# Patient Record
Sex: MALE | Race: WHITE | NOT HISPANIC OR LATINO | Employment: OTHER | URBAN - METROPOLITAN AREA
[De-identification: names, ages, dates, MRNs, and addresses within clinical notes are randomized per-mention and may not be internally consistent; named-entity substitution may affect disease eponyms.]

---

## 2017-01-27 ENCOUNTER — HOSPITAL ENCOUNTER (OUTPATIENT)
Facility: HOSPITAL | Age: 67
Setting detail: OBSERVATION
Discharge: HOME | End: 2017-01-28
Attending: EMERGENCY MEDICINE | Admitting: INTERNAL MEDICINE

## 2017-01-27 ENCOUNTER — APPOINTMENT (OUTPATIENT)
Dept: GENERAL RADIOLOGY | Facility: HOSPITAL | Age: 67
End: 2017-01-27

## 2017-01-27 ENCOUNTER — APPOINTMENT (OUTPATIENT)
Dept: CARDIOLOGY | Facility: HOSPITAL | Age: 67
End: 2017-01-27
Attending: INTERNAL MEDICINE

## 2017-01-27 DIAGNOSIS — R07.9 CHEST PAIN, UNSPECIFIED TYPE: Primary | ICD-10-CM

## 2017-01-27 LAB
ALBUMIN SERPL-MCNC: 3.5 G/DL (ref 3.5–5.2)
ALBUMIN/GLOB SERPL: 1.4 G/DL
ALP SERPL-CCNC: 97 U/L (ref 40–129)
ALT SERPL W P-5'-P-CCNC: 13 U/L (ref 5–41)
ANION GAP SERPL CALCULATED.3IONS-SCNC: 13.2 MMOL/L
APTT PPP: 32 SECONDS (ref 24.3–38.1)
AST SERPL-CCNC: 26 U/L (ref 5–40)
BASOPHILS # BLD AUTO: 0.04 10*3/MM3 (ref 0–0.2)
BASOPHILS NFR BLD AUTO: 0.8 % (ref 0–2)
BILIRUB SERPL-MCNC: 0.7 MG/DL (ref 0.2–1.2)
BUN BLD-MCNC: 15 MG/DL (ref 8–23)
BUN/CREAT SERPL: 14.7 (ref 7–25)
CALCIUM SPEC-SCNC: 8.4 MG/DL (ref 8.8–10.5)
CHLORIDE SERPL-SCNC: 104 MMOL/L (ref 98–107)
CO2 SERPL-SCNC: 23.8 MMOL/L (ref 22–29)
CREAT BLD-MCNC: 1.02 MG/DL (ref 0.76–1.27)
DEPRECATED RDW RBC AUTO: 55.9 FL (ref 37–54)
EOSINOPHIL # BLD AUTO: 0.08 10*3/MM3 (ref 0.1–0.3)
EOSINOPHIL NFR BLD AUTO: 1.5 % (ref 0–4)
ERYTHROCYTE [DISTWIDTH] IN BLOOD BY AUTOMATED COUNT: 14.3 % (ref 11.5–14.5)
GFR SERPL CREATININE-BSD FRML MDRD: 73 ML/MIN/1.73
GLOBULIN UR ELPH-MCNC: 2.5 GM/DL
GLUCOSE BLD-MCNC: 99 MG/DL (ref 65–99)
HCT VFR BLD AUTO: 41.6 % (ref 42–52)
HGB BLD-MCNC: 15 G/DL (ref 14–18)
IMM GRANULOCYTES # BLD: 0.03 10*3/MM3 (ref 0–0.03)
IMM GRANULOCYTES NFR BLD: 0.6 % (ref 0–0.5)
INR PPP: 0.94 (ref 0.9–1.1)
LYMPHOCYTES # BLD AUTO: 1.71 10*3/MM3 (ref 0.6–4.8)
LYMPHOCYTES NFR BLD AUTO: 33 % (ref 20–45)
MCH RBC QN AUTO: 38 PG (ref 27–31)
MCHC RBC AUTO-ENTMCNC: 36.1 G/DL (ref 31–37)
MCV RBC AUTO: 105.3 FL (ref 80–94)
MONOCYTES # BLD AUTO: 0.46 10*3/MM3 (ref 0–1)
MONOCYTES NFR BLD AUTO: 8.9 % (ref 3–8)
NEUTROPHILS # BLD AUTO: 2.86 10*3/MM3 (ref 1.5–8.3)
NEUTROPHILS NFR BLD AUTO: 55.2 % (ref 45–70)
NRBC BLD MANUAL-RTO: 0 /100 WBC (ref 0–0)
NT-PROBNP SERPL-MCNC: 158 PG/ML (ref 5–125)
PLATELET # BLD AUTO: 150 10*3/MM3 (ref 140–500)
PMV BLD AUTO: 10.4 FL (ref 7.4–10.4)
POTASSIUM BLD-SCNC: 4 MMOL/L (ref 3.5–5.2)
PROT SERPL-MCNC: 6 G/DL (ref 6–8.5)
PROTHROMBIN TIME: 12.6 SECONDS (ref 12.1–15)
RBC # BLD AUTO: 3.95 10*6/MM3 (ref 4.7–6.1)
SODIUM BLD-SCNC: 141 MMOL/L (ref 136–145)
TROPONIN T SERPL-MCNC: <0.01 NG/ML (ref 0–0.03)
TROPONIN T SERPL-MCNC: <0.01 NG/ML (ref 0–0.03)
WBC NRBC COR # BLD: 5.18 10*3/MM3 (ref 4.8–10.8)

## 2017-01-27 PROCEDURE — 85730 THROMBOPLASTIN TIME PARTIAL: CPT | Performed by: EMERGENCY MEDICINE

## 2017-01-27 PROCEDURE — G0378 HOSPITAL OBSERVATION PER HR: HCPCS

## 2017-01-27 PROCEDURE — 25010000002 ENOXAPARIN PER 10 MG: Performed by: EMERGENCY MEDICINE

## 2017-01-27 PROCEDURE — 25010000002 ENOXAPARIN PER 10 MG: Performed by: INTERNAL MEDICINE

## 2017-01-27 PROCEDURE — 99219 PR INITIAL OBSERVATION CARE/DAY 50 MINUTES: CPT | Performed by: INTERNAL MEDICINE

## 2017-01-27 PROCEDURE — 94640 AIRWAY INHALATION TREATMENT: CPT

## 2017-01-27 PROCEDURE — 85025 COMPLETE CBC W/AUTO DIFF WBC: CPT | Performed by: EMERGENCY MEDICINE

## 2017-01-27 PROCEDURE — 85610 PROTHROMBIN TIME: CPT | Performed by: EMERGENCY MEDICINE

## 2017-01-27 PROCEDURE — 83880 ASSAY OF NATRIURETIC PEPTIDE: CPT | Performed by: EMERGENCY MEDICINE

## 2017-01-27 PROCEDURE — 99284 EMERGENCY DEPT VISIT MOD MDM: CPT | Performed by: EMERGENCY MEDICINE

## 2017-01-27 PROCEDURE — 71020 HC CHEST PA AND LATERAL: CPT

## 2017-01-27 PROCEDURE — 80053 COMPREHEN METABOLIC PANEL: CPT | Performed by: EMERGENCY MEDICINE

## 2017-01-27 PROCEDURE — 96372 THER/PROPH/DIAG INJ SC/IM: CPT

## 2017-01-27 PROCEDURE — 93306 TTE W/DOPPLER COMPLETE: CPT

## 2017-01-27 PROCEDURE — 99284 EMERGENCY DEPT VISIT MOD MDM: CPT

## 2017-01-27 PROCEDURE — 84484 ASSAY OF TROPONIN QUANT: CPT | Performed by: INTERNAL MEDICINE

## 2017-01-27 PROCEDURE — 93306 TTE W/DOPPLER COMPLETE: CPT | Performed by: INTERNAL MEDICINE

## 2017-01-27 PROCEDURE — 93010 ELECTROCARDIOGRAM REPORT: CPT | Performed by: INTERNAL MEDICINE

## 2017-01-27 PROCEDURE — 93005 ELECTROCARDIOGRAM TRACING: CPT | Performed by: EMERGENCY MEDICINE

## 2017-01-27 PROCEDURE — 84484 ASSAY OF TROPONIN QUANT: CPT | Performed by: EMERGENCY MEDICINE

## 2017-01-27 RX ORDER — SODIUM CHLORIDE 0.9 % (FLUSH) 0.9 %
10 SYRINGE (ML) INJECTION AS NEEDED
Status: DISCONTINUED | OUTPATIENT
Start: 2017-01-27 | End: 2017-01-28 | Stop reason: HOSPADM

## 2017-01-27 RX ORDER — ACETAMINOPHEN 325 MG/1
650 TABLET ORAL EVERY 6 HOURS PRN
Status: DISCONTINUED | OUTPATIENT
Start: 2017-01-27 | End: 2017-01-28 | Stop reason: HOSPADM

## 2017-01-27 RX ORDER — DOCUSATE SODIUM 100 MG/1
100 CAPSULE, LIQUID FILLED ORAL 2 TIMES DAILY
Status: DISCONTINUED | OUTPATIENT
Start: 2017-01-27 | End: 2017-01-28 | Stop reason: HOSPADM

## 2017-01-27 RX ORDER — GABAPENTIN 400 MG/1
800 CAPSULE ORAL EVERY 8 HOURS SCHEDULED
Status: DISCONTINUED | OUTPATIENT
Start: 2017-01-27 | End: 2017-01-28 | Stop reason: HOSPADM

## 2017-01-27 RX ORDER — CYCLOBENZAPRINE HCL 10 MG
10 TABLET ORAL 3 TIMES DAILY PRN
Status: DISCONTINUED | OUTPATIENT
Start: 2017-01-27 | End: 2017-01-28 | Stop reason: HOSPADM

## 2017-01-27 RX ORDER — NORTRIPTYLINE HYDROCHLORIDE 25 MG/1
25 CAPSULE ORAL NIGHTLY
Status: DISCONTINUED | OUTPATIENT
Start: 2017-01-27 | End: 2017-01-28 | Stop reason: HOSPADM

## 2017-01-27 RX ORDER — NITROGLYCERIN 0.4 MG/1
0.4 TABLET SUBLINGUAL
Status: DISCONTINUED | OUTPATIENT
Start: 2017-01-27 | End: 2017-01-28 | Stop reason: HOSPADM

## 2017-01-27 RX ORDER — NICOTINE 21 MG/24HR
1 PATCH, TRANSDERMAL 24 HOURS TRANSDERMAL EVERY 24 HOURS
Status: DISCONTINUED | OUTPATIENT
Start: 2017-01-27 | End: 2017-01-28 | Stop reason: HOSPADM

## 2017-01-27 RX ORDER — DESONIDE 0.5 MG/G
CREAM TOPICAL EVERY 12 HOURS SCHEDULED
Status: DISCONTINUED | OUTPATIENT
Start: 2017-01-27 | End: 2017-01-28 | Stop reason: HOSPADM

## 2017-01-27 RX ORDER — ASPIRIN 81 MG/1
324 TABLET, CHEWABLE ORAL ONCE
Status: COMPLETED | OUTPATIENT
Start: 2017-01-27 | End: 2017-01-27

## 2017-01-27 RX ORDER — IPRATROPIUM BROMIDE AND ALBUTEROL SULFATE 2.5; .5 MG/3ML; MG/3ML
3 SOLUTION RESPIRATORY (INHALATION)
Status: DISCONTINUED | OUTPATIENT
Start: 2017-01-27 | End: 2017-01-28 | Stop reason: HOSPADM

## 2017-01-27 RX ORDER — DOXAZOSIN 2 MG/1
4 TABLET ORAL DAILY
Status: DISCONTINUED | OUTPATIENT
Start: 2017-01-27 | End: 2017-01-28 | Stop reason: HOSPADM

## 2017-01-27 RX ORDER — ALBUTEROL SULFATE 2.5 MG/3ML
2.5 SOLUTION RESPIRATORY (INHALATION)
Status: DISCONTINUED | OUTPATIENT
Start: 2017-01-27 | End: 2017-01-27

## 2017-01-27 RX ORDER — FAMOTIDINE 20 MG/1
20 TABLET, FILM COATED ORAL DAILY
Status: DISCONTINUED | OUTPATIENT
Start: 2017-01-27 | End: 2017-01-28 | Stop reason: HOSPADM

## 2017-01-27 RX ORDER — CELECOXIB 200 MG/1
200 CAPSULE ORAL DAILY
Status: DISCONTINUED | OUTPATIENT
Start: 2017-01-27 | End: 2017-01-28 | Stop reason: HOSPADM

## 2017-01-27 RX ORDER — BUDESONIDE 0.5 MG/2ML
0.5 INHALANT ORAL
Status: DISCONTINUED | OUTPATIENT
Start: 2017-01-27 | End: 2017-01-28 | Stop reason: HOSPADM

## 2017-01-27 RX ADMIN — DILTIAZEM HYDROCHLORIDE 30 MG: 30 TABLET, FILM COATED ORAL at 19:08

## 2017-01-27 RX ADMIN — NORTRIPTYLINE HYDROCHLORIDE 25 MG: 25 CAPSULE ORAL at 21:11

## 2017-01-27 RX ADMIN — DESONIDE: 0.5 CREAM TOPICAL at 21:08

## 2017-01-27 RX ADMIN — ASPIRIN 81 MG 324 MG: 81 TABLET ORAL at 12:55

## 2017-01-27 RX ADMIN — FAMOTIDINE 20 MG: 20 TABLET, FILM COATED ORAL at 19:08

## 2017-01-27 RX ADMIN — IPRATROPIUM BROMIDE AND ALBUTEROL SULFATE 3 ML: .5; 3 SOLUTION RESPIRATORY (INHALATION) at 20:16

## 2017-01-27 RX ADMIN — DILTIAZEM HYDROCHLORIDE 30 MG: 30 TABLET, FILM COATED ORAL at 23:50

## 2017-01-27 RX ADMIN — NITROGLYCERIN 0.4 MG: 0.4 TABLET SUBLINGUAL at 12:55

## 2017-01-27 RX ADMIN — IPRATROPIUM BROMIDE AND ALBUTEROL SULFATE 3 ML: .5; 3 SOLUTION RESPIRATORY (INHALATION) at 16:33

## 2017-01-27 RX ADMIN — BUDESONIDE 0.5 MG: 0.5 SUSPENSION RESPIRATORY (INHALATION) at 20:16

## 2017-01-27 RX ADMIN — ENOXAPARIN SODIUM 60 MG: 60 INJECTION SUBCUTANEOUS at 15:05

## 2017-01-27 RX ADMIN — GABAPENTIN 800 MG: 400 CAPSULE ORAL at 21:09

## 2017-01-27 RX ADMIN — NICOTINE 1 PATCH: 21 PATCH TRANSDERMAL at 19:07

## 2017-01-27 RX ADMIN — ENOXAPARIN SODIUM 40 MG: 40 INJECTION SUBCUTANEOUS at 21:08

## 2017-01-27 RX ADMIN — NITROGLYCERIN 1 INCH: 20 OINTMENT TOPICAL at 14:08

## 2017-01-27 RX ADMIN — GABAPENTIN 800 MG: 400 CAPSULE ORAL at 19:09

## 2017-01-28 VITALS
WEIGHT: 142.8 LBS | HEIGHT: 69 IN | SYSTOLIC BLOOD PRESSURE: 122 MMHG | TEMPERATURE: 97 F | OXYGEN SATURATION: 97 % | HEART RATE: 86 BPM | BODY MASS INDEX: 21.15 KG/M2 | DIASTOLIC BLOOD PRESSURE: 65 MMHG | RESPIRATION RATE: 18 BRPM

## 2017-01-28 DIAGNOSIS — R07.9 CHEST PAIN, UNSPECIFIED TYPE: Primary | ICD-10-CM

## 2017-01-28 LAB
ALBUMIN SERPL-MCNC: 3.4 G/DL (ref 3.5–5.2)
ALBUMIN/GLOB SERPL: 1.7 G/DL
ALP SERPL-CCNC: 83 U/L (ref 40–129)
ALT SERPL W P-5'-P-CCNC: 10 U/L (ref 5–41)
ANION GAP SERPL CALCULATED.3IONS-SCNC: 12.7 MMOL/L
AST SERPL-CCNC: 21 U/L (ref 5–40)
BASOPHILS # BLD AUTO: 0.04 10*3/MM3 (ref 0–0.2)
BASOPHILS NFR BLD AUTO: 0.7 % (ref 0–2)
BH CV ECHO MEAS - ACS: 1.7 CM
BH CV ECHO MEAS - AO MAX PG (FULL): 13 MMHG
BH CV ECHO MEAS - AO MAX PG: 13.1 MMHG
BH CV ECHO MEAS - AO MEAN PG (FULL): 3.1 MMHG
BH CV ECHO MEAS - AO MEAN PG: 6.7 MMHG
BH CV ECHO MEAS - AO ROOT AREA (BSA CORRECTED): 1.9
BH CV ECHO MEAS - AO ROOT AREA: 9.6 CM^2
BH CV ECHO MEAS - AO ROOT DIAM: 3.5 CM
BH CV ECHO MEAS - AO V2 MAX: 180.7 CM/SEC
BH CV ECHO MEAS - AO V2 MEAN: 118.9 CM/SEC
BH CV ECHO MEAS - AO V2 VTI: 32.7 CM
BH CV ECHO MEAS - AVA(I,A): 2.4 CM^2
BH CV ECHO MEAS - AVA(I,D): 2.4 CM^2
BH CV ECHO MEAS - AVA(V,A): 2.1 CM^2
BH CV ECHO MEAS - AVA(V,D): 2.1 CM^2
BH CV ECHO MEAS - BSA(HAYCOCK): 1.8 M^2
BH CV ECHO MEAS - BSA: 1.8 M^2
BH CV ECHO MEAS - BZI_BMI: 21.1 KILOGRAMS/M^2
BH CV ECHO MEAS - BZI_METRIC_HEIGHT: 175.3 CM
BH CV ECHO MEAS - BZI_METRIC_WEIGHT: 64.9 KG
BH CV ECHO MEAS - CONTRAST EF (2CH): 65.8 ML/M^2
BH CV ECHO MEAS - CONTRAST EF 4CH: 59.7 ML/M^2
BH CV ECHO MEAS - EDV(CUBED): 101 ML
BH CV ECHO MEAS - EDV(MOD-SP2): 73 ML
BH CV ECHO MEAS - EDV(MOD-SP4): 67 ML
BH CV ECHO MEAS - EDV(TEICH): 100.2 ML
BH CV ECHO MEAS - EF(CUBED): 81.8 %
BH CV ECHO MEAS - EF(MOD-SP2): 65.8 %
BH CV ECHO MEAS - EF(MOD-SP4): 59.7 %
BH CV ECHO MEAS - EF(TEICH): 74.5 %
BH CV ECHO MEAS - ESV(CUBED): 18.4 ML
BH CV ECHO MEAS - ESV(MOD-SP2): 25 ML
BH CV ECHO MEAS - ESV(MOD-SP4): 27 ML
BH CV ECHO MEAS - ESV(TEICH): 25.6 ML
BH CV ECHO MEAS - FS: 43.3 %
BH CV ECHO MEAS - IVS/LVPW: 1.1
BH CV ECHO MEAS - IVSD: 0.86 CM
BH CV ECHO MEAS - LA DIMENSION: 3 CM
BH CV ECHO MEAS - LA/AO: 0.87
BH CV ECHO MEAS - LAT PEAK E' VEL: 10 CM/SEC
BH CV ECHO MEAS - LV DIASTOLIC VOL/BSA (35-75): 37.4 ML/M^2
BH CV ECHO MEAS - LV MASS(C)D: 127.2 GRAMS
BH CV ECHO MEAS - LV MASS(C)DI: 71 GRAMS/M^2
BH CV ECHO MEAS - LV MAX PG: 6.8 MMHG
BH CV ECHO MEAS - LV MEAN PG: 3.5 MMHG
BH CV ECHO MEAS - LV SYSTOLIC VOL/BSA (12-30): 15.1 ML/M^2
BH CV ECHO MEAS - LV V1 MAX: 130.3 CM/SEC
BH CV ECHO MEAS - LV V1 MEAN: 88 CM/SEC
BH CV ECHO MEAS - LV V1 VTI: 27.3 CM
BH CV ECHO MEAS - LVIDD: 4.7 CM
BH CV ECHO MEAS - LVIDS: 2.6 CM
BH CV ECHO MEAS - LVLD AP2: 8 CM
BH CV ECHO MEAS - LVLD AP4: 7.7 CM
BH CV ECHO MEAS - LVLS AP2: 6.8 CM
BH CV ECHO MEAS - LVLS AP4: 6.7 CM
BH CV ECHO MEAS - LVOT AREA (M): 2.8 CM^2
BH CV ECHO MEAS - LVOT AREA: 2.8 CM^2
BH CV ECHO MEAS - LVOT DIAM: 1.9 CM
BH CV ECHO MEAS - LVPWD: 0.81 CM
BH CV ECHO MEAS - MED PEAK E' VEL: 9 CM/SEC
BH CV ECHO MEAS - MR MAX PG: 84.4 MMHG
BH CV ECHO MEAS - MR MAX VEL: 459.4 CM/SEC
BH CV ECHO MEAS - MV A DUR: 0.16 SEC
BH CV ECHO MEAS - MV A MAX VEL: 87.4 CM/SEC
BH CV ECHO MEAS - MV DEC SLOPE: 375.9 CM/SEC^2
BH CV ECHO MEAS - MV DEC TIME: 0.23 SEC
BH CV ECHO MEAS - MV E MAX VEL: 89.8 CM/SEC
BH CV ECHO MEAS - MV E/A: 1
BH CV ECHO MEAS - MV MAX PG: 3.4 MMHG
BH CV ECHO MEAS - MV MEAN PG: 1.5 MMHG
BH CV ECHO MEAS - MV P1/2T MAX VEL: 90.3 CM/SEC
BH CV ECHO MEAS - MV P1/2T: 70.4 MSEC
BH CV ECHO MEAS - MV V2 MAX: 92.3 CM/SEC
BH CV ECHO MEAS - MV V2 MEAN: 57.6 CM/SEC
BH CV ECHO MEAS - MV V2 VTI: 24.5 CM
BH CV ECHO MEAS - MVA P1/2T LCG: 2.4 CM^2
BH CV ECHO MEAS - MVA(P1/2T): 3.1 CM^2
BH CV ECHO MEAS - MVA(VTI): 3.2 CM^2
BH CV ECHO MEAS - PA ACC SLOPE: 1079 CM/SEC^2
BH CV ECHO MEAS - PA ACC TIME: 0.12 SEC
BH CV ECHO MEAS - PA MAX PG (FULL): 4.2 MMHG
BH CV ECHO MEAS - PA MAX PG: 7.4 MMHG
BH CV ECHO MEAS - PA MEAN PG (FULL): 1.6 MMHG
BH CV ECHO MEAS - PA MEAN PG: 3 MMHG
BH CV ECHO MEAS - PA PR(ACCEL): 23.5 MMHG
BH CV ECHO MEAS - PA V2 MAX: 135.7 CM/SEC
BH CV ECHO MEAS - PA V2 MEAN: 78.2 CM/SEC
BH CV ECHO MEAS - PA V2 VTI: 19 CM
BH CV ECHO MEAS - PULM A REVS DUR: 0.14 SEC
BH CV ECHO MEAS - PULM A REVS VEL: 42.4 CM/SEC
BH CV ECHO MEAS - PULM DIAS VEL: 45.9 CM/SEC
BH CV ECHO MEAS - PULM S/D: 1.3
BH CV ECHO MEAS - PULM SYS VEL: 60.7 CM/SEC
BH CV ECHO MEAS - PVA(I,A): 2.2 CM^2
BH CV ECHO MEAS - PVA(I,D): 2.2 CM^2
BH CV ECHO MEAS - PVA(V,A): 2 CM^2
BH CV ECHO MEAS - PVA(V,D): 2 CM^2
BH CV ECHO MEAS - QP/QS: 0.53
BH CV ECHO MEAS - RAP SYSTOLE: 3 MMHG
BH CV ECHO MEAS - RV MAX PG: 3.1 MMHG
BH CV ECHO MEAS - RV MEAN PG: 1.4 MMHG
BH CV ECHO MEAS - RV V1 MAX: 88.4 CM/SEC
BH CV ECHO MEAS - RV V1 MEAN: 53.5 CM/SEC
BH CV ECHO MEAS - RV V1 VTI: 13.6 CM
BH CV ECHO MEAS - RVOT AREA: 3 CM^2
BH CV ECHO MEAS - RVOT DIAM: 2 CM
BH CV ECHO MEAS - RVSP: 31 MMHG
BH CV ECHO MEAS - SI(AO): 174.4 ML/M^2
BH CV ECHO MEAS - SI(CUBED): 46.1 ML/M^2
BH CV ECHO MEAS - SI(LVOT): 43.3 ML/M^2
BH CV ECHO MEAS - SI(MOD-SP2): 26.8 ML/M^2
BH CV ECHO MEAS - SI(MOD-SP4): 22.3 ML/M^2
BH CV ECHO MEAS - SI(TEICH): 41.7 ML/M^2
BH CV ECHO MEAS - SV(AO): 312.4 ML
BH CV ECHO MEAS - SV(CUBED): 82.6 ML
BH CV ECHO MEAS - SV(LVOT): 77.6 ML
BH CV ECHO MEAS - SV(MOD-SP2): 48 ML
BH CV ECHO MEAS - SV(MOD-SP4): 40 ML
BH CV ECHO MEAS - SV(RVOT): 40.8 ML
BH CV ECHO MEAS - SV(TEICH): 74.6 ML
BH CV ECHO MEAS - TAPSE (>1.6): 2.8 CM2
BH CV ECHO MEAS - TR MAX VEL: 261.8 CM/SEC
BH CV XLRA - RV BASE: 3.8 CM
BH CV XLRA - RV LENGTH: 7.2 CM
BH CV XLRA - RV MID: 2.6 CM
BH CV XLRA - TDI S': 18 CM/SEC
BILIRUB SERPL-MCNC: 0.6 MG/DL (ref 0.2–1.2)
BUN BLD-MCNC: 10 MG/DL (ref 8–23)
BUN/CREAT SERPL: 11.4 (ref 7–25)
CALCIUM SPEC-SCNC: 8.3 MG/DL (ref 8.8–10.5)
CHLORIDE SERPL-SCNC: 104 MMOL/L (ref 98–107)
CO2 SERPL-SCNC: 24.3 MMOL/L (ref 22–29)
CREAT BLD-MCNC: 0.88 MG/DL (ref 0.76–1.27)
DEPRECATED RDW RBC AUTO: 55 FL (ref 37–54)
E/E' RATIO: 10
EOSINOPHIL # BLD AUTO: 0.07 10*3/MM3 (ref 0.1–0.3)
EOSINOPHIL NFR BLD AUTO: 1.3 % (ref 0–4)
ERYTHROCYTE [DISTWIDTH] IN BLOOD BY AUTOMATED COUNT: 14.2 % (ref 11.5–14.5)
GFR SERPL CREATININE-BSD FRML MDRD: 87 ML/MIN/1.73
GLOBULIN UR ELPH-MCNC: 2 GM/DL
GLUCOSE BLD-MCNC: 96 MG/DL (ref 65–99)
HCT VFR BLD AUTO: 37.9 % (ref 42–52)
HGB BLD-MCNC: 13.4 G/DL (ref 14–18)
IMM GRANULOCYTES # BLD: 0.02 10*3/MM3 (ref 0–0.03)
IMM GRANULOCYTES NFR BLD: 0.4 % (ref 0–0.5)
LEFT ATRIUM VOLUME INDEX: 23 ML/M2
LEFT ATRIUM VOLUME: 41 CM3
LYMPHOCYTES # BLD AUTO: 1.79 10*3/MM3 (ref 0.6–4.8)
LYMPHOCYTES NFR BLD AUTO: 32 % (ref 20–45)
MCH RBC QN AUTO: 36.9 PG (ref 27–31)
MCHC RBC AUTO-ENTMCNC: 35.4 G/DL (ref 31–37)
MCV RBC AUTO: 104.4 FL (ref 80–94)
MONOCYTES # BLD AUTO: 0.45 10*3/MM3 (ref 0–1)
MONOCYTES NFR BLD AUTO: 8 % (ref 3–8)
NEUTROPHILS # BLD AUTO: 3.23 10*3/MM3 (ref 1.5–8.3)
NEUTROPHILS NFR BLD AUTO: 57.6 % (ref 45–70)
NRBC BLD MANUAL-RTO: 0 /100 WBC (ref 0–0)
PLATELET # BLD AUTO: 137 10*3/MM3 (ref 140–500)
PMV BLD AUTO: 10.3 FL (ref 7.4–10.4)
POTASSIUM BLD-SCNC: 3.7 MMOL/L (ref 3.5–5.2)
PROT SERPL-MCNC: 5.4 G/DL (ref 6–8.5)
RBC # BLD AUTO: 3.63 10*6/MM3 (ref 4.7–6.1)
SODIUM BLD-SCNC: 141 MMOL/L (ref 136–145)
TROPONIN T SERPL-MCNC: <0.01 NG/ML (ref 0–0.03)
TROPONIN T SERPL-MCNC: <0.01 NG/ML (ref 0–0.03)
WBC NRBC COR # BLD: 5.6 10*3/MM3 (ref 4.8–10.8)

## 2017-01-28 PROCEDURE — 99217 PR OBSERVATION CARE DISCHARGE MANAGEMENT: CPT | Performed by: INTERNAL MEDICINE

## 2017-01-28 PROCEDURE — 84484 ASSAY OF TROPONIN QUANT: CPT | Performed by: INTERNAL MEDICINE

## 2017-01-28 PROCEDURE — 93005 ELECTROCARDIOGRAM TRACING: CPT | Performed by: INTERNAL MEDICINE

## 2017-01-28 PROCEDURE — G0378 HOSPITAL OBSERVATION PER HR: HCPCS

## 2017-01-28 PROCEDURE — 93010 ELECTROCARDIOGRAM REPORT: CPT | Performed by: INTERNAL MEDICINE

## 2017-01-28 PROCEDURE — 99214 OFFICE O/P EST MOD 30 MIN: CPT | Performed by: INTERNAL MEDICINE

## 2017-01-28 PROCEDURE — 94640 AIRWAY INHALATION TREATMENT: CPT

## 2017-01-28 PROCEDURE — 80053 COMPREHEN METABOLIC PANEL: CPT | Performed by: INTERNAL MEDICINE

## 2017-01-28 PROCEDURE — 85025 COMPLETE CBC W/AUTO DIFF WBC: CPT | Performed by: INTERNAL MEDICINE

## 2017-01-28 RX ORDER — FAMOTIDINE 20 MG/1
20 TABLET, FILM COATED ORAL DAILY
Qty: 30 TABLET | Refills: 1 | Status: SHIPPED | OUTPATIENT
Start: 2017-01-28 | End: 2018-04-09

## 2017-01-28 RX ADMIN — BUDESONIDE 0.5 MG: 0.5 SUSPENSION RESPIRATORY (INHALATION) at 07:38

## 2017-01-28 RX ADMIN — DILTIAZEM HYDROCHLORIDE 30 MG: 30 TABLET, FILM COATED ORAL at 05:42

## 2017-01-28 RX ADMIN — DOCUSATE SODIUM 100 MG: 100 CAPSULE, LIQUID FILLED ORAL at 09:00

## 2017-01-28 RX ADMIN — FAMOTIDINE 20 MG: 20 TABLET, FILM COATED ORAL at 09:00

## 2017-01-28 RX ADMIN — IPRATROPIUM BROMIDE AND ALBUTEROL SULFATE 3 ML: .5; 3 SOLUTION RESPIRATORY (INHALATION) at 07:38

## 2017-01-28 RX ADMIN — GABAPENTIN 800 MG: 400 CAPSULE ORAL at 05:42

## 2017-01-28 RX ADMIN — CELECOXIB 200 MG: 200 CAPSULE ORAL at 09:00

## 2017-01-28 RX ADMIN — DOXAZOSIN 4 MG: 2 TABLET ORAL at 09:00

## 2017-02-01 ENCOUNTER — HOSPITAL ENCOUNTER (OUTPATIENT)
Dept: CARDIOLOGY | Facility: HOSPITAL | Age: 67
Discharge: HOME | End: 2017-02-01
Attending: INTERNAL MEDICINE | Admitting: INTERNAL MEDICINE

## 2017-02-01 ENCOUNTER — TELEPHONE (OUTPATIENT)
Dept: CARDIOLOGY | Facility: CLINIC | Age: 67
End: 2017-02-01

## 2017-02-01 DIAGNOSIS — R07.9 CHEST PAIN, UNSPECIFIED TYPE: ICD-10-CM

## 2017-02-01 LAB
BH CV NUCLEAR PRIOR STUDY: 1
BH CV STRESS BP STAGE 1: NORMAL
BH CV STRESS COMMENTS STAGE 1: NORMAL
BH CV STRESS DOSE REGADENOSON STAGE 1: 0.4
BH CV STRESS DURATION MIN STAGE 1: 0
BH CV STRESS DURATION SEC STAGE 1: 15
BH CV STRESS HR STAGE 1: 116
BH CV STRESS PROTOCOL 1: NORMAL
BH CV STRESS RECOVERY BP: NORMAL MMHG
BH CV STRESS RECOVERY HR: 100 BPM
BH CV STRESS STAGE 1: 1
LV EF NUC BP: 60 %
MAXIMAL PREDICTED HEART RATE: 154 BPM
PERCENT MAX PREDICTED HR: 75.32 %
STRESS BASELINE BP: NORMAL MMHG
STRESS BASELINE HR: 92 BPM
STRESS PERCENT HR: 89 %
STRESS POST EXERCISE DUR SEC: 15 SEC
STRESS POST PEAK BP: NORMAL MMHG
STRESS POST PEAK HR: 116 BPM
STRESS TARGET HR: 131 BPM

## 2017-02-01 PROCEDURE — 93016 CV STRESS TEST SUPVJ ONLY: CPT | Performed by: INTERNAL MEDICINE

## 2017-02-01 PROCEDURE — 93018 CV STRESS TEST I&R ONLY: CPT | Performed by: INTERNAL MEDICINE

## 2017-02-01 PROCEDURE — A9502 TC99M TETROFOSMIN: HCPCS | Performed by: INTERNAL MEDICINE

## 2017-02-01 PROCEDURE — 78452 HT MUSCLE IMAGE SPECT MULT: CPT

## 2017-02-01 PROCEDURE — 0 TECHNETIUM TETROFOSMIN KIT: Performed by: INTERNAL MEDICINE

## 2017-02-01 PROCEDURE — 25010000002 REGADENOSON 0.4 MG/5ML SOLUTION: Performed by: INTERNAL MEDICINE

## 2017-02-01 PROCEDURE — 93017 CV STRESS TEST TRACING ONLY: CPT

## 2017-02-01 PROCEDURE — 78452 HT MUSCLE IMAGE SPECT MULT: CPT | Performed by: INTERNAL MEDICINE

## 2017-02-01 RX ADMIN — REGADENOSON 0.4 MG: 0.08 INJECTION, SOLUTION INTRAVENOUS at 13:01

## 2017-02-01 RX ADMIN — TETROFOSMIN 1 DOSE: 1.38 INJECTION, POWDER, LYOPHILIZED, FOR SOLUTION INTRAVENOUS at 12:10

## 2017-02-01 RX ADMIN — TETROFOSMIN 1 DOSE: 1.38 INJECTION, POWDER, LYOPHILIZED, FOR SOLUTION INTRAVENOUS at 13:01

## 2017-02-01 NOTE — TELEPHONE ENCOUNTER
King Parson  Male, 66 y.o., 1950  PCP:   BETTE Guillen  Language:   English  Need Interp:   No  Last Weight:   142 lb 12.8 oz (64.8 kg)  Phone:   M: [phone] H: [phone]  Allergies  Codeine  Health Maintenance:   Due  FYI:   None  Primary Ins.:   MEDICARE  MRN:   [id number]  MyChart:   Code Exp  Pharmacy:   [address] - [phone]  - [phone] [[id number]]  Preferred Lab:   None  Next Appt Date by Dept:   None    Message  Received: Today       Ashwin Buck MD sent to Cosmo Conway MD       Cc: Stephania Will MA                     Patient of yours I saw in  over the weekend for atypical chest pain.       Will you let him know of the results?     ty   jdk            Previous Messages       ----- Message -----      From: Cosmo Conway MD      Sent: 2/1/2017   2:05 PM        To: Ashwin Buck MD                           Stress Test With Myocardial Perfusion   Status:  Final result   Visible to patient:  No (Not Released) Dx:  Chest pain, unspecified type Order: [id number]         Details        Reading Physician Reading Date Result Priority       MD Cosmo Floyd MD Michael Imburgia, MD 2/1/2017 2/1/2017 2/1/2017 Routine           Result Text             · Myocardial perfusion imaging indicates a normal myocardial perfusion study with no evidence of ischemia.  · Left ventricular ejection fraction is normal (Calculated EF = 60%).  · Compared to the prior study from 5/19/2015 the current study reveals no changes.  · Impressions are consistent with a low risk study.                    All Measurements          Ref Range & Units 2:01 PM         Nuclear Prior Study  1       BH CV STRESS PROTOCOL 1  Pharmacologic       Stage 1  1       HR Stage 1  116       BP Stage 1  98/50       Duration Min Stage 1  0       Duration Sec Stage 1  15       Stress Dose Regadenoson Stage 1  0.4        Stress Comments Stage 1  15 sec bolus injection       Baseline HR bpm 92       Baseline BP mmHg 110/62       Peak HR bpm 116       Percent Max Pred HR % 75.32       Percent Target HR % 89       Peak BP mmHg 98/50       Recovery HR bpm 100       Recovery BP mmHg 122/64       Target HR (85%) bpm 131       Max. Pred. HR (100%) bpm 154       Exercise duration (sec) sec 15       Nuc Stress EF % 60                       [facility]  [address]  [facility] [id number] [phone]            Stress Data        Stage HR (bpm) BP (mmHg) Minutes Seconds Dose (mg) Comments       1        116        98/50        0        15        0.4        15 sec bolus injection                Stress Measurements        Baseline Vitals   Baseline HR 92 bpm        Baseline /62 mmHg         Peak Stress Vitals   Peak  bpm        Peak BP 98/50 mmHg         Recovery Vitals   Recovery  bpm        Recovery /64 mmHg         Exercise Data   Target HR (85%) 131 bpm        Max. Pred. HR (100%) 154 bpm        Percent Max Pred HR 75.32 %        Exercise duration (sec) 15 sec                 Stress Procedure        Rest ECG  Baseline ECG of normal sinus rhythm noted. Right bundle branch block noted. Slight ST elevation leads III and AVF.   AL interval = 160 ms. QRS complex = 120 ms. There was no ST segment deviation noted.       Stress ECG  Stress ECG of normal sinus rhythm noted. Right bundle branch block noted. Slight ST elevation leads III and AVF.   There was no ST segment deviation noted during stress.   Arrhythmias during stress: none.   Arrhythmias during recovery: none.   There were no significant arrhythmias noted during the test.   ECG was interpretable.   Indeterminate stress ECG interpretation.       Stress Description  A pharmacological stress test was performed using regadenoson with low-level exercise.   The patient reached the end of the protocol.   The patient  reported shortness of breath during the stress test. Symptoms were not clinically significant.       Stress Findings  Equivocal ECG evidence of myocardial ischemia.Indeterminate clinical evidence of myocardial ischemia.           Nuclear Perfusion Findings        Study Impression  Myocardial perfusion imaging indicates a normal myocardial perfusion study with no evidence of ischemia. Impressions are consistent with a low risk study. Compared to the prior study from 5/19/2015 the current study reveals no changes.       Rest Perfusion Defect 1  No rest myocardial perfusion defect noted.       Stress Perfusion Defect 1  No stress myocardial perfusion defect noted.       Ventricle Size / Description  Left ventricular ejection fraction is normal (Calculated EF = 60%). Normal LV cavity size. Normal LV wall motion noted. Normal RV cavity size.                   Last Resulted: 02/01/17  2:02 PM                            Routing History        Priority Sent On From To Message Type        2/1/2017  2:08 PM MD Cosmo Myrick MD Results        Stephania Will MA         2/1/2017  2:05 PM MD Ashwin Floyd MD Results

## 2017-02-02 RX ORDER — GABAPENTIN 800 MG/1
TABLET ORAL
Qty: 120 TABLET | Refills: 2 | Status: SHIPPED | OUTPATIENT
Start: 2017-02-02 | End: 2017-05-11 | Stop reason: SDUPTHER

## 2017-03-28 ENCOUNTER — OFFICE VISIT (OUTPATIENT)
Dept: PAIN MEDICINE | Facility: CLINIC | Age: 67
End: 2017-03-28

## 2017-03-28 VITALS
DIASTOLIC BLOOD PRESSURE: 74 MMHG | HEIGHT: 69 IN | HEART RATE: 68 BPM | TEMPERATURE: 97.8 F | SYSTOLIC BLOOD PRESSURE: 144 MMHG | RESPIRATION RATE: 16 BRPM | OXYGEN SATURATION: 97 % | WEIGHT: 154.6 LBS | BODY MASS INDEX: 22.9 KG/M2

## 2017-03-28 DIAGNOSIS — M48.061 SPINAL STENOSIS OF LUMBAR REGION: ICD-10-CM

## 2017-03-28 DIAGNOSIS — G89.29 OTHER CHRONIC PAIN: Primary | ICD-10-CM

## 2017-03-28 DIAGNOSIS — M54.16 LUMBAR RADICULOPATHY: ICD-10-CM

## 2017-03-28 DIAGNOSIS — M47.816 LUMBAR FACET ARTHROPATHY: ICD-10-CM

## 2017-03-28 PROCEDURE — 99213 OFFICE O/P EST LOW 20 MIN: CPT | Performed by: NURSE PRACTITIONER

## 2017-03-28 RX ORDER — CELECOXIB 200 MG/1
CAPSULE ORAL
Qty: 30 CAPSULE | Refills: 1 | Status: SHIPPED | OUTPATIENT
Start: 2017-03-28 | End: 2017-05-16 | Stop reason: SDUPTHER

## 2017-03-28 NOTE — PROGRESS NOTES
CHIEF COMPLAINT    Pt had Lumbar RF on 7-12-16, and states he was able to work for two months after that he felt so good. He reports 65% relief after it for months. Gradually, the relief has been wearing off. His bilateral leg pain has been increasing as well.    Subjective   Kinglilian Parson is a 66 y.o. male  who presents to the office for follow-up of procedure.  He completed a lumbar RFL   on  7- performed by Dr. Prince for management of low back pain. Patient reports 65-70% relief from the procedure for 6-7 months with moderate benefit ongoing at this time. Primary complaint today is bilateral leg pain, low back pain still decreased.      C/o low back pain with radiating pain down bilateral posterior/lateral legs. The pain is constant, but worse with activity.  Pain is worse at night. Today it is a 6/10 in severity.  Improved with rest, injections. Has previously benefited from bilateral L5 LTFESI.      Back Pain   This is a chronic problem. The current episode started more than 1 year ago. The problem occurs constantly. The problem is unchanged. The pain is present in the lumbar spine and sacro-iliac. The quality of the pain is described as aching and burning. The pain does not radiate (radicular symptoms have resolved since LTFESI, still reporting axial LBP). The pain is at a severity of 6/10. The pain is mild. The pain is worse during the night. The symptoms are aggravated by standing, bending and twisting (walking). Stiffness is present at night. Pertinent negatives include no bladder incontinence, bowel incontinence, chest pain, dysuria, fever, headaches, numbness, tingling or weakness. Risk factors include sedentary lifestyle. He has tried NSAIDs (Celebrex, Gabapentin, SI joint injections, Bilateral L5 TFESI's, lumbar MBB) for the symptoms. The treatment provided moderate relief.        PEG Assessment   What number best describes your pain on average in the past week?6  What number best describes  "how, during the past week, pain has interfered with your enjoyment of life?6  What number best describes how, during the past week, pain has interfered with your general activity?  7      The following portions of the patient's history were reviewed and updated as appropriate: allergies, current medications, past family history, past medical history, past social history, past surgical history and problem list.    Review of Systems   Constitutional: Negative for activity change, appetite change, chills and fever.   Respiratory: Positive for cough (pt states he has asthma and COPD) and shortness of breath. Negative for apnea.    Cardiovascular: Negative for chest pain and palpitations.   Gastrointestinal: Negative for bowel incontinence, constipation, diarrhea, nausea and vomiting.   Genitourinary: Negative for bladder incontinence, difficulty urinating and dysuria.   Musculoskeletal: Positive for back pain (Bilat leg to ankles).   Neurological: Negative for dizziness, tingling, weakness, light-headedness, numbness and headaches.   Psychiatric/Behavioral: Negative for agitation, confusion, hallucinations, sleep disturbance and suicidal ideas. The patient is not nervous/anxious.        Vitals:    03/28/17 1515   BP: 144/74   Pulse: 68   Resp: 16   Temp: 97.8 °F (36.6 °C)   SpO2: 97%   Weight: 154 lb 9.6 oz (70.1 kg)   Height: 69\" (175.3 cm)   PainSc:   6   PainLoc: Back     Objective   Physical Exam   Constitutional: He is oriented to person, place, and time. He appears well-developed and well-nourished. He is cooperative.   HENT:   Head: Normocephalic and atraumatic.   Nose: Nose normal.   Eyes: Conjunctivae and lids are normal.   Neck: Trachea normal.   Cardiovascular: Normal rate, regular rhythm and normal heart sounds.    Pulmonary/Chest: Effort normal and breath sounds normal. No respiratory distress.   Abdominal: Soft. Normal appearance.   Musculoskeletal:        Lumbar back: He exhibits tenderness. "   Neurological: He is alert and oriented to person, place, and time. He has normal reflexes. Gait normal.   Reflex Scores:       Patellar reflexes are 2+ on the right side and 2+ on the left side.  Skin: Skin is warm, dry and intact.   Psychiatric: He has a normal mood and affect. His speech is normal and behavior is normal. Judgment and thought content normal. Cognition and memory are normal.   Nursing note and vitals reviewed.    Assessment/Plan   King was seen today for back pain.    Diagnoses and all orders for this visit:    Other chronic pain    Spinal stenosis of lumbar region  -     Case Request    Lumbar radiculopathy  -     Case Request    Lumbar facet arthropathy      --- Bilateral L5 LTFESI X 2, 2-4 weeks apart. No blood thinners.    --- Follow-up after procedure          ADA REPORT  ADA report has been reviewed and scanned into the patient's chart.    Date of last ADA : 3-

## 2017-04-18 ENCOUNTER — OUTSIDE FACILITY SERVICE (OUTPATIENT)
Dept: PAIN MEDICINE | Facility: CLINIC | Age: 67
End: 2017-04-18

## 2017-04-18 PROCEDURE — 64483 NJX AA&/STRD TFRM EPI L/S 1: CPT | Performed by: ANESTHESIOLOGY

## 2017-05-11 RX ORDER — GABAPENTIN 800 MG/1
TABLET ORAL
Qty: 120 TABLET | Refills: 1 | Status: SHIPPED | OUTPATIENT
Start: 2017-05-11 | End: 2017-07-08 | Stop reason: SDUPTHER

## 2017-05-16 ENCOUNTER — OFFICE VISIT (OUTPATIENT)
Dept: PAIN MEDICINE | Facility: CLINIC | Age: 67
End: 2017-05-16

## 2017-05-16 VITALS
WEIGHT: 151 LBS | SYSTOLIC BLOOD PRESSURE: 153 MMHG | RESPIRATION RATE: 16 BRPM | DIASTOLIC BLOOD PRESSURE: 77 MMHG | BODY MASS INDEX: 22.36 KG/M2 | HEART RATE: 77 BPM | TEMPERATURE: 99.2 F | OXYGEN SATURATION: 95 % | HEIGHT: 69 IN

## 2017-05-16 DIAGNOSIS — G89.29 OTHER CHRONIC PAIN: Primary | ICD-10-CM

## 2017-05-16 DIAGNOSIS — M51.36 DEGENERATION OF INTERVERTEBRAL DISC OF LUMBAR REGION: ICD-10-CM

## 2017-05-16 DIAGNOSIS — M54.16 LUMBAR RADICULOPATHY: ICD-10-CM

## 2017-05-16 DIAGNOSIS — M47.816 LUMBAR FACET ARTHROPATHY: ICD-10-CM

## 2017-05-16 PROCEDURE — 99212 OFFICE O/P EST SF 10 MIN: CPT | Performed by: NURSE PRACTITIONER

## 2017-05-16 RX ORDER — PREDNISONE 10 MG/1
TABLET ORAL
COMMUNITY
Start: 2017-05-12 | End: 2017-06-20

## 2017-05-16 RX ORDER — OMEPRAZOLE 40 MG/1
40 CAPSULE, DELAYED RELEASE ORAL EVERY MORNING
COMMUNITY
Start: 2017-05-12

## 2017-05-23 ENCOUNTER — OUTSIDE FACILITY SERVICE (OUTPATIENT)
Dept: PAIN MEDICINE | Facility: CLINIC | Age: 67
End: 2017-05-23

## 2017-05-23 PROCEDURE — 64483 NJX AA&/STRD TFRM EPI L/S 1: CPT | Performed by: ANESTHESIOLOGY

## 2017-05-23 RX ORDER — CELECOXIB 200 MG/1
CAPSULE ORAL
Qty: 30 CAPSULE | Refills: 1 | Status: SHIPPED | OUTPATIENT
Start: 2017-05-23 | End: 2017-07-21 | Stop reason: SDUPTHER

## 2017-05-31 ENCOUNTER — TRANSCRIBE ORDERS (OUTPATIENT)
Dept: ADMINISTRATIVE | Facility: HOSPITAL | Age: 67
End: 2017-05-31

## 2017-05-31 DIAGNOSIS — R22.1 NECK MASS: Primary | ICD-10-CM

## 2017-06-06 ENCOUNTER — HOSPITAL ENCOUNTER (OUTPATIENT)
Dept: CT IMAGING | Facility: HOSPITAL | Age: 67
Discharge: HOME | End: 2017-06-06
Attending: OTOLARYNGOLOGY | Admitting: OTOLARYNGOLOGY

## 2017-06-06 DIAGNOSIS — R22.1 NECK MASS: ICD-10-CM

## 2017-06-06 LAB — CREAT BLDA-MCNC: 0.9 MG/DL (ref 0.6–1.3)

## 2017-06-06 PROCEDURE — 0 IOPAMIDOL 61 % SOLUTION: Performed by: OTOLARYNGOLOGY

## 2017-06-06 PROCEDURE — 82565 ASSAY OF CREATININE: CPT

## 2017-06-06 PROCEDURE — 70491 CT SOFT TISSUE NECK W/DYE: CPT

## 2017-06-06 RX ADMIN — IOPAMIDOL 85 ML: 612 INJECTION, SOLUTION INTRAVENOUS at 10:18

## 2017-06-15 ENCOUNTER — TRANSCRIBE ORDERS (OUTPATIENT)
Dept: ADMINISTRATIVE | Facility: HOSPITAL | Age: 67
End: 2017-06-15

## 2017-06-15 DIAGNOSIS — R59.0 ENLARGED LYMPH NODE IN NECK: Primary | ICD-10-CM

## 2017-06-20 ENCOUNTER — OFFICE VISIT (OUTPATIENT)
Dept: PAIN MEDICINE | Facility: CLINIC | Age: 67
End: 2017-06-20

## 2017-06-20 VITALS
WEIGHT: 148.4 LBS | SYSTOLIC BLOOD PRESSURE: 123 MMHG | RESPIRATION RATE: 18 BRPM | TEMPERATURE: 98.1 F | OXYGEN SATURATION: 96 % | BODY MASS INDEX: 21.98 KG/M2 | HEART RATE: 78 BPM | HEIGHT: 69 IN | DIASTOLIC BLOOD PRESSURE: 72 MMHG

## 2017-06-20 DIAGNOSIS — M47.816 LUMBAR FACET ARTHROPATHY: ICD-10-CM

## 2017-06-20 DIAGNOSIS — M54.16 LUMBAR RADICULOPATHY: ICD-10-CM

## 2017-06-20 DIAGNOSIS — G89.29 OTHER CHRONIC PAIN: Primary | ICD-10-CM

## 2017-06-20 DIAGNOSIS — M51.36 DEGENERATION OF INTERVERTEBRAL DISC OF LUMBAR REGION: ICD-10-CM

## 2017-06-20 PROCEDURE — 99213 OFFICE O/P EST LOW 20 MIN: CPT | Performed by: NURSE PRACTITIONER

## 2017-06-20 NOTE — PROGRESS NOTES
CHIEF COMPLAINT  Follow-up for back pain.    Subjective   King Parson is a 66 y.o. male  who presents to the office for follow-up of procedure.  He completed a bilateral lumbar TFESI @ L5  on  5/23/17 performed by Dr. Prince for management of back pain. Patient reports 40% ongoing relief from the procedure.     lumbar RFL on 7-. 65-70% relief from the procedure for  About 8 months but can tell that it has waned over the previous couple of months.  He complains of lumbosacral area pain, no radiating pain currently.  It is aggravated by bending, twisting. Improved with rest.       Back Pain   This is a chronic problem. The current episode started more than 1 year ago. The problem occurs constantly. The problem is unchanged. The pain is present in the lumbar spine and sacro-iliac. The quality of the pain is described as aching and burning. The pain does not radiate. The pain is at a severity of 6/10. The pain is mild. The pain is worse during the night. The symptoms are aggravated by standing, bending and twisting (walking). Stiffness is present at night. Pertinent negatives include no bladder incontinence, bowel incontinence, chest pain, dysuria, fever, headaches, numbness, tingling or weakness. Risk factors include sedentary lifestyle. He has tried NSAIDs (Celebrex, Gabapentin, SI joint injections, Bilateral L5 TFESI's, lumbar MBB/RFL) for the symptoms. The treatment provided moderate relief.      PEG Assessment   What number best describes your pain on average in the past week?6  What number best describes how, during the past week, pain has interfered with your enjoyment of life?7  What number best describes how, during the past week, pain has interfered with your general activity?  7    The following portions of the patient's history were reviewed and updated as appropriate: allergies, current medications, past family history, past medical history, past social history, past surgical history and  "problem list.    Review of Systems   Constitutional: Negative for fatigue and fever.   HENT: Positive for congestion.    Eyes: Negative for visual disturbance.   Respiratory: Positive for shortness of breath. Negative for cough and wheezing.    Cardiovascular: Negative.  Negative for chest pain.   Gastrointestinal: Negative for bowel incontinence, constipation and diarrhea.   Genitourinary: Negative for bladder incontinence, difficulty urinating and dysuria.   Musculoskeletal: Positive for back pain.   Neurological: Negative for tingling, weakness, numbness and headaches.   Psychiatric/Behavioral: Negative for sleep disturbance and suicidal ideas. The patient is nervous/anxious.      Vitals:    06/20/17 1053   BP: 123/72   Pulse: 78   Resp: 18   Temp: 98.1 °F (36.7 °C)   SpO2: 96%   Weight: 148 lb 6.4 oz (67.3 kg)   Height: 69\" (175.3 cm)   PainSc:   6   PainLoc: Back     Objective   Physical Exam   Constitutional: He is oriented to person, place, and time. He appears well-developed and well-nourished. He is cooperative.   HENT:   Head: Normocephalic and atraumatic.   Nose: Nose normal.   Eyes: Conjunctivae and lids are normal.   Neck: Trachea normal.   Cardiovascular: Normal rate.    Pulmonary/Chest: Effort normal. No respiratory distress.   Abdominal: Soft. Normal appearance.   Musculoskeletal:        Lumbar back: He exhibits decreased range of motion (pain with extension ) and tenderness.   +lumbar facet tenderness    Neurological: He is alert and oriented to person, place, and time. He has normal reflexes. Gait normal.   Reflex Scores:       Patellar reflexes are 2+ on the right side and 2+ on the left side.  Skin: Skin is warm, dry and intact.   Psychiatric: He has a normal mood and affect. His speech is normal and behavior is normal. Judgment and thought content normal. Cognition and memory are normal.   Nursing note and vitals reviewed.    Assessment/Plan   King was seen today for back pain.    Diagnoses " "and all orders for this visit:    Other chronic pain    Degeneration of intervertebral disc of lumbar region    Lumbar radiculopathy    Lumbar facet arthropathy  -     Case Request      --- Bilateral L2-L5 MBB X 1, if diagnostically positive would plan to repeat RFL    -------  Education about Medial Branch Blockade and RF Therapy:    This medial branch blockade (MBB) suggested is intended for diagnostic purposes, with the intent of offering the patient Radiofrequency thermal rhizotomy (RF) if the MBB is diagnostically effective.  The diagnostic blockade is necessary to determine the likelihood that RF therapy could be efficacious in providing long term relief to the patient.    Medial branches are sensory nerve branches that connect to a facet joint and transmit sensations & pain signals from that joint.  Facet is a term for the type of joints found in the spine.  Medial branches are the nerves that go to a facet, and therefore are also sometimes called \"facet joint nerves\" (FJNs).      In a medial branch blockade procedure, xray fluoroscopy is used to verify the locations of the outside of the joint lines which are being targeted.  Under xray guidance, needles are placed to these areas.  Contrast dye is injected to confirm proper placement, with dye flowing over the joint area, and to ensure that the dye does not flow into unintended areas such as a vein.  When this is confirmed, local anesthetic is injected to block the medial branch at that joint level.      If MBBs are diagnostically successful in blocking pain, then the patient is most likely a great candidate for Radiofrequency of those facet joint nerves.  In the RF procedure, needles are placed to the joint lines in the same fashion, and after testing, the needle tips are heated to thermally treat the nerves, blocking the nerves by in essence damaging the nerves with the heat treatment.       Medically, a successful RF procedure should provide a patient " with 50% pain relief or more for at least 6 months.  Clinical experience suggests that successful patients receive relief more in the range of 12 months on average.  We also discussed that a fortunate minority of patients receive therapeutic success from the MBB, and may not require RF ablation.  If a patient receives more than 8 weeks of relief from MBB, then occasional repeat MBB for therapeutic purposes is a very reasonable alternative therapy.  This course of therapy is consistent with our LCDs according to our CMS  in the area, and therefore other insurance providers should follow accordingly.  We will monitor our patients to screen for these therapeutic responders and will offer RF therapy only when necessary.        We discussed that MBB & RF are not without risks.  Guidelines regarding anticoagulant use & neuraxial procedures will be respected.  Patients that are ill or otherwise may be at risk for sepsis will not have their spines accessed by neuraxial injections of any type.  This patient will not be offered these therapies if there is an increased risk.   We discussed that there is a risk of postprocedural pain and also a risk of worsening of clinical picture with these procedures as with any neuraxial procedure.    -------      --- Follow-up after procedure          ADA REPORT    ADA report has been reviewed and scanned into the patient's chart.    Date of last ADA : 6-

## 2017-06-23 ENCOUNTER — HOSPITAL ENCOUNTER (OUTPATIENT)
Dept: ULTRASOUND IMAGING | Facility: HOSPITAL | Age: 67
Discharge: HOME | End: 2017-06-23
Attending: OTOLARYNGOLOGY | Admitting: OTOLARYNGOLOGY

## 2017-06-23 VITALS
OXYGEN SATURATION: 95 % | WEIGHT: 148 LBS | SYSTOLIC BLOOD PRESSURE: 152 MMHG | TEMPERATURE: 99.3 F | DIASTOLIC BLOOD PRESSURE: 73 MMHG | BODY MASS INDEX: 21.92 KG/M2 | RESPIRATION RATE: 18 BRPM | HEART RATE: 68 BPM | HEIGHT: 69 IN

## 2017-06-23 DIAGNOSIS — R59.0 ENLARGED LYMPH NODE IN NECK: ICD-10-CM

## 2017-06-23 PROCEDURE — 76942 ECHO GUIDE FOR BIOPSY: CPT

## 2017-06-23 PROCEDURE — 88305 TISSUE EXAM BY PATHOLOGIST: CPT | Performed by: OTOLARYNGOLOGY

## 2017-06-23 RX ORDER — LIDOCAINE HYDROCHLORIDE 10 MG/ML
20 INJECTION, SOLUTION INFILTRATION; PERINEURAL ONCE
Status: COMPLETED | OUTPATIENT
Start: 2017-06-23 | End: 2017-06-23

## 2017-06-23 RX ADMIN — LIDOCAINE HYDROCHLORIDE 8 ML: 10 INJECTION, SOLUTION INFILTRATION; PERINEURAL at 13:20

## 2017-06-29 ENCOUNTER — DOCUMENTATION (OUTPATIENT)
Dept: PAIN MEDICINE | Facility: CLINIC | Age: 67
End: 2017-06-29

## 2017-06-29 ENCOUNTER — OUTSIDE FACILITY SERVICE (OUTPATIENT)
Dept: PAIN MEDICINE | Facility: CLINIC | Age: 67
End: 2017-06-29

## 2017-06-29 PROCEDURE — 64493 INJ PARAVERT F JNT L/S 1 LEV: CPT | Performed by: ANESTHESIOLOGY

## 2017-06-29 PROCEDURE — 64494 INJ PARAVERT F JNT L/S 2 LEV: CPT | Performed by: ANESTHESIOLOGY

## 2017-06-29 PROCEDURE — 64495 INJ PARAVERT F JNT L/S 3 LEV: CPT | Performed by: ANESTHESIOLOGY

## 2017-06-30 ENCOUNTER — TRANSCRIBE ORDERS (OUTPATIENT)
Dept: ADMINISTRATIVE | Facility: HOSPITAL | Age: 67
End: 2017-06-30

## 2017-06-30 DIAGNOSIS — C13.9 HYPOPHARYNGEAL CANCER (HCC): Primary | ICD-10-CM

## 2017-06-30 NOTE — PROGRESS NOTES
Bilateral L2-5 Lumbar Medial Branch Blockade    PREOPERATIVE DIAGNOSIS:  Lumbar spondylosis without myelopathy    POSTOPERATIVE DIAGNOSIS:  Lumbar spondylosis without myelopathy    PROCEDURE:   Diagnostic Bilateral Lumbar Medial Branch Nerve Blockades, with fluoroscopy:  L2, L3, L4, and L5 nerves (at the L3, L4, L5 transverse processes and the sacral alar groove) to block facet joints L3-4, L4-5, and L5-S1  1. 64346-65 -- Bilateral Lumbar Facet blocks, 1st Level  2. 40044-30 -- Bilateral Lumbar Facet blocks, 2nd  Level  3. 62015-53 -- Bilateral Lumbar Facet blocks, 3rd Level    PRE-PROCEDURE DISCUSSION WITH PATIENT:    Risks and complications were discussed with the patient prior to starting the procedure and informed consent was obtained.      SURGEON:  Edgar Prince MD    REASON FOR PROCEDURE:    Previous therapeutic significance after RF last year.  Recent return of pain.  Relief from previous RF was >50% for >10 months.      SEDATION:  Versed 4mg, Fentanyl 100 mcg  ANESTHETIC:  Marcaine 0.5%  STEROID:  no  TOTAL VOLUME OF SOLUTION:  8mL    DESCRIPTON OF PROCEDURE:  After obtaining informed consent, IV access was obtained in the preoperative area.   The patient was taken to the operating room.  The patient was placed in the prone position with a pillow under the abdomen. All pressure points were well padded.  EKG, blood pressure, and pulse oximeter were monitored.  The patient was monitored and sedated by the RN under my direction. The lumbosacral area was prepped with Chloraprep and draped in a sterile fashion. Under fluoroscopic guidance the transverse processes of the L3, L4, and L5 vertebrae at the junctions of the superior articular processes were identified on the right. Also identified was the groove between the ala and the superior articular process of the sacrum on the ipsilateral side.  Skin and subcutaneous tissue were anesthetized with 1% lidocaine above each of these points. A 22-gauge 3.5  -inch  spinal needle was introduced under fluoroscopic guidance at the above junctions. Aspiration was negative for blood and CSF.  After confirming the position of the needle with fluoroscope in all views, 0.25 mL of Omnipaque was injected, and after seeing the proper spread a total of 1 mL of the anesthetic solution noted above was injected at each of these points.  Needles were removed intact from each of the areas.  A similar procedure was repeated to block the L2, L3, L4, and L5 nerves on the contralateral side.   Onset of analgesia was noted.  Vital signs remained stable throughout.      ESTIMATED BLOOD LOSS:  <5 mL  SPECIMENS:  none    COMPLICATIONS:   None.      TOLERANCE & DISCHARGE CONDITION:    The patient tolerated the procedure well.  The patient was transported to the recovery area without difficulties.  The patient was discharged to home under the care of family in stable and satisfactory condition.    PLAN OF CARE:  1. The patient was given our standard instruction sheet.  2. We discussed that Lumbar Medial Branch Blockade is a diagnostic procedure in consideration for radiofrequency ablation if two diagnostic procedures prove to be positive for significant benefit.  If sustained relief of 6 to eight weeks is obtained, then an alternative plan could be therapeutic lumbar branch blockades.  3. The patient is asked to keep a pain log each hour for 8 hours after the procedure today.  4. The patient will  Await for us to receive authorization for Radiofrequency lesioning.  I saw him in the recovery room and he was 100% pain free from the local anesthetic.  5. The patient will resume all medications as per the medication reconciliation sheet.

## 2017-07-06 ENCOUNTER — HOSPITAL ENCOUNTER (OUTPATIENT)
Dept: PET IMAGING | Facility: HOSPITAL | Age: 67
Discharge: HOME | End: 2017-07-06
Attending: OTOLARYNGOLOGY | Admitting: OTOLARYNGOLOGY

## 2017-07-06 ENCOUNTER — HOSPITAL ENCOUNTER (OUTPATIENT)
Dept: PET IMAGING | Facility: HOSPITAL | Age: 67
Discharge: HOME | End: 2017-07-06
Attending: OTOLARYNGOLOGY

## 2017-07-06 DIAGNOSIS — C13.9 HYPOPHARYNGEAL CANCER (HCC): ICD-10-CM

## 2017-07-06 PROCEDURE — 0 FLUDEOXYGLUCOSE F18 SOLUTION: Performed by: OTOLARYNGOLOGY

## 2017-07-06 PROCEDURE — 78815 PET IMAGE W/CT SKULL-THIGH: CPT

## 2017-07-06 PROCEDURE — A9552 F18 FDG: HCPCS | Performed by: OTOLARYNGOLOGY

## 2017-07-06 RX ADMIN — FLUDEOXYGLUCOSE F18 1 DOSE: 300 INJECTION INTRAVENOUS at 09:25

## 2017-07-10 RX ORDER — GABAPENTIN 800 MG/1
TABLET ORAL
Qty: 120 TABLET | Refills: 2 | OUTPATIENT
Start: 2017-07-10 | End: 2017-07-27 | Stop reason: SDUPTHER

## 2017-07-19 ENCOUNTER — TELEPHONE (OUTPATIENT)
Dept: PAIN MEDICINE | Facility: CLINIC | Age: 67
End: 2017-07-19

## 2017-07-19 NOTE — TELEPHONE ENCOUNTER
Mr. Parson called today and states that he has newly been diagnosed with throat cancer. He states that he has an appt at [facility] on 7/25/17. He wanted to know if he would still be able to get his Lumbar RF with Dr. Prince on 8/1/17. He was told the oncologist may start treatment immediately. I informed him per our conversation that we will continue with the plan for the RF unless the oncologist sees a reason why he shouldn't have the procedure.

## 2017-07-24 PROBLEM — C13.9 SQUAMOUS CELL CARCINOMA OF HYPOPHARYNX (HCC): Status: ACTIVE | Noted: 2017-07-24

## 2017-07-25 ENCOUNTER — LAB (OUTPATIENT)
Dept: LAB | Facility: HOSPITAL | Age: 67
End: 2017-07-25

## 2017-07-25 ENCOUNTER — CONSULT (OUTPATIENT)
Dept: ONCOLOGY | Facility: CLINIC | Age: 67
End: 2017-07-25

## 2017-07-25 VITALS
HEART RATE: 76 BPM | HEIGHT: 68 IN | SYSTOLIC BLOOD PRESSURE: 124 MMHG | WEIGHT: 143.6 LBS | DIASTOLIC BLOOD PRESSURE: 68 MMHG | RESPIRATION RATE: 18 BRPM | BODY MASS INDEX: 21.76 KG/M2 | TEMPERATURE: 98.6 F | OXYGEN SATURATION: 97 %

## 2017-07-25 DIAGNOSIS — C13.9 SQUAMOUS CELL CARCINOMA OF HYPOPHARYNX (HCC): Primary | ICD-10-CM

## 2017-07-25 DIAGNOSIS — C14.0 THROAT CANCER (HCC): Primary | ICD-10-CM

## 2017-07-25 LAB
BASOPHILS # BLD AUTO: 0.04 10*3/MM3 (ref 0–0.1)
BASOPHILS NFR BLD AUTO: 0.7 % (ref 0–1.1)
DEPRECATED RDW RBC AUTO: 49.9 FL (ref 37–49)
EOSINOPHIL # BLD AUTO: 0.06 10*3/MM3 (ref 0–0.36)
EOSINOPHIL NFR BLD AUTO: 1 % (ref 1–5)
ERYTHROCYTE [DISTWIDTH] IN BLOOD BY AUTOMATED COUNT: 12.3 % (ref 11.7–14.5)
HCT VFR BLD AUTO: 40.9 % (ref 40–49)
HGB BLD-MCNC: 15.2 G/DL (ref 13.5–16.5)
IMM GRANULOCYTES # BLD: 0.05 10*3/MM3 (ref 0–0.03)
IMM GRANULOCYTES NFR BLD: 0.8 % (ref 0–0.5)
LYMPHOCYTES # BLD AUTO: 1.75 10*3/MM3 (ref 1–3.5)
LYMPHOCYTES NFR BLD AUTO: 28.7 % (ref 20–49)
MCH RBC QN AUTO: 40.4 PG (ref 27–33)
MCHC RBC AUTO-ENTMCNC: 37.2 G/DL (ref 32–35)
MCV RBC AUTO: 108.8 FL (ref 83–97)
MONOCYTES # BLD AUTO: 0.81 10*3/MM3 (ref 0.25–0.8)
MONOCYTES NFR BLD AUTO: 13.3 % (ref 4–12)
NEUTROPHILS # BLD AUTO: 3.38 10*3/MM3 (ref 1.5–7)
NEUTROPHILS NFR BLD AUTO: 55.5 % (ref 39–75)
NRBC BLD MANUAL-RTO: 0 /100 WBC (ref 0–0)
PLATELET # BLD AUTO: 156 10*3/MM3 (ref 150–375)
PMV BLD AUTO: 9.9 FL (ref 8.9–12.1)
RBC # BLD AUTO: 3.76 10*6/MM3 (ref 4.3–5.5)
WBC NRBC COR # BLD: 6.09 10*3/MM3 (ref 4–10)

## 2017-07-25 PROCEDURE — 99205 OFFICE O/P NEW HI 60 MIN: CPT | Performed by: INTERNAL MEDICINE

## 2017-07-25 PROCEDURE — 36416 COLLJ CAPILLARY BLOOD SPEC: CPT | Performed by: INTERNAL MEDICINE

## 2017-07-25 PROCEDURE — 85025 COMPLETE CBC W/AUTO DIFF WBC: CPT | Performed by: INTERNAL MEDICINE

## 2017-07-25 RX ORDER — SODIUM CHLORIDE 9 MG/ML
250 INJECTION, SOLUTION INTRAVENOUS ONCE
Status: CANCELLED | OUTPATIENT
Start: 2017-08-11

## 2017-07-25 RX ORDER — SODIUM CHLORIDE 9 MG/ML
250 INJECTION, SOLUTION INTRAVENOUS ONCE
Status: CANCELLED | OUTPATIENT
Start: 2017-08-10

## 2017-07-25 RX ORDER — PALONOSETRON 0.05 MG/ML
0.25 INJECTION, SOLUTION INTRAVENOUS ONCE
Status: CANCELLED | OUTPATIENT
Start: 2017-08-11

## 2017-07-25 RX ORDER — FAMOTIDINE 10 MG/ML
20 INJECTION, SOLUTION INTRAVENOUS ONCE
Status: CANCELLED | OUTPATIENT
Start: 2017-08-10

## 2017-07-25 RX ORDER — CELECOXIB 200 MG/1
CAPSULE ORAL
Qty: 30 CAPSULE | Refills: 5 | Status: SHIPPED | OUTPATIENT
Start: 2017-07-25 | End: 2017-07-27 | Stop reason: SDUPTHER

## 2017-07-25 NOTE — PROGRESS NOTES
Subjective     REASON FOR CONSULTATION:  1. 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  2.  Plans for combined radiation and chemotherapy as definitive treatment.  3.  Long smoking history with COPD.  Provide an opinion on any further workup or treatment                             REQUESTING PHYSICIAN:  Clinton Styles MD    RECORDS OBTAINED:  Records of the patients history including those obtained from the referring provider were reviewed and summarized in detail.    HISTORY OF PRESENT ILLNESS:  The patient is a 66 y.o. year old male who is here for an opinion about the above issue.  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  This diagnosis was made by FNA of the neck node on 6/23/2017.  He is undergone staging CT scans on 6/6/2017 and a PET scan on 7/6/2017.  His disease appears to be clinical stage TIII N2 a stage IV a squamous carcinoma of the hypopharynx.    He was originally referred to the [facility] and was seen by Dr. Moreno Camp of radiation oncology on 7/14/2017.  The patient lives in [place] and has some transportation difficulties.  He has a sister who works in the lab at [facility], [place] and for this reason they have decided to receive their treatment in the [facility] system.    He was seen today in the company of 2 of his sisters.  We recommended combined chemotherapy and radiation.  He will be seeing Dr. Grayson of the [facility] radiation Center tomorrow to discuss the radiation portion of his treatment.  We plan to deliver weekly chemotherapy during radiation with combination of cisplatin and Taxol.  Taxol will be delivered day 1 and cisplatin be delivered along with hydration on day 2 each week.    We had a long discussion with the patient and his family today regarding the toxicities of  treatment and recommended referral for placement of a MediPort and a PEG tube.  We also will be scheduling a nutrition consult.    History of Present Illness     Past Medical History:   Diagnosis Date   • Cancer 2017    Hypopharyngeal, right   • COPD (chronic obstructive pulmonary disease)    • GERD (gastroesophageal reflux disease)    • Hyperlipidemia    • Hypertension    • Low back pain     chronic, also bilat leg pain   • Neck mass     right side, here to have bx   • Osteoarthritis         Past Surgical History:   Procedure Laterality Date   • ELBOW ARTHROTOMY Bilateral     bursa removed   • ELBOW PROCEDURE     • EPIDURAL BLOCK     • EYE SURGERY Left 1993   • HAND SURGERY Left         Current Outpatient Prescriptions on File Prior to Visit   Medication Sig Dispense Refill   • albuterol (PROVENTIL HFA;VENTOLIN HFA) 108 (90 BASE) MCG/ACT inhaler Inhale 2 puffs 2 (two) times a day.     • celecoxib (CeleBREX) 200 MG capsule TAKE ONE CAPSULE BY MOUTH DAILY 30 capsule 5   • desonide (DESOWEN) 0.05 % cream      • diltiazem SR (CARDIZEM SR) 120 MG 12 hr capsule Take 1 capsule by mouth 2 (two) times a day.     • doxazosin (CARDURA) 4 MG tablet Take 1 tablet by mouth daily.     • famotidine (PEPCID) 20 MG tablet Take 1 tablet by mouth Daily. 30 tablet 1   • Fluticasone Propionate, Inhal, (FLOVENT IN) Inhale 2 (two) times a day.     • gabapentin (NEURONTIN) 800 MG tablet TAKE ONE TABLET BY MOUTH FOUR TIMES A  tablet 2   • Mometasone Furoate 200 MCG/ACT aerosol Inhale 2 puffs 2 (two) times a day.     • omeprazole (priLOSEC) 40 MG capsule      • [DISCONTINUED] cyclobenzaprine (FLEXERIL) 10 MG tablet Take 1 tablet by mouth 3 (three) times a day as needed.     • [DISCONTINUED] lidocaine viscous (XYLOCAINE) 2 % solution      • [DISCONTINUED] nortriptyline (PAMELOR) 25 MG capsule Take 1 capsule by mouth nightly.       No current facility-administered medications on file prior to visit.         ALLERGIES:    Allergies    Allergen Reactions   • Codeine      He dont like to take it because it makes him feel strange.         Social History     Social History   • Marital status: Single     Spouse name: N/A   • Number of children: 0   • Years of education: N/A     Occupational History   •  for medical supply Retired     Social History Main Topics   • Smoking status: Current Every Day Smoker     Packs/day: 0.50     Types: Cigarettes   • Smokeless tobacco: Not on file      Comment: for 12 yrs. Pt states he has patches but hasn't started trying to quit but wants to   • Alcohol use Yes      Comment: 3-4 drinks a week   • Drug use: No   • Sexual activity: Not on file     Other Topics Concern   • Not on file     Social History Narrative        Family History   Problem Relation Age of Onset   • Cancer Father      malignant brenda   • Prostate cancer Father    • Heart disease Sister      cath stent placement    • Heart attack Sister    • Heart attack Brother    • Diabetes Brother    • Hypertension Other         Review of Systems   Constitutional: Negative for activity change, appetite change, fatigue, fever and unexpected weight change.   HENT: Negative for hearing loss, nosebleeds, trouble swallowing and voice change.         Edentulous   Eyes: Negative for visual disturbance.   Respiratory: Positive for shortness of breath. Negative for cough and wheezing.    Cardiovascular: Negative for chest pain and palpitations.   Gastrointestinal: Negative for abdominal pain, diarrhea, nausea and vomiting.   Genitourinary: Negative for difficulty urinating, frequency, hematuria and urgency.   Musculoskeletal: Positive for back pain. Negative for neck pain.   Skin: Negative for rash.   Neurological: Positive for dizziness. Negative for seizures, syncope and headaches.   Hematological: Negative for adenopathy. Does not bruise/bleed easily.   Psychiatric/Behavioral: Negative for behavioral problems. The patient is not nervous/anxious.      "    Objective     Vitals:    07/25/17 0831   BP: 124/68   Pulse: 76   Resp: 18   Temp: 98.6 °F (37 °C)   TempSrc: Oral   SpO2: 97%   Weight: 143 lb 9.6 oz (65.1 kg)   Height: 68.11\" (173 cm)   PainSc: 6  Comment: Back and Bilat leg pain.   PainLoc: Back     Current Status 7/25/2017   ECOG score 0       Physical Exam   Constitutional: He is oriented to person, place, and time. He appears well-developed and well-nourished. No distress.   HENT:   Head: Normocephalic.   edentulous   Eyes: Conjunctivae and EOM are normal. Pupils are equal, round, and reactive to light. No scleral icterus.   Neck: Normal range of motion. Neck supple. No JVD present. No thyromegaly present.   Cardiovascular: Normal rate and regular rhythm.  Exam reveals no gallop and no friction rub.    No murmur heard.  Pulmonary/Chest: Effort normal. He has wheezes. He has no rales.   Abdominal: Soft. He exhibits no distension and no mass. There is no tenderness.   Musculoskeletal: Normal range of motion. He exhibits no edema or deformity.   Lymphadenopathy:     He has cervical adenopathy.        Right cervical: Superficial cervical adenopathy present.   3-4 cm firm node right neck.   Neurological: He is alert and oriented to person, place, and time. He has normal reflexes. No cranial nerve deficit.   Skin: Skin is warm and dry. No rash noted. No erythema.   Psychiatric: He has a normal mood and affect. His behavior is normal. Judgment normal.         RECENT LABS:  Hematology WBC   Date Value Ref Range Status   07/25/2017 6.09 4.00 - 10.00 10*3/mm3 Final     RBC   Date Value Ref Range Status   07/25/2017 3.76 (L) 4.30 - 5.50 10*6/mm3 Final     Hemoglobin   Date Value Ref Range Status   07/25/2017 15.2 13.5 - 16.5 g/dL Final     Hematocrit   Date Value Ref Range Status   07/25/2017 40.9 40.0 - 49.0 % Final     Platelets   Date Value Ref Range Status   07/25/2017 156 150 - 375 10*3/mm3 Final        Lab Results   Component Value Date    GLUCOSE 96 " "01/28/2017    BUN 10 01/28/2017    CREATININE 0.90 06/06/2017    EGFRIFNONA 87 01/28/2017    BCR 11.4 01/28/2017    K 3.7 01/28/2017    CO2 24.3 01/28/2017    CALCIUM 8.3 (L) 01/28/2017    ALBUMIN 3.40 (L) 01/28/2017    LABIL2 1.7 01/28/2017    AST 21 01/28/2017    ALT 10 01/28/2017     PATHOLOGY 6/23/2017  Final Diagnosis   \"RIGHT NECK MASS\", NEEDLE BIOPSY:                         METASTATIC SQUAMOUS CELL CARCINOMA TO LYMPH NODE.     CT NECK WITH CONTRAST  6/6/2017      HISTORY: Right-sided neck mass.      A CT examination of the neck was performed after the intravenous  administration of contrast. No prior study is available for comparison.      FINDINGS: The visualized portions of the skull base appear unremarkable.  The parotid and submandibular glands appear unremarkable.      There is a pathologically enlarged node involving the jugulodigastric  region on the right. It is centrally decreased in attenuation. This  measures 2.4 cm in transverse dimension and approximately 1.9 cm in AP  dimension. Small nodes are identified, subcentimeter in size involving  the posterior triangle of the neck on the right. The largest measures 5  mm in size. Small 4 to 5 mm nodes are identified in the right  paratracheal region.      IMPRESSION : Pathologically enlarged node involving the jugulodigastric  region on the right. The etiology is uncertain.  I cannot exclude a neoplastic process. Further evaluation could be  performed with a PET examination and/or biopsy.    PET 7/6/2017  IMPRESSION:  Intensely hypermetabolic primary neoplasm within the right  wall of the hypopharynx as described in more detail above. There is a  solitary enlarged hypermetabolic lymph node in the right side of the  neck consistent with localized metastatic disease. There is no metabolic  evidence of distant metastatic disease within the chest, abdomen or  pelvis.    Assessment/Plan     1.  Stage IV a (T3, N2 a, M0) squamous cell carcinoma of the " hypopharynx with metastatic lymphadenopathy to the right neck.  2.  Comorbidities including COPD.    Recommendations    1.  We had a long discussion with the patient and his family today regarding the diagnosis, stage, prognosis, and treatment options for this squamous cell carcinoma the head and neck.  We agree that combined chemotherapy and radiation would be the best approach for this gentleman.  2.  He is scheduled to meet with Dr. Grayson at the [facility] radiation Center tomorrow on 7/26/2017.  3.  We discussed concurrent chemotherapy treatment with cisplatin and Taxol delivered weekly during his radiation with Taxol delivered on day 1 and cisplatin on day 2 of each week of treatment.  4.  We have scheduled a formal chemotherapy education appointment with former nurse practitioners but we did discuss the major toxicities of this treatment and have recommended placement of a port and PEG tube.  5.  Patient will be referred to Dr. Raul Valenzuela for placement of a MediPort and temporary PEG tube feeding tube prior toinitiation of therapy.  6.  The patient lives in [place] and has a sister who works in the [facility] lab.  She works on Thursday and Friday each week and they have requested that he receive his chemotherapy on those days.  We will plan to initiate his first chemotherapy treatment in our office on Thursday, 8/10/2017.  7.  We have also scheduled an appointment with the oncology nutritionist and when the patient returns for his chemotherapy education he also will meet with our [facility] .  8.  The patient and family understand that this treatment is potentially curative.  Patient will need to be restaged at completion of his combined chemotherapy and radiation including a follow-up PET scan and referral back to Dr. Styles for endoscopic evaluation.    Thanks for allowing us to see this nice gentleman in consultation.

## 2017-07-26 ENCOUNTER — APPOINTMENT (OUTPATIENT)
Dept: RADIATION ONCOLOGY | Facility: HOSPITAL | Age: 67
End: 2017-07-26

## 2017-07-26 ENCOUNTER — DOCUMENTATION (OUTPATIENT)
Dept: ONCOLOGY | Facility: CLINIC | Age: 67
End: 2017-07-26

## 2017-07-26 ENCOUNTER — PREP FOR SURGERY (OUTPATIENT)
Dept: OTHER | Facility: HOSPITAL | Age: 67
End: 2017-07-26

## 2017-07-26 ENCOUNTER — CONSULT (OUTPATIENT)
Dept: RADIATION ONCOLOGY | Facility: HOSPITAL | Age: 67
End: 2017-07-26

## 2017-07-26 ENCOUNTER — APPOINTMENT (OUTPATIENT)
Dept: LAB | Facility: HOSPITAL | Age: 67
End: 2017-07-26

## 2017-07-26 ENCOUNTER — OFFICE VISIT (OUTPATIENT)
Dept: ONCOLOGY | Facility: CLINIC | Age: 67
End: 2017-07-26

## 2017-07-26 ENCOUNTER — APPOINTMENT (OUTPATIENT)
Dept: ONCOLOGY | Facility: CLINIC | Age: 67
End: 2017-07-26

## 2017-07-26 VITALS — WEIGHT: 144.9 LBS | BODY MASS INDEX: 21.96 KG/M2

## 2017-07-26 VITALS
OXYGEN SATURATION: 96 % | SYSTOLIC BLOOD PRESSURE: 120 MMHG | DIASTOLIC BLOOD PRESSURE: 72 MMHG | TEMPERATURE: 98.2 F | RESPIRATION RATE: 16 BRPM | WEIGHT: 144 LBS | HEART RATE: 73 BPM | BODY MASS INDEX: 21.82 KG/M2

## 2017-07-26 DIAGNOSIS — C13.9 SQUAMOUS CELL CARCINOMA OF HYPOPHARYNX (HCC): Primary | ICD-10-CM

## 2017-07-26 DIAGNOSIS — C13.9 SQUAMOUS CELL CANCER OF HYPOPHARYNX (HCC): Primary | ICD-10-CM

## 2017-07-26 LAB
CYTO UR: NORMAL
LAB AP CASE REPORT: NORMAL
LAB AP CLINICAL INFORMATION: NORMAL
Lab: NORMAL
PATH REPORT.ADDENDUM SPEC: NORMAL
PATH REPORT.FINAL DX SPEC: NORMAL
PATH REPORT.GROSS SPEC: NORMAL

## 2017-07-26 PROCEDURE — 77334 RADIATION TREATMENT AID(S): CPT | Performed by: RADIOLOGY

## 2017-07-26 PROCEDURE — G0463 HOSPITAL OUTPT CLINIC VISIT: HCPCS | Performed by: NURSE PRACTITIONER

## 2017-07-26 PROCEDURE — G0463 HOSPITAL OUTPT CLINIC VISIT: HCPCS | Performed by: RADIOLOGY

## 2017-07-26 PROCEDURE — 77263 THER RADIOLOGY TX PLNG CPLX: CPT | Performed by: RADIOLOGY

## 2017-07-26 PROCEDURE — 99215 OFFICE O/P EST HI 40 MIN: CPT | Performed by: NURSE PRACTITIONER

## 2017-07-26 PROCEDURE — 77370 RADIATION PHYSICS CONSULT: CPT | Performed by: RADIOLOGY

## 2017-07-26 PROCEDURE — 99204 OFFICE O/P NEW MOD 45 MIN: CPT | Performed by: RADIOLOGY

## 2017-07-26 RX ORDER — ONDANSETRON HYDROCHLORIDE 8 MG/1
8 TABLET, FILM COATED ORAL 3 TIMES DAILY PRN
Qty: 30 TABLET | Refills: 5 | Status: SHIPPED | OUTPATIENT
Start: 2017-07-26 | End: 2017-09-21 | Stop reason: SDUPTHER

## 2017-07-26 RX ORDER — CEFAZOLIN SODIUM 2 G/100ML
2 INJECTION, SOLUTION INTRAVENOUS ONCE
Status: CANCELLED | OUTPATIENT
Start: 2017-07-31 | End: 2017-07-26

## 2017-07-26 NOTE — PROGRESS NOTES
"King Parson,66, was seen for an initial social work appointment today.  He and his two sisters, Ivet and Blaire, came in for Chemotherapy Education with BETTE Savage.  One of his sisters is a Transporter here at [facility].  They had just seen the chemotherapy infusion suite. I asked them about that and they all said it was fine. \"He has a bed of his own there.\" One of his sisters said they all understand  that both chemotherapy and radiation treatment \"have to happen.\"  The patient did not have false teeth, he has no teeth and his speech is indistinct. His sister said they had had no idea that treatment was as involved.  They've had appointments every day.  Next week he goes to the surgeon for both a port and a feeding tube.     I reviewed social work services and gave the family support group information.  Mr. Parson said the [employer] support groups are \"for women\".  We agreed that the Yoga or the knitting didn't sound especially designed for him.  He has talked about fishing, which he hasn't done much of.  A sister has a pond in back of her house if he gets interested.  (They all didn't think he'd want to do the Fishing Weekends.) Between the two sister's schedules and those of some other family members they didn't expect any problem with transportation to treatment from the patient's home in [place]. They have contact information for Lucrecia Frank LCSW and me and should feel free to call.  "

## 2017-07-26 NOTE — PROGRESS NOTES
Subjective     PATIENT NAME:  King Parson  YOB: 1950  PATIENTS AGE:  66 y.o.  PATIENTS SEX:  male  DATE OF SERVICE:  07/26/2017  PROVIDER:  BETTE Alas      ____________________PATIENT EDUCATION____________________    PATIENT EDUCATION:  Today I met with the patient to discuss the chemotherapy regimen recommended for treatment of his disease.  The patient was given explanation of treatment premed side effects including office policy that prohibits patients to drive if sedating medications are administered, MD explanation given regarding benefits, side effects, toxicities and goals of treatment.  The patient received a Chemotherapy/Biotherapy Plan Summary including diagnosis and specific treatment plan.    SIDE EFFECTS:  Common side effects were discussed with the patient and sisters.  Discussion included hair loss/discoloration, anemia/fatigue, infection/chills/fever, appetite, bleeding risk/precautions, constipation, diarrhea, mouth sores, taste alteration, loss of appetite,nausea/vomiting, peripheral neuropathy, skin/nail changes, rash, muscle aches/weakness, photosensitivity, weight gain/loss, hearing loss, dizziness, liver damage, lung damage, kidney damage, DVT/PE risk, fluid retention, pleural/pericardial effusion, somnolence, electrolyte/LFT imbalance, vein exercises and/or the possible need for vascular access/port placement.  The patient was advice that although uncommon, leakage of an infused medication from the vein or venous access device (port) may lead to skin breakdown and/or other tissue damage.  The patient was advised that he/she may have pain, bleeding, and/or bruising from the insertion of a needle in their vein or venous access device (port).  The patient was further advised that, in spite of proper technique, infection with redness and irritation may rarely occur at the site where the needle was inserted.  The patient was advised that if complications  occur, additional medical treatment is available.    Discussion also included side effects specific to drugs in the treatment plan, specifically Cisplatin, Taxol.    Reproductive risks were discussed, including appropriate use of birth control and protection during sexual relations.    A total of 60 minutes were spent with the patient, with 100% of time spent in education and counseling.    Beatrice Styles, APRSUKI  07/26/2017

## 2017-07-26 NOTE — PROGRESS NOTES
DIAGNOSIS and REASON FOR CONSULTATION: Squamous cell carcinoma of hypopharynx    Referring Provider:  Clinton Styles MD  Patient Care Team:  BETTE Guillen as PCP - General  BETTE Ruvalcaba as PCP - Claims Attributed  Clinton Styles MD as Referring Physician (Otolaryngology)  Aakash Garcia MD as Consulting Physician (Hematology and Oncology)  Cosmo Conway MD as Consulting Physician (Cardiology)  Edgar Prince MD as Consulting Physician (Pain Medicine)    CHIEF COMPLAINT:  Squamous cell carcinoma of hypopharynx  HISTORY OF PRESENT ILLNESS:  The patient is a 66 y.o. year old male who presented with difficulty swallowing and a palpable right neck mass. He had plain films of the neck on May 11, 2017 which showed no abnormality and he was treated intially with antibiotics. He was found on ENT exam to have a 2.5 cm right neck node and irritation within the hypopharynx.    He had a CT of the neck on June 6, 2017 which showed the large AIDA node on the right measuring 2.4 x 1.9 cm with small nodes in the right posterior triangle, all less than 5 mm and small 5 mm nodes in the right paratracheal region.     He underwent CT guided biopsy of the right neck node on June 23, 2017 which showed metastatic squamous cell carcinoma to the node.  PET scan on July 7, 2017 shows soft tissue fullness within the right lateral wall of the hypopharynx at the level of the vallecula with an SUV of 21.2. The mass extends inferiorly to the level of the thyroid cartilage, without invasion of the vocal cords and extends superiorly to the level of the base of tongue without invasvion of the tongue or vallecula.  There was a soliary enlarged hypermetabolic right jugular node measuring 2.0 x 2.6 cm but no other hypermetabolic nodes and no evidence of metastatic disease.    He was [facility] and was seen by the medical oncologist and radiation oncologist but due to transportation  issues and a family member here at [facility], he presneted to the [facility] physicians for another opinion. They discussed weekly cisplatin and Taxol concurrent with radiation. He was agreeable to port and PEG tube placement and will see Dr. Valenzuela to discuss those today.    Clinically, he is doing amazingly well. He is still tolerating a full diet and has no restrictions currently.  He has no teeth and eats a fairly soft diet typically. His weight is stable and he is maintaining his normal performance status. He does have COPD and states his shortness of breath is at it's baseline. He also has long standing back pain issues for which he sees the pain physician and that also is stable. He is accompanied by two sisters who will help with his transportation and needs.    Past Medical History: he  has a past medical history of Cancer (2017); COPD (chronic obstructive pulmonary disease); GERD (gastroesophageal reflux disease); Hyperlipidemia; Hypertension; Low back pain; Neck mass; and Osteoarthritis.    Past Surgical History:  he has a past surgical history that includes Eye surgery (Left, 1993); Hand surgery (Left); Elbow surgery; Epidural block injection; and Elbow arthrotomy (Bilateral).    Meds:    Current Outpatient Prescriptions:   •  albuterol (PROVENTIL HFA;VENTOLIN HFA) 108 (90 BASE) MCG/ACT inhaler, Inhale 2 puffs 2 (two) times a day., Disp: , Rfl:   •  celecoxib (CeleBREX) 200 MG capsule, TAKE ONE CAPSULE BY MOUTH DAILY, Disp: 30 capsule, Rfl: 5  •  desonide (DESOWEN) 0.05 % cream, , Disp: , Rfl:   •  diltiazem SR (CARDIZEM SR) 120 MG 12 hr capsule, Take 1 capsule by mouth 2 (two) times a day., Disp: , Rfl:   •  doxazosin (CARDURA) 4 MG tablet, Take 1 tablet by mouth daily., Disp: , Rfl:   •  famotidine (PEPCID) 20 MG tablet, Take 1 tablet by mouth Daily., Disp: 30 tablet, Rfl: 1  •  Fluticasone Propionate, Inhal, (FLOVENT IN), Inhale 2 (two) times a day., Disp: , Rfl:   •  gabapentin (NEURONTIN) 800 MG tablet,  TAKE ONE TABLET BY MOUTH FOUR TIMES A DAY, Disp: 120 tablet, Rfl: 2  •  Mometasone Furoate 200 MCG/ACT aerosol, Inhale 2 puffs 2 (two) times a day., Disp: , Rfl:   •  omeprazole (priLOSEC) 40 MG capsule, , Disp: , Rfl:     Allergies:    Allergies   Allergen Reactions   • Codeine      He dont like to take it because it makes him feel strange.        Family History:  his family history includes Cancer in his father; Diabetes in his brother; Heart attack in his brother and sister; Heart disease in his sister; Hypertension in his other; Prostate cancer in his father.    Social History:  he  reports that he has been smoking Cigarettes.  He has been smoking about 0.50 packs per day. He does not have any smokeless tobacco history on file. He reports that he drinks alcohol. He reports that he does not use illicit drugs.    Pertinent Findings on   Review of Systems   Constitutional: Negative for appetite change, chills, diaphoresis, fatigue, fever and unexpected weight change.   HENT:   Positive for lump/mass. Negative for hearing loss, mouth sores, nosebleeds, sore throat, tinnitus, trouble swallowing and voice change.    Eyes: Positive for eye problems.   Respiratory: Positive for shortness of breath and wheezing. Negative for chest tightness, cough, dizziness on exertion and hemoptysis.    Cardiovascular: Negative for chest pain, leg swelling and palpitations.   Gastrointestinal: Negative for abdominal distention, abdominal pain, blood in stool, constipation, diarrhea, nausea, rectal pain and vomiting.   Endocrine: Negative for hot flashes.   Genitourinary: Positive for frequency. Negative for bladder incontinence, difficulty urinating, dysuria, hematuria, nocturia and pelvic pain.    Musculoskeletal: Positive for arthralgias, back pain, myalgias, neck pain and neck stiffness. Negative for flank pain and gait problem.   Skin: Negative for itching and rash.   Neurological: Positive for dizziness. Negative for extremity  weakness, gait problem, headaches, light-headedness, numbness, seizures and speech difficulty.   Hematological: Negative for adenopathy. Does not bruise/bleed easily.   Psychiatric/Behavioral: Negative for confusion, decreased concentration, depression, sleep disturbance and suicidal ideas. The patient is not nervous/anxious.    :  Vitals:    07/26/17 0912   BP: 120/72   Pulse: 73   Resp: 16   Temp: 98.2 °F (36.8 °C)   TempSrc: Tympanic   SpO2: 96%   Weight: 144 lb (65.3 kg)   PainSc:   5   PainLoc: Back       Performance Status: (2) Ambulatory and capable of self care, unable to carry out work activity, up and about > 50% or waking hours    Pertinent Findings on:  Physical Exam   Constitutional: He is oriented to person, place, and time. He appears well-developed and well-nourished.   HENT:   Head: Normocephalic and atraumatic.   OC/OP WNL. No teeth nor dentures noted.    Eyes: EOM are normal.   Neck: Normal range of motion.   2 x 2 cm right mid neck node; no other palpable lymphadenopathy   Pulmonary/Chest: Effort normal.   Abdominal: Soft.   Musculoskeletal: Normal range of motion.   Neurological: He is alert and oriented to person, place, and time.   Skin: Skin is warm and dry.   Psychiatric: He has a normal mood and affect. His behavior is normal. Judgment and thought content normal.   Vitals reviewed.      Assessment:   1. Squamous cell carcinoma of hypopharynx         Plan:   We reviewed today the diagnostic imaging, pathology reports and stage of disease at this point.  We reviewed the standard treatment recommendations, including the consideration of surgery and chemotherapy.  They have had this discussion multiple times now and are very comfortable with the treatment recommendations.    I discussed a course of definitive treatment encompassing the site of the primary, adjacent pharyngeal sites and the bilateral necks and supraclavicular spaces to a total dose of 5000 cGy in 25 treatments.  Following this,  we will plan to continue treatment to the primary and the ipsilateral neck on to 6000 cGy in 5 additional treatments and then finally will plan on boosting the site of PET positivity in the neck and primary site on to approximately 6600 to 7000 cGy in an additional 3 to 5 treatments.  This will all be delivered using an IMRT treatment plan along with image guidance to optimize the dose delivered and protect the normal adjacent structures as much as possible.  Given the above, this course of treatment should be completed in approximately 7 weeks.    We discussed the logistics of the treatment and the importance of our treatment planning process today.  We also discussed the significant acute side effects of irritation of the treated skin, hair loss in the treatment area, mucositis, sore mouth, tongue and throat, dysphagia, increased saliva and mucus, cough, decrease in appetite and fatigue.  We also discussed the long term possibility of dry mouth, difficulties with dentition and the mandible and possible esophageal stricture down the road.      We did discuss the importance of continuing to eat in spite of the tube placement and he understands the integration of the tube feeds for caloric supplementation. We will have our nutritionist and other support services meet with him as well. I have made a referral to our multidisciplinary head and neck clinic for evaluation by our nutritionist, physical and occupational therapists and lymphedema specialists if needed.     We did go ahead today and make our immobilization mask and take our CT for treatment planning.  We will be fusing that to the PET scan.  I anticipate getting treatments underway concurrent with the chemotherapy on August 10th.    I spent over 45 minutes face-to-face with the patient today and of that time, 35 minutes were spent counseling and coordinating care.

## 2017-07-27 ENCOUNTER — APPOINTMENT (OUTPATIENT)
Dept: PREADMISSION TESTING | Facility: HOSPITAL | Age: 67
End: 2017-07-27

## 2017-07-27 ENCOUNTER — DOCUMENTATION (OUTPATIENT)
Dept: ONCOLOGY | Facility: CLINIC | Age: 67
End: 2017-07-27

## 2017-07-27 ENCOUNTER — DOCUMENTATION (OUTPATIENT)
Dept: NUTRITION | Facility: HOSPITAL | Age: 67
End: 2017-07-27

## 2017-07-27 ENCOUNTER — TELEPHONE (OUTPATIENT)
Dept: PAIN MEDICINE | Facility: CLINIC | Age: 67
End: 2017-07-27

## 2017-07-27 VITALS
BODY MASS INDEX: 21.37 KG/M2 | TEMPERATURE: 97.7 F | OXYGEN SATURATION: 97 % | DIASTOLIC BLOOD PRESSURE: 68 MMHG | RESPIRATION RATE: 16 BRPM | HEIGHT: 69 IN | SYSTOLIC BLOOD PRESSURE: 130 MMHG | HEART RATE: 69 BPM | WEIGHT: 144.3 LBS

## 2017-07-27 LAB
ANION GAP SERPL CALCULATED.3IONS-SCNC: 16.4 MMOL/L
BUN BLD-MCNC: 10 MG/DL (ref 8–23)
BUN/CREAT SERPL: 12.3 (ref 7–25)
CALCIUM SPEC-SCNC: 9 MG/DL (ref 8.6–10.5)
CHLORIDE SERPL-SCNC: 102 MMOL/L (ref 98–107)
CO2 SERPL-SCNC: 23.6 MMOL/L (ref 22–29)
CREAT BLD-MCNC: 0.81 MG/DL (ref 0.76–1.27)
GFR SERPL CREATININE-BSD FRML MDRD: 95 ML/MIN/1.73
GLUCOSE BLD-MCNC: 71 MG/DL (ref 65–99)
POTASSIUM BLD-SCNC: 3.6 MMOL/L (ref 3.5–5.2)
SODIUM BLD-SCNC: 142 MMOL/L (ref 136–145)

## 2017-07-27 PROCEDURE — 93005 ELECTROCARDIOGRAM TRACING: CPT

## 2017-07-27 PROCEDURE — 36415 COLL VENOUS BLD VENIPUNCTURE: CPT

## 2017-07-27 PROCEDURE — 80048 BASIC METABOLIC PNL TOTAL CA: CPT | Performed by: SURGERY

## 2017-07-27 PROCEDURE — 93010 ELECTROCARDIOGRAM REPORT: CPT | Performed by: INTERNAL MEDICINE

## 2017-07-27 RX ORDER — GABAPENTIN 800 MG/1
800 TABLET ORAL 4 TIMES DAILY
COMMUNITY
End: 2017-10-04 | Stop reason: SDUPTHER

## 2017-07-27 RX ORDER — NICOTINE 21 MG/24HR
1 PATCH, TRANSDERMAL 24 HOURS TRANSDERMAL EVERY 24 HOURS
COMMUNITY
End: 2018-04-09

## 2017-07-27 RX ORDER — CELECOXIB 200 MG/1
200 CAPSULE ORAL DAILY
COMMUNITY
End: 2018-05-18 | Stop reason: HOSPADM

## 2017-07-27 NOTE — PROGRESS NOTES
"Oncology Nutrition Screening    Patient Name:  King Parson  YOB: 1950  MRN: [id number]  Date:  07/27/17  Physician:  Jose Grayson    Type of Cancer Treatment:   Radiation/Cyberknife: Definitive  Number of Treatments:  35  Chemotherapy: cisplatin/taxol       Patient Active Problem List   Diagnosis   • Chronic low back pain   • Lumbar radiculopathy   • Spinal stenosis of lumbar region   • Degeneration of intervertebral disc of lumbar region   • Osteoarthritis of lumbar spine   • Inflammation of sacroiliac joint   • Chest pain   • Lumbar facet arthropathy   • Squamous cell carcinoma of hypopharynx   • Throat cancer   • Squamous cell cancer of hypopharynx       Current Outpatient Prescriptions   Medication Sig Dispense Refill   • albuterol (PROVENTIL HFA;VENTOLIN HFA) 108 (90 BASE) MCG/ACT inhaler Inhale 2 puffs 2 (two) times a day.     • celecoxib (CeleBREX) 200 MG capsule TAKE ONE CAPSULE BY MOUTH DAILY 30 capsule 5   • desonide (DESOWEN) 0.05 % cream      • diltiazem SR (CARDIZEM SR) 120 MG 12 hr capsule Take 1 capsule by mouth 2 (two) times a day.     • doxazosin (CARDURA) 4 MG tablet Take 1 tablet by mouth daily.     • famotidine (PEPCID) 20 MG tablet Take 1 tablet by mouth Daily. 30 tablet 1   • Fluticasone Propionate, Inhal, (FLOVENT IN) Inhale 2 (two) times a day.     • gabapentin (NEURONTIN) 800 MG tablet TAKE ONE TABLET BY MOUTH FOUR TIMES A  tablet 2   • Mometasone Furoate 200 MCG/ACT aerosol Inhale 2 puffs 2 (two) times a day.     • omeprazole (priLOSEC) 40 MG capsule      • ondansetron (ZOFRAN) 8 MG tablet Take 1 tablet by mouth 3 (Three) Times a Day As Needed for Nausea or Vomiting. 30 tablet 5     No current facility-administered medications for this visit.        Glycemic Risk:   n/a    Weight:   Height: 68\"  Weight: 144 lbs.  Usual Body Weight: 148 lbs.   BMI: 22    Weight has been stable. He lost 20# intentionally 2 years ago after giving up sweets.     Oral Food " Intake:  Regular Diet - No Restrictions    Hydration Status:   How many 8 ounce glass of water of fluid do you drink per day?  8    Enteral Feeding:   Location of Feeding Tube:  Stomach  Date of Tube Placement: 7/31/17  n/a    Nutrition Symptoms:   No Problems with Eating    Activity:   Normal with no limitations     reports that he has been smoking Cigarettes.  He has been smoking about 0.50 packs per day. He does not have any smokeless tobacco history on file. He reports that he drinks alcohol. He reports that he does not use illicit drugs.    Evaluation of Nutritional Risk:   Patient identified to be at nutritional risk and/or for nutritional consultation; will follow patient through course of treatment.   Diagnosis  Nutrition Impact Symptoms  Patient Education     Notes: [facility].  He is having a port and PEG placed on Monday 7/31.  He is currently eating well and is not having swallowing issues even though he complains of throat soreness.  He is working on gaining some weight in preparation for treatment.  He is edentulous and has been for about 2 years. He eats regular soft foods at home.    I reviewed the Eating Hints booklet and discussed the importance of protein and calorie intake during treatment and that it is important he continue to eat after the PEG is placed. Discussed hydration, mouth care and potential side effects of treatment and the effect on appetite.  Educated patient on the PEG using a booklet and provided patient with a syringe set for flushing the tube after it is placed.  We also talked about cleaning and care of the tube.  I provided high calorie high protein milkshake recipes and a handout on nutrition for head and neck cancer patients.  I also gave the patient a cancer cookbook for head and neck patients as he likes to cook.    With the patients permission I sent a referral to [facility] [place]. Will follow up after the PEG is placed and follow during  course of treatment.     Needs:  1970- 2275 cals  , 80-90g pro , 1970-2275cc free fluid per day.         Electronically signed by:  Marli May RD  3:16 PM

## 2017-07-27 NOTE — TELEPHONE ENCOUNTER
Pt is having outpatient surgery by Dr. Valenzuela to insert an IV port in chest for chemotherapy and a feeding tube insertion on 7-31-17. He has an RF scheduled for 8-01-17. He is asking if this is still ok to go ahead with it? He does not want to cancel RF. States chemo for throat cancer does not start until 8-10-17.

## 2017-07-27 NOTE — TELEPHONE ENCOUNTER
I do not see a reason why he could not complete the RF for port placement.  Just let the patient know that there could be some discomfort related to positioning during the RF procedure.

## 2017-07-27 NOTE — DISCHARGE INSTRUCTIONS
Take the following medications the morning of surgery with a small sip of water:  QVAR, DOXAZOSIN, DILTIAZEM, ALBUTEROL, FAMOTIDINE, OMEPRAZOLE    Arrive to hospital on your day of surgery at 10:00 AM.    General Instructions:  • Do not eat solid food after midnight the night before surgery.  • You may drink clear liquids day of surgery but must stop at least one hour before your hospital arrival time 9:00 AM.  • It is beneficial for you to have a clear drink that contains carbohydrates the day of surgery.  We suggest a 20 ounce bottle of Gatorade or Powerade for non-diabetic patients or a 20 ounce bottle of G2 or Powerade Zero for diabetic patients. (Pediatric patients, are not advised to drink a 20 ounce carbohydrate drink)    Clear liquids are liquids you can see through.  Nothing red in color.     Plain water                               Sports drinks  Sodas                                   Gelatin (Jell-O)  Fruit juices without pulp such as white grape juice and apple juice  Popsicles that contain no fruit or yogurt  Tea or coffee (no cream or milk added)  Gatorade / Powerade  G2 / Powerade Zero    • Infants may have breast milk up to four hours before surgery.  • Infants drinking formula may drink formula up to six hours before surgery.   • Patients who avoid smoking, chewing tobacco and alcohol for 4 weeks prior to surgery have a reduced risk of post-operative complications.  Quit smoking as many days before surgery as you can.  • Do not smoke, use chewing tobacco or drink alcohol the day of surgery.   • If applicable bring your C-PAP/ BI-PAP machine.  • Bring any papers given to you in the doctor’s office.  • Wear clean comfortable clothes and socks.  • Do not wear contact lenses or make-up.  Bring a case for your glasses.   • Bring crutches or walker if applicable.  • Leave all other valuables and jewelry at home.  • The Pre-Admission Testing nurse will instruct you to bring medications if unable to  obtain an accurate list in Pre-Admission Testing.        If you were given a blood bank ID arm band remember to bring it with you the day of surgery.    Preventing a Surgical Site Infection:  • For 2 to 3 days before surgery, avoid shaving with a razor because the razor can irritate skin and make it easier to develop an infection.  • The night prior to surgery sleep in a clean bed with clean clothing.  Do not allow pets to sleep with you.  • Shower on the morning of surgery using a fresh bar of anti-bacterial soap (such as Dial) and clean washcloth.  Dry with a clean towel and dress in clean clothing.  • Ask your surgeon if you will be receiving antibiotics prior to surgery.  • Make sure you, your family, and all healthcare providers clean their hands with soap and water or an alcohol based hand  before caring for you or your wound.    Day of surgery:  Upon arrival, a Pre-op nurse and Anesthesiologist will review your health history, obtain vital signs, and answer questions you may have.  The only belongings needed at this time will be your home medications and if applicable your C-PAP/BI-PAP machine.  If you are staying overnight your family can leave the rest of your belongings in the car and bring them to your room later.  A Pre-op nurse will start an IV and you may receive medication in preparation for surgery, including something to help you relax.  Your family will be able to see you in the Pre-op area.  While you are in surgery your family should notify the waiting room  if they leave the waiting room area and provide a contact phone number.    Please be aware that surgery does come with discomfort.  We want to make every effort to control your discomfort so please discuss any uncontrolled symptoms with your nurse.   Your doctor will most likely have prescribed pain medications.      If you are going home after surgery you will receive individualized written care instructions before being  discharged.  A responsible adult must drive you to and from the hospital on the day of your surgery and stay with you for 24 hours.    If you are staying overnight following surgery, you will be transported to your hospital room following the recovery period.  [facility] has all private rooms.    If you have any questions please call Pre-Admission Testing at [phone].  Deductibles and co-payments are collected on the day of service. Please be prepared to pay the required co-pay, deductible or deposit on the day of service as defined by your plan.

## 2017-07-28 ENCOUNTER — TELEPHONE (OUTPATIENT)
Dept: SURGERY | Facility: CLINIC | Age: 67
End: 2017-07-28

## 2017-07-28 NOTE — TELEPHONE ENCOUNTER
Pt sched for peg & port on 7/31 but, pt suppose to have RFA L-2 & L5 where he has to lay on his belly. Is that a problem?

## 2017-07-31 ENCOUNTER — ANESTHESIA (OUTPATIENT)
Dept: PERIOP | Facility: HOSPITAL | Age: 67
End: 2017-07-31

## 2017-07-31 ENCOUNTER — HOSPITAL ENCOUNTER (OUTPATIENT)
Facility: HOSPITAL | Age: 67
Setting detail: HOSPITAL OUTPATIENT SURGERY
Discharge: HOME | End: 2017-07-31
Attending: SURGERY | Admitting: SURGERY

## 2017-07-31 ENCOUNTER — ANESTHESIA EVENT (OUTPATIENT)
Dept: PERIOP | Facility: HOSPITAL | Age: 67
End: 2017-07-31

## 2017-07-31 ENCOUNTER — APPOINTMENT (OUTPATIENT)
Dept: GENERAL RADIOLOGY | Facility: HOSPITAL | Age: 67
End: 2017-07-31

## 2017-07-31 VITALS
HEART RATE: 68 BPM | OXYGEN SATURATION: 95 % | HEIGHT: 69 IN | TEMPERATURE: 97.6 F | SYSTOLIC BLOOD PRESSURE: 134 MMHG | RESPIRATION RATE: 16 BRPM | BODY MASS INDEX: 21.18 KG/M2 | WEIGHT: 143 LBS | DIASTOLIC BLOOD PRESSURE: 70 MMHG

## 2017-07-31 DIAGNOSIS — C13.9 SQUAMOUS CELL CANCER OF HYPOPHARYNX (HCC): ICD-10-CM

## 2017-07-31 PROCEDURE — 25010000003 CEFAZOLIN IN DEXTROSE 2-4 GM/100ML-% SOLUTION: Performed by: SURGERY

## 2017-07-31 PROCEDURE — C1788 PORT, INDWELLING, IMP: HCPCS | Performed by: SURGERY

## 2017-07-31 PROCEDURE — 25010000002 MIDAZOLAM PER 1 MG: Performed by: ANESTHESIOLOGY

## 2017-07-31 PROCEDURE — 36561 INSERT TUNNELED CV CATH: CPT | Performed by: SURGERY

## 2017-07-31 PROCEDURE — 25010000002 PROPOFOL 10 MG/ML EMULSION: Performed by: NURSE ANESTHETIST, CERTIFIED REGISTERED

## 2017-07-31 PROCEDURE — 77001 FLUOROGUIDE FOR VEIN DEVICE: CPT

## 2017-07-31 PROCEDURE — 43246 EGD PLACE GASTROSTOMY TUBE: CPT | Performed by: SURGERY

## 2017-07-31 PROCEDURE — 25010000002 HEPARIN (PORCINE) PER 1000 UNITS: Performed by: SURGERY

## 2017-07-31 PROCEDURE — 77001 FLUOROGUIDE FOR VEIN DEVICE: CPT | Performed by: SURGERY

## 2017-07-31 DEVICE — PERCUTANEOUS ENDOSCOPIC GASTROSTOMY KIT
Type: IMPLANTABLE DEVICE | Status: FUNCTIONAL
Brand: ENDOVIVE SAFETY PEG KIT

## 2017-07-31 DEVICE — POWERPORT M.R.I. IMPLANTABLE PORT WITH ATTACHABLE 8F CHRONOFLEX OPEN-ENDED SINGLE-LUMEN VENOUS CATHETER INTERMEDIATE KIT (WITH SUTURE PLUGS)
Type: IMPLANTABLE DEVICE | Status: FUNCTIONAL
Brand: POWERPORT M.R.I., CHRONOFLEX

## 2017-07-31 RX ORDER — PROMETHAZINE HYDROCHLORIDE 25 MG/ML
12.5 INJECTION, SOLUTION INTRAMUSCULAR; INTRAVENOUS ONCE AS NEEDED
Status: DISCONTINUED | OUTPATIENT
Start: 2017-07-31 | End: 2017-07-31 | Stop reason: HOSPADM

## 2017-07-31 RX ORDER — FENTANYL CITRATE 50 UG/ML
50 INJECTION, SOLUTION INTRAMUSCULAR; INTRAVENOUS
Status: DISCONTINUED | OUTPATIENT
Start: 2017-07-31 | End: 2017-07-31 | Stop reason: HOSPADM

## 2017-07-31 RX ORDER — DIPHENHYDRAMINE HYDROCHLORIDE 50 MG/ML
12.5 INJECTION INTRAMUSCULAR; INTRAVENOUS
Status: DISCONTINUED | OUTPATIENT
Start: 2017-07-31 | End: 2017-07-31 | Stop reason: HOSPADM

## 2017-07-31 RX ORDER — HYDROCODONE BITARTRATE AND ACETAMINOPHEN 7.5; 325 MG/1; MG/1
1 TABLET ORAL ONCE AS NEEDED
Status: COMPLETED | OUTPATIENT
Start: 2017-07-31 | End: 2017-07-31

## 2017-07-31 RX ORDER — EPHEDRINE SULFATE 50 MG/ML
5 INJECTION, SOLUTION INTRAVENOUS ONCE AS NEEDED
Status: DISCONTINUED | OUTPATIENT
Start: 2017-07-31 | End: 2017-07-31 | Stop reason: HOSPADM

## 2017-07-31 RX ORDER — FLUMAZENIL 0.1 MG/ML
0.2 INJECTION INTRAVENOUS AS NEEDED
Status: DISCONTINUED | OUTPATIENT
Start: 2017-07-31 | End: 2017-07-31 | Stop reason: HOSPADM

## 2017-07-31 RX ORDER — MIDAZOLAM HYDROCHLORIDE 1 MG/ML
2 INJECTION INTRAMUSCULAR; INTRAVENOUS
Status: DISCONTINUED | OUTPATIENT
Start: 2017-07-31 | End: 2017-07-31 | Stop reason: HOSPADM

## 2017-07-31 RX ORDER — PROPOFOL 10 MG/ML
VIAL (ML) INTRAVENOUS CONTINUOUS PRN
Status: DISCONTINUED | OUTPATIENT
Start: 2017-07-31 | End: 2017-07-31 | Stop reason: SURG

## 2017-07-31 RX ORDER — SODIUM CHLORIDE 0.9 % (FLUSH) 0.9 %
1-10 SYRINGE (ML) INJECTION AS NEEDED
Status: DISCONTINUED | OUTPATIENT
Start: 2017-07-31 | End: 2017-07-31 | Stop reason: HOSPADM

## 2017-07-31 RX ORDER — MIDAZOLAM HYDROCHLORIDE 1 MG/ML
1 INJECTION INTRAMUSCULAR; INTRAVENOUS
Status: DISCONTINUED | OUTPATIENT
Start: 2017-07-31 | End: 2017-07-31 | Stop reason: HOSPADM

## 2017-07-31 RX ORDER — NALOXONE HCL 0.4 MG/ML
0.2 VIAL (ML) INJECTION AS NEEDED
Status: DISCONTINUED | OUTPATIENT
Start: 2017-07-31 | End: 2017-07-31 | Stop reason: HOSPADM

## 2017-07-31 RX ORDER — PROMETHAZINE HYDROCHLORIDE 25 MG/1
12.5 TABLET ORAL ONCE AS NEEDED
Status: DISCONTINUED | OUTPATIENT
Start: 2017-07-31 | End: 2017-07-31 | Stop reason: HOSPADM

## 2017-07-31 RX ORDER — HYDROMORPHONE HYDROCHLORIDE 1 MG/ML
0.5 INJECTION, SOLUTION INTRAMUSCULAR; INTRAVENOUS; SUBCUTANEOUS
Status: DISCONTINUED | OUTPATIENT
Start: 2017-07-31 | End: 2017-07-31 | Stop reason: HOSPADM

## 2017-07-31 RX ORDER — PROPOFOL 10 MG/ML
VIAL (ML) INTRAVENOUS AS NEEDED
Status: DISCONTINUED | OUTPATIENT
Start: 2017-07-31 | End: 2017-07-31 | Stop reason: SURG

## 2017-07-31 RX ORDER — LABETALOL HYDROCHLORIDE 5 MG/ML
5 INJECTION, SOLUTION INTRAVENOUS
Status: DISCONTINUED | OUTPATIENT
Start: 2017-07-31 | End: 2017-07-31 | Stop reason: HOSPADM

## 2017-07-31 RX ORDER — MAGNESIUM HYDROXIDE 1200 MG/15ML
LIQUID ORAL AS NEEDED
Status: DISCONTINUED | OUTPATIENT
Start: 2017-07-31 | End: 2017-07-31 | Stop reason: HOSPADM

## 2017-07-31 RX ORDER — BUPIVACAINE HYDROCHLORIDE AND EPINEPHRINE 5; 5 MG/ML; UG/ML
INJECTION, SOLUTION PERINEURAL AS NEEDED
Status: DISCONTINUED | OUTPATIENT
Start: 2017-07-31 | End: 2017-07-31 | Stop reason: HOSPADM

## 2017-07-31 RX ORDER — SODIUM CHLORIDE, SODIUM LACTATE, POTASSIUM CHLORIDE, CALCIUM CHLORIDE 600; 310; 30; 20 MG/100ML; MG/100ML; MG/100ML; MG/100ML
9 INJECTION, SOLUTION INTRAVENOUS CONTINUOUS
Status: DISCONTINUED | OUTPATIENT
Start: 2017-07-31 | End: 2017-07-31 | Stop reason: HOSPADM

## 2017-07-31 RX ORDER — HYDRALAZINE HYDROCHLORIDE 20 MG/ML
5 INJECTION INTRAMUSCULAR; INTRAVENOUS
Status: DISCONTINUED | OUTPATIENT
Start: 2017-07-31 | End: 2017-07-31 | Stop reason: HOSPADM

## 2017-07-31 RX ORDER — OXYCODONE AND ACETAMINOPHEN 7.5; 325 MG/1; MG/1
1 TABLET ORAL ONCE AS NEEDED
Status: DISCONTINUED | OUTPATIENT
Start: 2017-07-31 | End: 2017-07-31 | Stop reason: HOSPADM

## 2017-07-31 RX ORDER — FAMOTIDINE 10 MG/ML
20 INJECTION, SOLUTION INTRAVENOUS ONCE
Status: COMPLETED | OUTPATIENT
Start: 2017-07-31 | End: 2017-07-31

## 2017-07-31 RX ORDER — PROMETHAZINE HYDROCHLORIDE 25 MG/1
25 TABLET ORAL ONCE AS NEEDED
Status: DISCONTINUED | OUTPATIENT
Start: 2017-07-31 | End: 2017-07-31 | Stop reason: HOSPADM

## 2017-07-31 RX ORDER — LIDOCAINE HYDROCHLORIDE 20 MG/ML
INJECTION, SOLUTION INFILTRATION; PERINEURAL AS NEEDED
Status: DISCONTINUED | OUTPATIENT
Start: 2017-07-31 | End: 2017-07-31 | Stop reason: SURG

## 2017-07-31 RX ORDER — ONDANSETRON 2 MG/ML
4 INJECTION INTRAMUSCULAR; INTRAVENOUS ONCE AS NEEDED
Status: DISCONTINUED | OUTPATIENT
Start: 2017-07-31 | End: 2017-07-31 | Stop reason: HOSPADM

## 2017-07-31 RX ORDER — CEFAZOLIN SODIUM 2 G/100ML
2 INJECTION, SOLUTION INTRAVENOUS ONCE
Status: COMPLETED | OUTPATIENT
Start: 2017-07-31 | End: 2017-07-31

## 2017-07-31 RX ORDER — HYDROCODONE BITARTRATE AND ACETAMINOPHEN 5; 325 MG/1; MG/1
TABLET ORAL
Qty: 30 TABLET | Refills: 0 | Status: SHIPPED | OUTPATIENT
Start: 2017-07-31 | End: 2017-08-17

## 2017-07-31 RX ORDER — PROMETHAZINE HYDROCHLORIDE 25 MG/1
25 SUPPOSITORY RECTAL ONCE AS NEEDED
Status: DISCONTINUED | OUTPATIENT
Start: 2017-07-31 | End: 2017-07-31 | Stop reason: HOSPADM

## 2017-07-31 RX ADMIN — LIDOCAINE HYDROCHLORIDE 60 MG: 20 INJECTION, SOLUTION INFILTRATION; PERINEURAL at 12:17

## 2017-07-31 RX ADMIN — FAMOTIDINE 20 MG: 10 INJECTION INTRAVENOUS at 11:49

## 2017-07-31 RX ADMIN — PROPOFOL 60 MG: 10 INJECTION, EMULSION INTRAVENOUS at 12:19

## 2017-07-31 RX ADMIN — PROPOFOL 200 MCG/KG/MIN: 10 INJECTION, EMULSION INTRAVENOUS at 12:20

## 2017-07-31 RX ADMIN — HYDROCODONE BITARTRATE AND ACETAMINOPHEN 1 TABLET: 7.5; 325 TABLET ORAL at 07:00

## 2017-07-31 RX ADMIN — CEFAZOLIN SODIUM 2 G: 2 INJECTION, SOLUTION INTRAVENOUS at 12:15

## 2017-07-31 RX ADMIN — MIDAZOLAM 2 MG: 1 INJECTION INTRAMUSCULAR; INTRAVENOUS at 11:49

## 2017-07-31 RX ADMIN — SODIUM CHLORIDE, POTASSIUM CHLORIDE, SODIUM LACTATE AND CALCIUM CHLORIDE 9 ML/HR: 600; 310; 30; 20 INJECTION, SOLUTION INTRAVENOUS at 11:49

## 2017-07-31 NOTE — ANESTHESIA POSTPROCEDURE EVALUATION
"Patient: King Parson    Procedure Summary     Date Anesthesia Start Anesthesia Stop Room / Location    07/31/17 1215 1301  [facility] / [facility]       Procedure Diagnosis Surgeon Provider    INSERTION VENOUS ACCESS DEVICE (N/A ); ESOPHAGOGASTRODUODENOSCOPY WITH PERCUTANEOUS ENDOSCOPIC GASTROSTOMY TUBE INSERTION (N/A Esophagus) Squamous cell cancer of hypopharynx  (Squamous cell cancer of hypopharynx [C13.9]) MD Karime Byers MD          Anesthesia Type: MAC  Last vitals  BP   136/84 (07/31/17 1410)    Temp   36.4 °C (97.6 °F) (07/31/17 1426)    Pulse   67 (07/31/17 1410)   Resp   16 (07/31/17 1410)    SpO2   95 % (07/31/17 1410)      Post Anesthesia Care and Evaluation    Patient location during evaluation: PACU  Patient participation: complete - patient participated  Level of consciousness: awake and alert  Pain management: adequate  Airway patency: patent  Anesthetic complications: No anesthetic complications    Cardiovascular status: acceptable  Respiratory status: acceptable  Hydration status: acceptable    Comments: /84  Pulse 67  Temp 36.4 °C (97.6 °F) (Oral)   Resp 16  Ht 69\" (175.3 cm)  Wt 143 lb (64.9 kg)  SpO2 95%  BMI 21.12 kg/m2          "

## 2017-07-31 NOTE — ANESTHESIA PREPROCEDURE EVALUATION
Anesthesia Evaluation     Patient summary reviewed   NPO Solid Status: > 8 hours  NPO Liquid Status: > 8 hours     Airway   Mallampati: II  TM distance: >3 FB  Dental      Pulmonary    (+) a smoker (Last smoked 2 days ago.), COPD, asthma,     ROS comment: Cancer of hypopharynx.  Cardiovascular     ECG reviewed  Rhythm: regular  Rate: normal    (+) hypertension, hyperlipidemia    ROS comment: Negative stress test in February.  EF 60%    Neuro/Psych  GI/Hepatic/Renal/Endo    (+)  GERD,     Musculoskeletal     Abdominal    Substance History      OB/GYN          Other   (+) arthritis   history of cancer active                                    Anesthesia Plan    ASA 4     MAC     intravenous induction   Anesthetic plan and risks discussed with patient.

## 2017-08-01 ENCOUNTER — OUTSIDE FACILITY SERVICE (OUTPATIENT)
Dept: PAIN MEDICINE | Facility: CLINIC | Age: 67
End: 2017-08-01

## 2017-08-01 ENCOUNTER — DOCUMENTATION (OUTPATIENT)
Dept: PAIN MEDICINE | Facility: CLINIC | Age: 67
End: 2017-08-01

## 2017-08-01 PROCEDURE — 99152 MOD SED SAME PHYS/QHP 5/>YRS: CPT | Performed by: ANESTHESIOLOGY

## 2017-08-01 PROCEDURE — 64636 DESTROY L/S FACET JNT ADDL: CPT | Performed by: ANESTHESIOLOGY

## 2017-08-01 PROCEDURE — 64635 DESTROY LUMB/SAC FACET JNT: CPT | Performed by: ANESTHESIOLOGY

## 2017-08-02 NOTE — PROGRESS NOTES
Bilateral L2-5 Lumbar Medial Branch RADIOFREQUENCY  [facility]        PREOPERATIVE DIAGNOSIS:  Lumbar spondylosis without myelopathy    POSTOPERATIVE DIAGNOSIS:  Lumbar spondylosis without myelopathy    PROCEDURE:   Diagnostic Bilateral Lumbar Medial Branch Nerve thermal radiofrequency lesioning, with fluoroscopy:  L2, L3, L4, and L5 nerves (at the L3, L4, L5 transverse processes and the sacral alar groove) to thermally treat the innervation to facet joints L3-4, L4-5, and L5-S1  1. 85103-34 -- Bilateral L/S facet neuro destr., 1st Level  2. 61600-84 -- Bilateral L/S facet neuro destr., 2nd  Level  3. 36221-63 -- Bilateral  L/S facet neuro destr., 3rd Level    PRE-PROCEDURE DISCUSSION WITH PATIENT:    Risks and complications were discussed with the patient prior to starting the procedure and informed consent was obtained.      SURGEON:  Edgar Prince MD    REASON FOR PROCEDURE:    Previous diagnostic positivity of the lumbar medial branch blockade is noted.  Previous therapeutic effect from radiofrequency lesioning of the same levels for a clinically significant period of time was noted.    SEDATION:  Fentanyl 100 mcg IV  TIME OF PROCEDURE:   The intraoperative procedure time after administration of the sedative was 33 minutes.       ANESTHETIC:  Lidocaine 2%  STEROID:  NONE      DESCRIPTON OF PROCEDURE:  After obtaining informed consent, IV access was obtained in the preoperative area.   The patient was taken to the operating room.  The patient was placed in the prone position with a pillow under the abdomen. All pressure points were well padded.  EKG, blood pressure, and pulse oximeter were monitored.  The patient was monitored and sedated by the RN under my direction. The lumbosacral area was prepped with Chloraprep and draped in a sterile fashion.     Under fluoroscopic guidance the transverse processes of the L3, L4, and L5 vertebrae at the junctions of the superior articular processes  were identified on the right.  Also identified was the groove between the ala and the superior articular process of the sacrum on the ipsilateral side.  Skin and subcutaneous tissue were anesthetized with 1ml of 1% lidocaine above each of these points. Then, radiofrequency probe needles were advanced in this fluoro view to the above junctions.  Aspiration was negative for blood and CSF.  After confirming the position of the needle with fluoroscope in all views, testing was initiated.  First, sensory testing was started on each needle a 1V and 50Hz and slowly decreased until painful pressure stimulation diminished at 0.5V.  Next, motor testing was confirmed to be negative at 3V and 2Hz for any radicular stimulation.  Then 1mL of the local anesthetic was instilled in each needle.  Two minutes elapsed, and during this time a lateral fluoroscopic view was confirmed again to ensure the needles had not advanced nor retracted.  Then, Radiofrequency Lesioning was initiated for 3 minutes at 85 degrees Celsius.  Needles were removed intact from each of the areas.     A similar procedure was repeated to address the L2, L3, L4, and L5 nerves on the contralateral side.   Onset of analgesia was noted.  Vital signs remained stable throughout.      ESTIMATED BLOOD LOSS:  <5 mL  SPECIMENS:  none    COMPLICATIONS:   No complications were noted.    TOLERANCE & DISCHARGE CONDITION:    The patient tolerated the procedure well.  The patient was transported to the recovery area without difficulties.  The patient was discharged to home under the care of family in stable and satisfactory condition.    PLAN OF CARE:  1. The patient was given our standard instruction sheet.  2. The patient will  Return to clinic 4 wks.  3. The patient will resume all medications as per the medication reconciliation sheet.

## 2017-08-04 ENCOUNTER — TELEPHONE (OUTPATIENT)
Dept: SURGERY | Facility: CLINIC | Age: 67
End: 2017-08-04

## 2017-08-04 ENCOUNTER — HOSPITAL ENCOUNTER (INPATIENT)
Facility: HOSPITAL | Age: 67
LOS: 1 days | Discharge: HOME | End: 2017-08-06
Attending: FAMILY MEDICINE | Admitting: SURGERY

## 2017-08-04 DIAGNOSIS — IMO0001 CELLULITIS, WOUND, POST-OPERATIVE, INITIAL ENCOUNTER: Primary | ICD-10-CM

## 2017-08-04 PROBLEM — L03.311 CELLULITIS, ABDOMINAL WALL: Status: ACTIVE | Noted: 2017-08-04

## 2017-08-04 LAB
ALBUMIN SERPL-MCNC: 3.5 G/DL (ref 3.5–5.2)
ALBUMIN/GLOB SERPL: 1.3 G/DL
ALP SERPL-CCNC: 49 U/L (ref 39–117)
ALT SERPL W P-5'-P-CCNC: 24 U/L (ref 1–41)
ANION GAP SERPL CALCULATED.3IONS-SCNC: 13.7 MMOL/L
AST SERPL-CCNC: 23 U/L (ref 1–40)
BASOPHILS # BLD AUTO: 0.01 10*3/MM3 (ref 0–0.2)
BASOPHILS NFR BLD AUTO: 0.2 % (ref 0–1.5)
BILIRUB SERPL-MCNC: 0.2 MG/DL (ref 0.1–1.2)
BUN BLD-MCNC: 11 MG/DL (ref 8–23)
BUN/CREAT SERPL: 15.9 (ref 7–25)
CALCIUM SPEC-SCNC: 9 MG/DL (ref 8.6–10.5)
CHLORIDE SERPL-SCNC: 103 MMOL/L (ref 98–107)
CO2 SERPL-SCNC: 26.3 MMOL/L (ref 22–29)
CREAT BLD-MCNC: 0.69 MG/DL (ref 0.76–1.27)
DEPRECATED RDW RBC AUTO: 51.7 FL (ref 37–54)
EOSINOPHIL # BLD AUTO: 0.11 10*3/MM3 (ref 0–0.7)
EOSINOPHIL NFR BLD AUTO: 2.4 % (ref 0.3–6.2)
ERYTHROCYTE [DISTWIDTH] IN BLOOD BY AUTOMATED COUNT: 12.6 % (ref 11.5–14.5)
GFR SERPL CREATININE-BSD FRML MDRD: 115 ML/MIN/1.73
GLOBULIN UR ELPH-MCNC: 2.6 GM/DL
GLUCOSE BLD-MCNC: 96 MG/DL (ref 65–99)
HCT VFR BLD AUTO: 34.5 % (ref 40.4–52.2)
HGB BLD-MCNC: 12 G/DL (ref 13.7–17.6)
IMM GRANULOCYTES # BLD: 0.03 10*3/MM3 (ref 0–0.03)
IMM GRANULOCYTES NFR BLD: 0.6 % (ref 0–0.5)
LYMPHOCYTES # BLD AUTO: 1.47 10*3/MM3 (ref 0.9–4.8)
LYMPHOCYTES NFR BLD AUTO: 31.8 % (ref 19.6–45.3)
MCH RBC QN AUTO: 39.6 PG (ref 27–32.7)
MCHC RBC AUTO-ENTMCNC: 34.8 G/DL (ref 32.6–36.4)
MCV RBC AUTO: 113.9 FL (ref 79.8–96.2)
MONOCYTES # BLD AUTO: 0.62 10*3/MM3 (ref 0.2–1.2)
MONOCYTES NFR BLD AUTO: 13.4 % (ref 5–12)
NEUTROPHILS # BLD AUTO: 2.38 10*3/MM3 (ref 1.9–8.1)
NEUTROPHILS NFR BLD AUTO: 51.6 % (ref 42.7–76)
PLATELET # BLD AUTO: 153 10*3/MM3 (ref 140–500)
PMV BLD AUTO: 10 FL (ref 6–12)
POTASSIUM BLD-SCNC: 3.7 MMOL/L (ref 3.5–5.2)
PROT SERPL-MCNC: 6.1 G/DL (ref 6–8.5)
RBC # BLD AUTO: 3.03 10*6/MM3 (ref 4.6–6)
SODIUM BLD-SCNC: 143 MMOL/L (ref 136–145)
WBC NRBC COR # BLD: 4.62 10*3/MM3 (ref 4.5–10.7)

## 2017-08-04 PROCEDURE — 80053 COMPREHEN METABOLIC PANEL: CPT | Performed by: FAMILY MEDICINE

## 2017-08-04 PROCEDURE — 25010000002 ONDANSETRON PER 1 MG: Performed by: FAMILY MEDICINE

## 2017-08-04 PROCEDURE — 25010000002 HYDROMORPHONE PER 4 MG: Performed by: FAMILY MEDICINE

## 2017-08-04 PROCEDURE — G0378 HOSPITAL OBSERVATION PER HR: HCPCS

## 2017-08-04 PROCEDURE — 25010000002 VANCOMYCIN: Performed by: FAMILY MEDICINE

## 2017-08-04 PROCEDURE — 99284 EMERGENCY DEPT VISIT MOD MDM: CPT

## 2017-08-04 PROCEDURE — 85025 COMPLETE CBC W/AUTO DIFF WBC: CPT | Performed by: FAMILY MEDICINE

## 2017-08-04 RX ORDER — HYDROCODONE BITARTRATE AND ACETAMINOPHEN 7.5; 325 MG/1; MG/1
1 TABLET ORAL ONCE
Status: DISCONTINUED | OUTPATIENT
Start: 2017-08-04 | End: 2017-08-04

## 2017-08-04 RX ORDER — NICOTINE 21 MG/24HR
1 PATCH, TRANSDERMAL 24 HOURS TRANSDERMAL EVERY 24 HOURS
Status: DISCONTINUED | OUTPATIENT
Start: 2017-08-04 | End: 2017-08-06 | Stop reason: HOSPADM

## 2017-08-04 RX ORDER — ONDANSETRON HYDROCHLORIDE 8 MG/1
8 TABLET, FILM COATED ORAL 3 TIMES DAILY PRN
Status: DISCONTINUED | OUTPATIENT
Start: 2017-08-04 | End: 2017-08-06 | Stop reason: HOSPADM

## 2017-08-04 RX ORDER — ONDANSETRON 2 MG/ML
4 INJECTION INTRAMUSCULAR; INTRAVENOUS ONCE
Status: COMPLETED | OUTPATIENT
Start: 2017-08-04 | End: 2017-08-04

## 2017-08-04 RX ORDER — CELECOXIB 200 MG/1
200 CAPSULE ORAL DAILY
Status: DISCONTINUED | OUTPATIENT
Start: 2017-08-04 | End: 2017-08-06 | Stop reason: HOSPADM

## 2017-08-04 RX ORDER — FAMOTIDINE 20 MG/1
20 TABLET, FILM COATED ORAL DAILY
Status: DISCONTINUED | OUTPATIENT
Start: 2017-08-04 | End: 2017-08-06 | Stop reason: HOSPADM

## 2017-08-04 RX ORDER — TERAZOSIN 1 MG/1
1 CAPSULE ORAL NIGHTLY
Status: DISCONTINUED | OUTPATIENT
Start: 2017-08-04 | End: 2017-08-06 | Stop reason: HOSPADM

## 2017-08-04 RX ORDER — DILTIAZEM HYDROCHLORIDE 120 MG/1
120 CAPSULE, COATED, EXTENDED RELEASE ORAL
Status: DISCONTINUED | OUTPATIENT
Start: 2017-08-04 | End: 2017-08-06 | Stop reason: HOSPADM

## 2017-08-04 RX ORDER — BUDESONIDE 0.5 MG/2ML
0.5 INHALANT ORAL
Status: DISCONTINUED | OUTPATIENT
Start: 2017-08-04 | End: 2017-08-06 | Stop reason: HOSPADM

## 2017-08-04 RX ORDER — VANCOMYCIN HYDROCHLORIDE 1 G/200ML
1000 INJECTION, SOLUTION INTRAVENOUS EVERY 12 HOURS
Status: DISCONTINUED | OUTPATIENT
Start: 2017-08-05 | End: 2017-08-06 | Stop reason: HOSPADM

## 2017-08-04 RX ORDER — ALBUTEROL SULFATE 2.5 MG/3ML
2.5 SOLUTION RESPIRATORY (INHALATION) 2 TIMES DAILY
Status: DISCONTINUED | OUTPATIENT
Start: 2017-08-04 | End: 2017-08-06 | Stop reason: HOSPADM

## 2017-08-04 RX ORDER — GABAPENTIN 400 MG/1
800 CAPSULE ORAL NIGHTLY
Status: DISCONTINUED | OUTPATIENT
Start: 2017-08-04 | End: 2017-08-06 | Stop reason: HOSPADM

## 2017-08-04 RX ADMIN — NICOTINE 1 PATCH: 21 PATCH, EXTENDED RELEASE TRANSDERMAL at 22:51

## 2017-08-04 RX ADMIN — ONDANSETRON 4 MG: 2 INJECTION INTRAMUSCULAR; INTRAVENOUS at 17:10

## 2017-08-04 RX ADMIN — VANCOMYCIN HYDROCHLORIDE 1250 MG: 1 INJECTION, POWDER, LYOPHILIZED, FOR SOLUTION INTRAVENOUS at 19:39

## 2017-08-04 RX ADMIN — CELECOXIB 200 MG: 200 CAPSULE ORAL at 22:51

## 2017-08-04 RX ADMIN — TERAZOSIN HYDROCHLORIDE ANHYDROUS 1 MG: 1 CAPSULE ORAL at 22:51

## 2017-08-04 RX ADMIN — GABAPENTIN 800 MG: 400 CAPSULE ORAL at 22:41

## 2017-08-04 RX ADMIN — FAMOTIDINE 20 MG: 20 TABLET, FILM COATED ORAL at 22:51

## 2017-08-04 RX ADMIN — HYDROMORPHONE HYDROCHLORIDE 1 MG: 1 INJECTION, SOLUTION INTRAMUSCULAR; INTRAVENOUS; SUBCUTANEOUS at 17:11

## 2017-08-04 RX ADMIN — DILTIAZEM HYDROCHLORIDE 120 MG: 120 CAPSULE, COATED, EXTENDED RELEASE ORAL at 22:51

## 2017-08-04 NOTE — ED NOTES
Pt. has had G-tube placed on Monday and has a large area of erythema and warmth surrounding the insertion site.     Rafy Diamond RN  08/04/17 0492

## 2017-08-04 NOTE — ED PROVIDER NOTES
EMERGENCY DEPARTMENT ENCOUNTER    CHIEF COMPLAINT  Chief Complaint: post-op  History given by: patient, family  History limited by: nothing  Room Number: EVANGELISTA/EVANGELISTA  PMD: BETTE Guillen      HPI:  Pt is a 66 y.o. male who presents complaining of increasing erythema and pain around his G-Tube site for the last four days. Pt states that his niece marked the area of erythema around 1430 last night and that it has increased in diameter since that time. Pt denies similar symptoms previously or additional complaint. Pt states that he had to take a pain pill last night secondary to the pain.    Duration:  4 days  Onset: gradual  Timing: constant  Location: abdomen  Radiation: none  Quality: erythema  Intensity/Severity: moderate  Progression: worsening  Associated Symptoms: abd wall pin  Aggravating Factors: none  Alleviating Factors: none  Previous Episodes: Pt denies similar symptoms previously.  Treatment before arrival: Pt states that he had to take a pain pill last night secondary to the pain.    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   • Throat cancer 07/25/2017   • Squamous cell cancer of hypopharynx 07/26/2017     Resolved Ambulatory Problems     Diagnosis Date Noted   • No Resolved Ambulatory Problems     Past Medical History:   Diagnosis Date   • Asthma    • Bruises easily    • COPD (chronic obstructive pulmonary disease)    • GERD (gastroesophageal reflux disease)    • History of chest pain 02/01/2017   • Hyperlipidemia    • Hypertension    • Low back pain    • Neck  mass    • Osteoarthritis    • Squamous cell cancer of hypopharynx 2017   • Vision loss of left eye        PAST SURGICAL HISTORY  Past Surgical History:   Procedure Laterality Date   • ELBOW ARTHROTOMY Bilateral     bursa removed   • ENDOSCOPY W/ PEG TUBE PLACEMENT N/A 7/31/2017    Procedure: ESOPHAGOGASTRODUODENOSCOPY WITH PERCUTANEOUS ENDOSCOPIC GASTROSTOMY TUBE INSERTION;  Surgeon: Raul Valenzuela MD;  Location: [facility];  Service:    • EPIDURAL BLOCK     • EYE SURGERY Left 1993    HEMORRHAGE LEFT EYE   • HAND TENDON REPAIR      LEFT THUMB   • INCISION AND DRAINAGE ABSCESS Left     LEFT HAND, STREP INFECTION   • VENOUS ACCESS DEVICE (PORT) INSERTION N/A 7/31/2017    Procedure: INSERTION VENOUS ACCESS DEVICE;  Surgeon: Raul Valenuzela MD;  Location: [facility] OR;  Service:        FAMILY HISTORY  Family History   Problem Relation Age of Onset   • Cancer Father      malignant brenda   • Prostate cancer Father    • Heart disease Sister      cath stent placement    • Heart attack Sister    • Heart attack Brother    • Diabetes Brother    • Heart disease Brother    • Hypertension Other    • Malig Hyperthermia Neg Hx        SOCIAL HISTORY  Social History     Social History   • Marital status: Single     Spouse name: N/A   • Number of children: 0   • Years of education: High school     Occupational History   •  for medical supply Disabled     Social History Main Topics   • Smoking status: Current Some Day Smoker     Packs/day: 1.00     Years: 19.00     Types: Cigarettes   • Smokeless tobacco: Not on file      Comment: PT STATES HASN'T SMOKED IN 4 DAYS, WEARING NICOTINE PATCH   • Alcohol use No   • Drug use: No   • Sexual activity: Defer     Other Topics Concern   • Not on file     Social History Narrative   • No narrative on file       ALLERGIES  Codeine    REVIEW OF SYSTEMS  Review of Systems   Constitutional: Negative for activity change, appetite change and fever.   HENT: Negative for congestion  and sore throat.    Eyes: Negative.    Respiratory: Negative for cough and shortness of breath.    Cardiovascular: Negative for chest pain and leg swelling.   Gastrointestinal: Positive for abdominal pain (abd wall ). Negative for diarrhea and vomiting.   Endocrine: Negative.    Genitourinary: Negative for decreased urine volume and dysuria.   Musculoskeletal: Negative for neck pain.   Skin: Positive for color change (erythema to abdominal wall). Negative for rash and wound.   Allergic/Immunologic: Negative.    Neurological: Negative for weakness, numbness and headaches.   Hematological: Negative.    Psychiatric/Behavioral: Negative.    All other systems reviewed and are negative.      PHYSICAL EXAM  ED Triage Vitals   Temp Heart Rate Resp BP SpO2   08/04/17 1426 08/04/17 1426 08/04/17 1426 08/04/17 1432 08/04/17 1426   97.7 °F (36.5 °C) 104 16 135/76 95 %      Temp src Heart Rate Source Patient Position BP Location FiO2 (%)   08/04/17 1426 -- -- -- --   Tympanic           Physical Exam   Constitutional: He is oriented to person, place, and time and well-developed, well-nourished, and in no distress.   HENT:   Head: Normocephalic and atraumatic.   Eyes: EOM are normal. Pupils are equal, round, and reactive to light.   Neck: Normal range of motion. Neck supple.   Cardiovascular: Normal rate and normal heart sounds.  An irregularly irregular rhythm present.   Pulmonary/Chest: Effort normal and breath sounds normal. No respiratory distress.   Bruising and mild swelling around the port site in the left upper chest w/o signs of infection   Abdominal: Soft. There is no tenderness. There is no rebound and no guarding.   There is a marked area of diffuse erythema around the pt's G-Tube site, which extends 4-6 inches around the G-tube site. , which has a 0.5x1.0cm increased diameter of erythema outside of the marked area on the abdomen   Musculoskeletal: Normal range of motion. He exhibits no edema.   Neurological: He is  alert and oriented to person, place, and time. He has normal sensation and normal strength.   Skin: Skin is warm and dry.   Psychiatric: Mood and affect normal.   Nursing note and vitals reviewed.      LAB RESULTS  Lab Results (last 24 hours)     Procedure Component Value Units Date/Time    Comprehensive Metabolic Panel [[id number]]  (Abnormal) Collected:  08/04/17 1703    Specimen:  Blood Updated:  08/04/17 1736     Glucose 96 mg/dL      BUN 11 mg/dL      Creatinine 0.69 (L) mg/dL      Sodium 143 mmol/L      Potassium 3.7 mmol/L      Chloride 103 mmol/L      CO2 26.3 mmol/L      Calcium 9.0 mg/dL      Total Protein 6.1 g/dL      Albumin 3.50 g/dL      ALT (SGPT) 24 U/L      AST (SGOT) 23 U/L      Alkaline Phosphatase 49 U/L      Total Bilirubin 0.2 mg/dL      eGFR Non African Amer 115 mL/min/1.73      Globulin 2.6 gm/dL      A/G Ratio 1.3 g/dL      BUN/Creatinine Ratio 15.9     Anion Gap 13.7 mmol/L     CBC & Differential [[id number]] Collected:  08/04/17 1710    Specimen:  Blood Updated:  08/04/17 1722    Narrative:       The following orders were created for panel order CBC & Differential.  Procedure                               Abnormality         Status                     ---------                               -----------         ------                     Scan Slide[[id number]]                                                                  CBC Auto Differential[[id number]]        Abnormal            Final result                 Please view results for these tests on the individual orders.    CBC Auto Differential [[id number]]  (Abnormal) Collected:  08/04/17 1710    Specimen:  Blood Updated:  08/04/17 1722     WBC 4.62 10*3/mm3      RBC 3.03 (L) 10*6/mm3      Hemoglobin 12.0 (L) g/dL      Hematocrit 34.5 (L) %      .9 (H) fL      MCH 39.6 (H) pg      MCHC 34.8 g/dL      RDW 12.6 %      RDW-SD 51.7 fl      MPV 10.0 fL      Platelets 153 10*3/mm3      Neutrophil % 51.6 %      Lymphocyte % 31.8 %       Monocyte % 13.4 (H) %      Eosinophil % 2.4 %      Basophil % 0.2 %      Immature Grans % 0.6 (H) %      Neutrophils, Absolute 2.38 10*3/mm3      Lymphocytes, Absolute 1.47 10*3/mm3      Monocytes, Absolute 0.62 10*3/mm3      Eosinophils, Absolute 0.11 10*3/mm3      Basophils, Absolute 0.01 10*3/mm3      Immature Grans, Absolute 0.03 10*3/mm3           I ordered the above labs and reviewed the results    PROCEDURES  Procedures      PROGRESS AND CONSULTS  ED Course     1639- D/w pt and family that the pt's symptoms are c/w cellulitis. Discussed the plan to order lab work and consult Dr. Valenzuela (general surgery) prior to disposition. Pt and family agree with the plan and all questions were addressed.    1646- Ordered blood work for further evaluation.    1703- Ordered Dilaudid and Zofran for pain.    1852- Ordered vancomycin for abdominal wall cellulitis. Placed call to Dr. Valenzuela (general surgery) for consult.     1853- Rechecked pt. Pt states that his pain is much improved after pain medication. Notified pt and family of the pt's lab results, including his unremarkable WBC count and the plan to consult Dr. Valenzuela (general surgery). Pt and family agree with the plan and all questions were addressed.    6:58 PM  Latest vital signs   BP- 131/86 HR- 65 Temp- 97.7 °F (36.5 °C) (Tympanic) O2 sat- 92%    1905- Discussed the pt's case with Dr. Capellan (general surgery) who states that he will come evaluate the pt in the ED once he has completed his current surgical case.    1932- Rechecked pt. Pt is resting comfortably. Notified pt and family of the consult with Dr. Capellan (general surgery) and that he will come evaluate the pt in the ED once he is done with his current surgical case. Pt and family agree with the plan and all questions were addressed.  Dr Capellan here and will arrange admission.  First dose of Vanc given.     MEDICAL DECISION MAKING  Results were reviewed/discussed with the patient and they were also  made aware of online access. Pt also made aware that some labs, such as cultures, will not be resulted during ER visit and follow up with PMD is necessary.     MDM  Number of Diagnoses or Management Options     Amount and/or Complexity of Data Reviewed  Clinical lab tests: ordered and reviewed (WBC=4.62)  Decide to obtain previous medical records or to obtain history from someone other than the patient: yes  Obtain history from someone other than the patient: yes (family)  Review and summarize past medical records: yes (Pt had a port and G-tube placed on 7-31-17 by Dr. Valenzuela (general surgery).)  Discuss the patient with other providers: yes (D/w Dr. Capellan (general surgery))           DIAGNOSIS  Final diagnoses:   Cellulitis, wound, post-operative, initial encounter       DISPOSITION  Admit to Dr Valenzuela    Latest Documented Vital Signs:  As of 9:27 PM  BP- 151/88 HR- 64 Temp- 97.7 °F (36.5 °C) (Tympanic) O2 sat- 94%    --  Documentation assistance provided by sarah Howard for Dr. Webb.  Information recorded by the scrscotty was done at my direction and has been verified and validated by me.     Mariah Howard  08/04/17 1957       Wei Webb MD  08/04/17 6824

## 2017-08-04 NOTE — TELEPHONE ENCOUNTER
Spoke with patient informed him of Dr. Valenzuela's response. Pt voiced understanding and will be making his way to [facility] on [address].

## 2017-08-04 NOTE — TELEPHONE ENCOUNTER
Pt had Left subclavian 8 [place] PowerPort placed and a Gtube on 8-31-17. Pt states that his entire stomach is red/swollen and very painful. It is not hot to the touch and there is no drainage from the site. Please advise.

## 2017-08-05 PROBLEM — T81.49XA CELLULITIS, WOUND, POST-OPERATIVE: Status: ACTIVE | Noted: 2017-08-05

## 2017-08-05 LAB
BASOPHILS # BLD AUTO: 0.01 10*3/MM3 (ref 0–0.2)
BASOPHILS NFR BLD AUTO: 0.3 % (ref 0–1.5)
DEPRECATED RDW RBC AUTO: 51.3 FL (ref 37–54)
EOSINOPHIL # BLD AUTO: 0.09 10*3/MM3 (ref 0–0.7)
EOSINOPHIL NFR BLD AUTO: 2.4 % (ref 0.3–6.2)
ERYTHROCYTE [DISTWIDTH] IN BLOOD BY AUTOMATED COUNT: 12.5 % (ref 11.5–14.5)
HCT VFR BLD AUTO: 38.5 % (ref 40.4–52.2)
HGB BLD-MCNC: 13.1 G/DL (ref 13.7–17.6)
IMM GRANULOCYTES # BLD: 0.02 10*3/MM3 (ref 0–0.03)
IMM GRANULOCYTES NFR BLD: 0.5 % (ref 0–0.5)
LYMPHOCYTES # BLD AUTO: 0.98 10*3/MM3 (ref 0.9–4.8)
LYMPHOCYTES NFR BLD AUTO: 25.7 % (ref 19.6–45.3)
MCH RBC QN AUTO: 38.9 PG (ref 27–32.7)
MCHC RBC AUTO-ENTMCNC: 34 G/DL (ref 32.6–36.4)
MCV RBC AUTO: 114.2 FL (ref 79.8–96.2)
MONOCYTES # BLD AUTO: 0.31 10*3/MM3 (ref 0.2–1.2)
MONOCYTES NFR BLD AUTO: 8.1 % (ref 5–12)
NEUTROPHILS # BLD AUTO: 2.4 10*3/MM3 (ref 1.9–8.1)
NEUTROPHILS NFR BLD AUTO: 63 % (ref 42.7–76)
PLATELET # BLD AUTO: 152 10*3/MM3 (ref 140–500)
PMV BLD AUTO: 10.2 FL (ref 6–12)
RBC # BLD AUTO: 3.37 10*6/MM3 (ref 4.6–6)
WBC NRBC COR # BLD: 3.81 10*3/MM3 (ref 4.5–10.7)

## 2017-08-05 PROCEDURE — 94640 AIRWAY INHALATION TREATMENT: CPT

## 2017-08-05 PROCEDURE — 85025 COMPLETE CBC W/AUTO DIFF WBC: CPT | Performed by: SURGERY

## 2017-08-05 PROCEDURE — 94799 UNLISTED PULMONARY SVC/PX: CPT

## 2017-08-05 PROCEDURE — 25010000002 VANCOMYCIN PER 500 MG: Performed by: SURGERY

## 2017-08-05 RX ORDER — HYDROCODONE BITARTRATE AND ACETAMINOPHEN 5; 325 MG/1; MG/1
2 TABLET ORAL EVERY 4 HOURS PRN
Status: DISCONTINUED | OUTPATIENT
Start: 2017-08-05 | End: 2017-08-06 | Stop reason: HOSPADM

## 2017-08-05 RX ORDER — GABAPENTIN 400 MG/1
800 CAPSULE ORAL NIGHTLY
Status: DISCONTINUED | OUTPATIENT
Start: 2017-08-05 | End: 2017-08-06 | Stop reason: HOSPADM

## 2017-08-05 RX ADMIN — BUDESONIDE 0.5 MG: 0.5 INHALANT RESPIRATORY (INHALATION) at 08:00

## 2017-08-05 RX ADMIN — GABAPENTIN 800 MG: 400 CAPSULE ORAL at 21:20

## 2017-08-05 RX ADMIN — HYDROCODONE BITARTRATE AND ACETAMINOPHEN 2 TABLET: 5; 325 TABLET ORAL at 11:40

## 2017-08-05 RX ADMIN — VANCOMYCIN HYDROCHLORIDE 1000 MG: 1 INJECTION, SOLUTION INTRAVENOUS at 18:39

## 2017-08-05 RX ADMIN — TERAZOSIN HYDROCHLORIDE ANHYDROUS 1 MG: 1 CAPSULE ORAL at 21:08

## 2017-08-05 RX ADMIN — HYDROCODONE BITARTRATE AND ACETAMINOPHEN 2 TABLET: 5; 325 TABLET ORAL at 21:20

## 2017-08-05 RX ADMIN — VANCOMYCIN HYDROCHLORIDE 1000 MG: 1 INJECTION, SOLUTION INTRAVENOUS at 05:49

## 2017-08-05 RX ADMIN — NICOTINE 1 PATCH: 21 PATCH, EXTENDED RELEASE TRANSDERMAL at 21:08

## 2017-08-05 RX ADMIN — ALBUTEROL SULFATE 2.5 MG: 2.5 SOLUTION RESPIRATORY (INHALATION) at 08:00

## 2017-08-05 NOTE — PLAN OF CARE
Problem: Fall Risk (Adult)  Intervention: Monitor/Assist with Self Care    08/04/17 2137 08/04/17 2138 08/04/17 2225   Musculoskeletal Interventions   Self-Care Promotion --  --  --    Activity   Activity Type --  --  ambulated to bathroom   Activity Level of Assistance --  --  --    Monitor/Assist with Self Care   Ambulation --  2-->assistive person --    Transferring 0-->independent --  --    Toileting 0-->independent --  --    Bathing 0-->independent --  --    Dressing 0-->independent --  --    Eating 0-->independent --  --    Communication 0-->understands/communicates without difficulty --  --    Swallowing (if score 2 or more for any item, consult Rehab Services) 0-->swallows foods/liquids without difficulty --  --      08/04/17 2343   Musculoskeletal Interventions   Self-Care Promotion independence encouraged;BADL personal objects within reach;safe use of adaptive equipment encouraged   Activity   Activity Type --    Activity Level of Assistance assistance, 2 people   Monitor/Assist with Self Care   Ambulation --    Transferring --    Toileting --    Bathing --    Dressing --    Eating --    Communication --    Swallowing (if score 2 or more for any item, consult Rehab Services) --        Intervention: Reduce Risk/Promote Restraint Free Environment                       08/04/17 2301 08/04/17 2343   Safety Interventions   Safety/Security Measures --  bed alarm set   Environmental Safety Modification assistive device/personal items within reach;clutter free environment maintained;lighting adjusted --    Restraint Interventions   Safety Promotion/Fall Prevention safety round/check completed;fall prevention program maintained;nonskid shoes/slippers when out of bed --                  Intervention: Review Medications/Identify Contributors to Fall Risk    08/04/17 2343   Safety Interventions   Medication Review/Management medications reviewed         Goal: Identify Related Risk Factors and Signs and  Symptoms  Outcome: Ongoing (interventions implemented as appropriate)    08/04/17 2343   Fall Risk   Fall Risk: Related Risk Factors age-related changes;history of falls;environment unfamiliar   Fall Risk: Signs and Symptoms presence of risk factors       Goal: Absence of Falls  Outcome: Ongoing (interventions implemented as appropriate)    08/04/17 2343   Fall Risk (Adult)   Absence of Falls making progress toward outcome         Problem: Pain, Acute (Adult)  Intervention: Monitor/Manage Analgesia    08/04/17 2343   Safety Interventions   Medication Review/Management medications reviewed   Manage Acute Burn Pain   Pain Management Interventions relaxation techniques promoted       Intervention: Mutually Develop/Implement Acute Pain Management Plan    08/04/17 2343   Manage Acute Burn Pain   Pain Management Interventions relaxation techniques promoted   Cognitive Interventions   Sensory Stimulation Regulation auditory stimulation minimized;care clustered;lighting decreased;music/television provided for relaxation;quiet environment promoted       Intervention: Support/Optimize Psychosocial Response to Acute Pain    08/04/17 2343   Coping Strategies   Trust Relationship/Rapport care explained;choices provided;empathic listening provided;questions answered;questions encouraged;reassurance provided   Diversional Activities television;smartphone   Family/Support System Care involvement promoted;presence promoted   Supportive Measures active listening utilized;positive reinforcement provided;relaxation techniques promoted         Goal: Identify Related Risk Factors and Signs and Symptoms  Outcome: Ongoing (interventions implemented as appropriate)    08/04/17 2343   Pain, Acute   Related Risk Factors (Acute Pain) disease process   Signs and Symptoms (Acute Pain) guarding/abnormal posturing/positioning;verbalization of pain descriptors;questions meaning of pain       Goal: Acceptable Pain Control/Comfort Level  Outcome:  Ongoing (interventions implemented as appropriate)    08/04/17 2343   Pain, Acute (Adult)   Acceptable Pain Control/Comfort Level making progress toward outcome         Problem: Infection, Risk/Actual (Adult)  Intervention: Manage Suspected/Actual Infection    08/04/17 2343   Safety Interventions   Isolation Precautions protective environment initiated   Safety Interventions   Infection Management aseptic technique maintained       Intervention: Prevent Infection/Maximize Resistance    08/04/17 2343   Nutrition Interventions   Oral Nutrition Promotion rest periods promoted;safe use of adaptive equipment encouraged   Pain/Comfort/Sleep Interventions   Sleep/Rest Enhancement awakenings minimized;consistent schedule promoted;family presence promoted;noise level reduced;regular sleep/rest pattern promoted;relaxation techniques promoted;therapeutic touch utilized         Goal: Identify Related Risk Factors and Signs and Symptoms  Outcome: Ongoing (interventions implemented as appropriate)    08/04/17 2343   Infection, Risk/Actual   Infection, Risk/Actual: Related Risk Factors chronic illness/condition   Signs and Symptoms (Infection, Risk/Actual) edema;weakness       Goal: Infection Prevention/Resolution  Outcome: Ongoing (interventions implemented as appropriate)    08/04/17 2343   Infection, Risk/Actual (Adult)   Infection Prevention/Resolution making progress toward outcome         Problem: Cellulitis (Adult)  Intervention: Monitor/Manage Neurovascular Compromise    08/04/17 2343   Skin Interventions   Skin Protection incontinence pads utilized;tubing/devices free from skin contact       Intervention: Monitor/Manage Pain    08/04/17 2343   Safety Interventions   Medication Review/Management medications reviewed   Manage Acute Burn Pain   Pain Management Interventions relaxation techniques promoted       Intervention: Prevent/Manage Infection Progression    08/04/17 2343   Safety Interventions   Infection Prevention  barrier precautions utilized;personal protective equipment utilized;rest/sleep promoted;single patient room provided   Isolation Precautions protective environment initiated   Safety Interventions   Infection Management aseptic technique maintained       Intervention: Prevent/Manage DVT/VTE Risk    08/04/17 2343   Support Surgical/Anesthesia Recovery   Venous Thromboembolism Prevent/Manage bleeding risk assessed;fluids promoted;ROM (passive) performed         Goal: Signs and Symptoms of Listed Potential Problems Will be Absent or Manageable (Cellulitis)  Outcome: Ongoing (interventions implemented as appropriate)    08/04/17 2343   Cellulitis   Problems Assessed (Cellulitis) all   Problems Present (Cellulitis) pain;infection

## 2017-08-05 NOTE — H&P
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  He says he did well for a few days.  He has been eating well.  His bowels are working.  The pain he had initially postoperatively seemed to resolve by about day 2, and then yesterday he came worse.  He noted some erythema and cellulitis surrounding his PEG tube, and this has progressed over about the last 36 hours.  He presented to the emergency room for further evaluation.  He is had some scant drainage around the gastrostomy tube, no feculent discharge.  No bleeding.  He's had some subjective fever, but nothing measured and observed objectively.    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    Medications: Home medicines include Prilosec, Zofran, Pepcid, albuterol, diltiazem, doxazosin, Qvar, Celebrex, Neurontin, NicoDerm    Allergies: Codeine    Social History: Has smoked about a pack of cigarettes a day for around 20 years.  Recently quit a couple of days ago.  Does not use alcohol.  Lives with his wife.  Currently disabled.    Family History: Positive for prostate cancer, coronary artery disease, diabetes    Review of Systems:  Constitutional: Negative for change in weight or appetite  HENT: no hearing change, no runny nose  Eyes: no visual changes or icterus  Neck: no swollen glands or dysphagia or odynophagia  Respiratory: negative for SOB, cough, hemoptysis or wheezing  Cardiovascular: negative for chest pain, palpitations or peripheral edema  Gastrointestinal: Positive for abdominal pain, negative for diarrhea, vomiting.  Musculoskeletal: negative for  myalgias or joint pain  Skin: patient denies icterus or rash  Hematologic: negative for easy bleeding or bruising      Physical Exam:  Vitals: Heart rate 104 respiratory rate 16 breaths per minute blood pressure 135/76 temperature 97.7  General: alert and oriented, appropriate, no acute distress  HENT:  Head is normocephalic, atraumatic, mucous membranes are moist  Eyes: Extraocular movements are intact, no scleral icterus  Neck: Supple without lymphadenopathy or thyromegaly, trachea is in the midline  Respiratory: Lungs are clear bilaterally without wheezing, no use of accessory muscles is noted  Cardiovascular: Regular rate and rhythm without murmur, no peripheral edema  Gastrointestinal: Soft, mildly distended.  Gastrostomy tube is at about 3 cm.  There is no significant discharge noted.  There is erythema inches moderate extending about 4 cm around the gastrostomy site, and a mild erythema extending about 12 cm in all directions.  There is tenderness to palpation.  There is no peritonitis.  Musculoskeletal: Muscle bulk and strength is normal and equal bilaterally.  No gross deformity.  Skin: No rash or icterus noted    Laboratory data: White blood cell count is normal at 4.6.  Hemoglobin is slightly low at 12.0.      Assessment and plan:   Abdominal wall cellulitis  Squamous cell cancer of the head and neck  Recent placement of percutaneous feeding gastrostomy    I discussed the options with the patient.  I think it's reasonable to admit him for a period of IV antibiotics to see if the cellulitis will improve or resolve.  I don't think there is any need or indication at this time for CT of the abdomen and pelvis.  I have a very low index of suspicion for any inadvertent abdominal injury.  I'm going to place him on IV antibiotics.  I will notify Dr. Valenzuela of the patient's admission.      Adan Capellan MD, FACS  General, Minimally Invasive and Endoscopic Surgery  [facility]    [address] [place] [address]  Way, [address]  [place], [place], [address]  P: [phone]  F: [phone]

## 2017-08-05 NOTE — CONSULTS
"Adult Nutrition  Assessment    Patient Name:  King Parson  YOB: 1950  MRN: [id number]  Admit Date:  8/4/2017    Assessment Date:  8/5/2017          Reason for Assessment       08/05/17 1111    Reason for Assessment    Reason For Assessment/Visit nurse/nurse practitioner consult    Identified At Risk By Screening Criteria MST SCORE 2+    Diagnosis Diagnosis    Skin Cellulitis   abd wall                  Anthropometrics       08/05/17 1111    Anthropometrics    RD Documented Weight on Admission 67.1 kg (147 lb 14.9 oz)    Body Mass Index (BMI)    BMI Grade 19.1 - 24.9 - normal            Labs/Tests/Procedures/Meds       08/05/17 1113    Labs/Tests/Procedures/Meds    Labs/Tests Review Reviewed;Hgb Hct;WBC    Medication Review Reviewed, pertinent;Antacid;Antibiotic    Significant Vitals reviewed            Physical Findings       08/05/17 1114    Physical Findings/Assessment    Additional Documentation Physical Appearance (Group)    Physical Appearance    Tubes peg tube    Skin other (see comments)   excoriation bilat midline umbilical region; Cabrera score 21            Estimated/Assessed Needs       08/05/17 1114    Calculation Measurements    Weight Used For Calculations 67.1 kg (147 lb 14.9 oz)    Height Used for Calculations 1.753 m (5' 9.02\")    Estimated/Assessed Energy Needs    Energy Need Method Dennard-St Jeor    Age 66    RMR (Dennard-St. Jeor Equation) 1441.63    Activity Factors (Dennard St Jeor)  Out of bed, ambulatory  1.3    Total estimated needs (Dennard St. Jeor) 1874    Estimated/Assessed Protein Needs    Weight Used for Protein Calculation 67.1 kg (147 lb 14.9 oz)    Protein (gm/kg) 1.2    1.2 Gm Protein (gm) 80.52    Estimated Protein Range 67-81    Estimated/Assessed Fluid Needs    Fluid Need Method RDA method    RDA Method (mL)  1874            Nutrition Prescription Ordered       08/05/17 1115    Nutrition Prescription PO    Current PO Diet Regular            Evaluation of " Received Nutrient/Fluid Intake       08/05/17 1115    PO Evaluation    Number of Days PO Intake Evaluated 1 day    Number of Meals 1    % PO Intake 100            Comments:  Completed nutrition assessment triggered by nurse admission screen.        Electronically signed by:  Kirti Chavez RD  08/05/17 11:18 AM

## 2017-08-05 NOTE — PLAN OF CARE
Problem: Patient Care Overview (Adult)  Goal: Plan of Care Review  Outcome: Ongoing (interventions implemented as appropriate)    08/05/17 1602   Coping/Psychosocial Response Interventions   Plan Of Care Reviewed With patient   Patient Care Overview   Progress improving   Outcome Evaluation   Outcome Summary/Follow up Plan pt c/o pain; norco 1-2 tabs initiated q4; new iv site; full code         Problem: Fall Risk (Adult)  Goal: Identify Related Risk Factors and Signs and Symptoms  Outcome: Ongoing (interventions implemented as appropriate)  Goal: Absence of Falls  Outcome: Ongoing (interventions implemented as appropriate)    Problem: Pain, Acute (Adult)  Goal: Identify Related Risk Factors and Signs and Symptoms  Outcome: Ongoing (interventions implemented as appropriate)  Goal: Acceptable Pain Control/Comfort Level  Outcome: Ongoing (interventions implemented as appropriate)    Problem: Infection, Risk/Actual (Adult)  Goal: Identify Related Risk Factors and Signs and Symptoms  Outcome: Ongoing (interventions implemented as appropriate)  Goal: Infection Prevention/Resolution  Outcome: Ongoing (interventions implemented as appropriate)    Problem: Cellulitis (Adult)  Goal: Signs and Symptoms of Listed Potential Problems Will be Absent or Manageable (Cellulitis)  Outcome: Ongoing (interventions implemented as appropriate)

## 2017-08-05 NOTE — PROGRESS NOTES
"Pharmacokinetic Consult - Vancomycin Dosing (Initial Note)    King Baxterrakan has been consulted for pharmacy to dose vancomycin for skin and soft tissue infection.  Pharmacy dosing vancomycin per Dr. Capellan's request.   Goal trough: 10-20 mg/L   Other antimicrobials: none    Relevant clinical data and objective history reviewed:  66 y.o. male 69\" (175.3 cm) 143 lb (64.9 kg)    Past Medical History:   Diagnosis Date   • Asthma    • Bruises easily    • COPD (chronic obstructive pulmonary disease)    • GERD (gastroesophageal reflux disease)    • History of chest pain 02/01/2017    HAD NEG. STRESS TEST IN EPIC   • Hyperlipidemia    • Hypertension    • Low back pain     chronic, also bilat leg pain   • Neck mass     right side   • Osteoarthritis    • Squamous cell cancer of hypopharynx 2017   • Vision loss of left eye      Creatinine   Date Value Ref Range Status   08/04/2017 0.69 (L) 0.76 - 1.27 mg/dL Final     BUN   Date Value Ref Range Status   08/04/2017 11 8 - 23 mg/dL Final     Estimated Creatinine Clearance: 83.4 mL/min (by C-G formula based on Cr of 0.69).    Lab Results   Component Value Date    WBC 4.62 08/04/2017     Temp Readings from Last 3 Encounters:   08/04/17 97.7 °F (36.5 °C) (Tympanic)   07/31/17 97.6 °F (36.4 °C) (Oral)   07/27/17 97.7 °F (36.5 °C) (Oral)      Baseline culture/source/susceptibility:   8/4 1939 vancomycin 1250 mg iv once in ER    Assessment/Plan  1. Given patient's age, weight and renal function, will start vancomycin 1000 mg IV q12h.   2. Will monitor serum creatinine every 24 hours for the first 3 days then at least every 48 hours per dosing recommendations.   3. Check Vancomycin level on 8/6 prior to the 4th dose.   Thank you Dr. Capellan for the consult and pharmacy will continue to follow daily while on vancomycin and adjust as needed.     Mary Kate Felix, Pharm.D.    "

## 2017-08-05 NOTE — PROGRESS NOTES
Abdominal wall erythema is essentially resolved.  Plunger from G-tube slightly tight on skin and I loosened it which should help with his discomfort.  Probably home tomorrow on oral antibiotics.

## 2017-08-06 VITALS
BODY MASS INDEX: 21.92 KG/M2 | DIASTOLIC BLOOD PRESSURE: 74 MMHG | HEART RATE: 107 BPM | WEIGHT: 148 LBS | SYSTOLIC BLOOD PRESSURE: 129 MMHG | HEIGHT: 69 IN | RESPIRATION RATE: 18 BRPM | TEMPERATURE: 98.6 F | OXYGEN SATURATION: 93 %

## 2017-08-06 PROCEDURE — 94799 UNLISTED PULMONARY SVC/PX: CPT

## 2017-08-06 PROCEDURE — 99024 POSTOP FOLLOW-UP VISIT: CPT | Performed by: SURGERY

## 2017-08-06 PROCEDURE — 25010000002 VANCOMYCIN PER 500 MG: Performed by: SURGERY

## 2017-08-06 RX ORDER — AMOXICILLIN AND CLAVULANATE POTASSIUM 875; 125 MG/1; MG/1
1 TABLET, FILM COATED ORAL 2 TIMES DAILY
Qty: 20 TABLET | Refills: 0 | Status: SHIPPED | OUTPATIENT
Start: 2017-08-06 | End: 2017-08-16

## 2017-08-06 RX ORDER — HYDROCODONE BITARTRATE AND ACETAMINOPHEN 5; 325 MG/1; MG/1
TABLET ORAL
Qty: 30 TABLET | Refills: 0 | Status: SHIPPED | OUTPATIENT
Start: 2017-08-06 | End: 2017-08-31 | Stop reason: CLARIF

## 2017-08-06 RX ADMIN — VANCOMYCIN HYDROCHLORIDE 1000 MG: 1 INJECTION, SOLUTION INTRAVENOUS at 06:14

## 2017-08-06 RX ADMIN — FAMOTIDINE 20 MG: 20 TABLET, FILM COATED ORAL at 08:00

## 2017-08-06 RX ADMIN — DILTIAZEM HYDROCHLORIDE 120 MG: 120 CAPSULE, COATED, EXTENDED RELEASE ORAL at 08:00

## 2017-08-06 RX ADMIN — ALBUTEROL SULFATE 2.5 MG: 2.5 SOLUTION RESPIRATORY (INHALATION) at 07:29

## 2017-08-06 RX ADMIN — BUDESONIDE 0.5 MG: 0.5 INHALANT RESPIRATORY (INHALATION) at 07:29

## 2017-08-06 RX ADMIN — CELECOXIB 200 MG: 200 CAPSULE ORAL at 08:00

## 2017-08-06 NOTE — DISCHARGE SUMMARY
DATE OF ADMIT:  8/4/17    DATE OF DISCHARGE:  8/6/17    DIAGNOSIS:  Cellulitis of abdominal wall    SUMMARY OF HOSPITAL COURSE:   Underwent port and PEG on 7/31/17. Came in on 8/4/17 with cellulitis of abdominal wall. Cellulitis and pain quickly resolved with IV antibiotics. Discharged with augmentin 875 bid for 10 days and should be ok to start chemotherapy as planned on Thursday.     Raul Valenzuela M.D.

## 2017-08-06 NOTE — PLAN OF CARE
Problem: Patient Care Overview (Adult)  Goal: Plan of Care Review  Outcome: Ongoing (interventions implemented as appropriate)    08/06/17 1218   Coping/Psychosocial Response Interventions   Plan Of Care Reviewed With patient   Patient Care Overview   Progress improving   Outcome Evaluation   Outcome Summary/Follow up Plan patient being discharged today         Problem: Fall Risk (Adult)  Goal: Identify Related Risk Factors and Signs and Symptoms  Outcome: Ongoing (interventions implemented as appropriate)  Goal: Absence of Falls  Outcome: Ongoing (interventions implemented as appropriate)    Problem: Pain, Acute (Adult)  Goal: Identify Related Risk Factors and Signs and Symptoms  Outcome: Ongoing (interventions implemented as appropriate)  Goal: Acceptable Pain Control/Comfort Level  Outcome: Ongoing (interventions implemented as appropriate)    Problem: Infection, Risk/Actual (Adult)  Goal: Identify Related Risk Factors and Signs and Symptoms  Outcome: Ongoing (interventions implemented as appropriate)  Goal: Infection Prevention/Resolution  Outcome: Ongoing (interventions implemented as appropriate)    Problem: Cellulitis (Adult)  Goal: Signs and Symptoms of Listed Potential Problems Will be Absent or Manageable (Cellulitis)  Outcome: Ongoing (interventions implemented as appropriate)

## 2017-08-06 NOTE — PLAN OF CARE
Problem: Fall Risk (Adult)  Intervention: Monitor/Assist with Self Care    08/06/17 0011 08/06/17 0500   Musculoskeletal Interventions   Self-Care Promotion independence encouraged;BADL personal objects within reach;safe use of adaptive equipment encouraged --    Activity   Activity Type --  up ad hieu   Activity Level of Assistance --  independent   Monitor/Assist with Self Care   Ambulation 0-->independent --    Transferring 0-->independent --    Toileting 0-->independent --    Bathing 0-->independent --    Dressing 0-->independent --    Eating 0-->independent --    Communication 0-->understands/communicates without difficulty --    Swallowing (if score 2 or more for any item, consult Rehab Services) 0-->swallows foods/liquids without difficulty --        Intervention: Reduce Risk/Promote Restraint Free Environment    08/06/17 0500   Safety Interventions   Safety/Security Measures bed alarm refused   Environmental Safety Modification assistive device/personal items within reach;clutter free environment maintained;lighting adjusted   Restraint Interventions   Safety Promotion/Fall Prevention safety round/check completed;fall prevention program maintained;nonskid shoes/slippers when out of bed       Intervention: Review Medications/Identify Contributors to Fall Risk    08/05/17 2106   Safety Interventions   Medication Review/Management medications reviewed         Goal: Identify Related Risk Factors and Signs and Symptoms  Outcome: Ongoing (interventions implemented as appropriate)    08/04/17 2343   Fall Risk   Fall Risk: Related Risk Factors age-related changes;history of falls;environment unfamiliar   Fall Risk: Signs and Symptoms presence of risk factors       Goal: Absence of Falls  Outcome: Ongoing (interventions implemented as appropriate)    08/06/17 0540   Fall Risk (Adult)   Absence of Falls making progress toward outcome         Problem: Pain, Acute (Adult)  Intervention: Monitor/Manage Analgesia     08/05/17 2106 08/05/17 2215   Safety Interventions   Medication Review/Management medications reviewed --    Manage Acute Burn Pain   Pain Management Interventions --  relaxation techniques promoted       Intervention: Mutually Develop/Implement Acute Pain Management Plan    08/05/17 2215 08/06/17 0011   Manage Acute Burn Pain   Pain Management Interventions relaxation techniques promoted --    Cognitive Interventions   Sensory Stimulation Regulation --  care clustered;lighting decreased;quiet environment promoted       Intervention: Support/Optimize Psychosocial Response to Acute Pain    08/04/17 2343 08/05/17 2106 08/06/17 0011   Coping Strategies   Trust Relationship/Rapport --  --  reassurance provided   Diversional Activities --  television --    Family/Support System Care involvement promoted;presence promoted --  --    Supportive Measures --  --  relaxation techniques promoted         Goal: Identify Related Risk Factors and Signs and Symptoms  Outcome: Ongoing (interventions implemented as appropriate)    08/04/17 2343   Pain, Acute   Related Risk Factors (Acute Pain) disease process   Signs and Symptoms (Acute Pain) guarding/abnormal posturing/positioning;verbalization of pain descriptors;questions meaning of pain       Goal: Acceptable Pain Control/Comfort Level  Outcome: Ongoing (interventions implemented as appropriate)    08/06/17 0540   Pain, Acute (Adult)   Acceptable Pain Control/Comfort Level making progress toward outcome         Problem: Infection, Risk/Actual (Adult)  Intervention: Manage Suspected/Actual Infection    08/06/17 0011   Safety Interventions   Isolation Precautions protective environment maintained   Safety Interventions   Infection Management aseptic technique maintained       Intervention: Prevent Infection/Maximize Resistance    08/05/17 2106 08/06/17 0011   Nutrition Interventions   Oral Nutrition Promotion --  rest periods promoted;safe use of adaptive equipment encouraged    Pain/Comfort/Sleep Interventions   Sleep/Rest Enhancement --  awakenings minimized;consistent schedule promoted;noise level reduced;regular sleep/rest pattern promoted;relaxation techniques promoted   Hygiene Care Assistance   Perineal Care cleansed --    Hygiene Care   Bathing/Skin Care bedtime care --          Goal: Identify Related Risk Factors and Signs and Symptoms  Outcome: Ongoing (interventions implemented as appropriate)    08/04/17 2343   Infection, Risk/Actual   Infection, Risk/Actual: Related Risk Factors chronic illness/condition   Signs and Symptoms (Infection, Risk/Actual) edema;weakness       Goal: Infection Prevention/Resolution  Outcome: Ongoing (interventions implemented as appropriate)    08/06/17 0540   Infection, Risk/Actual (Adult)   Infection Prevention/Resolution making progress toward outcome         Problem: Cellulitis (Adult)  Intervention: Monitor/Manage Neurovascular Compromise    08/05/17 2106   Skin Interventions   Skin Protection incontinence pads utilized;tubing/devices free from skin contact       Intervention: Monitor/Manage Pain    08/05/17 2106 08/05/17 2215   Safety Interventions   Medication Review/Management medications reviewed --    Manage Acute Burn Pain   Pain Management Interventions --  relaxation techniques promoted       Intervention: Prevent/Manage Infection Progression    08/06/17 0011   Safety Interventions   Infection Prevention barrier precautions utilized;personal protective equipment utilized;rest/sleep promoted;single patient room provided   Isolation Precautions protective environment maintained   Safety Interventions   Infection Management aseptic technique maintained       Intervention: Prevent/Manage DVT/VTE Risk    08/06/17 0011   Support Surgical/Anesthesia Recovery   Venous Thromboembolism Prevent/Manage bleeding risk assessed         Goal: Signs and Symptoms of Listed Potential Problems Will be Absent or Manageable (Cellulitis)  Outcome: Ongoing  (interventions implemented as appropriate)    08/06/17 0540   Cellulitis   Problems Assessed (Cellulitis) all   Problems Present (Cellulitis) pain;infection

## 2017-08-07 ENCOUNTER — APPOINTMENT (OUTPATIENT)
Dept: RADIATION ONCOLOGY | Facility: HOSPITAL | Age: 67
End: 2017-08-07

## 2017-08-07 PROCEDURE — 77300 RADIATION THERAPY DOSE PLAN: CPT | Performed by: RADIOLOGY

## 2017-08-07 PROCEDURE — 77301 RADIOTHERAPY DOSE PLAN IMRT: CPT | Performed by: RADIOLOGY

## 2017-08-10 ENCOUNTER — INFUSION (OUTPATIENT)
Dept: ONCOLOGY | Facility: HOSPITAL | Age: 67
End: 2017-08-10

## 2017-08-10 ENCOUNTER — OFFICE VISIT (OUTPATIENT)
Dept: ONCOLOGY | Facility: CLINIC | Age: 67
End: 2017-08-10

## 2017-08-10 VITALS
OXYGEN SATURATION: 99 % | BODY MASS INDEX: 22.25 KG/M2 | TEMPERATURE: 97.6 F | RESPIRATION RATE: 16 BRPM | WEIGHT: 146.8 LBS | DIASTOLIC BLOOD PRESSURE: 60 MMHG | SYSTOLIC BLOOD PRESSURE: 144 MMHG | HEIGHT: 68 IN | HEART RATE: 62 BPM

## 2017-08-10 VITALS — HEART RATE: 55 BPM | DIASTOLIC BLOOD PRESSURE: 77 MMHG | SYSTOLIC BLOOD PRESSURE: 144 MMHG

## 2017-08-10 DIAGNOSIS — C13.9 SQUAMOUS CELL CARCINOMA OF HYPOPHARYNX (HCC): Primary | ICD-10-CM

## 2017-08-10 DIAGNOSIS — C13.9 SQUAMOUS CELL CANCER OF HYPOPHARYNX (HCC): Primary | ICD-10-CM

## 2017-08-10 DIAGNOSIS — C13.9 SQUAMOUS CELL CARCINOMA OF HYPOPHARYNX (HCC): ICD-10-CM

## 2017-08-10 LAB
ALBUMIN SERPL-MCNC: 3.7 G/DL (ref 3.5–5.2)
ALBUMIN/GLOB SERPL: 1.5 G/DL (ref 1.1–2.4)
ALP SERPL-CCNC: 59 U/L (ref 38–116)
ALT SERPL W P-5'-P-CCNC: 22 U/L (ref 0–41)
ANION GAP SERPL CALCULATED.3IONS-SCNC: 16.5 MMOL/L
AST SERPL-CCNC: 23 U/L (ref 0–40)
BASOPHILS # BLD AUTO: 0.04 10*3/MM3 (ref 0–0.1)
BASOPHILS NFR BLD AUTO: 0.7 % (ref 0–1.1)
BILIRUB SERPL-MCNC: 0.4 MG/DL (ref 0.1–1.2)
BUN BLD-MCNC: 12 MG/DL (ref 6–20)
BUN/CREAT SERPL: 15.6 (ref 7.3–30)
CALCIUM SPEC-SCNC: 9.2 MG/DL (ref 8.5–10.2)
CHLORIDE SERPL-SCNC: 99 MMOL/L (ref 98–107)
CO2 SERPL-SCNC: 25.5 MMOL/L (ref 22–29)
CREAT BLD-MCNC: 0.77 MG/DL (ref 0.7–1.3)
DEPRECATED RDW RBC AUTO: 48.7 FL (ref 37–49)
EOSINOPHIL # BLD AUTO: 0.13 10*3/MM3 (ref 0–0.36)
EOSINOPHIL NFR BLD AUTO: 2.3 % (ref 1–5)
ERYTHROCYTE [DISTWIDTH] IN BLOOD BY AUTOMATED COUNT: 11.9 % (ref 11.7–14.5)
GFR SERPL CREATININE-BSD FRML MDRD: 101 ML/MIN/1.73
GLOBULIN UR ELPH-MCNC: 2.5 GM/DL (ref 1.8–3.5)
GLUCOSE BLD-MCNC: 116 MG/DL (ref 74–124)
HCT VFR BLD AUTO: 36.8 % (ref 40–49)
HGB BLD-MCNC: 13.4 G/DL (ref 13.5–16.5)
HOLD SPECIMEN: NORMAL
IMM GRANULOCYTES # BLD: 0.07 10*3/MM3 (ref 0–0.03)
IMM GRANULOCYTES NFR BLD: 1.3 % (ref 0–0.5)
LYMPHOCYTES # BLD AUTO: 1.36 10*3/MM3 (ref 1–3.5)
LYMPHOCYTES NFR BLD AUTO: 24.3 % (ref 20–49)
MAGNESIUM SERPL-MCNC: 1.8 MG/DL (ref 1.8–2.5)
MCH RBC QN AUTO: 40.4 PG (ref 27–33)
MCHC RBC AUTO-ENTMCNC: 36.4 G/DL (ref 32–35)
MCV RBC AUTO: 110.8 FL (ref 83–97)
MONOCYTES # BLD AUTO: 0.62 10*3/MM3 (ref 0.25–0.8)
MONOCYTES NFR BLD AUTO: 11.1 % (ref 4–12)
NEUTROPHILS # BLD AUTO: 3.38 10*3/MM3 (ref 1.5–7)
NEUTROPHILS NFR BLD AUTO: 60.3 % (ref 39–75)
NRBC BLD MANUAL-RTO: 0 /100 WBC (ref 0–0)
PLATELET # BLD AUTO: 200 10*3/MM3 (ref 150–375)
PMV BLD AUTO: 8.8 FL (ref 8.9–12.1)
POTASSIUM BLD-SCNC: 3.9 MMOL/L (ref 3.5–4.7)
PROT SERPL-MCNC: 6.2 G/DL (ref 6.3–8)
RBC # BLD AUTO: 3.32 10*6/MM3 (ref 4.3–5.5)
SODIUM BLD-SCNC: 141 MMOL/L (ref 134–145)
WBC NRBC COR # BLD: 5.6 10*3/MM3 (ref 4–10)

## 2017-08-10 PROCEDURE — 77338 DESIGN MLC DEVICE FOR IMRT: CPT | Performed by: RADIOLOGY

## 2017-08-10 PROCEDURE — 77470 SPECIAL RADIATION TREATMENT: CPT | Performed by: RADIOLOGY

## 2017-08-10 PROCEDURE — 77386: CPT | Performed by: RADIOLOGY

## 2017-08-10 PROCEDURE — 25010000002 PACLITAXEL PER 30 MG: Performed by: INTERNAL MEDICINE

## 2017-08-10 PROCEDURE — 25010000002 DIPHENHYDRAMINE PER 50 MG: Performed by: INTERNAL MEDICINE

## 2017-08-10 PROCEDURE — 77387 GUIDANCE FOR RADJ TX DLVR: CPT | Performed by: RADIOLOGY

## 2017-08-10 PROCEDURE — 99214 OFFICE O/P EST MOD 30 MIN: CPT | Performed by: INTERNAL MEDICINE

## 2017-08-10 PROCEDURE — 77386 CHG INTENSITY MODULATED RADIATION TX DLVR COMPLEX: CPT | Performed by: RADIOLOGY

## 2017-08-10 PROCEDURE — 77014 CHG CT GUIDANCE RADIATION THERAPY FLDS PLACEMENT: CPT | Performed by: RADIOLOGY

## 2017-08-10 PROCEDURE — 83735 ASSAY OF MAGNESIUM: CPT

## 2017-08-10 PROCEDURE — 96413 CHEMO IV INFUSION 1 HR: CPT | Performed by: INTERNAL MEDICINE

## 2017-08-10 PROCEDURE — 96415 CHEMO IV INFUSION ADDL HR: CPT | Performed by: INTERNAL MEDICINE

## 2017-08-10 PROCEDURE — 25010000002 DEXAMETHASONE SODIUM PHOSPHATE 10 MG/ML SOLUTION 1 ML VIAL: Performed by: INTERNAL MEDICINE

## 2017-08-10 PROCEDURE — 85025 COMPLETE CBC W/AUTO DIFF WBC: CPT

## 2017-08-10 PROCEDURE — 96375 TX/PRO/DX INJ NEW DRUG ADDON: CPT | Performed by: INTERNAL MEDICINE

## 2017-08-10 PROCEDURE — 77431 RADIATION THERAPY MANAGEMENT: CPT | Performed by: RADIOLOGY

## 2017-08-10 PROCEDURE — 80053 COMPREHEN METABOLIC PANEL: CPT

## 2017-08-10 RX ORDER — SODIUM CHLORIDE 9 MG/ML
250 INJECTION, SOLUTION INTRAVENOUS ONCE
Status: CANCELLED | OUTPATIENT
Start: 2017-08-17

## 2017-08-10 RX ORDER — FAMOTIDINE 10 MG/ML
20 INJECTION, SOLUTION INTRAVENOUS ONCE
Status: CANCELLED | OUTPATIENT
Start: 2017-08-17

## 2017-08-10 RX ORDER — SODIUM CHLORIDE 9 MG/ML
250 INJECTION, SOLUTION INTRAVENOUS ONCE
Status: CANCELLED | OUTPATIENT
Start: 2017-08-18

## 2017-08-10 RX ORDER — FAMOTIDINE 10 MG/ML
20 INJECTION, SOLUTION INTRAVENOUS ONCE
Status: COMPLETED | OUTPATIENT
Start: 2017-08-10 | End: 2017-08-10

## 2017-08-10 RX ORDER — PALONOSETRON 0.05 MG/ML
0.25 INJECTION, SOLUTION INTRAVENOUS ONCE
Status: CANCELLED | OUTPATIENT
Start: 2017-08-18

## 2017-08-10 RX ORDER — SODIUM CHLORIDE 9 MG/ML
250 INJECTION, SOLUTION INTRAVENOUS ONCE
Status: COMPLETED | OUTPATIENT
Start: 2017-08-10 | End: 2017-08-10

## 2017-08-10 RX ADMIN — PACLITAXEL 55 MG: 6 INJECTION, SOLUTION INTRAVENOUS at 10:35

## 2017-08-10 RX ADMIN — SODIUM CHLORIDE 250 ML: 900 INJECTION, SOLUTION INTRAVENOUS at 09:10

## 2017-08-10 RX ADMIN — DIPHENHYDRAMINE HYDROCHLORIDE 50 MG: 50 INJECTION, SOLUTION INTRAMUSCULAR; INTRAVENOUS at 09:10

## 2017-08-10 RX ADMIN — DEXAMETHASONE SODIUM PHOSPHATE 12 MG: 10 INJECTION, SOLUTION INTRAMUSCULAR; INTRAVENOUS at 09:30

## 2017-08-10 RX ADMIN — FAMOTIDINE 20 MG: 10 INJECTION, SOLUTION INTRAVENOUS at 09:47

## 2017-08-10 NOTE — PROGRESS NOTES
[facility] OUTPATIENT FOLLOW UP CLINIC VISIT    REASON FOR FOLLOW-UP:    1. 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  2.  Plans for combined radiation and chemotherapy as definitive treatment with cisplatin and Taxol initiated on 8/10/2017 concurrent with radiation.  He'll receive Taxol on day 1 and cisplatin on day 2 with IV fluid hydration.  3.  Long smoking history with COPD.  4.  Mediport and PEG tube placed by Dr. Valenzuela on 7/31/2017.  Subsequent PEG tube infection requiring antibiotics.    HISTORY OF PRESENT ILLNESS:  King Parson is a 66 y.o. male who returns today for follow up of the above issue.  He returns today anticipating initiating chemotherapy with Taxol and cisplatin concurrent with radiation therapy for his stage IV squamous cell carcinoma of the hypopharynx with metastatic lymphadenopathy to the right neck.  After his PEG and port placement he did have a PEG tube infection requiring inpatient hospitalization and IV antibiotics.  He has recovered from this though does still have some very mild erythema at the PEG tube insertion site with some mild tenderness.  He is afebrile.  He is not using the PEG tube for nutrition at this point.  He denies any other issues today.        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  This diagnosis was made by FNA of the neck node on 6/23/2017.  He is undergone staging CT scans on 6/6/2017 and a PET scan on 7/6/2017.  His disease appears to be clinical stage TIII N2 a stage IV a squamous carcinoma of the hypopharynx.     He was originally referred to the [facility] cancer [facility] and was seen by Dr. Moreno Camp of radiation oncology on 7/14/2017.  The patient lives in [place] and has some transportation difficulties.  He has a  sister who works in the lab at [employer] and for this reason they have decided to receive their treatment in the [facility] system.     He was seen initially by Dr. Garcia in the company of 2 of his sisters.  We recommended combined chemotherapy and radiation.  He will be seeing Dr. Grayson of the [facility] to discuss the radiation portion of his treatment.  We plan to deliver weekly chemotherapy during radiation with combination of cisplatin and Taxol.  Taxol will be delivered day 1 and cisplatin be delivered along with hydration on day 2 each week.    He saw Dr. Grayson who was in agreement with concurrent chemotherapy and radiation.    Mediport and PEG tube placed by Dr. Valenzuela on 7/31/2017.  Subsequent PEG tube infection requiring antibiotics.    He saw nutrition.  He had a chemotherapy education session.    Cycle #1 of therapy was initiated on 8/10/2017.      PAST MEDICAL, SURGICAL, FAMILY, AND SOCIAL HISTORIES were reviewed with the patient and in the electronic medical record, and were updated if indicated.    ALLERGIES:  Allergies   Allergen Reactions   • Codeine      He dont like to take it because it makes him feel strange.        MEDICATIONS:  The medication list has been reviewed with the patient by the medical assistant, and the list has been updated in the electronic medical record, which I reviewed.  Medication dosages and frequencies were confirmed to be accurate.    REVIEW OF SYSTEMS:  PAIN:  See Vital Signs below.  GENERAL:  No fevers, chills, night sweats, or unintended weight loss.  SKIN:  No rashes or non-healing lesions.  HEME/LYMPH:  No abnormal bleeding.  No palpable lymphadenopathy.  EYES:  No vision changes or diplopia.  ENT:  No sore throat or difficulty swallowing.  RESPIRATORY:  No cough, shortness of breath, hemoptysis, or wheezing.  CARDIOVASCULAR:  No chest pain, palpitations, orthopnea, or dyspnea on exertion.  GASTROINTESTINAL:  No abdominal pain, nausea, vomiting,  "constipation, diarrhea, melena, or hematochezia.  Mild erythema and irritation at the PEG tube insertion site.  GENITOURINARY:  No dysuria or hematuria.  MUSCULOSKELETAL:  No joint pain, swelling, or erythema.  NEUROLOGIC:  No dizziness, loss of consciousness, or seizures.  PSYCHIATRIC:  No depression, anxiety, or mood changes.    Vitals:    08/10/17 0807   BP: 144/60   Pulse: 62   Resp: 16   Temp: 97.6 °F (36.4 °C)   SpO2: 99%   Weight: 146 lb 12.8 oz (66.6 kg)   Height: 68.11\" (173 cm)   PainSc: 7  Comment: back pain       PHYSICAL EXAMINATION:  GENERAL:  Well-developed well-nourished male; awake, alert and oriented, in no acute distress.  SKIN:  Warm and dry, without rashes, purpura, or petechiae.  HEAD:  Normocephalic, atraumatic.  EYES:  Pupils equal, round and reactive to light.  Extraocular movements intact.  Conjunctivae normal.  EARS:  Hearing intact.  NOSE:  Septum midline.  No excoriations or nasal discharge.  MOUTH:  No stomatitis or ulcers.  Lips are normal.  THROAT:  Oropharynx without lesions or exudates.  NECK:  Supple with good range of motion; no thyromegaly or masses; no JVD or bruits.  LYMPHATICS:  There is a 4 x 5 cm lymph node present in the right cervical region.  CHEST:  Lungs are clear to auscultation bilaterally.  No wheezes, rales, or rhonchi.  A Mediport is present in the left subclavian area that is benign in appearance.  HEART:  Regular rate; normal rhythm.  No murmurs, gallops or rubs.  ABDOMEN:  Soft, non-tender, non-distended.  Normal active bowel sounds.  No organomegaly.  A PEG tube is present.  Mild erythema at the insertion site on the skin.  EXTREMITIES:  No clubbing, cyanosis, or edema.  NEUROLOGICAL:  No focal neurologic deficits.    DIAGNOSTIC DATA:  Lab Results   Component Value Date    WBC 5.60 08/10/2017    HGB 13.4 (L) 08/10/2017    HCT 36.8 (L) 08/10/2017    .8 (H) 08/10/2017     08/10/2017       IMAGING:  None reviewed    ASSESSMENT:  This is a 66 y.o. " male with:  1.  Stage IV a (T3, N2 a, M0) squamous cell carcinoma of the hypopharynx with metastatic lymphadenopathy to the right neck.  2.  Comorbidities including COPD.  3.  Recent PEG tube infection    PLAN:  1.  Today we will proceed with cycle #1 of therapy with Taxol and cisplatin.  Taxol was administered on day 1 of each cycle and cisplatin on day 2.  This is being administered concurrent with radiation therapy.  Taxol will be administered at 30 mg per metered squared and cisplatin at 20 mg per metered squared.  I counseled him today regarding potential adverse effects.  He has had a chemotherapy education session.  He will receive significant intravenous fluid hydration on day 2 with cisplatin.  Definitive therapy.  Curative intent.  2.  We will arrange for him to see a physician or nurse practitioner in the office each week on Thursdays.  I advised him to call us with any problems in between his chemotherapy, particularly as he progresses through his course of treatment and toxicities worsen.  3.  He will follow-up with surgery regarding his PEG tube infection.

## 2017-08-11 ENCOUNTER — DOCUMENTATION (OUTPATIENT)
Dept: RADIATION ONCOLOGY | Facility: HOSPITAL | Age: 67
End: 2017-08-11

## 2017-08-11 ENCOUNTER — INFUSION (OUTPATIENT)
Dept: ONCOLOGY | Facility: HOSPITAL | Age: 67
End: 2017-08-11

## 2017-08-11 ENCOUNTER — DOCUMENTATION (OUTPATIENT)
Dept: NUTRITION | Facility: HOSPITAL | Age: 67
End: 2017-08-11

## 2017-08-11 VITALS
TEMPERATURE: 97.8 F | WEIGHT: 145.6 LBS | DIASTOLIC BLOOD PRESSURE: 67 MMHG | SYSTOLIC BLOOD PRESSURE: 124 MMHG | BODY MASS INDEX: 22.07 KG/M2 | HEART RATE: 66 BPM

## 2017-08-11 DIAGNOSIS — C13.9 SQUAMOUS CELL CARCINOMA OF HYPOPHARYNX (HCC): Primary | ICD-10-CM

## 2017-08-11 PROCEDURE — 77386: CPT | Performed by: RADIOLOGY

## 2017-08-11 PROCEDURE — 77014 CHG CT GUIDANCE RADIATION THERAPY FLDS PLACEMENT: CPT | Performed by: RADIOLOGY

## 2017-08-11 PROCEDURE — 25010000002 PALONOSETRON PER 25 MCG: Performed by: INTERNAL MEDICINE

## 2017-08-11 PROCEDURE — 96375 TX/PRO/DX INJ NEW DRUG ADDON: CPT | Performed by: INTERNAL MEDICINE

## 2017-08-11 PROCEDURE — 25010000002 CISPLATIN PER 50 MG: Performed by: INTERNAL MEDICINE

## 2017-08-11 PROCEDURE — 25010000002 POTASSIUM CHLORIDE PER 2 MEQ OF POTASSIUM: Performed by: INTERNAL MEDICINE

## 2017-08-11 PROCEDURE — 77386 CHG INTENSITY MODULATED RADIATION TX DLVR COMPLEX: CPT | Performed by: RADIOLOGY

## 2017-08-11 PROCEDURE — 25010000002 MAGNESIUM SULFATE PER 500 MG OF MAGNESIUM: Performed by: INTERNAL MEDICINE

## 2017-08-11 PROCEDURE — 96413 CHEMO IV INFUSION 1 HR: CPT | Performed by: INTERNAL MEDICINE

## 2017-08-11 PROCEDURE — 77427 RADIATION TX MANAGEMENT X5: CPT | Performed by: RADIOLOGY

## 2017-08-11 PROCEDURE — 96367 TX/PROPH/DG ADDL SEQ IV INF: CPT | Performed by: INTERNAL MEDICINE

## 2017-08-11 PROCEDURE — 25010000002 FOSAPREPITANT PER 1 MG: Performed by: INTERNAL MEDICINE

## 2017-08-11 PROCEDURE — 25010000002 DEXAMETHASONE SODIUM PHOSPHATE 10 MG/ML SOLUTION 1 ML VIAL: Performed by: INTERNAL MEDICINE

## 2017-08-11 RX ORDER — PALONOSETRON 0.05 MG/ML
0.25 INJECTION, SOLUTION INTRAVENOUS ONCE
Status: COMPLETED | OUTPATIENT
Start: 2017-08-11 | End: 2017-08-11

## 2017-08-11 RX ORDER — SODIUM CHLORIDE 9 MG/ML
250 INJECTION, SOLUTION INTRAVENOUS ONCE
Status: COMPLETED | OUTPATIENT
Start: 2017-08-11 | End: 2017-08-11

## 2017-08-11 RX ADMIN — DEXAMETHASONE SODIUM PHOSPHATE 12 MG: 10 INJECTION, SOLUTION INTRAMUSCULAR; INTRAVENOUS at 09:59

## 2017-08-11 RX ADMIN — SODIUM CHLORIDE 250 ML: 900 INJECTION, SOLUTION INTRAVENOUS at 08:16

## 2017-08-11 RX ADMIN — SODIUM CHLORIDE 150 MG: 900 INJECTION, SOLUTION INTRAVENOUS at 09:28

## 2017-08-11 RX ADMIN — PALONOSETRON HYDROCHLORIDE 0.25 MG: 0.25 INJECTION INTRAVENOUS at 09:26

## 2017-08-11 RX ADMIN — POTASSIUM CHLORIDE 500 ML: 2 INJECTION, SOLUTION, CONCENTRATE INTRAVENOUS at 11:42

## 2017-08-11 RX ADMIN — POTASSIUM CHLORIDE 500 ML: 2 INJECTION, SOLUTION, CONCENTRATE INTRAVENOUS at 08:23

## 2017-08-11 RX ADMIN — CISPLATIN 36 MG: 1 INJECTION, SOLUTION INTRAVENOUS at 10:39

## 2017-08-11 NOTE — PROGRESS NOTES
ONC Nutrition Follow Up:     Weight 145#.    PEG site still with some redness but much better per patient.  Eating well, no problems swallowing. Having diarrhea today, has Immodium at home which he has been instructed to take.     Encouraged intake. Will continue to follow.

## 2017-08-11 NOTE — PROGRESS NOTES
Pre Cisplatin:  Weight 145.9 lb  Lungs CTA, baseline SOA (hx asthma, COPD)   HR regular  /66 HR 61    Post Cisplatin:  Weight 146.8 lb   Lungs: Rhonchi RUL, otherwise CTA.  Denies any worse SOA   HR Regular  /75 HR 73    Total urine voided: 1100 CC    Pt educated to call for any concerns, he v/u

## 2017-08-14 ENCOUNTER — INFUSION (OUTPATIENT)
Dept: ONCOLOGY | Facility: HOSPITAL | Age: 67
End: 2017-08-14

## 2017-08-14 VITALS
BODY MASS INDEX: 23.16 KG/M2 | HEART RATE: 74 BPM | DIASTOLIC BLOOD PRESSURE: 66 MMHG | TEMPERATURE: 98.2 F | SYSTOLIC BLOOD PRESSURE: 108 MMHG | WEIGHT: 152.8 LBS

## 2017-08-14 DIAGNOSIS — E86.0 DEHYDRATION: Primary | ICD-10-CM

## 2017-08-14 DIAGNOSIS — C13.9 SQUAMOUS CELL CARCINOMA OF HYPOPHARYNX (HCC): Primary | ICD-10-CM

## 2017-08-14 DIAGNOSIS — C13.9 SQUAMOUS CELL CARCINOMA OF HYPOPHARYNX (HCC): ICD-10-CM

## 2017-08-14 LAB
ALBUMIN SERPL-MCNC: 3.5 G/DL (ref 3.5–5.2)
ALBUMIN/GLOB SERPL: 1.6 G/DL (ref 1.1–2.4)
ALP SERPL-CCNC: 54 U/L (ref 38–116)
ALT SERPL W P-5'-P-CCNC: 18 U/L (ref 0–41)
ANION GAP SERPL CALCULATED.3IONS-SCNC: 12.8 MMOL/L
AST SERPL-CCNC: 16 U/L (ref 0–40)
BASOPHILS # BLD AUTO: 0.01 10*3/MM3 (ref 0–0.1)
BASOPHILS NFR BLD AUTO: 0.1 % (ref 0–1.1)
BILIRUB SERPL-MCNC: 0.4 MG/DL (ref 0.1–1.2)
BUN BLD-MCNC: 16 MG/DL (ref 6–20)
BUN/CREAT SERPL: 20 (ref 7.3–30)
CALCIUM SPEC-SCNC: 8.7 MG/DL (ref 8.5–10.2)
CHLORIDE SERPL-SCNC: 103 MMOL/L (ref 98–107)
CO2 SERPL-SCNC: 26.2 MMOL/L (ref 22–29)
CREAT BLD-MCNC: 0.8 MG/DL (ref 0.7–1.3)
DEPRECATED RDW RBC AUTO: 50.4 FL (ref 37–49)
EOSINOPHIL # BLD AUTO: 0.05 10*3/MM3 (ref 0–0.36)
EOSINOPHIL NFR BLD AUTO: 0.7 % (ref 1–5)
ERYTHROCYTE [DISTWIDTH] IN BLOOD BY AUTOMATED COUNT: 12.1 % (ref 11.7–14.5)
GFR SERPL CREATININE-BSD FRML MDRD: 97 ML/MIN/1.73
GLOBULIN UR ELPH-MCNC: 2.2 GM/DL (ref 1.8–3.5)
GLUCOSE BLD-MCNC: 93 MG/DL (ref 74–124)
HCT VFR BLD AUTO: 34.9 % (ref 40–49)
HGB BLD-MCNC: 12.4 G/DL (ref 13.5–16.5)
IMM GRANULOCYTES # BLD: 0.03 10*3/MM3 (ref 0–0.03)
IMM GRANULOCYTES NFR BLD: 0.4 % (ref 0–0.5)
LYMPHOCYTES # BLD AUTO: 1.85 10*3/MM3 (ref 1–3.5)
LYMPHOCYTES NFR BLD AUTO: 26.2 % (ref 20–49)
MAGNESIUM SERPL-MCNC: 1.9 MG/DL (ref 1.8–2.5)
MCH RBC QN AUTO: 40 PG (ref 27–33)
MCHC RBC AUTO-ENTMCNC: 35.5 G/DL (ref 32–35)
MCV RBC AUTO: 112.6 FL (ref 83–97)
MONOCYTES # BLD AUTO: 0.41 10*3/MM3 (ref 0.25–0.8)
MONOCYTES NFR BLD AUTO: 5.8 % (ref 4–12)
NEUTROPHILS # BLD AUTO: 4.7 10*3/MM3 (ref 1.5–7)
NEUTROPHILS NFR BLD AUTO: 66.8 % (ref 39–75)
NRBC BLD MANUAL-RTO: 0 /100 WBC (ref 0–0)
PLATELET # BLD AUTO: 197 10*3/MM3 (ref 150–375)
PMV BLD AUTO: 9.4 FL (ref 8.9–12.1)
POTASSIUM BLD-SCNC: 4 MMOL/L (ref 3.5–4.7)
PROT SERPL-MCNC: 5.7 G/DL (ref 6.3–8)
RBC # BLD AUTO: 3.1 10*6/MM3 (ref 4.3–5.5)
SODIUM BLD-SCNC: 142 MMOL/L (ref 134–145)
WBC NRBC COR # BLD: 7.05 10*3/MM3 (ref 4–10)

## 2017-08-14 PROCEDURE — 96361 HYDRATE IV INFUSION ADD-ON: CPT | Performed by: NURSE PRACTITIONER

## 2017-08-14 PROCEDURE — 77386: CPT | Performed by: RADIOLOGY

## 2017-08-14 PROCEDURE — 96360 HYDRATION IV INFUSION INIT: CPT | Performed by: NURSE PRACTITIONER

## 2017-08-14 PROCEDURE — 77386 CHG INTENSITY MODULATED RADIATION TX DLVR COMPLEX: CPT | Performed by: RADIOLOGY

## 2017-08-14 PROCEDURE — 77014 CHG CT GUIDANCE RADIATION THERAPY FLDS PLACEMENT: CPT | Performed by: RADIOLOGY

## 2017-08-14 PROCEDURE — 80053 COMPREHEN METABOLIC PANEL: CPT | Performed by: NURSE PRACTITIONER

## 2017-08-14 PROCEDURE — 36415 COLL VENOUS BLD VENIPUNCTURE: CPT | Performed by: NURSE PRACTITIONER

## 2017-08-14 PROCEDURE — 85025 COMPLETE CBC W/AUTO DIFF WBC: CPT | Performed by: NURSE PRACTITIONER

## 2017-08-14 PROCEDURE — 83735 ASSAY OF MAGNESIUM: CPT | Performed by: NURSE PRACTITIONER

## 2017-08-14 RX ORDER — SODIUM CHLORIDE 9 MG/ML
500 INJECTION, SOLUTION INTRAVENOUS CONTINUOUS
Status: DISCONTINUED | OUTPATIENT
Start: 2017-08-14 | End: 2017-08-14 | Stop reason: HOSPADM

## 2017-08-14 RX ORDER — SODIUM CHLORIDE 9 MG/ML
500 INJECTION, SOLUTION INTRAVENOUS ONCE
Status: COMPLETED | OUTPATIENT
Start: 2017-08-14 | End: 2017-08-14

## 2017-08-14 RX ADMIN — SODIUM CHLORIDE 500 ML: 900 INJECTION, SOLUTION INTRAVENOUS at 14:50

## 2017-08-14 RX ADMIN — SODIUM CHLORIDE 500 ML: 900 INJECTION, SOLUTION INTRAVENOUS at 14:00

## 2017-08-14 NOTE — PROGRESS NOTES
Pt here after his radiation appointment complaining of diarrhea since receiving his taxol last Thursday Labs drawn and 500cc NS hung per Fide AMOS. Labs are WNL. Reviewed with Beatrice AMOS and will administer additional 500cc NS IV and review immodium dosing. Diarrhea would stop after 4 doses of immodium, however pt thought that was the most he could take. And a couple hours after his 4th dose, diarrhea would return. Instructed that he could take a max of 8 doses/day and that BETTE Barron would send script for lomotil to his pharmacy. Instructed on incorporating lomotil with his immodium. Pt verbalizes understanding.

## 2017-08-14 NOTE — PROGRESS NOTES
The patient arrived at the office today with reports of loose bowels, diarrhea 4-5 times per day.  He is currently utilizing Imodium, though taking only 4 tablets in a 24-hour period.  He has not adequately maximizing this.  He continues to eat and drink adequately.  Lab values were reviewed, which thankfully are within normal limits.  We will provide the patient with 1000 mL normal saline.  Additionally, I have called in Lomotil 2.5 mg to alternate with Imodium.  The patient was provided appropriate instructions for maximizing Imodium use.  He will return as scheduled on Thursday for further evaluation.

## 2017-08-14 NOTE — PROGRESS NOTES
Pt showed up to scheduling desk and stated that he was told to come in to the office if his diarrhea had gotten worse. He states that he is going at least 4-5 times at night and more than that during the day. He states he is able to eat and drink; however, doesn't fell like its enough. Spoke with BETTE Elizalde and her order add to chemo for CBC, CMP,MG and start 500cc NS while waiting for stat labs. Informed pt of this and he V/U.

## 2017-08-15 PROCEDURE — 77386: CPT | Performed by: RADIOLOGY

## 2017-08-15 PROCEDURE — 77386 CHG INTENSITY MODULATED RADIATION TX DLVR COMPLEX: CPT | Performed by: RADIOLOGY

## 2017-08-15 PROCEDURE — 77014 CHG CT GUIDANCE RADIATION THERAPY FLDS PLACEMENT: CPT | Performed by: RADIOLOGY

## 2017-08-16 ENCOUNTER — DOCUMENTATION (OUTPATIENT)
Dept: NUTRITION | Facility: HOSPITAL | Age: 67
End: 2017-08-16

## 2017-08-16 PROCEDURE — 77386 CHG INTENSITY MODULATED RADIATION TX DLVR COMPLEX: CPT | Performed by: RADIOLOGY

## 2017-08-16 PROCEDURE — 77014 CHG CT GUIDANCE RADIATION THERAPY FLDS PLACEMENT: CPT | Performed by: RADIOLOGY

## 2017-08-16 PROCEDURE — 77386: CPT | Performed by: RADIOLOGY

## 2017-08-16 NOTE — PROGRESS NOTES
ONC Nutrition Follow Up:     Weight 152# 8/14,  increased 7#.    IVF received 8/14.  C/o diarrhea but eating great.    Will continue to follow.

## 2017-08-17 ENCOUNTER — INFUSION (OUTPATIENT)
Dept: ONCOLOGY | Facility: HOSPITAL | Age: 67
End: 2017-08-17

## 2017-08-17 ENCOUNTER — RADIATION ONCOLOGY WEEKLY ASSESSMENT (OUTPATIENT)
Dept: RADIATION ONCOLOGY | Facility: HOSPITAL | Age: 67
End: 2017-08-17

## 2017-08-17 ENCOUNTER — OFFICE VISIT (OUTPATIENT)
Dept: ONCOLOGY | Facility: CLINIC | Age: 67
End: 2017-08-17

## 2017-08-17 ENCOUNTER — APPOINTMENT (OUTPATIENT)
Dept: ONCOLOGY | Facility: CLINIC | Age: 67
End: 2017-08-17

## 2017-08-17 VITALS
WEIGHT: 146.8 LBS | HEART RATE: 67 BPM | TEMPERATURE: 98 F | HEIGHT: 68 IN | RESPIRATION RATE: 16 BRPM | DIASTOLIC BLOOD PRESSURE: 62 MMHG | SYSTOLIC BLOOD PRESSURE: 108 MMHG | BODY MASS INDEX: 22.25 KG/M2 | OXYGEN SATURATION: 97 %

## 2017-08-17 VITALS
WEIGHT: 146 LBS | DIASTOLIC BLOOD PRESSURE: 70 MMHG | SYSTOLIC BLOOD PRESSURE: 124 MMHG | TEMPERATURE: 97.8 F | BODY MASS INDEX: 22.13 KG/M2 | HEART RATE: 79 BPM | OXYGEN SATURATION: 95 % | RESPIRATION RATE: 16 BRPM

## 2017-08-17 VITALS — DIASTOLIC BLOOD PRESSURE: 73 MMHG | HEART RATE: 57 BPM | SYSTOLIC BLOOD PRESSURE: 124 MMHG

## 2017-08-17 DIAGNOSIS — C13.9 SQUAMOUS CELL CARCINOMA OF HYPOPHARYNX (HCC): Primary | ICD-10-CM

## 2017-08-17 DIAGNOSIS — C13.9 SQUAMOUS CELL CARCINOMA OF HYPOPHARYNX (HCC): ICD-10-CM

## 2017-08-17 DIAGNOSIS — K52.1 CHEMOTHERAPY INDUCED DIARRHEA: ICD-10-CM

## 2017-08-17 DIAGNOSIS — C13.9 SQUAMOUS CELL CANCER OF HYPOPHARYNX (HCC): Primary | ICD-10-CM

## 2017-08-17 DIAGNOSIS — T45.1X5A CHEMOTHERAPY INDUCED DIARRHEA: ICD-10-CM

## 2017-08-17 LAB
ALBUMIN SERPL-MCNC: 3.5 G/DL (ref 3.5–5.2)
ALBUMIN/GLOB SERPL: 1.3 G/DL (ref 1.1–2.4)
ALP SERPL-CCNC: 53 U/L (ref 38–116)
ALT SERPL W P-5'-P-CCNC: 19 U/L (ref 0–41)
ANION GAP SERPL CALCULATED.3IONS-SCNC: 11.9 MMOL/L
AST SERPL-CCNC: 16 U/L (ref 0–40)
BASOPHILS # BLD AUTO: 0.03 10*3/MM3 (ref 0–0.1)
BASOPHILS NFR BLD AUTO: 0.5 % (ref 0–1.1)
BILIRUB SERPL-MCNC: 0.4 MG/DL (ref 0.1–1.2)
BUN BLD-MCNC: 19 MG/DL (ref 6–20)
BUN/CREAT SERPL: 19.8 (ref 7.3–30)
CALCIUM SPEC-SCNC: 9.1 MG/DL (ref 8.5–10.2)
CHLORIDE SERPL-SCNC: 101 MMOL/L (ref 98–107)
CO2 SERPL-SCNC: 25.1 MMOL/L (ref 22–29)
CREAT BLD-MCNC: 0.96 MG/DL (ref 0.7–1.3)
DEPRECATED RDW RBC AUTO: 49.2 FL (ref 37–49)
EOSINOPHIL # BLD AUTO: 0.13 10*3/MM3 (ref 0–0.36)
EOSINOPHIL NFR BLD AUTO: 2.1 % (ref 1–5)
ERYTHROCYTE [DISTWIDTH] IN BLOOD BY AUTOMATED COUNT: 11.9 % (ref 11.7–14.5)
GFR SERPL CREATININE-BSD FRML MDRD: 78 ML/MIN/1.73
GLOBULIN UR ELPH-MCNC: 2.6 GM/DL (ref 1.8–3.5)
GLUCOSE BLD-MCNC: 121 MG/DL (ref 74–124)
HCT VFR BLD AUTO: 34.3 % (ref 40–49)
HGB BLD-MCNC: 12.1 G/DL (ref 13.5–16.5)
IMM GRANULOCYTES # BLD: 0.11 10*3/MM3 (ref 0–0.03)
IMM GRANULOCYTES NFR BLD: 1.7 % (ref 0–0.5)
LYMPHOCYTES # BLD AUTO: 1.25 10*3/MM3 (ref 1–3.5)
LYMPHOCYTES NFR BLD AUTO: 19.8 % (ref 20–49)
MAGNESIUM SERPL-MCNC: 1.9 MG/DL (ref 1.8–2.5)
MCH RBC QN AUTO: 39.7 PG (ref 27–33)
MCHC RBC AUTO-ENTMCNC: 35.3 G/DL (ref 32–35)
MCV RBC AUTO: 112.5 FL (ref 83–97)
MONOCYTES # BLD AUTO: 0.53 10*3/MM3 (ref 0.25–0.8)
MONOCYTES NFR BLD AUTO: 8.4 % (ref 4–12)
NEUTROPHILS # BLD AUTO: 4.25 10*3/MM3 (ref 1.5–7)
NEUTROPHILS NFR BLD AUTO: 67.5 % (ref 39–75)
NRBC BLD MANUAL-RTO: 0 /100 WBC (ref 0–0)
PLATELET # BLD AUTO: 197 10*3/MM3 (ref 150–375)
PMV BLD AUTO: 9.3 FL (ref 8.9–12.1)
POTASSIUM BLD-SCNC: 4.9 MMOL/L (ref 3.5–4.7)
PROT SERPL-MCNC: 6.1 G/DL (ref 6.3–8)
RBC # BLD AUTO: 3.05 10*6/MM3 (ref 4.3–5.5)
SODIUM BLD-SCNC: 138 MMOL/L (ref 134–145)
WBC NRBC COR # BLD: 6.3 10*3/MM3 (ref 4–10)

## 2017-08-17 PROCEDURE — 96413 CHEMO IV INFUSION 1 HR: CPT | Performed by: NURSE PRACTITIONER

## 2017-08-17 PROCEDURE — 80053 COMPREHEN METABOLIC PANEL: CPT

## 2017-08-17 PROCEDURE — 99213 OFFICE O/P EST LOW 20 MIN: CPT | Performed by: NURSE PRACTITIONER

## 2017-08-17 PROCEDURE — 96375 TX/PRO/DX INJ NEW DRUG ADDON: CPT | Performed by: NURSE PRACTITIONER

## 2017-08-17 PROCEDURE — 25010000002 PACLITAXEL PER 30 MG: Performed by: NURSE PRACTITIONER

## 2017-08-17 PROCEDURE — 25010000002 DEXAMETHASONE SODIUM PHOSPHATE 10 MG/ML SOLUTION 1 ML VIAL: Performed by: NURSE PRACTITIONER

## 2017-08-17 PROCEDURE — 85025 COMPLETE CBC W/AUTO DIFF WBC: CPT

## 2017-08-17 PROCEDURE — 83735 ASSAY OF MAGNESIUM: CPT

## 2017-08-17 PROCEDURE — 77336 RADIATION PHYSICS CONSULT: CPT | Performed by: RADIOLOGY

## 2017-08-17 PROCEDURE — 77386: CPT | Performed by: RADIOLOGY

## 2017-08-17 PROCEDURE — 77386 CHG INTENSITY MODULATED RADIATION TX DLVR COMPLEX: CPT | Performed by: RADIOLOGY

## 2017-08-17 PROCEDURE — 77014 CHG CT GUIDANCE RADIATION THERAPY FLDS PLACEMENT: CPT | Performed by: RADIOLOGY

## 2017-08-17 PROCEDURE — 25010000002 DIPHENHYDRAMINE PER 50 MG: Performed by: INTERNAL MEDICINE

## 2017-08-17 RX ORDER — DIPHENOXYLATE HYDROCHLORIDE AND ATROPINE SULFATE 2.5; .025 MG/1; MG/1
1 TABLET ORAL 4 TIMES DAILY PRN
Qty: 30 TABLET | Refills: 1 | OUTPATIENT
Start: 2017-08-17 | End: 2017-09-21 | Stop reason: SDUPTHER

## 2017-08-17 RX ORDER — FAMOTIDINE 10 MG/ML
20 INJECTION, SOLUTION INTRAVENOUS ONCE
Status: COMPLETED | OUTPATIENT
Start: 2017-08-17 | End: 2017-08-17

## 2017-08-17 RX ORDER — SODIUM CHLORIDE 9 MG/ML
250 INJECTION, SOLUTION INTRAVENOUS ONCE
Status: COMPLETED | OUTPATIENT
Start: 2017-08-17 | End: 2017-08-17

## 2017-08-17 RX ADMIN — FAMOTIDINE 20 MG: 10 INJECTION, SOLUTION INTRAVENOUS at 08:25

## 2017-08-17 RX ADMIN — DEXAMETHASONE SODIUM PHOSPHATE 12 MG: 10 INJECTION, SOLUTION INTRAMUSCULAR; INTRAVENOUS at 08:50

## 2017-08-17 RX ADMIN — SODIUM CHLORIDE 250 ML: 900 INJECTION, SOLUTION INTRAVENOUS at 08:20

## 2017-08-17 RX ADMIN — DIPHENHYDRAMINE HYDROCHLORIDE 25 MG: 50 INJECTION, SOLUTION INTRAMUSCULAR; INTRAVENOUS at 08:29

## 2017-08-17 RX ADMIN — PACLITAXEL 55 MG: 6 INJECTION, SOLUTION INTRAVENOUS at 09:37

## 2017-08-17 NOTE — PROGRESS NOTES
Physician Weekly Management Note    Diagnosis:   Squamous cell carcinoma of hypopharynx        Clinical: Stage EDMAR (T3, N2a, M0)     Reason for Visit:  Radiation (6/33)    Concurrent Chemo:   Yes    Notes on Treatment course, Films, Medical progress and Plan:  Doing well. Had some diarrhea after first chemo but has meds. Eating better. No problems or questions, cont on.    ROS - Other than as listed above, as follows:  Constitutional - Normal - no complaints of fatigue, denies lack of appetite, fever, night sweats or change in weight.  Neck - Normal - denies neck masses, muscle weakness, neck pain, decreased range of motion or swelling.  Cardiovascular - Normal - denies arrhythmias, chest pain, dyspnea, edema, orthopnea or palpitations.  Respiratory - Normal - denies cough, dyspnea, hemoptysis, hiccoughs, pleuritic chest pain or wheezing.  Gastrointestinal - Normal - no complaints of constipation, abdominal pain, diarrhea, heartburn/dyspepsia, hematemesis, hemorrhoids, melena or GI bleeding, nausea, pain or cramping or vomiting.    PHYSICAL EXAM - Other than listed above, as follows:  Vitals:    08/17/17 1127   BP: 124/70   Pulse: 79   Resp: 16   Temp: 97.8 °F (36.6 °C)   TempSrc: Oral   SpO2: 95%   Weight: 146 lb (66.2 kg)   PainSc: 0-No pain       Constitutional - Normal - no evidence of impaired alertness, inadequate appearance, premature or advanced chronologic age, uncooperativeness, altered mood, affect or disorientation.  Neck - Normal - no evidence of tender or enlarged lymph nodes, neck abnormalities, restricted range of motion or enlarged thyroid.  Chest - Normal - no evidence of chest abnormalities, tender or enlarged lymph nodes.  Respiratory - Normal - no evidence of abnormal breat sounds, chest abnormalities on palpation and chest abnormalities on percussion and normal breath sounds.  Hematologic/Lymphatic - Normal - no evidence of tender or enlarged axillary lymph nodes nor tender or enlarged neck  "nodes.    Performance Status: (2) Ambulatory and capable of self care, unable to carry out work activity, up and about > 50% or waking hours    Problem added:  No problems updated.  Medications added: No orders of the defined types were placed in this encounter.    Ancillary referrals made: No orders of the defined types were placed in this encounter.      Technical aspects reviewed:  Weekly OBI approved if applicable?  Yes  Weekly port films approved?  Yes  Change requests noted if applicable?  Yes  Patient setup and plan reviewed?  Yes    Chart Reviewed:  Continue current treatment plan?  Yes  Treatment plan change requested?  No    I have reviewed and marked as \"reviewed\" the current medications, allergies and problem list in the patients EMR.    I have reviewed the patient's medical, surgical  history in detail, reviewed any pertinent lab work  and updated the computerized patient record if needed.    Patient's Care Team:  Patient Care Team:  BETTE Guillen as PCP - General  BETTE Ruvalcaba as PCP - Claims Attributed  Clinton Styles MD as Referring Physician (Otolaryngology)  Aakash Garcia MD as Consulting Physician (Hematology and Oncology)  Cosmo Conway MD as Consulting Physician (Cardiology)  Edgar Prince MD as Consulting Physician (Pain Medicine)  John Presley MD as Consulting Physician (Hematology and Oncology)    Seen and approved by:  Marlene Grayson MD, 08/17/2017  "

## 2017-08-17 NOTE — PROGRESS NOTES
[facility] OUTPATIENT FOLLOW UP CLINIC VISIT    REASON FOR FOLLOW-UP:    1. 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  2.  Plans for combined radiation and chemotherapy as definitive treatment with cisplatin and Taxol initiated on 8/10/2017 concurrent with radiation.  He'll receive Taxol on day 1 and cisplatin on day 2 with IV fluid hydration.  3.  Long smoking history with COPD.  4.  Mediport and PEG tube placed by Dr. Valenzuela on 7/31/2017.  Subsequent PEG tube infection requiring antibiotics.    HISTORY OF PRESENT ILLNESS:  King Parson is a 66 y.o. male with the above-mentioned history, who returns today in anticipation of his second week of concurrent chemoradiation.  He initiated cisplatin and Taxol one week ago, 8/10/2017.  He received Taxol on day 1, cisplatin on day 2 along with hydration.  He tolerated treatment reasonably well.  He denies any nausea or vomiting.  He continues to have adequate oral intake.  He did come to the office on Monday, 8/14/2017 for triage visit regarding diarrhea.  He was provided IV hydration and Lomotil.  He has continued alternate Lomotil and Imodium.  He reports his bowels today are more formed, only 2 times per day.  He continues to drink plenty of fluids.  He reports his urine is normal in appearance.  He denies any fevers or chills, cough or shortness of breath.  He denies any abdominal pain.  He denies any blood in his stool.    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  This diagnosis was made by FNA of the neck node on 6/23/2017.  He is undergone staging CT scans on 6/6/2017 and a PET scan on 7/6/2017.  His disease appears to be clinical stage TIII N2 a stage IV a squamous carcinoma of the hypopharynx.     He was originally referred to the  [facility] and was seen by Dr. Moreno Camp of radiation oncology on 7/14/2017.  The patient lives in [place] and has some transportation difficulties.  He has a sister who works in the lab at [employer] and for this reason they have decided to receive their treatment in the [facility] system.     He was seen initially by Dr. Garcia in the company of 2 of his sisters.  We recommended combined chemotherapy and radiation.  He will be seeing Dr. Grayson of the [facility] to discuss the radiation portion of his treatment.  We plan to deliver weekly chemotherapy during radiation with combination of cisplatin and Taxol.  Taxol will be delivered day 1 and cisplatin be delivered along with hydration on day 2 each week.    He saw Dr. Grayson who was in agreement with concurrent chemotherapy and radiation.    Mediport and PEG tube placed by Dr. Valenzuela on 7/31/2017.  Subsequent PEG tube infection requiring antibiotics.    He saw nutrition.  He had a chemotherapy education session.    Cycle #1 of therapy was initiated on 8/10/2017.      PAST MEDICAL, SURGICAL, FAMILY, AND SOCIAL HISTORIES were reviewed with the patient and in the electronic medical record, and were updated if indicated.    ALLERGIES:  Allergies   Allergen Reactions   • Codeine      He dont like to take it because it makes him feel strange.        MEDICATIONS:  The medication list has been reviewed with the patient by the medical assistant, and the list has been updated in the electronic medical record, which I reviewed.  Medication dosages and frequencies were confirmed to be accurate.    I have reviewed the patient's medical history in detail and updated the computerized patient record.    REVIEW OF SYSTEMS:  PAIN:  See Vital Signs below.  GENERAL:  No fevers, chills, night sweats, or unintended weight loss.  SKIN:  No rashes or non-healing lesions.  HEME/LYMPH:  No abnormal bleeding.  No palpable lymphadenopathy.  EYES:  No vision  "changes or diplopia.  ENT:  No sore throat or difficulty swallowing.  RESPIRATORY:  No cough, shortness of breath, hemoptysis, or wheezing.  CARDIOVASCULAR:  No chest pain, palpitations, orthopnea, or dyspnea on exertion.  GASTROINTESTINAL:  No abdominal pain, nausea, vomiting, constipation, melena, or hematochezia.  Mild erythema and irritation at the PEG tube insertion site, +diarrhea  GENITOURINARY:  No dysuria or hematuria.  MUSCULOSKELETAL:  No joint pain, swelling, or erythema.  NEUROLOGIC:  No dizziness, loss of consciousness, or seizures.  PSYCHIATRIC:  No depression, anxiety, or mood changes.    Vitals:    08/17/17 0749   BP: 108/62   Pulse: 67   Resp: 16   Temp: 98 °F (36.7 °C)   TempSrc: Oral   SpO2: 97%   Weight: 146 lb 12.8 oz (66.6 kg)   Height: 68.11\" (173 cm)   PainSc: 6  Comment: back and legs       PHYSICAL EXAMINATION:  GENERAL:  Well-developed well-nourished male; awake, alert and oriented, in no acute distress.  SKIN:  Warm and dry, without rashes, purpura, or petechiae.  HEAD:  Normocephalic, atraumatic.  EYES:  Pupils equal, round and reactive to light.  Extraocular movements intact.  Conjunctivae normal.  EARS:  Hearing intact.  NOSE:  Septum midline.  No excoriations or nasal discharge.  MOUTH:  No stomatitis or ulcers.  Lips are normal.  THROAT:  Oropharynx without lesions or exudates.  LYMPHATICS:  There is a 4 x 5 cm lymph node present in the right cervical region. Measurements unchanged compared to previous exam.  CHEST:  Lungs are clear to auscultation bilaterally.  No wheezes, rales, or rhonchi.  A Mediport is present in the left subclavian area that is benign in appearance.  HEART:  Regular rate; normal rhythm.  No murmurs, gallops or rubs.  ABDOMEN:  Soft, non-tender, non-distended.  Normal active bowel sounds.  A PEG tube is present. Benign in appearance. Gauze present without surrounding erythema.  EXTREMITIES:  No clubbing, cyanosis, or edema.  NEUROLOGICAL:  No focal neurologic " deficits.    DIAGNOSTIC DATA:  Lab Results   Component Value Date    WBC 6.30 08/17/2017    HGB 12.1 (L) 08/17/2017    HCT 34.3 (L) 08/17/2017    .5 (H) 08/17/2017     08/17/2017     Lab Results   Component Value Date    GLUCOSE 121 08/17/2017    BUN 19 08/17/2017    CREATININE 0.96 08/17/2017    EGFRIFNONA 78 08/17/2017    BCR 19.8 08/17/2017    K 4.9 (H) 08/17/2017    CO2 25.1 08/17/2017    CALCIUM 9.1 08/17/2017    ALBUMIN 3.50 08/17/2017    LABIL2 1.3 08/17/2017    AST 16 08/17/2017    ALT 19 08/17/2017       IMAGING:  None reviewed    ASSESSMENT:  This is a 66 y.o. male with:  1.  Stage IV a (T3, N2 a, M0) squamous cell carcinoma of the hypopharynx with metastatic lymphadenopathy to the right neck.  2.  Comorbidities including COPD.  3.  Recent PEG tube infection. Well healed, cleaning twice a day.   4. Diarrhea secondary to chemotherapy. Improved with imodium and lomotil.    PLAN:  1. Proceed with cycle 2 day 1, taxol today 30mg/m2.  2. Return tomorrow for cycle 2 day 2 cisplatin 20mg/m2 and IV hydration.  3. Continue radiation under the direction of radiation oncology.  4. Continue imodium and lomotil as needed for diarrhea  5. Continue PEG care twice a day as directed.  6. Return in 1 week and 2 weeks for M.D. review in anticipation of cycle 3 and 4 of Taxol and cisplatin concurrent with radiation therapy.    Beatrice Styles, APRN  08/17/2017

## 2017-08-18 ENCOUNTER — INFUSION (OUTPATIENT)
Dept: ONCOLOGY | Facility: HOSPITAL | Age: 67
End: 2017-08-18

## 2017-08-18 VITALS
HEART RATE: 76 BPM | SYSTOLIC BLOOD PRESSURE: 99 MMHG | WEIGHT: 144 LBS | DIASTOLIC BLOOD PRESSURE: 61 MMHG | TEMPERATURE: 98.4 F | BODY MASS INDEX: 21.82 KG/M2

## 2017-08-18 DIAGNOSIS — C13.9 SQUAMOUS CELL CARCINOMA OF HYPOPHARYNX (HCC): Primary | ICD-10-CM

## 2017-08-18 PROCEDURE — 25010000002 CISPLATIN PER 50 MG: Performed by: INTERNAL MEDICINE

## 2017-08-18 PROCEDURE — 96375 TX/PRO/DX INJ NEW DRUG ADDON: CPT | Performed by: INTERNAL MEDICINE

## 2017-08-18 PROCEDURE — 77014 CHG CT GUIDANCE RADIATION THERAPY FLDS PLACEMENT: CPT | Performed by: RADIOLOGY

## 2017-08-18 PROCEDURE — 77427 RADIATION TX MANAGEMENT X5: CPT | Performed by: RADIOLOGY

## 2017-08-18 PROCEDURE — 25010000002 MAGNESIUM SULFATE PER 500 MG OF MAGNESIUM: Performed by: INTERNAL MEDICINE

## 2017-08-18 PROCEDURE — 25010000002 POTASSIUM CHLORIDE PER 2 MEQ OF POTASSIUM: Performed by: INTERNAL MEDICINE

## 2017-08-18 PROCEDURE — 25010000002 FOSAPREPITANT PER 1 MG: Performed by: INTERNAL MEDICINE

## 2017-08-18 PROCEDURE — 77386: CPT | Performed by: RADIOLOGY

## 2017-08-18 PROCEDURE — 96367 TX/PROPH/DG ADDL SEQ IV INF: CPT | Performed by: INTERNAL MEDICINE

## 2017-08-18 PROCEDURE — 77386 CHG INTENSITY MODULATED RADIATION TX DLVR COMPLEX: CPT | Performed by: RADIOLOGY

## 2017-08-18 PROCEDURE — 25010000002 PALONOSETRON PER 25 MCG: Performed by: INTERNAL MEDICINE

## 2017-08-18 PROCEDURE — 25010000002 DEXAMETHASONE SODIUM PHOSPHATE 10 MG/ML SOLUTION 1 ML VIAL: Performed by: INTERNAL MEDICINE

## 2017-08-18 PROCEDURE — 96413 CHEMO IV INFUSION 1 HR: CPT | Performed by: INTERNAL MEDICINE

## 2017-08-18 RX ORDER — SODIUM CHLORIDE 9 MG/ML
250 INJECTION, SOLUTION INTRAVENOUS ONCE
Status: COMPLETED | OUTPATIENT
Start: 2017-08-18 | End: 2017-08-18

## 2017-08-18 RX ORDER — PALONOSETRON 0.05 MG/ML
0.25 INJECTION, SOLUTION INTRAVENOUS ONCE
Status: COMPLETED | OUTPATIENT
Start: 2017-08-18 | End: 2017-08-18

## 2017-08-18 RX ADMIN — PALONOSETRON HYDROCHLORIDE 0.25 MG: 0.25 INJECTION INTRAVENOUS at 08:10

## 2017-08-18 RX ADMIN — DEXAMETHASONE SODIUM PHOSPHATE 12 MG: 10 INJECTION, SOLUTION INTRAMUSCULAR; INTRAVENOUS at 08:15

## 2017-08-18 RX ADMIN — SODIUM CHLORIDE 150 MG: 900 INJECTION, SOLUTION INTRAVENOUS at 09:47

## 2017-08-18 RX ADMIN — SODIUM CHLORIDE 250 ML: 900 INJECTION, SOLUTION INTRAVENOUS at 08:05

## 2017-08-18 RX ADMIN — POTASSIUM CHLORIDE 500 ML: 2 INJECTION, SOLUTION, CONCENTRATE INTRAVENOUS at 11:53

## 2017-08-18 RX ADMIN — CISPLATIN 36 MG: 1 INJECTION, SOLUTION INTRAVENOUS at 10:48

## 2017-08-18 RX ADMIN — POTASSIUM CHLORIDE 500 ML: 2 INJECTION, SOLUTION, CONCENTRATE INTRAVENOUS at 08:31

## 2017-08-18 NOTE — PROGRESS NOTES
Post treatment weight 147.0lb    Lungs clear vic  No c/o noted  Urine output 1200cc    Pt discharged to radiation

## 2017-08-21 ENCOUNTER — TELEPHONE (OUTPATIENT)
Dept: RADIATION ONCOLOGY | Facility: HOSPITAL | Age: 67
End: 2017-08-21

## 2017-08-21 PROCEDURE — 77014 CHG CT GUIDANCE RADIATION THERAPY FLDS PLACEMENT: CPT | Performed by: RADIOLOGY

## 2017-08-21 PROCEDURE — 77386: CPT | Performed by: RADIOLOGY

## 2017-08-21 PROCEDURE — 77386 CHG INTENSITY MODULATED RADIATION TX DLVR COMPLEX: CPT | Performed by: RADIOLOGY

## 2017-08-21 NOTE — TELEPHONE ENCOUNTER
"Mr Parson calls to say that today after treatment he got home and noticed his mouth is ' very painful with a sore developing in the top of mouth\" he used Chloraseptic which caused burning. I informed him he would be seen by DR Grayson tomorrow and reminded him to use the Warm water salt and soda rinses as we discussed duriing radiation education. Patient received a copy of recipe in handouts. He agrees to this and is already taking oral pain meds for other medical issues.  "

## 2017-08-22 ENCOUNTER — RADIATION ONCOLOGY WEEKLY ASSESSMENT (OUTPATIENT)
Dept: RADIATION ONCOLOGY | Facility: HOSPITAL | Age: 67
End: 2017-08-22

## 2017-08-22 VITALS
SYSTOLIC BLOOD PRESSURE: 98 MMHG | RESPIRATION RATE: 16 BRPM | BODY MASS INDEX: 21.98 KG/M2 | DIASTOLIC BLOOD PRESSURE: 64 MMHG | WEIGHT: 145 LBS | HEART RATE: 65 BPM | TEMPERATURE: 97.4 F | OXYGEN SATURATION: 97 %

## 2017-08-22 DIAGNOSIS — C13.9 SQUAMOUS CELL CARCINOMA OF HYPOPHARYNX (HCC): Primary | ICD-10-CM

## 2017-08-22 DIAGNOSIS — C13.9 SQUAMOUS CELL CARCINOMA OF HYPOPHARYNX (HCC): ICD-10-CM

## 2017-08-22 PROBLEM — Z45.2 ENCOUNTER FOR FITTING AND ADJUSTMENT OF VASCULAR CATHETER: Status: ACTIVE | Noted: 2017-08-22

## 2017-08-22 PROCEDURE — 77386 CHG INTENSITY MODULATED RADIATION TX DLVR COMPLEX: CPT | Performed by: RADIOLOGY

## 2017-08-22 PROCEDURE — 77014 CHG CT GUIDANCE RADIATION THERAPY FLDS PLACEMENT: CPT | Performed by: RADIOLOGY

## 2017-08-22 PROCEDURE — 77386: CPT | Performed by: RADIOLOGY

## 2017-08-22 RX ORDER — SODIUM CHLORIDE 0.9 % (FLUSH) 0.9 %
10 SYRINGE (ML) INJECTION AS NEEDED
Status: CANCELLED | OUTPATIENT
Start: 2017-09-08

## 2017-08-22 NOTE — PROGRESS NOTES
Physician Weekly Management Note    Diagnosis:   Squamous cell carcinoma of hypopharynx        Clinical: Stage EDMAR (T3, N2a, M0)     Reason for Visit:  Radiation (9/33)    Concurrent Chemo:   Yes    Notes on Treatment course, Films, Medical progress and Plan:  Doing fairly well. Had first sore throat last night. Took some fleenors and a pain pill and slept all night. Still betty regular diet. Educated more about rinses, use of meds, etc. Diarrhea resolved. No other problems or questions, cont on.    ROS - Other than as listed above, as follows:  Constitutional - Normal - no complaints of fatigue, denies lack of appetite, fever, night sweats or change in weight.  Neck - Normal - denies neck masses, muscle weakness, neck pain, decreased range of motion or swelling.  Cardiovascular - Normal - denies arrhythmias, chest pain, dyspnea, edema, orthopnea or palpitations.  Respiratory - Normal - denies cough, dyspnea, hemoptysis, hiccoughs, pleuritic chest pain or wheezing.  Gastrointestinal - Normal - no complaints of constipation, abdominal pain, diarrhea, heartburn/dyspepsia, hematemesis, hemorrhoids, melena or GI bleeding, nausea, pain or cramping or vomiting.    PHYSICAL EXAM - Other than listed above, as follows:  Vitals:    08/22/17 0830   BP: 98/64   Pulse: 65   Resp: 16   Temp: 97.4 °F (36.3 °C)   TempSrc: Oral   SpO2: 97%   Weight: 145 lb (65.8 kg)   PainSc:   5   PainLoc: Throat       Constitutional - Normal - no evidence of impaired alertness, inadequate appearance, premature or advanced chronologic age, uncooperativeness, altered mood, affect or disorientation.  Neck - Normal - no evidence of tender or enlarged lymph nodes, neck abnormalities, restricted range of motion or enlarged thyroid.  Chest - Normal - no evidence of chest abnormalities, tender or enlarged lymph nodes.  Respiratory - Normal - no evidence of abnormal breat sounds, chest abnormalities on palpation and chest abnormalities on percussion and  "normal breath sounds.  Hematologic/Lymphatic - Normal - no evidence of tender or enlarged axillary lymph nodes nor tender or enlarged neck nodes.    Performance Status: (2) Ambulatory and capable of self care, unable to carry out work activity, up and about > 50% or waking hours    Problem added:  No problems updated.  Medications added: No orders of the defined types were placed in this encounter.    Ancillary referrals made: No orders of the defined types were placed in this encounter.      Technical aspects reviewed:  Weekly OBI approved if applicable?  Yes  Weekly port films approved?  Yes  Change requests noted if applicable?  Yes  Patient setup and plan reviewed?  Yes    Chart Reviewed:  Continue current treatment plan?  Yes  Treatment plan change requested?  No    I have reviewed and marked as \"reviewed\" the current medications, allergies and problem list in the patients EMR.    I have reviewed the patient's medical, surgical  history in detail, reviewed any pertinent lab work  and updated the computerized patient record if needed.    Patient's Care Team:  Patient Care Team:  BETTE Guillen as PCP - General  BETTE Ruvalcaba as PCP - Claims Attributed  Clinton Styles MD as Referring Physician (Otolaryngology)  Aakash Garcia MD as Consulting Physician (Hematology and Oncology)  Cosmo Conway MD as Consulting Physician (Cardiology)  Edgar Prince MD as Consulting Physician (Pain Medicine)  John Presley MD as Consulting Physician (Hematology and Oncology)    Seen and approved by:  Marlene Grayson MD, 08/17/2017  "

## 2017-08-23 PROCEDURE — 77386 CHG INTENSITY MODULATED RADIATION TX DLVR COMPLEX: CPT | Performed by: RADIOLOGY

## 2017-08-23 PROCEDURE — 77386: CPT | Performed by: RADIOLOGY

## 2017-08-23 PROCEDURE — 77014 CHG CT GUIDANCE RADIATION THERAPY FLDS PLACEMENT: CPT | Performed by: RADIOLOGY

## 2017-08-24 ENCOUNTER — INFUSION (OUTPATIENT)
Dept: ONCOLOGY | Facility: HOSPITAL | Age: 67
End: 2017-08-24

## 2017-08-24 ENCOUNTER — OFFICE VISIT (OUTPATIENT)
Dept: ONCOLOGY | Facility: CLINIC | Age: 67
End: 2017-08-24

## 2017-08-24 VITALS
HEIGHT: 67 IN | SYSTOLIC BLOOD PRESSURE: 118 MMHG | DIASTOLIC BLOOD PRESSURE: 58 MMHG | RESPIRATION RATE: 16 BRPM | HEART RATE: 79 BPM | OXYGEN SATURATION: 94 % | TEMPERATURE: 98.5 F | WEIGHT: 144.4 LBS | BODY MASS INDEX: 22.66 KG/M2

## 2017-08-24 VITALS
HEART RATE: 86 BPM | SYSTOLIC BLOOD PRESSURE: 94 MMHG | BODY MASS INDEX: 22.56 KG/M2 | DIASTOLIC BLOOD PRESSURE: 57 MMHG | TEMPERATURE: 98.7 F | WEIGHT: 144.6 LBS

## 2017-08-24 DIAGNOSIS — C13.9 SQUAMOUS CELL CARCINOMA OF HYPOPHARYNX (HCC): ICD-10-CM

## 2017-08-24 DIAGNOSIS — C13.9 SQUAMOUS CELL CARCINOMA OF HYPOPHARYNX (HCC): Primary | ICD-10-CM

## 2017-08-24 LAB
ALBUMIN SERPL-MCNC: 3.4 G/DL (ref 3.5–5.2)
ALBUMIN/GLOB SERPL: 1.5 G/DL (ref 1.1–2.4)
ALP SERPL-CCNC: 50 U/L (ref 38–116)
ALT SERPL W P-5'-P-CCNC: 11 U/L (ref 0–41)
ANION GAP SERPL CALCULATED.3IONS-SCNC: 11.1 MMOL/L
AST SERPL-CCNC: 11 U/L (ref 0–40)
BASOPHILS # BLD AUTO: 0.02 10*3/MM3 (ref 0–0.1)
BASOPHILS NFR BLD AUTO: 0.3 % (ref 0–1.1)
BILIRUB SERPL-MCNC: 0.6 MG/DL (ref 0.1–1.2)
BUN BLD-MCNC: 17 MG/DL (ref 6–20)
BUN/CREAT SERPL: 17.7 (ref 7.3–30)
CALCIUM SPEC-SCNC: 8.6 MG/DL (ref 8.5–10.2)
CHLORIDE SERPL-SCNC: 99 MMOL/L (ref 98–107)
CO2 SERPL-SCNC: 25.9 MMOL/L (ref 22–29)
CREAT BLD-MCNC: 0.96 MG/DL (ref 0.7–1.3)
DEPRECATED RDW RBC AUTO: 49.8 FL (ref 37–49)
EOSINOPHIL # BLD AUTO: 0.04 10*3/MM3 (ref 0–0.36)
EOSINOPHIL NFR BLD AUTO: 0.5 % (ref 1–5)
ERYTHROCYTE [DISTWIDTH] IN BLOOD BY AUTOMATED COUNT: 12 % (ref 11.7–14.5)
GFR SERPL CREATININE-BSD FRML MDRD: 78 ML/MIN/1.73
GLOBULIN UR ELPH-MCNC: 2.3 GM/DL (ref 1.8–3.5)
GLUCOSE BLD-MCNC: 142 MG/DL (ref 74–124)
HCT VFR BLD AUTO: 32.7 % (ref 40–49)
HGB BLD-MCNC: 11.5 G/DL (ref 13.5–16.5)
HOLD SPECIMEN: NORMAL
IMM GRANULOCYTES # BLD: 0.03 10*3/MM3 (ref 0–0.03)
IMM GRANULOCYTES NFR BLD: 0.4 % (ref 0–0.5)
LYMPHOCYTES # BLD AUTO: 0.58 10*3/MM3 (ref 1–3.5)
LYMPHOCYTES NFR BLD AUTO: 7.7 % (ref 20–49)
MAGNESIUM SERPL-MCNC: 2.1 MG/DL (ref 1.8–2.5)
MCH RBC QN AUTO: 39.7 PG (ref 27–33)
MCHC RBC AUTO-ENTMCNC: 35.2 G/DL (ref 32–35)
MCV RBC AUTO: 112.8 FL (ref 83–97)
MONOCYTES # BLD AUTO: 0.53 10*3/MM3 (ref 0.25–0.8)
MONOCYTES NFR BLD AUTO: 7.1 % (ref 4–12)
NEUTROPHILS # BLD AUTO: 6.29 10*3/MM3 (ref 1.5–7)
NEUTROPHILS NFR BLD AUTO: 84 % (ref 39–75)
NRBC BLD MANUAL-RTO: 0 /100 WBC (ref 0–0)
PLATELET # BLD AUTO: 136 10*3/MM3 (ref 150–375)
PMV BLD AUTO: 9.8 FL (ref 8.9–12.1)
POTASSIUM BLD-SCNC: 4.6 MMOL/L (ref 3.5–4.7)
PROT SERPL-MCNC: 5.7 G/DL (ref 6.3–8)
RBC # BLD AUTO: 2.9 10*6/MM3 (ref 4.3–5.5)
SODIUM BLD-SCNC: 136 MMOL/L (ref 134–145)
WBC NRBC COR # BLD: 7.49 10*3/MM3 (ref 4–10)

## 2017-08-24 PROCEDURE — 83735 ASSAY OF MAGNESIUM: CPT

## 2017-08-24 PROCEDURE — 25010000002 DEXAMETHASONE PER 1 MG: Performed by: INTERNAL MEDICINE

## 2017-08-24 PROCEDURE — 77014 CHG CT GUIDANCE RADIATION THERAPY FLDS PLACEMENT: CPT | Performed by: RADIOLOGY

## 2017-08-24 PROCEDURE — 77336 RADIATION PHYSICS CONSULT: CPT | Performed by: RADIOLOGY

## 2017-08-24 PROCEDURE — 80053 COMPREHEN METABOLIC PANEL: CPT

## 2017-08-24 PROCEDURE — 99214 OFFICE O/P EST MOD 30 MIN: CPT | Performed by: INTERNAL MEDICINE

## 2017-08-24 PROCEDURE — 96413 CHEMO IV INFUSION 1 HR: CPT | Performed by: INTERNAL MEDICINE

## 2017-08-24 PROCEDURE — 85025 COMPLETE CBC W/AUTO DIFF WBC: CPT

## 2017-08-24 PROCEDURE — 25010000002 PACLITAXEL PER 30 MG: Performed by: INTERNAL MEDICINE

## 2017-08-24 PROCEDURE — 77386: CPT | Performed by: RADIOLOGY

## 2017-08-24 PROCEDURE — 77386 CHG INTENSITY MODULATED RADIATION TX DLVR COMPLEX: CPT | Performed by: RADIOLOGY

## 2017-08-24 PROCEDURE — 25010000002 DIPHENHYDRAMINE PER 50 MG: Performed by: INTERNAL MEDICINE

## 2017-08-24 PROCEDURE — 96375 TX/PRO/DX INJ NEW DRUG ADDON: CPT | Performed by: INTERNAL MEDICINE

## 2017-08-24 RX ORDER — FAMOTIDINE 10 MG/ML
20 INJECTION, SOLUTION INTRAVENOUS ONCE
Status: COMPLETED | OUTPATIENT
Start: 2017-08-24 | End: 2017-08-24

## 2017-08-24 RX ORDER — FAMOTIDINE 10 MG/ML
20 INJECTION, SOLUTION INTRAVENOUS ONCE
Status: CANCELLED | OUTPATIENT
Start: 2017-08-24

## 2017-08-24 RX ORDER — PALONOSETRON 0.05 MG/ML
0.25 INJECTION, SOLUTION INTRAVENOUS ONCE
Status: CANCELLED | OUTPATIENT
Start: 2017-08-25

## 2017-08-24 RX ORDER — SODIUM CHLORIDE 9 MG/ML
250 INJECTION, SOLUTION INTRAVENOUS ONCE
Status: CANCELLED | OUTPATIENT
Start: 2017-08-25

## 2017-08-24 RX ORDER — SODIUM CHLORIDE 9 MG/ML
250 INJECTION, SOLUTION INTRAVENOUS ONCE
Status: CANCELLED | OUTPATIENT
Start: 2017-08-24

## 2017-08-24 RX ORDER — SODIUM CHLORIDE 9 MG/ML
250 INJECTION, SOLUTION INTRAVENOUS ONCE
Status: COMPLETED | OUTPATIENT
Start: 2017-08-24 | End: 2017-08-24

## 2017-08-24 RX ADMIN — FAMOTIDINE 20 MG: 10 INJECTION, SOLUTION INTRAVENOUS at 08:56

## 2017-08-24 RX ADMIN — SODIUM CHLORIDE 250 ML: 900 INJECTION, SOLUTION INTRAVENOUS at 08:57

## 2017-08-24 RX ADMIN — DIPHENHYDRAMINE HYDROCHLORIDE 25 MG: 50 INJECTION, SOLUTION INTRAMUSCULAR; INTRAVENOUS at 09:15

## 2017-08-24 RX ADMIN — DEXAMETHASONE SODIUM PHOSPHATE 12 MG: 10 INJECTION INTRAMUSCULAR; INTRAVENOUS at 08:57

## 2017-08-24 RX ADMIN — PACLITAXEL 55 MG: 6 INJECTION, SOLUTION INTRAVENOUS at 10:02

## 2017-08-24 NOTE — PROGRESS NOTES
[facility] GROUP OUTPATIENT FOLLOW UP CLINIC VISIT    REASON FOR FOLLOW-UP:    1. 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  2.  Plans for combined radiation and chemotherapy as definitive treatment with cisplatin and Taxol initiated on 8/10/2017 concurrent with radiation.  He'll receive Taxol on day 1 and cisplatin on day 2 with IV fluid hydration.  3.  Long smoking history with COPD.  4.  Mediport and PEG tube placed by Dr. Valenzuela on 7/31/2017.  Subsequent PEG tube infection requiring antibiotics.    HISTORY OF PRESENT ILLNESS:  King Parson is a 66 y.o. 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  Right neck lymphadenopathy is decreasing in size.  He reports worsening sore throat but continues to take nutrition by mouth exclusively.  He does request systemic pain medication today.  He is using topical therapy as prescribed by radiation oncology.  He has tolerated chemotherapy well otherwise.  Last night he reports some cold shaking chills and sweats but does not have a thermometer at home and did not take his temperature.  He is not neutropenic today.  Last week he complained of diarrhea and was advised to take Imodium and Lomotil for this.  He did not complain of diarrhea today.    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  This diagnosis was made by FNA of the neck node on 6/23/2017.  He is undergone staging CT scans on 6/6/2017 and a PET scan on 7/6/2017.  His disease appears to be clinical stage TIII N2 a stage IV a squamous carcinoma of the hypopharynx.     He was originally referred to the  [facility] and was seen by Dr. Moreno Camp of radiation oncology on 7/14/2017.  The patient lives in [place] and has some transportation difficulties.  He has a sister who works in the lab at [facility] and for this reason they have decided to receive their treatment in the [facility] system.     He was seen initially by Dr. Garcia in the company of 2 of his sisters.  We recommended combined chemotherapy and radiation.  He will be seeing Dr. Grayson of the [facility] to discuss the radiation portion of his treatment.  We plan to deliver weekly chemotherapy during radiation with combination of cisplatin and Taxol.  Taxol will be delivered day 1 and cisplatin be delivered along with hydration on day 2 each week.    He saw Dr. Grayson who was in agreement with concurrent chemotherapy and radiation.    Mediport and PEG tube placed by Dr. Valenzuela on 7/31/2017.  Subsequent PEG tube infection requiring antibiotics.    He saw nutrition.  He had a chemotherapy education session.    Cycle #1 of therapy was initiated on 8/10/2017.      PAST MEDICAL, SURGICAL, FAMILY, AND SOCIAL HISTORIES were reviewed with the patient and in the electronic medical record, and were updated if indicated.    ALLERGIES:  Allergies   Allergen Reactions   • Codeine      He dont like to take it because it makes him feel strange.        MEDICATIONS:  The medication list has been reviewed with the patient by the medical assistant, and the list has been updated in the electronic medical record, which I reviewed.  Medication dosages and frequencies were confirmed to be accurate.    I have reviewed the patient's medical history in detail and updated the computerized patient record.    REVIEW OF SYSTEMS:  PAIN:  See Vital Signs below.  GENERAL:  See history of present illness  SKIN:  No rashes or non-healing lesions.  HEME/LYMPH:  No abnormal bleeding.  No palpable lymphadenopathy.  EYES:  No vision changes or diplopia.  ENT:  "History of present illness  RESPIRATORY:  No cough, shortness of breath, hemoptysis, or wheezing.  CARDIOVASCULAR:  No chest pain, palpitations, orthopnea, or dyspnea on exertion.  GASTROINTESTINAL:  See history of present illness  GENITOURINARY:  No dysuria or hematuria.  MUSCULOSKELETAL:  No joint pain, swelling, or erythema.  NEUROLOGIC:  No dizziness, loss of consciousness, or seizures.  PSYCHIATRIC:  No depression, anxiety, or mood changes.    Vitals:    08/24/17 0756   BP: 118/58   Pulse: 79   Resp: 16   Temp: 98.5 °F (36.9 °C)   TempSrc: Oral   SpO2: 94%   Weight: 144 lb 6.4 oz (65.5 kg)   Height: 67.13\" (170.5 cm)  Comment: new height   PainSc: 6  Comment: mouth/throat pain       PHYSICAL EXAMINATION:  GENERAL:  Well-developed well-nourished male; awake, alert and oriented, in no acute distress.  SKIN:  Warm and dry, without rashes, purpura, or petechiae.  HEAD:  Normocephalic, atraumatic.  EYES:  Pupils equal, round and reactive to light.  Extraocular movements intact.  Conjunctivae normal.  EARS:  Hearing intact.  NOSE:  Septum midline.  No excoriations or nasal discharge.  MOUTH:  No stomatitis or ulcers.  Lips are normal.  THROAT:  Grade 1 mucositis is present in the posterior pharynx  LYMPHATICS:  Right cervical lymphadenopathy improving  CHEST:  Lungs are clear to auscultation bilaterally.  No wheezes, rales, or rhonchi.  A Mediport is present in the left subclavian area that is benign in appearance.  HEART:  Regular rate; normal rhythm.  No murmurs, gallops or rubs.  ABDOMEN:  Soft, non-tender, non-distended.  Normal active bowel sounds.  A PEG tube is present. Benign in appearance.  EXTREMITIES:  No clubbing, cyanosis, or edema.  NEUROLOGICAL:  No focal neurologic deficits.    DIAGNOSTIC DATA:  Lab Results   Component Value Date    WBC 7.49 08/24/2017    HGB 11.5 (L) 08/24/2017    HCT 32.7 (L) 08/24/2017    .8 (H) 08/24/2017     (L) 08/24/2017     Lab Results   Component Value Date    " GLUCOSE 121 08/17/2017    BUN 19 08/17/2017    CREATININE 0.96 08/17/2017    EGFRIFNONA 78 08/17/2017    BCR 19.8 08/17/2017    K 4.9 (H) 08/17/2017    CO2 25.1 08/17/2017    CALCIUM 9.1 08/17/2017    ALBUMIN 3.50 08/17/2017    LABIL2 1.3 08/17/2017    AST 16 08/17/2017    ALT 19 08/17/2017       IMAGING:  None reviewed    ASSESSMENT:  This is a 66 y.o. male with:  1.  Stage IV a (T3, N2 a, M0) squamous cell carcinoma of the hypopharynx with metastatic lymphadenopathy to the right neck.  2.  Comorbidities including COPD.  3.  Recent PEG tube infection. Well healed at this point  4. Diarrhea secondary to chemotherapy. Improved with imodium and lomotil.  5.  Grade 1 mucositis related to chemotherapy and radiation: He has worsening pain as a result of this.  I prescribed him at codon/acetaminophen 7.5/325 mg liquid suspension to take orally or per tube every 6 hours as needed.  We can escalate if necessary.  6.  Chills last night: I advised him to purchase a thermometer.  He will be able to do this.    PLAN:  1. Proceed with cycle 3 day 1, taxol today 30mg/m2.  2. Return tomorrow for cycle 3 day 2 cisplatin 20mg/m2 and IV hydration.  3. Continue radiation under the direction of radiation oncology.  4. Continue imodium and lomotil as needed for diarrhea  5. Continue PEG care.  I advised him that he may need to start using his tube more for nutrition in the near future.  He has seen our nutritionist and will contact her when this becomes necessary.  6. Description for hydrocodone/acetaminophen liquid suspension as noted above  7. Return weekly for therapy    We discussed more today than just his cancer and treatment.  We prescribed pain medication.  We discussed nutrition.    John Presley MD

## 2017-08-25 ENCOUNTER — INFUSION (OUTPATIENT)
Dept: ONCOLOGY | Facility: HOSPITAL | Age: 67
End: 2017-08-25

## 2017-08-25 VITALS
SYSTOLIC BLOOD PRESSURE: 119 MMHG | BODY MASS INDEX: 22.66 KG/M2 | DIASTOLIC BLOOD PRESSURE: 62 MMHG | HEART RATE: 68 BPM | WEIGHT: 145.2 LBS | TEMPERATURE: 98 F

## 2017-08-25 DIAGNOSIS — C13.9 SQUAMOUS CELL CARCINOMA OF HYPOPHARYNX (HCC): Primary | ICD-10-CM

## 2017-08-25 PROCEDURE — 25010000002 MAGNESIUM SULFATE PER 500 MG OF MAGNESIUM: Performed by: INTERNAL MEDICINE

## 2017-08-25 PROCEDURE — 77386 CHG INTENSITY MODULATED RADIATION TX DLVR COMPLEX: CPT | Performed by: RADIOLOGY

## 2017-08-25 PROCEDURE — 25010000002 DEXAMETHASONE SODIUM PHOSPHATE 10 MG/ML SOLUTION 1 ML VIAL: Performed by: INTERNAL MEDICINE

## 2017-08-25 PROCEDURE — 25010000002 FOSAPREPITANT PER 1 MG: Performed by: INTERNAL MEDICINE

## 2017-08-25 PROCEDURE — 96375 TX/PRO/DX INJ NEW DRUG ADDON: CPT | Performed by: INTERNAL MEDICINE

## 2017-08-25 PROCEDURE — 96367 TX/PROPH/DG ADDL SEQ IV INF: CPT | Performed by: INTERNAL MEDICINE

## 2017-08-25 PROCEDURE — 25010000002 POTASSIUM CHLORIDE PER 2 MEQ OF POTASSIUM: Performed by: INTERNAL MEDICINE

## 2017-08-25 PROCEDURE — 77386: CPT | Performed by: RADIOLOGY

## 2017-08-25 PROCEDURE — 96413 CHEMO IV INFUSION 1 HR: CPT | Performed by: INTERNAL MEDICINE

## 2017-08-25 PROCEDURE — 77427 RADIATION TX MANAGEMENT X5: CPT | Performed by: RADIOLOGY

## 2017-08-25 PROCEDURE — 25010000002 CISPLATIN PER 50 MG: Performed by: INTERNAL MEDICINE

## 2017-08-25 PROCEDURE — 77014 CHG CT GUIDANCE RADIATION THERAPY FLDS PLACEMENT: CPT | Performed by: RADIOLOGY

## 2017-08-25 PROCEDURE — 96361 HYDRATE IV INFUSION ADD-ON: CPT | Performed by: INTERNAL MEDICINE

## 2017-08-25 PROCEDURE — 25010000002 PALONOSETRON PER 25 MCG: Performed by: INTERNAL MEDICINE

## 2017-08-25 RX ORDER — SODIUM CHLORIDE 9 MG/ML
250 INJECTION, SOLUTION INTRAVENOUS ONCE
Status: COMPLETED | OUTPATIENT
Start: 2017-08-25 | End: 2017-08-25

## 2017-08-25 RX ORDER — PALONOSETRON 0.05 MG/ML
0.25 INJECTION, SOLUTION INTRAVENOUS ONCE
Status: COMPLETED | OUTPATIENT
Start: 2017-08-25 | End: 2017-08-25

## 2017-08-25 RX ADMIN — PALONOSETRON HYDROCHLORIDE 0.25 MG: 0.25 INJECTION INTRAVENOUS at 10:14

## 2017-08-25 RX ADMIN — POTASSIUM CHLORIDE 500 ML: 2 INJECTION, SOLUTION, CONCENTRATE INTRAVENOUS at 11:53

## 2017-08-25 RX ADMIN — SODIUM CHLORIDE 150 MG: 900 INJECTION, SOLUTION INTRAVENOUS at 09:23

## 2017-08-25 RX ADMIN — POTASSIUM CHLORIDE 500 ML: 2 INJECTION, SOLUTION, CONCENTRATE INTRAVENOUS at 08:15

## 2017-08-25 RX ADMIN — CISPLATIN 36 MG: 1 INJECTION, SOLUTION INTRAVENOUS at 10:48

## 2017-08-25 RX ADMIN — SODIUM CHLORIDE 250 ML: 900 INJECTION, SOLUTION INTRAVENOUS at 08:14

## 2017-08-25 RX ADMIN — DEXAMETHASONE SODIUM PHOSPHATE 12 MG: 10 INJECTION, SOLUTION INTRAMUSCULAR; INTRAVENOUS at 09:56

## 2017-08-25 NOTE — PROGRESS NOTES
Pre B/P 120/70, pre weight 145,  Post B/P 119/62, post weight 144.2  Total urine output 750cc  Lungs unchanged from previous assessment

## 2017-08-28 ENCOUNTER — DOCUMENTATION (OUTPATIENT)
Dept: NUTRITION | Facility: HOSPITAL | Age: 67
End: 2017-08-28

## 2017-08-28 ENCOUNTER — RADIATION ONCOLOGY WEEKLY ASSESSMENT (OUTPATIENT)
Dept: RADIATION ONCOLOGY | Facility: HOSPITAL | Age: 67
End: 2017-08-28

## 2017-08-28 VITALS
OXYGEN SATURATION: 85 % | HEART RATE: 93 BPM | BODY MASS INDEX: 21.88 KG/M2 | TEMPERATURE: 97 F | RESPIRATION RATE: 16 BRPM | DIASTOLIC BLOOD PRESSURE: 53 MMHG | SYSTOLIC BLOOD PRESSURE: 102 MMHG | WEIGHT: 140.2 LBS

## 2017-08-28 DIAGNOSIS — C13.9 SQUAMOUS CELL CARCINOMA OF HYPOPHARYNX (HCC): Primary | ICD-10-CM

## 2017-08-28 PROCEDURE — 77386 CHG INTENSITY MODULATED RADIATION TX DLVR COMPLEX: CPT | Performed by: RADIOLOGY

## 2017-08-28 PROCEDURE — 77014 CHG CT GUIDANCE RADIATION THERAPY FLDS PLACEMENT: CPT | Performed by: RADIOLOGY

## 2017-08-28 PROCEDURE — 77386: CPT | Performed by: RADIOLOGY

## 2017-08-28 NOTE — PROGRESS NOTES
Physician Weekly Management Note    Diagnosis:   Squamous cell carcinoma of hypopharynx        Clinical: Stage EDMAR (T3, N2a, M0)     Reason for Visit:  Tx: 13/33    Concurrent Chemo:   Yes    Notes on Treatment course, Films, Medical progress and Plan:  Doing fairly well, still eating full diet though softer. He stumbled in waiting room this morning, witnessed by [place] and he states his leg fell asleep and he reached for a chair and the chair fell over. No injuries.  BP slightly low initially and better on recheck. He may need to decrease his Cardizem as we go forward. Will watch.    Reveiwed all he has eaten and he is doing very well. Open to meeting with Marli again for more ideas. Still not using tube but flushing it as appropriate. No sore throat this morning. He has fleenors and pain meds. He refuses any other intervention regarding fall. Appears fine and wlll continue on with treatment. No other problems or questions, cont on.    ROS - Other than as listed above, as follows:  Constitutional - Normal - no complaints of fatigue, denies lack of appetite, fever, night sweats or change in weight.  Neck - Normal - denies neck masses, muscle weakness, neck pain, decreased range of motion or swelling.  Cardiovascular - Normal - denies arrhythmias, chest pain, dyspnea, edema, orthopnea or palpitations.  Respiratory - Normal - denies cough, dyspnea, hemoptysis, hiccoughs, pleuritic chest pain or wheezing.  Gastrointestinal - Normal - no complaints of constipation, abdominal pain, diarrhea, heartburn/dyspepsia, hematemesis, hemorrhoids, melena or GI bleeding, nausea, pain or cramping or vomiting.    PHYSICAL EXAM - Other than listed above, as follows:  There were no vitals filed for this visit.    Constitutional - Normal - no evidence of impaired alertness, inadequate appearance, premature or advanced chronologic age, uncooperativeness, altered mood, affect or disorientation.  Neck - Normal - no evidence of tender or  "enlarged lymph nodes, neck abnormalities, restricted range of motion or enlarged thyroid.  Chest - Normal - no evidence of chest abnormalities, tender or enlarged lymph nodes.  Respiratory - Normal - no evidence of abnormal breat sounds, chest abnormalities on palpation and chest abnormalities on percussion and normal breath sounds.  Hematologic/Lymphatic - Normal - no evidence of tender or enlarged axillary lymph nodes nor tender or enlarged neck nodes.    Performance Status: (2) Ambulatory and capable of self care, unable to carry out work activity, up and about > 50% or waking hours    Problem added:  No problems updated.  Medications added: No orders of the defined types were placed in this encounter.    Ancillary referrals made: No orders of the defined types were placed in this encounter.      Technical aspects reviewed:  Weekly OBI approved if applicable?  Yes  Weekly port films approved?  Yes  Change requests noted if applicable?  Yes  Patient setup and plan reviewed?  Yes    Chart Reviewed:  Continue current treatment plan?  Yes  Treatment plan change requested?  No    I have reviewed and marked as \"reviewed\" the current medications, allergies and problem list in the patients EMR.    I have reviewed the patient's medical, surgical  history in detail, reviewed any pertinent lab work  and updated the computerized patient record if needed.    Patient's Care Team:  Patient Care Team:  BETTE Guillen as PCP - General  BETTE Ruvalcaba as PCP - Claims Attributed  Clinton Styles MD as Referring Physician (Otolaryngology)  Aakash Garcia MD as Consulting Physician (Hematology and Oncology)  Cosmo Conway MD as Consulting Physician (Cardiology)  Edgar Prince MD as Consulting Physician (Pain Medicine)  John Presley MD as Consulting Physician (Hematology and Oncology)    Seen and approved by:  Marlene Grayson MD, 08/17/2017  "

## 2017-08-28 NOTE — CODE DOCUMENTATION
After treatment weighed and weight 140.2. Discussed how he is flushing g-tube twice daily. Instructed to flush three times daily with two syringes of water in order to increase water intake. He agrees to this. He states he is drinking 1 Ensure per day. Recommended increasing to at least 2 per day and 3 if he can tolerate as long as he is continuing to eat. Informed him to let me know when eating becomes difficult and he agrees to this.  Continues to smoke cigarettes and use e cigarettes though he says very few regular cigarettes. Encouraged him to stop smoking. He voices understanding.

## 2017-08-28 NOTE — PROGRESS NOTES
[facility] Nutrition Follow Up:     Weight 140# today.      Spoke to patient on phone.  States  he was not able to eat over the weekend due to pain with swallowing.  States he has been using the swish and swallow after he eats. Told patient to use this prior to eating in order to benefit from the pain relief and be able to eat/drink. Reviewed high calorie high protein foods that are easy to swallow.   Will leave patient samples of Boost plus to use. Encouraged three per day mixed with ice cream. Will also leave patient a recipe list for high jaime high pro shakes.    He continues to flush PEG, instructed to flush twice a day per MD.  Open to using tube for nutrition but wants to eat while he is able to eat.     Continue to follow.

## 2017-08-29 ENCOUNTER — TELEPHONE (OUTPATIENT)
Dept: RADIATION ONCOLOGY | Facility: HOSPITAL | Age: 67
End: 2017-08-29

## 2017-08-29 ENCOUNTER — HOSPITAL ENCOUNTER (OUTPATIENT)
Dept: GENERAL RADIOLOGY | Facility: HOSPITAL | Age: 67
Discharge: HOME | End: 2017-08-29
Attending: RADIOLOGY | Admitting: RADIOLOGY

## 2017-08-29 ENCOUNTER — DOCUMENTATION (OUTPATIENT)
Dept: RADIATION ONCOLOGY | Facility: HOSPITAL | Age: 67
End: 2017-08-29

## 2017-08-29 VITALS — RESPIRATION RATE: 16 BRPM | HEART RATE: 103 BPM | OXYGEN SATURATION: 94 %

## 2017-08-29 DIAGNOSIS — C13.9 SQUAMOUS CELL CARCINOMA OF HYPOPHARYNX (HCC): Primary | ICD-10-CM

## 2017-08-29 DIAGNOSIS — C13.9 SQUAMOUS CELL CARCINOMA OF HYPOPHARYNX (HCC): ICD-10-CM

## 2017-08-29 PROCEDURE — 77014 CHG CT GUIDANCE RADIATION THERAPY FLDS PLACEMENT: CPT | Performed by: RADIOLOGY

## 2017-08-29 PROCEDURE — 71020 HC CHEST PA AND LATERAL: CPT

## 2017-08-29 PROCEDURE — 77386: CPT | Performed by: RADIOLOGY

## 2017-08-29 PROCEDURE — 77386 CHG INTENSITY MODULATED RADIATION TX DLVR COMPLEX: CPT | Performed by: RADIOLOGY

## 2017-08-29 RX ORDER — LEVOFLOXACIN 750 MG/1
750 TABLET ORAL DAILY
Qty: 10 TABLET | Refills: 0 | Status: SHIPPED | OUTPATIENT
Start: 2017-08-29 | End: 2017-09-05

## 2017-08-29 NOTE — TELEPHONE ENCOUNTER
Called patient to inform him of prescription for antibiotic waiting at his Pharmacy. He asks if he can eat or drink . I instructed if he is getting choked to use his tube and hold off on po.. Dr Grayson has ordered CBC for the am and patient has voiced understanding.

## 2017-08-29 NOTE — PROGRESS NOTES
Mr Parson arrives today complaining of dizziness. He usually drives himself but felt too bad today and had a family member drive him. Today he states when he drinks liquid he feels it is going into his lungs. He sounds congested and denies a productive cough.  Dr Marroquin informed and patient to go to hospital for chest x ray.

## 2017-08-30 ENCOUNTER — LAB (OUTPATIENT)
Dept: LAB | Facility: HOSPITAL | Age: 67
End: 2017-08-30

## 2017-08-30 ENCOUNTER — CLINICAL SUPPORT (OUTPATIENT)
Dept: ONCOLOGY | Facility: HOSPITAL | Age: 67
End: 2017-08-30

## 2017-08-30 ENCOUNTER — TREATMENT (OUTPATIENT)
Dept: RADIATION ONCOLOGY | Facility: HOSPITAL | Age: 67
End: 2017-08-30

## 2017-08-30 VITALS
RESPIRATION RATE: 20 BRPM | DIASTOLIC BLOOD PRESSURE: 67 MMHG | SYSTOLIC BLOOD PRESSURE: 116 MMHG | HEART RATE: 79 BPM | TEMPERATURE: 97.8 F

## 2017-08-30 DIAGNOSIS — C13.9 SQUAMOUS CELL CARCINOMA OF HYPOPHARYNX (HCC): Primary | ICD-10-CM

## 2017-08-30 LAB
ALBUMIN SERPL-MCNC: 3.5 G/DL (ref 3.5–5.2)
ALBUMIN/GLOB SERPL: 1.3 G/DL (ref 1.1–2.4)
ALP SERPL-CCNC: 45 U/L (ref 38–116)
ALT SERPL W P-5'-P-CCNC: 10 U/L (ref 0–41)
ANION GAP SERPL CALCULATED.3IONS-SCNC: 10.9 MMOL/L
AST SERPL-CCNC: 9 U/L (ref 0–40)
BASOPHILS # BLD AUTO: 0.01 10*3/MM3 (ref 0–0.1)
BASOPHILS NFR BLD AUTO: 0.5 % (ref 0–1.1)
BILIRUB SERPL-MCNC: 0.2 MG/DL (ref 0.1–1.2)
BUN BLD-MCNC: 21 MG/DL (ref 6–20)
BUN/CREAT SERPL: 24.1 (ref 7.3–30)
CALCIUM SPEC-SCNC: 9.2 MG/DL (ref 8.5–10.2)
CHLORIDE SERPL-SCNC: 98 MMOL/L (ref 98–107)
CO2 SERPL-SCNC: 27.1 MMOL/L (ref 22–29)
CREAT BLD-MCNC: 0.87 MG/DL (ref 0.7–1.3)
DEPRECATED RDW RBC AUTO: 49.1 FL (ref 37–49)
EOSINOPHIL # BLD AUTO: 0.04 10*3/MM3 (ref 0–0.36)
EOSINOPHIL NFR BLD AUTO: 1.8 % (ref 1–5)
ERYTHROCYTE [DISTWIDTH] IN BLOOD BY AUTOMATED COUNT: 12 % (ref 11.7–14.5)
GFR SERPL CREATININE-BSD FRML MDRD: 88 ML/MIN/1.73
GLOBULIN UR ELPH-MCNC: 2.7 GM/DL (ref 1.8–3.5)
GLUCOSE BLD-MCNC: 129 MG/DL (ref 74–124)
HCT VFR BLD AUTO: 33.5 % (ref 40–49)
HGB BLD-MCNC: 11.7 G/DL (ref 13.5–16.5)
HOLD SPECIMEN: NORMAL
IMM GRANULOCYTES # BLD: 0.02 10*3/MM3 (ref 0–0.03)
IMM GRANULOCYTES NFR BLD: 0.9 % (ref 0–0.5)
LYMPHOCYTES # BLD AUTO: 0.28 10*3/MM3 (ref 1–3.5)
LYMPHOCYTES NFR BLD AUTO: 12.8 % (ref 20–49)
MAGNESIUM SERPL-MCNC: 2.2 MG/DL (ref 1.8–2.5)
MCH RBC QN AUTO: 38.9 PG (ref 27–33)
MCHC RBC AUTO-ENTMCNC: 34.9 G/DL (ref 32–35)
MCV RBC AUTO: 111.3 FL (ref 83–97)
MONOCYTES # BLD AUTO: 0.27 10*3/MM3 (ref 0.25–0.8)
MONOCYTES NFR BLD AUTO: 12.4 % (ref 4–12)
NEUTROPHILS # BLD AUTO: 1.56 10*3/MM3 (ref 1.5–7)
NEUTROPHILS NFR BLD AUTO: 71.6 % (ref 39–75)
NRBC BLD MANUAL-RTO: 0 /100 WBC (ref 0–0)
PLATELET # BLD AUTO: 185 10*3/MM3 (ref 150–375)
PMV BLD AUTO: 9.4 FL (ref 8.9–12.1)
POTASSIUM BLD-SCNC: 4.7 MMOL/L (ref 3.5–4.7)
PROT SERPL-MCNC: 6.2 G/DL (ref 6.3–8)
RBC # BLD AUTO: 3.01 10*6/MM3 (ref 4.3–5.5)
SODIUM BLD-SCNC: 136 MMOL/L (ref 134–145)
WBC NRBC COR # BLD: 2.18 10*3/MM3 (ref 4–10)

## 2017-08-30 PROCEDURE — 77014 CHG CT GUIDANCE RADIATION THERAPY FLDS PLACEMENT: CPT | Performed by: RADIOLOGY

## 2017-08-30 PROCEDURE — 77386: CPT | Performed by: RADIOLOGY

## 2017-08-30 PROCEDURE — 85025 COMPLETE CBC W/AUTO DIFF WBC: CPT | Performed by: INTERNAL MEDICINE

## 2017-08-30 PROCEDURE — 80053 COMPREHEN METABOLIC PANEL: CPT | Performed by: INTERNAL MEDICINE

## 2017-08-30 PROCEDURE — 83735 ASSAY OF MAGNESIUM: CPT | Performed by: INTERNAL MEDICINE

## 2017-08-30 PROCEDURE — 77386 CHG INTENSITY MODULATED RADIATION TX DLVR COMPLEX: CPT | Performed by: RADIOLOGY

## 2017-08-30 PROCEDURE — 36415 COLL VENOUS BLD VENIPUNCTURE: CPT | Performed by: INTERNAL MEDICINE

## 2017-08-31 ENCOUNTER — INFUSION (OUTPATIENT)
Dept: ONCOLOGY | Facility: HOSPITAL | Age: 67
End: 2017-08-31

## 2017-08-31 ENCOUNTER — OFFICE VISIT (OUTPATIENT)
Dept: ONCOLOGY | Facility: CLINIC | Age: 67
End: 2017-08-31

## 2017-08-31 VITALS
OXYGEN SATURATION: 98 % | WEIGHT: 138.6 LBS | DIASTOLIC BLOOD PRESSURE: 62 MMHG | RESPIRATION RATE: 18 BRPM | HEIGHT: 67 IN | TEMPERATURE: 98.3 F | HEART RATE: 92 BPM | SYSTOLIC BLOOD PRESSURE: 118 MMHG | BODY MASS INDEX: 21.75 KG/M2

## 2017-08-31 VITALS — HEART RATE: 91 BPM | SYSTOLIC BLOOD PRESSURE: 120 MMHG | DIASTOLIC BLOOD PRESSURE: 71 MMHG

## 2017-08-31 DIAGNOSIS — C13.9 SQUAMOUS CELL CARCINOMA OF HYPOPHARYNX (HCC): Primary | ICD-10-CM

## 2017-08-31 DIAGNOSIS — C13.9 SQUAMOUS CELL CARCINOMA OF HYPOPHARYNX (HCC): ICD-10-CM

## 2017-08-31 PROCEDURE — 77336 RADIATION PHYSICS CONSULT: CPT | Performed by: RADIOLOGY

## 2017-08-31 PROCEDURE — 99215 OFFICE O/P EST HI 40 MIN: CPT | Performed by: INTERNAL MEDICINE

## 2017-08-31 PROCEDURE — 77386: CPT | Performed by: RADIOLOGY

## 2017-08-31 PROCEDURE — 77386 CHG INTENSITY MODULATED RADIATION TX DLVR COMPLEX: CPT | Performed by: RADIOLOGY

## 2017-08-31 PROCEDURE — 77014 CHG CT GUIDANCE RADIATION THERAPY FLDS PLACEMENT: CPT | Performed by: RADIOLOGY

## 2017-08-31 PROCEDURE — 25010000002 PACLITAXEL PER 30 MG: Performed by: INTERNAL MEDICINE

## 2017-08-31 PROCEDURE — 25010000002 DEXAMETHASONE SODIUM PHOSPHATE 10 MG/ML SOLUTION 1 ML VIAL: Performed by: INTERNAL MEDICINE

## 2017-08-31 PROCEDURE — 25010000002 DIPHENHYDRAMINE PER 50 MG: Performed by: INTERNAL MEDICINE

## 2017-08-31 PROCEDURE — 96413 CHEMO IV INFUSION 1 HR: CPT | Performed by: INTERNAL MEDICINE

## 2017-08-31 PROCEDURE — 96375 TX/PRO/DX INJ NEW DRUG ADDON: CPT | Performed by: INTERNAL MEDICINE

## 2017-08-31 RX ORDER — SODIUM CHLORIDE 9 MG/ML
250 INJECTION, SOLUTION INTRAVENOUS ONCE
Status: COMPLETED | OUTPATIENT
Start: 2017-08-31 | End: 2017-08-31

## 2017-08-31 RX ORDER — FAMOTIDINE 10 MG/ML
20 INJECTION, SOLUTION INTRAVENOUS ONCE
Status: CANCELLED | OUTPATIENT
Start: 2017-08-31

## 2017-08-31 RX ORDER — FENTANYL 25 UG/H
1 PATCH TRANSDERMAL
Qty: 10 PATCH | Refills: 0 | Status: SHIPPED | OUTPATIENT
Start: 2017-08-31 | End: 2017-09-21 | Stop reason: SDUPTHER

## 2017-08-31 RX ORDER — MORPHINE SULFATE 100 MG/5ML
10 SOLUTION ORAL
Qty: 120 ML | Refills: 0 | Status: SHIPPED | OUTPATIENT
Start: 2017-08-31 | End: 2017-09-21 | Stop reason: SDUPTHER

## 2017-08-31 RX ORDER — FAMOTIDINE 10 MG/ML
20 INJECTION, SOLUTION INTRAVENOUS ONCE
Status: COMPLETED | OUTPATIENT
Start: 2017-08-31 | End: 2017-08-31

## 2017-08-31 RX ORDER — SODIUM CHLORIDE 9 MG/ML
250 INJECTION, SOLUTION INTRAVENOUS ONCE
Status: CANCELLED | OUTPATIENT
Start: 2017-08-31

## 2017-08-31 RX ADMIN — DIPHENHYDRAMINE HYDROCHLORIDE 25 MG: 50 INJECTION, SOLUTION INTRAMUSCULAR; INTRAVENOUS at 09:08

## 2017-08-31 RX ADMIN — FAMOTIDINE 20 MG: 10 INJECTION, SOLUTION INTRAVENOUS at 09:08

## 2017-08-31 RX ADMIN — PACLITAXEL 55 MG: 6 INJECTION, SOLUTION INTRAVENOUS at 10:14

## 2017-08-31 RX ADMIN — SODIUM CHLORIDE 250 ML: 900 INJECTION, SOLUTION INTRAVENOUS at 09:08

## 2017-08-31 RX ADMIN — DEXAMETHASONE SODIUM PHOSPHATE 12 MG: 10 INJECTION, SOLUTION INTRAMUSCULAR; INTRAVENOUS at 09:25

## 2017-09-01 ENCOUNTER — INFUSION (OUTPATIENT)
Dept: ONCOLOGY | Facility: HOSPITAL | Age: 67
End: 2017-09-01

## 2017-09-01 ENCOUNTER — APPOINTMENT (OUTPATIENT)
Dept: RADIATION ONCOLOGY | Facility: HOSPITAL | Age: 67
End: 2017-09-01

## 2017-09-01 VITALS
WEIGHT: 136.4 LBS | TEMPERATURE: 98.4 F | HEART RATE: 92 BPM | DIASTOLIC BLOOD PRESSURE: 63 MMHG | BODY MASS INDEX: 21.28 KG/M2 | SYSTOLIC BLOOD PRESSURE: 95 MMHG

## 2017-09-01 DIAGNOSIS — C13.9 SQUAMOUS CELL CARCINOMA OF HYPOPHARYNX (HCC): Primary | ICD-10-CM

## 2017-09-01 LAB
ANION GAP SERPL CALCULATED.3IONS-SCNC: 18.2 MMOL/L
BASOPHILS # BLD AUTO: 0.01 10*3/MM3 (ref 0–0.1)
BASOPHILS NFR BLD AUTO: 0.2 % (ref 0–1.1)
BUN BLD-MCNC: 29 MG/DL (ref 6–20)
BUN/CREAT SERPL: 26.1 (ref 7.3–30)
CALCIUM SPEC-SCNC: 9.7 MG/DL (ref 8.5–10.2)
CHLORIDE SERPL-SCNC: 99 MMOL/L (ref 98–107)
CO2 SERPL-SCNC: 22.8 MMOL/L (ref 22–29)
CREAT BLD-MCNC: 1.11 MG/DL (ref 0.7–1.3)
DEPRECATED RDW RBC AUTO: 47.7 FL (ref 37–49)
EOSINOPHIL # BLD AUTO: 0.01 10*3/MM3 (ref 0–0.36)
EOSINOPHIL NFR BLD AUTO: 0.2 % (ref 1–5)
ERYTHROCYTE [DISTWIDTH] IN BLOOD BY AUTOMATED COUNT: 11.9 % (ref 11.7–14.5)
GFR SERPL CREATININE-BSD FRML MDRD: 66 ML/MIN/1.73
GLUCOSE BLD-MCNC: 137 MG/DL (ref 74–124)
HCT VFR BLD AUTO: 35.5 % (ref 40–49)
HGB BLD-MCNC: 12.5 G/DL (ref 13.5–16.5)
IMM GRANULOCYTES # BLD: 0.06 10*3/MM3 (ref 0–0.03)
IMM GRANULOCYTES NFR BLD: 1.1 % (ref 0–0.5)
LYMPHOCYTES # BLD AUTO: 0.48 10*3/MM3 (ref 1–3.5)
LYMPHOCYTES NFR BLD AUTO: 8.5 % (ref 20–49)
MCH RBC QN AUTO: 38.6 PG (ref 27–33)
MCHC RBC AUTO-ENTMCNC: 35.2 G/DL (ref 32–35)
MCV RBC AUTO: 109.6 FL (ref 83–97)
MONOCYTES # BLD AUTO: 0.63 10*3/MM3 (ref 0.25–0.8)
MONOCYTES NFR BLD AUTO: 11.2 % (ref 4–12)
NEUTROPHILS # BLD AUTO: 4.44 10*3/MM3 (ref 1.5–7)
NEUTROPHILS NFR BLD AUTO: 78.8 % (ref 39–75)
NRBC BLD MANUAL-RTO: 0 /100 WBC (ref 0–0)
PLATELET # BLD AUTO: 194 10*3/MM3 (ref 150–375)
PMV BLD AUTO: 9.6 FL (ref 8.9–12.1)
POTASSIUM BLD-SCNC: 4.5 MMOL/L (ref 3.5–4.7)
RBC # BLD AUTO: 3.24 10*6/MM3 (ref 4.3–5.5)
SODIUM BLD-SCNC: 140 MMOL/L (ref 134–145)
WBC NRBC COR # BLD: 5.63 10*3/MM3 (ref 4–10)

## 2017-09-01 PROCEDURE — 96361 HYDRATE IV INFUSION ADD-ON: CPT | Performed by: INTERNAL MEDICINE

## 2017-09-01 PROCEDURE — 25010000002 POTASSIUM CHLORIDE PER 2 MEQ OF POTASSIUM: Performed by: INTERNAL MEDICINE

## 2017-09-01 PROCEDURE — 77014 CHG CT GUIDANCE RADIATION THERAPY FLDS PLACEMENT: CPT | Performed by: RADIOLOGY

## 2017-09-01 PROCEDURE — 77386 CHG INTENSITY MODULATED RADIATION TX DLVR COMPLEX: CPT | Performed by: RADIOLOGY

## 2017-09-01 PROCEDURE — 25010000002 FOSAPREPITANT PER 1 MG: Performed by: INTERNAL MEDICINE

## 2017-09-01 PROCEDURE — 96367 TX/PROPH/DG ADDL SEQ IV INF: CPT | Performed by: INTERNAL MEDICINE

## 2017-09-01 PROCEDURE — 77427 RADIATION TX MANAGEMENT X5: CPT | Performed by: RADIOLOGY

## 2017-09-01 PROCEDURE — 96375 TX/PRO/DX INJ NEW DRUG ADDON: CPT | Performed by: INTERNAL MEDICINE

## 2017-09-01 PROCEDURE — 77386: CPT | Performed by: RADIOLOGY

## 2017-09-01 PROCEDURE — 96413 CHEMO IV INFUSION 1 HR: CPT | Performed by: INTERNAL MEDICINE

## 2017-09-01 PROCEDURE — 25010000002 DEXAMETHASONE SODIUM PHOSPHATE 10 MG/ML SOLUTION 1 ML VIAL: Performed by: INTERNAL MEDICINE

## 2017-09-01 PROCEDURE — 25010000002 CISPLATIN PER 50 MG: Performed by: INTERNAL MEDICINE

## 2017-09-01 PROCEDURE — 25010000002 FUROSEMIDE PER 20 MG: Performed by: INTERNAL MEDICINE

## 2017-09-01 PROCEDURE — 80048 BASIC METABOLIC PNL TOTAL CA: CPT | Performed by: INTERNAL MEDICINE

## 2017-09-01 PROCEDURE — 25010000002 MAGNESIUM SULFATE PER 500 MG OF MAGNESIUM: Performed by: INTERNAL MEDICINE

## 2017-09-01 PROCEDURE — 85025 COMPLETE CBC W/AUTO DIFF WBC: CPT | Performed by: INTERNAL MEDICINE

## 2017-09-01 PROCEDURE — 25010000002 PALONOSETRON PER 25 MCG: Performed by: INTERNAL MEDICINE

## 2017-09-01 PROCEDURE — 36415 COLL VENOUS BLD VENIPUNCTURE: CPT | Performed by: INTERNAL MEDICINE

## 2017-09-01 RX ORDER — SODIUM CHLORIDE 9 MG/ML
250 INJECTION, SOLUTION INTRAVENOUS ONCE
Status: CANCELLED | OUTPATIENT
Start: 2017-09-01

## 2017-09-01 RX ORDER — PALONOSETRON 0.05 MG/ML
0.25 INJECTION, SOLUTION INTRAVENOUS ONCE
Status: CANCELLED | OUTPATIENT
Start: 2017-09-01

## 2017-09-01 RX ORDER — FUROSEMIDE 10 MG/ML
20 INJECTION INTRAMUSCULAR; INTRAVENOUS ONCE
Status: COMPLETED | OUTPATIENT
Start: 2017-09-01 | End: 2017-09-01

## 2017-09-01 RX ORDER — PALONOSETRON 0.05 MG/ML
0.25 INJECTION, SOLUTION INTRAVENOUS ONCE
Status: COMPLETED | OUTPATIENT
Start: 2017-09-01 | End: 2017-09-01

## 2017-09-01 RX ORDER — SODIUM CHLORIDE 9 MG/ML
250 INJECTION, SOLUTION INTRAVENOUS ONCE
Status: COMPLETED | OUTPATIENT
Start: 2017-09-01 | End: 2017-09-01

## 2017-09-01 RX ORDER — SODIUM CHLORIDE 9 MG/ML
500 INJECTION, SOLUTION INTRAVENOUS ONCE
Status: COMPLETED | OUTPATIENT
Start: 2017-09-01 | End: 2017-09-01

## 2017-09-01 RX ADMIN — CISPLATIN 36 MG: 1 INJECTION, SOLUTION INTRAVENOUS at 13:02

## 2017-09-01 RX ADMIN — DEXAMETHASONE SODIUM PHOSPHATE 12 MG: 10 INJECTION, SOLUTION INTRAMUSCULAR; INTRAVENOUS at 10:57

## 2017-09-01 RX ADMIN — SODIUM CHLORIDE 150 MG: 900 INJECTION, SOLUTION INTRAVENOUS at 10:29

## 2017-09-01 RX ADMIN — POTASSIUM CHLORIDE 500 ML: 2 INJECTION, SOLUTION, CONCENTRATE INTRAVENOUS at 14:04

## 2017-09-01 RX ADMIN — PALONOSETRON HYDROCHLORIDE 0.25 MG: 0.25 INJECTION INTRAVENOUS at 10:29

## 2017-09-01 RX ADMIN — SODIUM CHLORIDE 500 ML: 900 INJECTION, SOLUTION INTRAVENOUS at 12:37

## 2017-09-01 RX ADMIN — SODIUM CHLORIDE 250 ML: 900 INJECTION, SOLUTION INTRAVENOUS at 09:25

## 2017-09-01 RX ADMIN — POTASSIUM CHLORIDE 500 ML: 2 INJECTION, SOLUTION, CONCENTRATE INTRAVENOUS at 09:25

## 2017-09-01 RX ADMIN — FUROSEMIDE 20 MG: 10 INJECTION, SOLUTION INTRAMUSCULAR; INTRAVENOUS at 12:37

## 2017-09-05 ENCOUNTER — RADIATION ONCOLOGY WEEKLY ASSESSMENT (OUTPATIENT)
Dept: RADIATION ONCOLOGY | Facility: HOSPITAL | Age: 67
End: 2017-09-05

## 2017-09-05 VITALS
OXYGEN SATURATION: 96 % | HEART RATE: 85 BPM | DIASTOLIC BLOOD PRESSURE: 88 MMHG | BODY MASS INDEX: 21.37 KG/M2 | WEIGHT: 137 LBS | SYSTOLIC BLOOD PRESSURE: 125 MMHG

## 2017-09-05 DIAGNOSIS — C76.0 HEAD AND NECK CANCER (HCC): Primary | ICD-10-CM

## 2017-09-05 PROCEDURE — 77386: CPT | Performed by: RADIOLOGY

## 2017-09-05 PROCEDURE — 77386 CHG INTENSITY MODULATED RADIATION TX DLVR COMPLEX: CPT | Performed by: RADIOLOGY

## 2017-09-05 PROCEDURE — 77014 CHG CT GUIDANCE RADIATION THERAPY FLDS PLACEMENT: CPT | Performed by: RADIOLOGY

## 2017-09-05 NOTE — PROGRESS NOTES
Physician Weekly Management Note    Diagnosis:   Squamous cell carcinoma of hypopharynx        Clinical: Stage EDMAR (T3, N2a, M0)     Reason for Visit:  Tx: 18/33    Concurrent Chemo:   Yes    Notes on Treatment course, Films, Medical progress and Plan:  Doing well, using tube feeds. Much improved after antibiotic and fentanyl patch. Wants to eat and we dicussed the swallow study again which he had previously not wanted to complete. He is agreeable now and will work to get that scheduled. Much better spirits, steady on his feet without falls. BP normalized. No other problems or questions, cont on.    ROS - Other than as listed above, as follows:  Constitutional - Normal - no complaints of fatigue, denies lack of appetite, fever, night sweats or change in weight.  Neck - Normal - denies neck masses, muscle weakness, neck pain, decreased range of motion or swelling.  Cardiovascular - Normal - denies arrhythmias, chest pain, dyspnea, edema, orthopnea or palpitations.  Respiratory - Normal - denies cough, dyspnea, hemoptysis, hiccoughs, pleuritic chest pain or wheezing.  Gastrointestinal - Normal - no complaints of constipation, abdominal pain, diarrhea, heartburn/dyspepsia, hematemesis, hemorrhoids, melena or GI bleeding, nausea, pain or cramping or vomiting.    PHYSICAL EXAM - Other than listed above, as follows:  Vitals:    09/05/17 0828   BP: 125/88   Pulse: 85   SpO2: 96%   Weight: 137 lb (62.1 kg)   PainSc: 0-No pain       Constitutional - Normal - no evidence of impaired alertness, inadequate appearance, premature or advanced chronologic age, uncooperativeness, altered mood, affect or disorientation.  Neck - Normal - no evidence of tender or enlarged lymph nodes, neck abnormalities, restricted range of motion or enlarged thyroid.  Chest - Normal - no evidence of chest abnormalities, tender or enlarged lymph nodes.  Respiratory - Normal - no evidence of abnormal breat sounds, chest abnormalities on palpation and  "chest abnormalities on percussion and normal breath sounds.  Hematologic/Lymphatic - Normal - no evidence of tender or enlarged axillary lymph nodes nor tender or enlarged neck nodes.    Performance Status: (2) Ambulatory and capable of self care, unable to carry out work activity, up and about > 50% or waking hours    Problem added:  No problems updated.  Medications added: No orders of the defined types were placed in this encounter.    Ancillary referrals made: No orders of the defined types were placed in this encounter.      Technical aspects reviewed:  Weekly OBI approved if applicable?  Yes  Weekly port films approved?  Yes  Change requests noted if applicable?  Yes  Patient setup and plan reviewed?  Yes    Chart Reviewed:  Continue current treatment plan?  Yes  Treatment plan change requested?  No    I have reviewed and marked as \"reviewed\" the current medications, allergies and problem list in the patients EMR.    I have reviewed the patient's medical, surgical  history in detail, reviewed any pertinent lab work  and updated the computerized patient record if needed.    Patient's Care Team:  Patient Care Team:  BETTE Guillen as PCP - General  BETTE Ruvalcaba as PCP - Claims Attributed  Clinton Styles MD as Referring Physician (Otolaryngology)  Aakash Garcia MD as Consulting Physician (Hematology and Oncology)  Cosmo Conway MD as Consulting Physician (Cardiology)  Edgar Prince MD as Consulting Physician (Pain Medicine)  John Presley MD as Consulting Physician (Hematology and Oncology)    Seen and approved by:  Marlene Grayson MD, 08/17/2017  "

## 2017-09-06 DIAGNOSIS — C13.9 SQUAMOUS CELL CARCINOMA OF HYPOPHARYNX (HCC): ICD-10-CM

## 2017-09-06 PROCEDURE — 77386: CPT | Performed by: RADIOLOGY

## 2017-09-06 PROCEDURE — 77014 CHG CT GUIDANCE RADIATION THERAPY FLDS PLACEMENT: CPT | Performed by: RADIOLOGY

## 2017-09-06 PROCEDURE — 77386 CHG INTENSITY MODULATED RADIATION TX DLVR COMPLEX: CPT | Performed by: RADIOLOGY

## 2017-09-07 ENCOUNTER — OFFICE VISIT (OUTPATIENT)
Dept: ONCOLOGY | Facility: CLINIC | Age: 67
End: 2017-09-07

## 2017-09-07 ENCOUNTER — INFUSION (OUTPATIENT)
Dept: ONCOLOGY | Facility: HOSPITAL | Age: 67
End: 2017-09-07

## 2017-09-07 VITALS
SYSTOLIC BLOOD PRESSURE: 104 MMHG | RESPIRATION RATE: 12 BRPM | OXYGEN SATURATION: 98 % | HEART RATE: 75 BPM | TEMPERATURE: 98.1 F | DIASTOLIC BLOOD PRESSURE: 58 MMHG | WEIGHT: 139.4 LBS | HEIGHT: 67 IN | BODY MASS INDEX: 21.88 KG/M2

## 2017-09-07 DIAGNOSIS — C13.9 SQUAMOUS CELL CARCINOMA OF HYPOPHARYNX (HCC): Primary | ICD-10-CM

## 2017-09-07 DIAGNOSIS — C13.9 SQUAMOUS CELL CANCER OF HYPOPHARYNX (HCC): ICD-10-CM

## 2017-09-07 DIAGNOSIS — C13.9 SQUAMOUS CELL CARCINOMA OF HYPOPHARYNX (HCC): ICD-10-CM

## 2017-09-07 DIAGNOSIS — K12.33 MUCOSITIS DUE TO RADIATION THERAPY: ICD-10-CM

## 2017-09-07 LAB
ALBUMIN SERPL-MCNC: 3.1 G/DL (ref 3.5–5.2)
ALBUMIN/GLOB SERPL: 1.3 G/DL (ref 1.1–2.4)
ALP SERPL-CCNC: 51 U/L (ref 38–116)
ALT SERPL W P-5'-P-CCNC: 12 U/L (ref 0–41)
ANION GAP SERPL CALCULATED.3IONS-SCNC: 11.7 MMOL/L
AST SERPL-CCNC: 14 U/L (ref 0–40)
BASOPHILS # BLD AUTO: 0.01 10*3/MM3 (ref 0–0.1)
BASOPHILS NFR BLD AUTO: 0.2 % (ref 0–1.1)
BILIRUB SERPL-MCNC: 0.3 MG/DL (ref 0.1–1.2)
BUN BLD-MCNC: 17 MG/DL (ref 6–20)
BUN/CREAT SERPL: 23 (ref 7.3–30)
CALCIUM SPEC-SCNC: 8.7 MG/DL (ref 8.5–10.2)
CHLORIDE SERPL-SCNC: 98 MMOL/L (ref 98–107)
CO2 SERPL-SCNC: 27.3 MMOL/L (ref 22–29)
CREAT BLD-MCNC: 0.74 MG/DL (ref 0.7–1.3)
DEPRECATED RDW RBC AUTO: 47.8 FL (ref 37–49)
EOSINOPHIL # BLD AUTO: 0.04 10*3/MM3 (ref 0–0.36)
EOSINOPHIL NFR BLD AUTO: 0.9 % (ref 1–5)
ERYTHROCYTE [DISTWIDTH] IN BLOOD BY AUTOMATED COUNT: 12 % (ref 11.7–14.5)
GFR SERPL CREATININE-BSD FRML MDRD: 106 ML/MIN/1.73
GLOBULIN UR ELPH-MCNC: 2.4 GM/DL (ref 1.8–3.5)
GLUCOSE BLD-MCNC: 127 MG/DL (ref 74–124)
HCT VFR BLD AUTO: 29.4 % (ref 40–49)
HGB BLD-MCNC: 10.3 G/DL (ref 13.5–16.5)
IMM GRANULOCYTES # BLD: 0.08 10*3/MM3 (ref 0–0.03)
IMM GRANULOCYTES NFR BLD: 1.9 % (ref 0–0.5)
LYMPHOCYTES # BLD AUTO: 0.49 10*3/MM3 (ref 1–3.5)
LYMPHOCYTES NFR BLD AUTO: 11.4 % (ref 20–49)
MAGNESIUM SERPL-MCNC: 1.8 MG/DL (ref 1.8–2.5)
MCH RBC QN AUTO: 38.4 PG (ref 27–33)
MCHC RBC AUTO-ENTMCNC: 35 G/DL (ref 32–35)
MCV RBC AUTO: 109.7 FL (ref 83–97)
MONOCYTES # BLD AUTO: 0.48 10*3/MM3 (ref 0.25–0.8)
MONOCYTES NFR BLD AUTO: 11.1 % (ref 4–12)
NEUTROPHILS # BLD AUTO: 3.21 10*3/MM3 (ref 1.5–7)
NEUTROPHILS NFR BLD AUTO: 74.5 % (ref 39–75)
NRBC BLD MANUAL-RTO: 0 /100 WBC (ref 0–0)
PLATELET # BLD AUTO: 182 10*3/MM3 (ref 150–375)
PMV BLD AUTO: 9.2 FL (ref 8.9–12.1)
POTASSIUM BLD-SCNC: 4.3 MMOL/L (ref 3.5–4.7)
PROT SERPL-MCNC: 5.5 G/DL (ref 6.3–8)
RBC # BLD AUTO: 2.68 10*6/MM3 (ref 4.3–5.5)
SODIUM BLD-SCNC: 137 MMOL/L (ref 134–145)
WBC NRBC COR # BLD: 4.31 10*3/MM3 (ref 4–10)

## 2017-09-07 PROCEDURE — 25010000002 DEXAMETHASONE PER 1 MG: Performed by: INTERNAL MEDICINE

## 2017-09-07 PROCEDURE — 96375 TX/PRO/DX INJ NEW DRUG ADDON: CPT | Performed by: INTERNAL MEDICINE

## 2017-09-07 PROCEDURE — 99213 OFFICE O/P EST LOW 20 MIN: CPT | Performed by: INTERNAL MEDICINE

## 2017-09-07 PROCEDURE — 96413 CHEMO IV INFUSION 1 HR: CPT | Performed by: INTERNAL MEDICINE

## 2017-09-07 PROCEDURE — 25010000002 PACLITAXEL PER 30 MG: Performed by: INTERNAL MEDICINE

## 2017-09-07 PROCEDURE — 77336 RADIATION PHYSICS CONSULT: CPT | Performed by: RADIOLOGY

## 2017-09-07 PROCEDURE — 83735 ASSAY OF MAGNESIUM: CPT

## 2017-09-07 PROCEDURE — 85025 COMPLETE CBC W/AUTO DIFF WBC: CPT

## 2017-09-07 PROCEDURE — 77386: CPT | Performed by: RADIOLOGY

## 2017-09-07 PROCEDURE — 77014 CHG CT GUIDANCE RADIATION THERAPY FLDS PLACEMENT: CPT | Performed by: RADIOLOGY

## 2017-09-07 PROCEDURE — 77386 CHG INTENSITY MODULATED RADIATION TX DLVR COMPLEX: CPT | Performed by: RADIOLOGY

## 2017-09-07 PROCEDURE — 80053 COMPREHEN METABOLIC PANEL: CPT

## 2017-09-07 PROCEDURE — 25010000002 DIPHENHYDRAMINE PER 50 MG: Performed by: INTERNAL MEDICINE

## 2017-09-07 RX ORDER — FAMOTIDINE 10 MG/ML
20 INJECTION, SOLUTION INTRAVENOUS ONCE
Status: CANCELLED | OUTPATIENT
Start: 2017-09-07

## 2017-09-07 RX ORDER — SODIUM CHLORIDE 9 MG/ML
250 INJECTION, SOLUTION INTRAVENOUS ONCE
Status: CANCELLED | OUTPATIENT
Start: 2017-09-07

## 2017-09-07 RX ORDER — FAMOTIDINE 10 MG/ML
20 INJECTION, SOLUTION INTRAVENOUS ONCE
Status: COMPLETED | OUTPATIENT
Start: 2017-09-07 | End: 2017-09-07

## 2017-09-07 RX ORDER — SODIUM CHLORIDE 9 MG/ML
250 INJECTION, SOLUTION INTRAVENOUS ONCE
Status: CANCELLED | OUTPATIENT
Start: 2017-09-08

## 2017-09-07 RX ORDER — SODIUM CHLORIDE 9 MG/ML
1000 INJECTION, SOLUTION INTRAVENOUS CONTINUOUS
Status: CANCELLED
Start: 2017-09-11

## 2017-09-07 RX ORDER — SODIUM CHLORIDE 9 MG/ML
250 INJECTION, SOLUTION INTRAVENOUS ONCE
Status: COMPLETED | OUTPATIENT
Start: 2017-09-07 | End: 2017-09-07

## 2017-09-07 RX ORDER — PALONOSETRON 0.05 MG/ML
0.25 INJECTION, SOLUTION INTRAVENOUS ONCE
Status: CANCELLED | OUTPATIENT
Start: 2017-09-08

## 2017-09-07 RX ADMIN — DEXAMETHASONE SODIUM PHOSPHATE 12 MG: 10 INJECTION INTRAMUSCULAR; INTRAVENOUS at 08:28

## 2017-09-07 RX ADMIN — FAMOTIDINE 20 MG: 10 INJECTION, SOLUTION INTRAVENOUS at 08:27

## 2017-09-07 RX ADMIN — PACLITAXEL 55 MG: 6 INJECTION, SOLUTION INTRAVENOUS at 09:34

## 2017-09-07 RX ADMIN — DIPHENHYDRAMINE HYDROCHLORIDE 25 MG: 50 INJECTION, SOLUTION INTRAMUSCULAR; INTRAVENOUS at 08:48

## 2017-09-07 RX ADMIN — SODIUM CHLORIDE 250 ML: 900 INJECTION, SOLUTION INTRAVENOUS at 08:28

## 2017-09-07 NOTE — PROGRESS NOTES
Per verbal order Dr Garcia patient to return on 9/11/17 for CBC, STAT BMP and Normal Saline 1 liter

## 2017-09-07 NOTE — PROGRESS NOTES
[facility] GROUP OUTPATIENT FOLLOW UP CLINIC VISIT    REASON FOR FOLLOW-UP:    1. 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  2.  Plans for combined radiation and chemotherapy as definitive treatment with cisplatin and Taxol initiated on 8/10/2017 concurrent with radiation.  He will receive Taxol on day 1 and cisplatin on day 2 with IV fluid hydration.  3.  Long smoking history with COPD.  4.  Mediport and PEG tube placed by Dr. Valenzuela on 7/31/2017.  Subsequent PEG tube infection requiring antibiotics.    HISTORY OF PRESENT ILLNESS:  King Parson is a 66 y.o. 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     He is now nothing by mouth with increasing toxicity from chemotherapy and radiation.  In spite of this his weight has been stable.  His pain from mucositis is under much better control with the use of a 25 µg fentanyl patch he has not had to supplement with liquid morphine for breakthrough.  He will have 12 more radiation treatments to go after today.    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  This diagnosis was made by FNA of the neck node on 6/23/2017.  He is undergone staging CT scans on 6/6/2017 and a PET scan on 7/6/2017.  His disease appears to be clinical stage TIII N2 a stage IV a squamous carcinoma of the hypopharynx.     He was originally referred to the [facility] cancer [facility] and was seen by Dr. Moreno Camp of radiation oncology on 7/14/2017.  The patient lives in [place] and has some transportation difficulties.  He has a sister who works in the lab at [employer] and for this  reason they have decided to receive their treatment in the [facility] system.     He was seen initially by Dr. Garcia in the company of 2 of his sisters.  We recommended combined chemotherapy and radiation.  He will be seeing Dr. Grayson of the [facility] to discuss the radiation portion of his treatment.  We plan to deliver weekly chemotherapy during radiation with combination of cisplatin and Taxol.  Taxol will be delivered day 1 and cisplatin be delivered along with hydration on day 2 each week.    He saw Dr. Grayson who was in agreement with concurrent chemotherapy and radiation.    Mediport and PEG tube placed by Dr. Valenzuela on 7/31/2017.  Subsequent PEG tube infection requiring antibiotics.    He saw nutrition.  He had a chemotherapy education session.    Cycle #1 of therapy was initiated on 8/10/2017.      PAST MEDICAL, SURGICAL, FAMILY, AND SOCIAL HISTORIES were reviewed with the patient and in the electronic medical record, and were updated if indicated.    ALLERGIES:  Allergies   Allergen Reactions   • Codeine      He dont like to take it because it makes him feel strange.        MEDICATIONS:  The medication list has been reviewed with the patient by the medical assistant, and the list has been updated in the electronic medical record, which I reviewed.  Medication dosages and frequencies were confirmed to be accurate.    I have reviewed the patient's medical history in detail and updated the computerized patient record.    REVIEW OF SYSTEMS:  PAIN:  See Vital Signs below.  GENERAL:  See history of present illness  SKIN:  Radiation dermatitis  HEME/LYMPH:  No abnormal bleeding.  No palpable lymphadenopathy.  EYES:  No vision changes or diplopia.  ENT: History of present illness  RESPIRATORY:  No cough, shortness of breath, hemoptysis, or wheezing.  CARDIOVASCULAR:  No chest pain, palpitations, orthopnea, or dyspnea on exertion.  GASTROINTESTINAL:  See history of present illness  GENITOURINARY:  No  "dysuria or hematuria.  MUSCULOSKELETAL:  No joint pain, swelling, or erythema.  NEUROLOGIC:  No dizziness, loss of consciousness, or seizures.  PSYCHIATRIC:  No depression, anxiety, or mood changes.    Vitals:    09/07/17 0748   BP: 104/58   Pulse: 75   Resp: 12   Temp: 98.1 °F (36.7 °C)   TempSrc: Oral   SpO2: 98%   Weight: 139 lb 6.4 oz (63.2 kg)   Height: 67.13\" (170.5 cm)   PainSc: 0-No pain  Comment: some minor throat irritation       PHYSICAL EXAMINATION:  GENERAL:  Well-developed well-nourished male; awake, alert and oriented, in no acute distress.  SKIN:  Mild radiation dermatitis  HEAD:  Normocephalic, atraumatic.  EYES:  Pupils equal, round and reactive to light.  Extraocular movements intact.  Conjunctivae normal.  EARS:  Hearing intact.  NOSE:  Septum midline.  No excoriations or nasal discharge.  MOUTH:  Ulcerative mucositis  THROAT:  Grade 2-3 mucositis is present in the posterior pharynx  LYMPHATICS:  Right cervical lymphadenopathy improving, currently measuring 2.0x2.0 cm  CHEST:  Lungs are clear to auscultation bilaterally.  No wheezes, rales, or rhonchi.  A Mediport is present in the left subclavian area that is benign in appearance.  HEART:  Regular rate; normal rhythm.  No murmurs, gallops or rubs.  ABDOMEN:  Soft, non-tender, non-distended.  Normal active bowel sounds.  A PEG tube is present. Benign in appearance.  EXTREMITIES:  No clubbing, cyanosis, or edema.  NEUROLOGICAL:  No focal neurologic deficits.    DIAGNOSTIC DATA:  Lab Results   Component Value Date    WBC 4.31 09/07/2017    HGB 10.3 (L) 09/07/2017    HCT 29.4 (L) 09/07/2017    .7 (H) 09/07/2017     09/07/2017     Lab Results   Component Value Date    GLUCOSE 127 (H) 09/07/2017    BUN 17 09/07/2017    CREATININE 0.74 09/07/2017    EGFRIFNONA 106 09/07/2017    BCR 23.0 09/07/2017    K 4.3 09/07/2017    CO2 27.3 09/07/2017    CALCIUM 8.7 09/07/2017    ALBUMIN 3.10 (L) 09/07/2017    LABIL2 1.3 09/07/2017    AST 14 " 09/07/2017    ALT 12 09/07/2017       IMAGING:  None reviewed    ASSESSMENT:  This is a 66 y.o. male with:  1.  Stage IV a (T3, N2 a, M0) squamous cell carcinoma of the hypopharynx with metastatic lymphadenopathy to the right neck.  2.  Comorbidities including COPD.  3.  Recent PEG tube infection. Well healed at this point  4. Diarrhea secondary to chemotherapy. Improved with imodium and lomotil.  5.  Grade 2-3 mucositis related to chemotherapy and radiation:Much improved on a fentanyl patch at 25 µg.  6.  Cough with possible aspiration: He is nothing by mouth at this point.      PLAN:  1. Proceed with cycle 5 day 1, taxol today 30mg/m2.  2. Return tomorrow for cycle 5 day 2 cisplatin 20mg/m2 and IV hydration.   3. Continue radiation under the direction of radiation oncology.  4. Continue imodium and lomotil as needed for diarrhea  5. Continue hydration and nutrition through his PEG tube at this point.  6. He has a prescription for liquid morphine solution to use either orally or through his tube and he will continue on a 25 µg fentanyl patch.  7. We will schedule additional labs and IV fluids in the office on Monday, 9/11/2017.  8. Nurse practitioner follow-up and cycle #6 chemotherapy in 1 week.  9. 2 unit M.D. follow-up and cycle #7 (possibly, depending on how many more radiation treatments he has and his degree of toxicity) in 2 weeks.  10. He is undergoing a video swallowing study next week on 9/12/2017.  For now he will continue to receive all fluids, meds, and nutrition through his PEG tube.      Aakash Garcia MD

## 2017-09-08 ENCOUNTER — INFUSION (OUTPATIENT)
Dept: ONCOLOGY | Facility: HOSPITAL | Age: 67
End: 2017-09-08

## 2017-09-08 ENCOUNTER — DOCUMENTATION (OUTPATIENT)
Dept: NUTRITION | Facility: HOSPITAL | Age: 67
End: 2017-09-08

## 2017-09-08 VITALS
HEART RATE: 79 BPM | WEIGHT: 140.6 LBS | DIASTOLIC BLOOD PRESSURE: 57 MMHG | SYSTOLIC BLOOD PRESSURE: 96 MMHG | TEMPERATURE: 98.4 F | BODY MASS INDEX: 21.94 KG/M2

## 2017-09-08 DIAGNOSIS — C13.9 SQUAMOUS CELL CARCINOMA OF HYPOPHARYNX (HCC): Primary | ICD-10-CM

## 2017-09-08 PROCEDURE — 25010000002 DEXAMETHASONE SODIUM PHOSPHATE 10 MG/ML SOLUTION 1 ML VIAL: Performed by: INTERNAL MEDICINE

## 2017-09-08 PROCEDURE — 25010000002 MAGNESIUM SULFATE PER 500 MG OF MAGNESIUM: Performed by: INTERNAL MEDICINE

## 2017-09-08 PROCEDURE — 77386 CHG INTENSITY MODULATED RADIATION TX DLVR COMPLEX: CPT | Performed by: RADIOLOGY

## 2017-09-08 PROCEDURE — 96375 TX/PRO/DX INJ NEW DRUG ADDON: CPT | Performed by: INTERNAL MEDICINE

## 2017-09-08 PROCEDURE — 25010000002 PALONOSETRON PER 25 MCG: Performed by: INTERNAL MEDICINE

## 2017-09-08 PROCEDURE — 25010000002 FOSAPREPITANT PER 1 MG: Performed by: INTERNAL MEDICINE

## 2017-09-08 PROCEDURE — 96413 CHEMO IV INFUSION 1 HR: CPT | Performed by: INTERNAL MEDICINE

## 2017-09-08 PROCEDURE — 25010000002 POTASSIUM CHLORIDE PER 2 MEQ OF POTASSIUM: Performed by: INTERNAL MEDICINE

## 2017-09-08 PROCEDURE — 25010000002 CISPLATIN PER 50 MG: Performed by: INTERNAL MEDICINE

## 2017-09-08 PROCEDURE — 77386: CPT | Performed by: RADIOLOGY

## 2017-09-08 PROCEDURE — 96367 TX/PROPH/DG ADDL SEQ IV INF: CPT | Performed by: INTERNAL MEDICINE

## 2017-09-08 PROCEDURE — 77014 CHG CT GUIDANCE RADIATION THERAPY FLDS PLACEMENT: CPT | Performed by: RADIOLOGY

## 2017-09-08 RX ORDER — PALONOSETRON 0.05 MG/ML
0.25 INJECTION, SOLUTION INTRAVENOUS ONCE
Status: COMPLETED | OUTPATIENT
Start: 2017-09-08 | End: 2017-09-08

## 2017-09-08 RX ORDER — SODIUM CHLORIDE 9 MG/ML
250 INJECTION, SOLUTION INTRAVENOUS ONCE
Status: COMPLETED | OUTPATIENT
Start: 2017-09-08 | End: 2017-09-08

## 2017-09-08 RX ADMIN — SODIUM CHLORIDE 250 ML: 900 INJECTION, SOLUTION INTRAVENOUS at 08:26

## 2017-09-08 RX ADMIN — PALONOSETRON HYDROCHLORIDE 0.25 MG: 0.25 INJECTION INTRAVENOUS at 10:30

## 2017-09-08 RX ADMIN — POTASSIUM CHLORIDE 500 ML: 2 INJECTION, SOLUTION, CONCENTRATE INTRAVENOUS at 11:34

## 2017-09-08 RX ADMIN — POTASSIUM CHLORIDE 500 ML: 2 INJECTION, SOLUTION, CONCENTRATE INTRAVENOUS at 08:25

## 2017-09-08 RX ADMIN — SODIUM CHLORIDE 150 MG: 900 INJECTION, SOLUTION INTRAVENOUS at 09:31

## 2017-09-08 RX ADMIN — CISPLATIN 36 MG: 1 INJECTION, SOLUTION INTRAVENOUS at 10:32

## 2017-09-08 RX ADMIN — DEXAMETHASONE SODIUM PHOSPHATE 12 MG: 10 INJECTION, SOLUTION INTRAMUSCULAR; INTRAVENOUS at 10:03

## 2017-09-08 NOTE — PROGRESS NOTES
Patient voided 160cc prior to cisplatin. Ok to hang per . Before leaving for day, pt voided another 400 for total of 560cc for the day. DC weight 141lb.

## 2017-09-08 NOTE — PROGRESS NOTES
ONC Nutrition Follow Up:     Weight 140#. Patient states he is weighing around 140# at home, too.     Using PEG, NPO pending VFSS next week.     Current TF: Jevity 1.5  5  1/2 cans per day meets needs, providing 1953 calories, 83 g protein, 990cc water,  water flushes of 180cc x6 per day ( 2070cc total cc free water).    Continue to follow.

## 2017-09-11 ENCOUNTER — INFUSION (OUTPATIENT)
Dept: ONCOLOGY | Facility: HOSPITAL | Age: 67
End: 2017-09-11

## 2017-09-11 ENCOUNTER — RADIATION ONCOLOGY WEEKLY ASSESSMENT (OUTPATIENT)
Dept: RADIATION ONCOLOGY | Facility: HOSPITAL | Age: 67
End: 2017-09-11

## 2017-09-11 VITALS
DIASTOLIC BLOOD PRESSURE: 62 MMHG | WEIGHT: 141.6 LBS | SYSTOLIC BLOOD PRESSURE: 101 MMHG | TEMPERATURE: 98.6 F | BODY MASS INDEX: 22.09 KG/M2 | HEART RATE: 78 BPM

## 2017-09-11 VITALS
SYSTOLIC BLOOD PRESSURE: 98 MMHG | HEART RATE: 78 BPM | DIASTOLIC BLOOD PRESSURE: 56 MMHG | OXYGEN SATURATION: 93 % | BODY MASS INDEX: 21.84 KG/M2 | TEMPERATURE: 97.1 F | RESPIRATION RATE: 16 BRPM | WEIGHT: 140 LBS

## 2017-09-11 DIAGNOSIS — R47.02 DYSPHASIA: Primary | ICD-10-CM

## 2017-09-11 DIAGNOSIS — C13.9 SQUAMOUS CELL CARCINOMA OF HYPOPHARYNX (HCC): ICD-10-CM

## 2017-09-11 DIAGNOSIS — K12.33 MUCOSITIS DUE TO RADIATION THERAPY: ICD-10-CM

## 2017-09-11 DIAGNOSIS — C13.9 SQUAMOUS CELL CANCER OF HYPOPHARYNX (HCC): Primary | ICD-10-CM

## 2017-09-11 DIAGNOSIS — C13.9 SQUAMOUS CELL CARCINOMA OF HYPOPHARYNX (HCC): Primary | ICD-10-CM

## 2017-09-11 LAB
ANION GAP SERPL CALCULATED.3IONS-SCNC: 13.4 MMOL/L
BASOPHILS # BLD AUTO: 0 10*3/MM3 (ref 0–0.1)
BASOPHILS NFR BLD AUTO: 0 % (ref 0–1.1)
BUN BLD-MCNC: 19 MG/DL (ref 6–20)
BUN/CREAT SERPL: 27.1 (ref 7.3–30)
CALCIUM SPEC-SCNC: 8.7 MG/DL (ref 8.5–10.2)
CHLORIDE SERPL-SCNC: 99 MMOL/L (ref 98–107)
CO2 SERPL-SCNC: 27.6 MMOL/L (ref 22–29)
CREAT BLD-MCNC: 0.7 MG/DL (ref 0.7–1.3)
DEPRECATED RDW RBC AUTO: 50.3 FL (ref 37–49)
EOSINOPHIL # BLD AUTO: 0.01 10*3/MM3 (ref 0–0.36)
EOSINOPHIL NFR BLD AUTO: 0.2 % (ref 1–5)
ERYTHROCYTE [DISTWIDTH] IN BLOOD BY AUTOMATED COUNT: 12.6 % (ref 11.7–14.5)
GFR SERPL CREATININE-BSD FRML MDRD: 113 ML/MIN/1.73
GLUCOSE BLD-MCNC: 96 MG/DL (ref 74–124)
HCT VFR BLD AUTO: 28.7 % (ref 40–49)
HGB BLD-MCNC: 9.8 G/DL (ref 13.5–16.5)
IMM GRANULOCYTES # BLD: 0.03 10*3/MM3 (ref 0–0.03)
IMM GRANULOCYTES NFR BLD: 0.7 % (ref 0–0.5)
LYMPHOCYTES # BLD AUTO: 0.34 10*3/MM3 (ref 1–3.5)
LYMPHOCYTES NFR BLD AUTO: 8.3 % (ref 20–49)
MCH RBC QN AUTO: 37.7 PG (ref 27–33)
MCHC RBC AUTO-ENTMCNC: 34.1 G/DL (ref 32–35)
MCV RBC AUTO: 110.4 FL (ref 83–97)
MONOCYTES # BLD AUTO: 0.41 10*3/MM3 (ref 0.25–0.8)
MONOCYTES NFR BLD AUTO: 10 % (ref 4–12)
NEUTROPHILS # BLD AUTO: 3.33 10*3/MM3 (ref 1.5–7)
NEUTROPHILS NFR BLD AUTO: 80.8 % (ref 39–75)
NRBC BLD MANUAL-RTO: 0 /100 WBC (ref 0–0)
PLATELET # BLD AUTO: 155 10*3/MM3 (ref 150–375)
PMV BLD AUTO: 9.6 FL (ref 8.9–12.1)
POTASSIUM BLD-SCNC: 3.8 MMOL/L (ref 3.5–4.7)
RBC # BLD AUTO: 2.6 10*6/MM3 (ref 4.3–5.5)
SODIUM BLD-SCNC: 140 MMOL/L (ref 134–145)
WBC NRBC COR # BLD: 4.12 10*3/MM3 (ref 4–10)

## 2017-09-11 PROCEDURE — 85025 COMPLETE CBC W/AUTO DIFF WBC: CPT

## 2017-09-11 PROCEDURE — 77386: CPT | Performed by: RADIOLOGY

## 2017-09-11 PROCEDURE — 96361 HYDRATE IV INFUSION ADD-ON: CPT | Performed by: NURSE PRACTITIONER

## 2017-09-11 PROCEDURE — 77014 CHG CT GUIDANCE RADIATION THERAPY FLDS PLACEMENT: CPT | Performed by: RADIOLOGY

## 2017-09-11 PROCEDURE — 77427 RADIATION TX MANAGEMENT X5: CPT | Performed by: RADIOLOGY

## 2017-09-11 PROCEDURE — 77386 CHG INTENSITY MODULATED RADIATION TX DLVR COMPLEX: CPT | Performed by: RADIOLOGY

## 2017-09-11 PROCEDURE — 80048 BASIC METABOLIC PNL TOTAL CA: CPT

## 2017-09-11 RX ORDER — SODIUM CHLORIDE 9 MG/ML
1000 INJECTION, SOLUTION INTRAVENOUS CONTINUOUS
Status: DISCONTINUED | OUTPATIENT
Start: 2017-09-11 | End: 2017-09-11 | Stop reason: HOSPADM

## 2017-09-11 RX ADMIN — SODIUM CHLORIDE 1000 ML: 900 INJECTION, SOLUTION INTRAVENOUS at 10:10

## 2017-09-11 NOTE — PROGRESS NOTES
Physician Weekly Management Note    Diagnosis:   Squamous cell carcinoma of hypopharynx        Clinical: Stage EDMAR (T3, N2a, M0)     Reason for Visit:  Tx: 22/33    Concurrent Chemo:   Yes    Notes on Treatment course, Films, Medical progress and Plan:  Doing fairly well, still with dysphagia. Intermittently able to swallow saliva but no food/liquids. Using tube feeds only. Wants to eat despite dysphagia and aspiration risk. Getting fluids today, previously scheduled with CBC. Swallow study scheduled for tomorrow to evaluate swallowing ability and risk of aspiration. Cont on.    ROS - Other than as listed above, as follows:  Constitutional - Normal - no complaints of fatigue, denies lack of appetite, fever, night sweats or change in weight.  Neck - Normal - denies neck masses, muscle weakness, neck pain, decreased range of motion or swelling.  Cardiovascular - Normal - denies arrhythmias, chest pain, dyspnea, edema, orthopnea or palpitations.  Respiratory - Normal - denies cough, dyspnea, hemoptysis, hiccoughs, pleuritic chest pain or wheezing.  Gastrointestinal - Normal - no complaints of constipation, abdominal pain, diarrhea, heartburn/dyspepsia, hematemesis, hemorrhoids, melena or GI bleeding, nausea, pain or cramping or vomiting.    PHYSICAL EXAM - Other than listed above, as follows:  Vitals:    09/11/17 0834   BP: 98/56   Pulse: 78   Resp: 16   Temp: 97.1 °F (36.2 °C)   TempSrc: Oral   SpO2: 93%   Weight: 140 lb (63.5 kg)   PainSc: 0-No pain       Constitutional - Normal - no evidence of impaired alertness, inadequate appearance, premature or advanced chronologic age, uncooperativeness, altered mood, affect or disorientation.  Neck - Normal - no evidence of tender or enlarged lymph nodes, neck abnormalities, restricted range of motion or enlarged thyroid.  Chest - Normal - no evidence of chest abnormalities, tender or enlarged lymph nodes.  Respiratory - Normal - no evidence of abnormal breat sounds, chest  "abnormalities on palpation and chest abnormalities on percussion and normal breath sounds.  Hematologic/Lymphatic - Normal - no evidence of tender or enlarged axillary lymph nodes nor tender or enlarged neck nodes.    Performance Status: (2) Ambulatory and capable of self care, unable to carry out work activity, up and about > 50% or waking hours    Problem added:  No problems updated.  Medications added: No orders of the defined types were placed in this encounter.    Ancillary referrals made: No orders of the defined types were placed in this encounter.      Technical aspects reviewed:  Weekly OBI approved if applicable?  Yes  Weekly port films approved?  Yes  Change requests noted if applicable?  Yes  Patient setup and plan reviewed?  Yes    Chart Reviewed:  Continue current treatment plan?  Yes  Treatment plan change requested?  No    I have reviewed and marked as \"reviewed\" the current medications, allergies and problem list in the patients EMR.    I have reviewed the patient's medical, surgical  history in detail, reviewed any pertinent lab work  and updated the computerized patient record if needed.    Patient's Care Team:  Patient Care Team:  BETTE Guillen as PCP - General  BETTE Ruvalcaba as PCP - Claims [facility]  Clinton Styles MD as Referring Physician (Otolaryngology)  Aakash Garcia MD as Consulting Physician (Hematology and Oncology)  Cosmo Conway MD as Consulting Physician (Cardiology)  Edgar Prince MD as Consulting Physician (Pain Medicine)  John Presley MD as Consulting Physician (Hematology and Oncology)    Seen and approved by:  Marlene Grayson MD, 08/17/2017  "

## 2017-09-12 ENCOUNTER — APPOINTMENT (OUTPATIENT)
Dept: GENERAL RADIOLOGY | Facility: HOSPITAL | Age: 67
End: 2017-09-12
Attending: RADIOLOGY

## 2017-09-12 ENCOUNTER — DOCUMENTATION (OUTPATIENT)
Dept: RADIATION ONCOLOGY | Facility: HOSPITAL | Age: 67
End: 2017-09-12

## 2017-09-12 DIAGNOSIS — R13.12 OROPHARYNGEAL DYSPHAGIA: Primary | ICD-10-CM

## 2017-09-12 DIAGNOSIS — C76.0 HEAD AND NECK CANCER (HCC): ICD-10-CM

## 2017-09-12 DIAGNOSIS — T17.908A ASPIRATION INTO AIRWAY, INITIAL ENCOUNTER: ICD-10-CM

## 2017-09-12 PROCEDURE — 77014 CHG CT GUIDANCE RADIATION THERAPY FLDS PLACEMENT: CPT | Performed by: RADIOLOGY

## 2017-09-12 PROCEDURE — 77386: CPT | Performed by: RADIOLOGY

## 2017-09-12 PROCEDURE — 77386 CHG INTENSITY MODULATED RADIATION TX DLVR COMPLEX: CPT | Performed by: RADIOLOGY

## 2017-09-12 NOTE — PROGRESS NOTES
Therapist contacted me regarding Mr Parson's stated pain level of 15. He pain has been documented as 0 during prior treatments . I saw him yesterday for status check and understood him to say he had no pain, however today he says he told me it was 12 . He has a fentanyl 25 mcg patch on and has been given oral morphine liquid for breakthrough pain . He tells therapist  he does not know how to take morphine. After speaking to him he understands the dosing but was not sure when to begin the morphine. I advised him he can begin taking it now and discussed it is 0.5ml not 5 ml as he initially mentioned . I also told him not to drive and to have his siblings drive him from now on. He verbalized understanding and agreed to this.

## 2017-09-13 ENCOUNTER — TREATMENT (OUTPATIENT)
Dept: RADIATION ONCOLOGY | Facility: HOSPITAL | Age: 67
End: 2017-09-13

## 2017-09-13 DIAGNOSIS — C13.9 SQUAMOUS CELL CARCINOMA OF HYPOPHARYNX (HCC): Primary | ICD-10-CM

## 2017-09-13 PROCEDURE — 77014 CHG CT GUIDANCE RADIATION THERAPY FLDS PLACEMENT: CPT | Performed by: RADIOLOGY

## 2017-09-13 PROCEDURE — 77386 CHG INTENSITY MODULATED RADIATION TX DLVR COMPLEX: CPT | Performed by: RADIOLOGY

## 2017-09-13 PROCEDURE — 77386: CPT | Performed by: RADIOLOGY

## 2017-09-13 NOTE — PROGRESS NOTES
ONC Nutrition Follow Up:     Weight 141# 9/11, consistent with weights at home.    Had VFSS yesterday. NPO recommended due to aspiration risk.  Pain increased.    Continues to use feeding tube for nutrition and hydration. Will follow.

## 2017-09-14 ENCOUNTER — INFUSION (OUTPATIENT)
Dept: ONCOLOGY | Facility: HOSPITAL | Age: 67
End: 2017-09-14

## 2017-09-14 ENCOUNTER — TELEPHONE (OUTPATIENT)
Dept: RADIATION ONCOLOGY | Facility: HOSPITAL | Age: 67
End: 2017-09-14

## 2017-09-14 ENCOUNTER — APPOINTMENT (OUTPATIENT)
Dept: ONCOLOGY | Facility: HOSPITAL | Age: 67
End: 2017-09-14

## 2017-09-14 ENCOUNTER — APPOINTMENT (OUTPATIENT)
Dept: ONCOLOGY | Facility: CLINIC | Age: 67
End: 2017-09-14

## 2017-09-14 ENCOUNTER — OFFICE VISIT (OUTPATIENT)
Dept: ONCOLOGY | Facility: CLINIC | Age: 67
End: 2017-09-14

## 2017-09-14 VITALS
RESPIRATION RATE: 18 BRPM | TEMPERATURE: 100.2 F | DIASTOLIC BLOOD PRESSURE: 50 MMHG | HEART RATE: 92 BPM | SYSTOLIC BLOOD PRESSURE: 102 MMHG | OXYGEN SATURATION: 91 % | BODY MASS INDEX: 21.47 KG/M2 | HEIGHT: 67 IN | WEIGHT: 136.8 LBS

## 2017-09-14 DIAGNOSIS — C13.9 SQUAMOUS CELL CARCINOMA OF HYPOPHARYNX (HCC): ICD-10-CM

## 2017-09-14 DIAGNOSIS — T66.XXXA RADIATION ESOPHAGITIS: ICD-10-CM

## 2017-09-14 DIAGNOSIS — K12.33 MUCOSITIS DUE TO RADIATION THERAPY: ICD-10-CM

## 2017-09-14 DIAGNOSIS — B37.0 ORAL CANDIDIASIS: ICD-10-CM

## 2017-09-14 DIAGNOSIS — L58.9 RADIATION DERMATITIS: ICD-10-CM

## 2017-09-14 DIAGNOSIS — C14.0 THROAT CANCER (HCC): Primary | ICD-10-CM

## 2017-09-14 DIAGNOSIS — T82.898A OTHER COMPLICATIONS DUE TO OTHER VASCULAR DEVICE, IMPLANT, AND GRAFT: Primary | ICD-10-CM

## 2017-09-14 DIAGNOSIS — K20.80 RADIATION ESOPHAGITIS: ICD-10-CM

## 2017-09-14 DIAGNOSIS — C13.9 SQUAMOUS CELL CARCINOMA OF HYPOPHARYNX (HCC): Primary | ICD-10-CM

## 2017-09-14 LAB
ALBUMIN SERPL-MCNC: 3.4 G/DL (ref 3.5–5.2)
ALBUMIN/GLOB SERPL: 1.5 G/DL (ref 1.1–2.4)
ALP SERPL-CCNC: 54 U/L (ref 38–116)
ALT SERPL W P-5'-P-CCNC: 10 U/L (ref 0–41)
ANION GAP SERPL CALCULATED.3IONS-SCNC: 13.7 MMOL/L
AST SERPL-CCNC: 13 U/L (ref 0–40)
BASOPHILS # BLD AUTO: 0.01 10*3/MM3 (ref 0–0.1)
BASOPHILS NFR BLD AUTO: 0.3 % (ref 0–1.1)
BILIRUB SERPL-MCNC: 0.5 MG/DL (ref 0.1–1.2)
BUN BLD-MCNC: 17 MG/DL (ref 6–20)
BUN/CREAT SERPL: 23.3 (ref 7.3–30)
CALCIUM SPEC-SCNC: 8.7 MG/DL (ref 8.5–10.2)
CHLORIDE SERPL-SCNC: 97 MMOL/L (ref 98–107)
CO2 SERPL-SCNC: 25.3 MMOL/L (ref 22–29)
CREAT BLD-MCNC: 0.73 MG/DL (ref 0.7–1.3)
DEPRECATED RDW RBC AUTO: 46.2 FL (ref 37–49)
EOSINOPHIL # BLD AUTO: 0.01 10*3/MM3 (ref 0–0.36)
EOSINOPHIL NFR BLD AUTO: 0.3 % (ref 1–5)
ERYTHROCYTE [DISTWIDTH] IN BLOOD BY AUTOMATED COUNT: 12.6 % (ref 11.7–14.5)
GFR SERPL CREATININE-BSD FRML MDRD: 107 ML/MIN/1.73
GLOBULIN UR ELPH-MCNC: 2.3 GM/DL (ref 1.8–3.5)
GLUCOSE BLD-MCNC: 97 MG/DL (ref 74–124)
HCT VFR BLD AUTO: 27.5 % (ref 40–49)
HGB BLD-MCNC: 10.3 G/DL (ref 13.5–16.5)
IMM GRANULOCYTES # BLD: 0.03 10*3/MM3 (ref 0–0.03)
IMM GRANULOCYTES NFR BLD: 0.8 % (ref 0–0.5)
LYMPHOCYTES # BLD AUTO: 0.29 10*3/MM3 (ref 1–3.5)
LYMPHOCYTES NFR BLD AUTO: 7.5 % (ref 20–49)
MAGNESIUM SERPL-MCNC: 1.7 MG/DL (ref 1.8–2.5)
MCH RBC QN AUTO: 39.5 PG (ref 27–33)
MCHC RBC AUTO-ENTMCNC: 37.5 G/DL (ref 32–35)
MCV RBC AUTO: 105.4 FL (ref 83–97)
MONOCYTES # BLD AUTO: 0.46 10*3/MM3 (ref 0.25–0.8)
MONOCYTES NFR BLD AUTO: 11.8 % (ref 4–12)
NEUTROPHILS # BLD AUTO: 3.09 10*3/MM3 (ref 1.5–7)
NEUTROPHILS NFR BLD AUTO: 79.3 % (ref 39–75)
NRBC BLD MANUAL-RTO: 0 /100 WBC (ref 0–0)
PLATELET # BLD AUTO: 176 10*3/MM3 (ref 150–375)
PMV BLD AUTO: 9.3 FL (ref 8.9–12.1)
POTASSIUM BLD-SCNC: 4.3 MMOL/L (ref 3.5–4.7)
PROT SERPL-MCNC: 5.7 G/DL (ref 6.3–8)
RBC # BLD AUTO: 2.61 10*6/MM3 (ref 4.3–5.5)
SODIUM BLD-SCNC: 136 MMOL/L (ref 134–145)
WBC NRBC COR # BLD: 3.89 10*3/MM3 (ref 4–10)

## 2017-09-14 PROCEDURE — 83735 ASSAY OF MAGNESIUM: CPT

## 2017-09-14 PROCEDURE — 25010000002 ALTEPLASE 2 MG RECONSTITUTED SOLUTION: Performed by: NURSE PRACTITIONER

## 2017-09-14 PROCEDURE — 80053 COMPREHEN METABOLIC PANEL: CPT

## 2017-09-14 PROCEDURE — 36593 DECLOT VASCULAR DEVICE: CPT | Performed by: NURSE PRACTITIONER

## 2017-09-14 PROCEDURE — 25010000002 PACLITAXEL PER 30 MG: Performed by: NURSE PRACTITIONER

## 2017-09-14 PROCEDURE — 85025 COMPLETE CBC W/AUTO DIFF WBC: CPT

## 2017-09-14 PROCEDURE — 96413 CHEMO IV INFUSION 1 HR: CPT | Performed by: NURSE PRACTITIONER

## 2017-09-14 PROCEDURE — 77386 CHG INTENSITY MODULATED RADIATION TX DLVR COMPLEX: CPT | Performed by: RADIOLOGY

## 2017-09-14 PROCEDURE — 96375 TX/PRO/DX INJ NEW DRUG ADDON: CPT | Performed by: NURSE PRACTITIONER

## 2017-09-14 PROCEDURE — 77336 RADIATION PHYSICS CONSULT: CPT | Performed by: RADIOLOGY

## 2017-09-14 PROCEDURE — 77386: CPT | Performed by: RADIOLOGY

## 2017-09-14 PROCEDURE — 25010000002 DIPHENHYDRAMINE PER 50 MG: Performed by: NURSE PRACTITIONER

## 2017-09-14 PROCEDURE — 25010000002 DEXAMETHASONE PER 1 MG: Performed by: NURSE PRACTITIONER

## 2017-09-14 PROCEDURE — 77014 CHG CT GUIDANCE RADIATION THERAPY FLDS PLACEMENT: CPT | Performed by: RADIOLOGY

## 2017-09-14 PROCEDURE — 99214 OFFICE O/P EST MOD 30 MIN: CPT | Performed by: NURSE PRACTITIONER

## 2017-09-14 RX ORDER — SODIUM CHLORIDE 9 MG/ML
250 INJECTION, SOLUTION INTRAVENOUS ONCE
Status: CANCELLED | OUTPATIENT
Start: 2017-09-14

## 2017-09-14 RX ORDER — FAMOTIDINE 10 MG/ML
20 INJECTION, SOLUTION INTRAVENOUS ONCE
Status: CANCELLED | OUTPATIENT
Start: 2017-09-14

## 2017-09-14 RX ORDER — FAMOTIDINE 10 MG/ML
20 INJECTION, SOLUTION INTRAVENOUS ONCE
Status: COMPLETED | OUTPATIENT
Start: 2017-09-14 | End: 2017-09-14

## 2017-09-14 RX ORDER — SODIUM CHLORIDE 9 MG/ML
250 INJECTION, SOLUTION INTRAVENOUS ONCE
Status: COMPLETED | OUTPATIENT
Start: 2017-09-14 | End: 2017-09-14

## 2017-09-14 RX ADMIN — PACLITAXEL 55 MG: 6 INJECTION, SOLUTION INTRAVENOUS at 10:34

## 2017-09-14 RX ADMIN — DIPHENHYDRAMINE HYDROCHLORIDE 25 MG: 50 INJECTION, SOLUTION INTRAMUSCULAR; INTRAVENOUS at 09:36

## 2017-09-14 RX ADMIN — FAMOTIDINE 20 MG: 10 INJECTION, SOLUTION INTRAVENOUS at 09:34

## 2017-09-14 RX ADMIN — SODIUM CHLORIDE 250 ML: 900 INJECTION, SOLUTION INTRAVENOUS at 09:36

## 2017-09-14 RX ADMIN — DEXAMETHASONE SODIUM PHOSPHATE 12 MG: 10 INJECTION INTRAMUSCULAR; INTRAVENOUS at 09:53

## 2017-09-14 RX ADMIN — ALTEPLASE 2 MG: 2.2 INJECTION, POWDER, LYOPHILIZED, FOR SOLUTION INTRAVENOUS at 07:46

## 2017-09-14 NOTE — PROGRESS NOTES
[facility] GROUP OUTPATIENT FOLLOW UP CLINIC VISIT    REASON FOR FOLLOW-UP:    1. 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  2.  Plans for combined radiation and chemotherapy as definitive treatment with cisplatin and Taxol initiated on 8/10/2017 concurrent with radiation.  He will receive Taxol on day 1 and cisplatin on day 2 with IV fluid hydration.  3.  Long smoking history with COPD.  4.  Mediport and PEG tube placed by Dr. Valenzuela on 7/31/2017.  Subsequent PEG tube infection requiring antibiotics.    HISTORY OF PRESENT ILLNESS:  King Parson is a 66 y.o. male with the above-mentioned history, who returns today in anticipation of cycle #6 of weekly therapy with Taxol and cisplatin for stage IV squamous cell carcinoma of the hypopharynx with metastatic lymphadenopathy to the right neck.     Overall, he is tolerating treatment fairly well.  He has been attempting to a small amount of soft foods, including a hard-boiled egg orally.  He is also taking occasional sips of water orally.  His primary source of nutrition however remains his PEG tube.  He does have a significant degree of radiation irritation confined to his cervical region.  He is utilizing a moist towel to ease the discomfort as well as topical aquaphor.  He has 7 radiation treatments left, including today.  He remains on fentanyl 25 µg with liquid hydrocodone for breakthrough pain.  He does have a prescription for morphine, though he has not yet used it.  With the progression of discomfort, he may consider adding the morphine prior to bedtime.  He denies any nausea or vomiting.  He does have occasional diarrhea which is responsive to Lomotil.  He denies any infectious symptoms including fever, chills, productive cough, or dysuria.  He continues to note reduction in his palpable, right lymph node.  Patient was recently evaluated by speech therapy and a swallow study  was performed.  As of now, there is no evidence of aspiration, thankfully.  The recommendation, currently is to continue with small bites and sips orally.  He will proceed with dysphagia therapy in the near future.  He has no other concerns.          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  This diagnosis was made by FNA of the neck node on 6/23/2017.  He is undergone staging CT scans on 6/6/2017 and a PET scan on 7/6/2017.  His disease appears to be clinical stage TIII N2 a stage IV a squamous carcinoma of the hypopharynx.     He was originally referred to the [facility] and was seen by Dr. Moreno Camp of radiation oncology on 7/14/2017.  The patient lives in [place] and has some transportation difficulties.  He has a sister who works in the lab at [employer] and for this reason they have decided to receive their treatment in the [facility] system.     He was seen initially by Dr. Garcia in the company of 2 of his sisters.  We recommended combined chemotherapy and radiation.  He will be seeing Dr. Grayson of the [facility] to discuss the radiation portion of his treatment.  We plan to deliver weekly chemotherapy during radiation with combination of cisplatin and Taxol.  Taxol will be delivered day 1 and cisplatin be delivered along with hydration on day 2 each week.    He saw Dr. Grayson who was in agreement with concurrent chemotherapy and radiation.    Mediport and PEG tube placed by Dr. Valenzuela on 7/31/2017.  Subsequent PEG tube infection requiring antibiotics.    He saw nutrition.  He had a chemotherapy education session.    Cycle #1 of therapy was initiated on 8/10/2017.      PAST MEDICAL, SURGICAL, FAMILY, AND SOCIAL HISTORIES were reviewed with the patient and in the electronic medical record, and were updated if  "indicated.    ALLERGIES:  Allergies   Allergen Reactions   • Codeine      He dont like to take it because it makes him feel strange.        MEDICATIONS:  The medication list has been reviewed with the patient by the medical assistant, and the list has been updated in the electronic medical record, which I reviewed.  Medication dosages and frequencies were confirmed to be accurate.    I have reviewed the patient's medical history in detail and updated the computerized patient record.    REVIEW OF SYSTEMS:  PAIN:  See Vital Signs below.  GENERAL:  See history of present illness  SKIN:  Radiation dermatitis, See HPI   HEME/LYMPH:  No abnormal bleeding.  No palpable lymphadenopathy.  EYES:  No vision changes or diplopia.  ENT: History of present illness  RESPIRATORY:  No cough, shortness of breath, hemoptysis, or wheezing.  CARDIOVASCULAR:  No chest pain, palpitations, orthopnea, or dyspnea on exertion.  GASTROINTESTINAL:  See history of present illness  GENITOURINARY:  No dysuria or hematuria.  MUSCULOSKELETAL:  No joint pain, swelling, or erythema.  NEUROLOGIC:  No dizziness, loss of consciousness, or seizures.  PSYCHIATRIC:  No depression, anxiety, or mood changes.    Vitals:    09/14/17 0751   BP: 102/50   Pulse: 92   Resp: 18   Temp: 100.2 °F (37.9 °C)   SpO2: 91%   Weight: 136 lb 12.8 oz (62.1 kg)   Height: 67.13\" (170.5 cm)   PainSc: 0-No pain       PHYSICAL EXAMINATION:  GENERAL:  Well-developed well-nourished male; awake, alert and oriented, in no acute distress.  SKIN:  radiation dermatitis primarily confined to his anterior and posterior cervical region  HEAD:  Normocephalic, atraumatic.  EYES:  Pupils equal, round and reactive to light.  Extraocular movements intact.  Conjunctivae normal.  EARS:  Hearing intact.  NOSE:  Septum midline.  No excoriations or nasal discharge.  MOUTH:  Ulcerative mucositis  THROAT:  Grade 2-3 mucositis is present in the posterior pharynx, this remains well controlled and " unchanged today  LYMPHATICS:  Right cervical lymphadenopathy improving, currently measuring 1.5x1.5 cm  CHEST:  Lungs are clear to auscultation bilaterally.  No wheezes, rales, or rhonchi.  A Mediport is present in the left subclavian area that is benign in appearance.  HEART:  Regular rate; normal rhythm.  No murmurs, gallops or rubs.  ABDOMEN:  Soft, non-tender, non-distended.  Normal active bowel sounds.  A PEG tube is present. Benign in appearance.  EXTREMITIES:  No clubbing, cyanosis, or edema.  NEUROLOGICAL:  No focal neurologic deficits.    DIAGNOSTIC DATA:  Lab Results   Component Value Date    WBC 3.89 (L) 09/14/2017    HGB 10.3 (L) 09/14/2017    HCT 27.5 (L) 09/14/2017    .4 (H) 09/14/2017     09/14/2017     Lab Results   Component Value Date    GLUCOSE 97 09/14/2017    BUN 17 09/14/2017    CREATININE 0.73 09/14/2017    EGFRIFNONA 107 09/14/2017    BCR 23.3 09/14/2017    K 4.3 09/14/2017    CO2 25.3 09/14/2017    CALCIUM 8.7 09/14/2017    ALBUMIN 3.40 (L) 09/14/2017    LABIL2 1.5 09/14/2017    AST 13 09/14/2017    ALT 10 09/14/2017       IMAGING:  None reviewed    ASSESSMENT:  This is a 66 y.o. male with:  1.  Stage IV a (T3, N2 a, M0) squamous cell carcinoma of the hypopharynx with metastatic lymphadenopathy to the right neck.  2.  Comorbidities including COPD.  3.  Recent PEG tube infection. Well healed at this point  4. Diarrhea secondary to chemotherapy. Improved with imodium and lomotil.  He continues on Lomotil with good control  5.  Grade 2-3 mucositis related to chemotherapy and radiation:Much improved on a fentanyl patch at 25 µg. He will introduce the morphine prior to bedtime as his pain has worsened, slightly.  We'll also continue with liquid hydrocodone for breakthrough pain.He has also been treating mild oral candidiasis with daily nystatin rinses.  6.  Cough with possible aspiration: Swallow study was recently done with no evidence of aspiration.  Patient will begin dysphagia  "therapy in the near future.  As of now, he will proceed with small sips and bites orally.    7.  Radiation dermatitis: Currently, patient is applying a moist rag to the affected area as well as occasional Aquaphor.  She is requesting something stronger for for the \"burning\".  I have left a message with radiation for possible additional topical medications.  Patient will have radiation after his treatment today and will discuss this with him at that time    PLAN:  1. Proceed with cycle 6 day 1, taxol today 30mg/m2.  2. Return tomorrow for cycle 6 day 2 cisplatin 20mg/m2 and IV hydration.   3. Continue radiation under the direction of Dr. Grayson.  Patient will discuss additional treatments for radiation dermatitis today.  4. Continue imodium and lomotil as needed for diarrhea  5. Continue hydration and nutrition through his PEG tube at this point.  Recent recommendation was for patient to introduce small sips and bites orally.  He will continue this also.   6. He has a prescription for liquid morphine solution to use either orally or through his tube and he will continue on a 25 µg fentanyl patch.  He will begin using the morphine at night for additional pain relief.  He will also continue on liquid hydrocodone as needed for breakthrough pain.  7. Continue daily nystatin rinses for oral candidiasis  8. 2 unit M.D. follow-up and cycle #7 (possibly, depending on how many more radiation treatments he has and his degree of toxicity) on 9/21/2017.  9. Patient will require a refill of his liquid hydrocodone at next MD visit in one week      BETTE Turner      "

## 2017-09-14 NOTE — TELEPHONE ENCOUNTER
Kelsy Ugalde NP with [facility] called about Mr. Clemons and the skin irritation he is having from his Radiation treatments. Kelsy asking is there anything stronger than Rin [phone]

## 2017-09-15 ENCOUNTER — INFUSION (OUTPATIENT)
Dept: ONCOLOGY | Facility: HOSPITAL | Age: 67
End: 2017-09-15

## 2017-09-15 ENCOUNTER — RADIATION ONCOLOGY WEEKLY ASSESSMENT (OUTPATIENT)
Dept: RADIATION ONCOLOGY | Facility: HOSPITAL | Age: 67
End: 2017-09-15

## 2017-09-15 VITALS
BODY MASS INDEX: 21.78 KG/M2 | DIASTOLIC BLOOD PRESSURE: 42 MMHG | SYSTOLIC BLOOD PRESSURE: 85 MMHG | TEMPERATURE: 98.6 F | HEART RATE: 80 BPM | WEIGHT: 139.6 LBS

## 2017-09-15 VITALS
WEIGHT: 140 LBS | TEMPERATURE: 98.4 F | BODY MASS INDEX: 21.97 KG/M2 | HEART RATE: 66 BPM | OXYGEN SATURATION: 95 % | DIASTOLIC BLOOD PRESSURE: 55 MMHG | HEIGHT: 67 IN | SYSTOLIC BLOOD PRESSURE: 105 MMHG

## 2017-09-15 DIAGNOSIS — C13.9 SQUAMOUS CELL CARCINOMA OF HYPOPHARYNX (HCC): Primary | ICD-10-CM

## 2017-09-15 PROCEDURE — 77338 DESIGN MLC DEVICE FOR IMRT: CPT | Performed by: RADIOLOGY

## 2017-09-15 PROCEDURE — 77014 CHG CT GUIDANCE RADIATION THERAPY FLDS PLACEMENT: CPT | Performed by: RADIOLOGY

## 2017-09-15 PROCEDURE — 25010000002 DEXAMETHASONE PER 1 MG: Performed by: NURSE PRACTITIONER

## 2017-09-15 PROCEDURE — 25010000002 MAGNESIUM SULFATE PER 500 MG OF MAGNESIUM: Performed by: NURSE PRACTITIONER

## 2017-09-15 PROCEDURE — 25010000002 CISPLATIN PER 50 MG: Performed by: NURSE PRACTITIONER

## 2017-09-15 PROCEDURE — 77386: CPT | Performed by: RADIOLOGY

## 2017-09-15 PROCEDURE — 96375 TX/PRO/DX INJ NEW DRUG ADDON: CPT | Performed by: INTERNAL MEDICINE

## 2017-09-15 PROCEDURE — 96413 CHEMO IV INFUSION 1 HR: CPT | Performed by: INTERNAL MEDICINE

## 2017-09-15 PROCEDURE — 25010000002 POTASSIUM CHLORIDE PER 2 MEQ OF POTASSIUM: Performed by: NURSE PRACTITIONER

## 2017-09-15 PROCEDURE — 25010000002 PALONOSETRON PER 25 MCG: Performed by: NURSE PRACTITIONER

## 2017-09-15 PROCEDURE — 25010000002 FOSAPREPITANT PER 1 MG: Performed by: NURSE PRACTITIONER

## 2017-09-15 PROCEDURE — 96366 THER/PROPH/DIAG IV INF ADDON: CPT | Performed by: INTERNAL MEDICINE

## 2017-09-15 PROCEDURE — 96367 TX/PROPH/DG ADDL SEQ IV INF: CPT | Performed by: INTERNAL MEDICINE

## 2017-09-15 PROCEDURE — 77386 CHG INTENSITY MODULATED RADIATION TX DLVR COMPLEX: CPT | Performed by: RADIOLOGY

## 2017-09-15 RX ORDER — PALONOSETRON 0.05 MG/ML
0.25 INJECTION, SOLUTION INTRAVENOUS ONCE
Status: COMPLETED | OUTPATIENT
Start: 2017-09-15 | End: 2017-09-15

## 2017-09-15 RX ORDER — SODIUM CHLORIDE 9 MG/ML
250 INJECTION, SOLUTION INTRAVENOUS ONCE
Status: COMPLETED | OUTPATIENT
Start: 2017-09-15 | End: 2017-09-15

## 2017-09-15 RX ADMIN — DEXAMETHASONE SODIUM PHOSPHATE 12 MG: 10 INJECTION INTRAMUSCULAR; INTRAVENOUS at 10:12

## 2017-09-15 RX ADMIN — SODIUM CHLORIDE 150 MG: 900 INJECTION, SOLUTION INTRAVENOUS at 09:31

## 2017-09-15 RX ADMIN — POTASSIUM CHLORIDE 500 ML: 2 INJECTION, SOLUTION, CONCENTRATE INTRAVENOUS at 11:43

## 2017-09-15 RX ADMIN — SODIUM CHLORIDE 250 ML: 900 INJECTION, SOLUTION INTRAVENOUS at 08:29

## 2017-09-15 RX ADMIN — PALONOSETRON HYDROCHLORIDE 0.25 MG: 0.25 INJECTION INTRAVENOUS at 09:31

## 2017-09-15 RX ADMIN — POTASSIUM CHLORIDE 500 ML: 2 INJECTION, SOLUTION, CONCENTRATE INTRAVENOUS at 08:29

## 2017-09-15 RX ADMIN — CISPLATIN 36 MG: 1 INJECTION, SOLUTION INTRAVENOUS at 10:35

## 2017-09-15 NOTE — PROGRESS NOTES
"  Physician Weekly Management Note    Diagnosis:   Squamous cell carcinoma of hypopharynx        Clinical: Stage EDMAR (T3, N2a, M0)     Reason for Visit:  Tx: 26/33    Concurrent Chemo:   Yes    Notes on Treatment course, Films, Medical progress and Plan:  States skin is becoming very painful. Brisk erythema but no desquam noted. Encouraged aquaphor and he likes the eucerin as well. Discouraged him from wearing collar on skin. Offered lidocaine to use as well. Will send in silvadene as well. His sister was here today and skin care discussed at great length with both. Otherwise doing ok. No new problems. Cont on.    ROS - Other than as listed above, as follows:  Constitutional - Normal - no complaints of fatigue, denies lack of appetite, fever, night sweats or change in weight.  Neck - Normal - denies neck masses, muscle weakness, neck pain, decreased range of motion or swelling.  Cardiovascular - Normal - denies arrhythmias, chest pain, dyspnea, edema, orthopnea or palpitations.  Respiratory - Normal - denies cough, dyspnea, hemoptysis, hiccoughs, pleuritic chest pain or wheezing.  Gastrointestinal - Normal - no complaints of constipation, abdominal pain, diarrhea, heartburn/dyspepsia, hematemesis, hemorrhoids, melena or GI bleeding, nausea, pain or cramping or vomiting.    PHYSICAL EXAM - Other than listed above, as follows:  Vitals:    09/15/17 1352   BP: 105/55   Pulse: 66   Temp: 98.4 °F (36.9 °C)   TempSrc: Oral   SpO2: 95%   Weight: 140 lb (63.5 kg)   Height: 67.13\" (170.5 cm)   PainSc: 10-Worst pain ever       Constitutional - Normal - no evidence of impaired alertness, inadequate appearance, premature or advanced chronologic age, uncooperativeness, altered mood, affect or disorientation.  Neck - Normal - no evidence of tender or enlarged lymph nodes, neck abnormalities, restricted range of motion or enlarged thyroid.  Chest - Normal - no evidence of chest abnormalities, tender or enlarged lymph " "nodes.  Respiratory - Normal - no evidence of abnormal breat sounds, chest abnormalities on palpation and chest abnormalities on percussion and normal breath sounds.  Hematologic/Lymphatic - Normal - no evidence of tender or enlarged axillary lymph nodes nor tender or enlarged neck nodes.    Performance Status: (2) Ambulatory and capable of self care, unable to carry out work activity, up and about > 50% or waking hours    Problem added:  No problems updated.  Medications added: No orders of the defined types were placed in this encounter.    Ancillary referrals made: No orders of the defined types were placed in this encounter.      Technical aspects reviewed:  Weekly OBI approved if applicable?  Yes  Weekly port films approved?  Yes  Change requests noted if applicable?  Yes  Patient setup and plan reviewed?  Yes    Chart Reviewed:  Continue current treatment plan?  Yes  Treatment plan change requested?  No    I have reviewed and marked as \"reviewed\" the current medications, allergies and problem list in the patients EMR.    I have reviewed the patient's medical, surgical  history in detail, reviewed any pertinent lab work  and updated the computerized patient record if needed.    Patient's Care Team:  Patient Care Team:  BETTE Guillen as PCP - General  BETTE Ruvalcaba as PCP - Claims Attributed  Clinton Styles MD as Referring Physician (Otolaryngology)  Aakash Garcia MD as Consulting Physician (Hematology and Oncology)  Cosmo Conway MD as Consulting Physician (Cardiology)  Edgar Prince MD as Consulting Physician (Pain Medicine)  John Presley MD as Consulting Physician (Hematology and Oncology)    Seen and approved by:  Marlene Grayson MD, 08/17/2017  "

## 2017-09-18 ENCOUNTER — RADIATION ONCOLOGY WEEKLY ASSESSMENT (OUTPATIENT)
Dept: RADIATION ONCOLOGY | Facility: HOSPITAL | Age: 67
End: 2017-09-18

## 2017-09-18 DIAGNOSIS — C13.9 SQUAMOUS CELL CARCINOMA OF HYPOPHARYNX (HCC): Primary | ICD-10-CM

## 2017-09-18 PROCEDURE — FACE2FACE: Performed by: RADIOLOGY

## 2017-09-18 NOTE — PROGRESS NOTES
Physician Weekly Management Note    Diagnosis:   Squamous cell carcinoma of hypopharynx        Clinical: Stage EDMAR (T3, N2a, M0)     Reason for Visit:  Tx: 27/33    Concurrent Chemo:   Yes    Notes on Treatment course, Films, Medical progress and Plan:  States skin is still painful. Moist desquam a bit larger area today. Offered a treatment break and he is agreeable. Will rest today and tomorrow. Reviewed skin care again.     ROS - Other than as listed above, as follows:  Constitutional - Normal - no complaints of fatigue, denies lack of appetite, fever, night sweats or change in weight.  Neck - Normal - denies neck masses, muscle weakness, neck pain, decreased range of motion or swelling.  Cardiovascular - Normal - denies arrhythmias, chest pain, dyspnea, edema, orthopnea or palpitations.  Respiratory - Normal - denies cough, dyspnea, hemoptysis, hiccoughs, pleuritic chest pain or wheezing.  Gastrointestinal - Normal - no complaints of constipation, abdominal pain, diarrhea, heartburn/dyspepsia, hematemesis, hemorrhoids, melena or GI bleeding, nausea, pain or cramping or vomiting.    PHYSICAL EXAM - Other than listed above, as follows:  There were no vitals filed for this visit.    Constitutional - Normal - no evidence of impaired alertness, inadequate appearance, premature or advanced chronologic age, uncooperativeness, altered mood, affect or disorientation.  Neck - Normal - no evidence of tender or enlarged lymph nodes, neck abnormalities, restricted range of motion or enlarged thyroid.  Chest - Normal - no evidence of chest abnormalities, tender or enlarged lymph nodes.  Respiratory - Normal - no evidence of abnormal breat sounds, chest abnormalities on palpation and chest abnormalities on percussion and normal breath sounds.  Hematologic/Lymphatic - Normal - no evidence of tender or enlarged axillary lymph nodes nor tender or enlarged neck nodes.    Performance Status: (2) Ambulatory and capable of self  "care, unable to carry out work activity, up and about > 50% or waking hours    Problem added:  No problems updated.  Medications added: No orders of the defined types were placed in this encounter.    Ancillary referrals made: No orders of the defined types were placed in this encounter.      Technical aspects reviewed:  Weekly OBI approved if applicable?  Yes  Weekly port films approved?  Yes  Change requests noted if applicable?  Yes  Patient setup and plan reviewed?  Yes    Chart Reviewed:  Continue current treatment plan?  Yes  Treatment plan change requested?  No    I have reviewed and marked as \"reviewed\" the current medications, allergies and problem list in the patients EMR.    I have reviewed the patient's medical, surgical  history in detail, reviewed any pertinent lab work  and updated the computerized patient record if needed.    Patient's Care Team:  Patient Care Team:  BETTE Guillen as PCP - General  BETTE Ruvalcaba as PCP - Claims Attributed  Clinton Styles MD as Referring Physician (Otolaryngology)  Aakash Garcia MD as Consulting Physician (Hematology and Oncology)  Cosmo Conway MD as Consulting Physician (Cardiology)  Edgar Prince MD as Consulting Physician (Pain Medicine)  John Presley MD as Consulting Physician (Hematology and Oncology)    Seen and approved by:  Marlene Grayson MD, 08/17/2017  "

## 2017-09-19 ENCOUNTER — TELEPHONE (OUTPATIENT)
Dept: ONCOLOGY | Facility: CLINIC | Age: 67
End: 2017-09-19

## 2017-09-19 ENCOUNTER — TELEPHONE (OUTPATIENT)
Dept: RADIATION ONCOLOGY | Facility: HOSPITAL | Age: 67
End: 2017-09-19

## 2017-09-19 DIAGNOSIS — C13.9 SQUAMOUS CELL CANCER OF HYPOPHARYNX (HCC): Primary | ICD-10-CM

## 2017-09-19 DIAGNOSIS — C13.9 SQUAMOUS CELL CARCINOMA OF HYPOPHARYNX (HCC): ICD-10-CM
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
## 2017-09-19 NOTE — TELEPHONE ENCOUNTER
----- Message from Laura Mario RN sent at 2017 11:11 AM EDT -----  Contact: [phone]  -50--please sched. Pt. In to see a np tomorrow after he gets his radiation.  Has appt with them at 8:25am.  Will need a cbc. Thanks, laura

## 2017-09-19 NOTE — TELEPHONE ENCOUNTER
Mr Parson calls to say his skin is much worse and he thinks he needs to go to the emergency room. I advised he come here for skin eval if needed and he declined . We discussed his skin care and he says his neck looks much worse > He reports he is using silvadene and lidocaine and then applying a wet cloth to his neck. I advised against using a wet cloth to his neck. Reviewed he should be cleaning off and reapplying three times daily and informed him to not wear shirt or anything that might rub against his skin. He also states his temp is 100.8 . He has fentanyl patch and morphine for his pain. Advised to take tylenol for his temp. He remains on break from radiation due to his skin reaction. Due to return for treatment Wednesday.

## 2017-09-20 ENCOUNTER — DOCUMENTATION (OUTPATIENT)
Dept: NUTRITION | Facility: HOSPITAL | Age: 67
End: 2017-09-20

## 2017-09-20 ENCOUNTER — APPOINTMENT (OUTPATIENT)
Dept: ONCOLOGY | Facility: HOSPITAL | Age: 67
End: 2017-09-20

## 2017-09-20 ENCOUNTER — APPOINTMENT (OUTPATIENT)
Dept: ONCOLOGY | Facility: CLINIC | Age: 67
End: 2017-09-20

## 2017-09-20 PROCEDURE — 77386 CHG INTENSITY MODULATED RADIATION TX DLVR COMPLEX: CPT | Performed by: RADIOLOGY

## 2017-09-20 PROCEDURE — 77427 RADIATION TX MANAGEMENT X5: CPT | Performed by: RADIOLOGY

## 2017-09-20 PROCEDURE — 77386: CPT | Performed by: RADIOLOGY

## 2017-09-20 PROCEDURE — 77014 CHG CT GUIDANCE RADIATION THERAPY FLDS PLACEMENT: CPT | Performed by: RADIOLOGY

## 2017-09-20 NOTE — PROGRESS NOTES
[facility] Nutrition Follow Up:       Weight 140#, 9/15 last documented weight    Radiation on hold last two days due to skin irritation, returned today. Continues with same TF via PEG.  Drinking some water.      Completes radiation next week. Will continue to follow.

## 2017-09-21 ENCOUNTER — INFUSION (OUTPATIENT)
Dept: ONCOLOGY | Facility: HOSPITAL | Age: 67
End: 2017-09-21

## 2017-09-21 ENCOUNTER — OFFICE VISIT (OUTPATIENT)
Dept: ONCOLOGY | Facility: CLINIC | Age: 67
End: 2017-09-21

## 2017-09-21 ENCOUNTER — TELEPHONE (OUTPATIENT)
Dept: RADIATION ONCOLOGY | Facility: HOSPITAL | Age: 67
End: 2017-09-21

## 2017-09-21 ENCOUNTER — APPOINTMENT (OUTPATIENT)
Dept: ONCOLOGY | Facility: HOSPITAL | Age: 67
End: 2017-09-21

## 2017-09-21 VITALS
RESPIRATION RATE: 16 BRPM | WEIGHT: 134.4 LBS | OXYGEN SATURATION: 96 % | HEART RATE: 88 BPM | BODY MASS INDEX: 21.09 KG/M2 | SYSTOLIC BLOOD PRESSURE: 108 MMHG | DIASTOLIC BLOOD PRESSURE: 52 MMHG | TEMPERATURE: 98.7 F | HEIGHT: 67 IN

## 2017-09-21 DIAGNOSIS — C13.9 SQUAMOUS CELL CARCINOMA OF HYPOPHARYNX (HCC): Primary | ICD-10-CM

## 2017-09-21 DIAGNOSIS — Z45.2 ENCOUNTER FOR FITTING AND ADJUSTMENT OF VASCULAR CATHETER: ICD-10-CM

## 2017-09-21 DIAGNOSIS — K12.33 MUCOSITIS DUE TO RADIATION THERAPY: ICD-10-CM

## 2017-09-21 DIAGNOSIS — Z45.2 ENCOUNTER FOR FITTING AND ADJUSTMENT OF VASCULAR CATHETER: Primary | ICD-10-CM

## 2017-09-21 LAB
ALBUMIN SERPL-MCNC: 3.4 G/DL (ref 3.5–5.2)
ALBUMIN/GLOB SERPL: 1.5 G/DL (ref 1.1–2.4)
ALP SERPL-CCNC: 101 U/L (ref 38–116)
ALT SERPL W P-5'-P-CCNC: 26 U/L (ref 0–41)
ANION GAP SERPL CALCULATED.3IONS-SCNC: 11.3 MMOL/L
AST SERPL-CCNC: 20 U/L (ref 0–40)
BASOPHILS # BLD AUTO: 0.01 10*3/MM3 (ref 0–0.1)
BASOPHILS NFR BLD AUTO: 0.6 % (ref 0–1.1)
BILIRUB SERPL-MCNC: 0.9 MG/DL (ref 0.1–1.2)
BUN BLD-MCNC: 13 MG/DL (ref 6–20)
BUN/CREAT SERPL: 15.9 (ref 7.3–30)
CALCIUM SPEC-SCNC: 9 MG/DL (ref 8.5–10.2)
CHLORIDE SERPL-SCNC: 95 MMOL/L (ref 98–107)
CO2 SERPL-SCNC: 29.7 MMOL/L (ref 22–29)
CREAT BLD-MCNC: 0.82 MG/DL (ref 0.7–1.3)
DEPRECATED RDW RBC AUTO: 53.6 FL (ref 37–49)
EOSINOPHIL # BLD AUTO: 0.02 10*3/MM3 (ref 0–0.36)
EOSINOPHIL NFR BLD AUTO: 1.2 % (ref 1–5)
ERYTHROCYTE [DISTWIDTH] IN BLOOD BY AUTOMATED COUNT: 13.7 % (ref 11.7–14.5)
GFR SERPL CREATININE-BSD FRML MDRD: 94 ML/MIN/1.73
GLOBULIN UR ELPH-MCNC: 2.3 GM/DL (ref 1.8–3.5)
GLUCOSE BLD-MCNC: 102 MG/DL (ref 74–124)
HCT VFR BLD AUTO: 26.1 % (ref 40–49)
HGB BLD-MCNC: 9.2 G/DL (ref 13.5–16.5)
HOLD SPECIMEN: NORMAL
IMM GRANULOCYTES # BLD: 0.02 10*3/MM3 (ref 0–0.03)
IMM GRANULOCYTES NFR BLD: 1.2 % (ref 0–0.5)
LYMPHOCYTES # BLD AUTO: 0.4 10*3/MM3 (ref 1–3.5)
LYMPHOCYTES NFR BLD AUTO: 23.4 % (ref 20–49)
MAGNESIUM SERPL-MCNC: 1.7 MG/DL (ref 1.8–2.5)
MCH RBC QN AUTO: 38.2 PG (ref 27–33)
MCHC RBC AUTO-ENTMCNC: 35.2 G/DL (ref 32–35)
MCV RBC AUTO: 108.3 FL (ref 83–97)
MONOCYTES # BLD AUTO: 0.31 10*3/MM3 (ref 0.25–0.8)
MONOCYTES NFR BLD AUTO: 18.1 % (ref 4–12)
NEUTROPHILS # BLD AUTO: 0.95 10*3/MM3 (ref 1.5–7)
NEUTROPHILS NFR BLD AUTO: 55.5 % (ref 39–75)
NRBC BLD MANUAL-RTO: 0 /100 WBC (ref 0–0)
PLATELET # BLD AUTO: 156 10*3/MM3 (ref 150–375)
PMV BLD AUTO: 8.9 FL (ref 8.9–12.1)
POTASSIUM BLD-SCNC: 4 MMOL/L (ref 3.5–4.7)
PROT SERPL-MCNC: 5.7 G/DL (ref 6.3–8)
RBC # BLD AUTO: 2.41 10*6/MM3 (ref 4.3–5.5)
SODIUM BLD-SCNC: 136 MMOL/L (ref 134–145)
WBC NRBC COR # BLD: 1.71 10*3/MM3 (ref 4–10)

## 2017-09-21 PROCEDURE — 96523 IRRIG DRUG DELIVERY DEVICE: CPT | Performed by: INTERNAL MEDICINE

## 2017-09-21 PROCEDURE — 25010000002 HEPARIN FLUSH (PORCINE) 100 UNIT/ML SOLUTION: Performed by: INTERNAL MEDICINE

## 2017-09-21 PROCEDURE — 85025 COMPLETE CBC W/AUTO DIFF WBC: CPT

## 2017-09-21 PROCEDURE — 80053 COMPREHEN METABOLIC PANEL: CPT

## 2017-09-21 PROCEDURE — 77336 RADIATION PHYSICS CONSULT: CPT | Performed by: RADIOLOGY

## 2017-09-21 PROCEDURE — 99213 OFFICE O/P EST LOW 20 MIN: CPT | Performed by: INTERNAL MEDICINE

## 2017-09-21 PROCEDURE — 83735 ASSAY OF MAGNESIUM: CPT

## 2017-09-21 RX ORDER — FENTANYL 25 UG/H
1 PATCH TRANSDERMAL
Qty: 10 PATCH | Refills: 0 | Status: SHIPPED | OUTPATIENT
Start: 2017-09-21 | End: 2017-10-21

## 2017-09-21 RX ORDER — DIPHENOXYLATE HYDROCHLORIDE AND ATROPINE SULFATE 2.5; .025 MG/1; MG/1
1 TABLET ORAL 4 TIMES DAILY PRN
Qty: 30 TABLET | Refills: 1 | OUTPATIENT
Start: 2017-09-21 | End: 2018-05-09

## 2017-09-21 RX ORDER — ONDANSETRON HYDROCHLORIDE 8 MG/1
8 TABLET, FILM COATED ORAL 3 TIMES DAILY PRN
Qty: 30 TABLET | Refills: 5 | Status: SHIPPED | OUTPATIENT
Start: 2017-09-21 | End: 2018-01-18 | Stop reason: SDUPTHER

## 2017-09-21 RX ORDER — SODIUM CHLORIDE 0.9 % (FLUSH) 0.9 %
10 SYRINGE (ML) INJECTION AS NEEDED
Status: CANCELLED | OUTPATIENT
Start: 2017-09-21

## 2017-09-21 RX ORDER — SODIUM CHLORIDE 0.9 % (FLUSH) 0.9 %
10 SYRINGE (ML) INJECTION AS NEEDED
Status: DISCONTINUED | OUTPATIENT
Start: 2017-09-21 | End: 2017-09-21 | Stop reason: HOSPADM

## 2017-09-21 RX ORDER — MORPHINE SULFATE 100 MG/5ML
10 SOLUTION ORAL
Qty: 120 ML | Refills: 0 | Status: SHIPPED | OUTPATIENT
Start: 2017-09-21 | End: 2017-09-28 | Stop reason: SDUPTHER

## 2017-09-21 RX ADMIN — SODIUM CHLORIDE, PRESERVATIVE FREE 500 UNITS: 5 INJECTION INTRAVENOUS at 09:23

## 2017-09-21 RX ADMIN — Medication 10 ML: at 09:22

## 2017-09-21 NOTE — PROGRESS NOTES
[facility] OUTPATIENT FOLLOW UP CLINIC VISIT    REASON FOR FOLLOW-UP:    1. 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  2.  Plans for combined radiation and chemotherapy as definitive treatment with cisplatin and Taxol initiated on 8/10/2017 concurrent with radiation.  He will receive Taxol on day 1 and cisplatin on day 2 with IV fluid hydration.  3.  Long smoking history with COPD.  4.  Mediport and PEG tube placed by Dr. Valenzuela on 7/31/2017.  Subsequent PEG tube infection requiring antibiotics.    HISTORY OF PRESENT ILLNESS:  King Parson is a 67 y.o. male with the above-mentioned history, who returns today in anticipation of cycle #7 of weekly therapy with Taxol and cisplatin for stage IV squamous cell carcinoma of the hypopharynx with metastatic lymphadenopathy to the right neck.     He has only 5 days left of radiation therapy.  He has developed significant wet desquamation of the skin in the radiation port and is currently using Silvadene cream.  He is having a lot of pain from this area and has started using his Roxanol drops in addition to the fentanyl patch.  He is staying hydrated and supplement his nutrition through his PEG tube.  His white blood count today is marginal and he has already completed 6 weeks of chemotherapy in conjunction with radiation and therefore we elected not to proceed with any additional chemotherapy and we'll plan to finish out his radiation and observe from there with a repeat PET scan in about 6 weeks.      Patient was recently evaluated by speech therapy and a swallow study was performed.  As of now, there is no evidence of aspiration, thankfully.  The recommendation, currently is to continue with small bites and sips orally.  He will proceed with dysphagia therapy in the near future.  He has no other concerns.          ONCOLOGIC HISTORY:  He was referred to us by his ENT physician due  to the new diagnosis of squamous cell carcinoma of the hypopharynx with metastatic lymphadenopathy in the right neck.  This diagnosis was made by FNA of the neck node on 6/23/2017.  He is undergone staging CT scans on 6/6/2017 and a PET scan on 7/6/2017.  His disease appears to be clinical stage TIII N2 a stage IV a squamous carcinoma of the hypopharynx.     He was originally referred to the [facility] and was seen by Dr. Moreno Camp of radiation oncology on 7/14/2017.  The patient lives in [place] and has some transportation difficulties.  He has a sister who works in the lab at [employer] and for this reason they have decided to receive their treatment in the [facility] system.     He was seen initially by Dr. Garcia in the company of 2 of his sisters.  We recommended combined chemotherapy and radiation.  He will be seeing Dr. Grayson of the [facility] to discuss the radiation portion of his treatment.  We plan to deliver weekly chemotherapy during radiation with combination of cisplatin and Taxol.  Taxol will be delivered day 1 and cisplatin be delivered along with hydration on day 2 each week.    He saw Dr. Grayson who was in agreement with concurrent chemotherapy and radiation.    Mediport and PEG tube placed by Dr. Valenzuela on 7/31/2017.  Subsequent PEG tube infection requiring antibiotics.    He saw nutrition.  He had a chemotherapy education session.    Cycle #1 of therapy was initiated on 8/10/2017.    Patient completed his chemotherapy (6 cycles) on 9/14/2017    Radiation therapy completed 9/27/2017      PAST MEDICAL, SURGICAL, FAMILY, AND SOCIAL HISTORIES were reviewed with the patient and in the electronic medical record, and were updated if indicated.    ALLERGIES:  Allergies   Allergen Reactions   • Codeine      He dont like to take it because it makes him feel strange.        MEDICATIONS:  The medication list has been reviewed with the patient by the medical assistant, and the  "list has been updated in the electronic medical record, which I reviewed.  Medication dosages and frequencies were confirmed to be accurate.    I have reviewed the patient's medical history in detail and updated the computerized patient record.    REVIEW OF SYSTEMS:  PAIN:  See Vital Signs below.  GENERAL:  See history of present illness  SKIN:  Radiation dermatitis, See HPI   HEME/LYMPH:  No abnormal bleeding.  No palpable lymphadenopathy.  EYES:  No vision changes or diplopia.  ENT: History of present illness  RESPIRATORY:  No cough, shortness of breath, hemoptysis, or wheezing.  CARDIOVASCULAR:  No chest pain, palpitations, orthopnea, or dyspnea on exertion.  GASTROINTESTINAL:  See history of present illness  GENITOURINARY:  No dysuria or hematuria.  MUSCULOSKELETAL:  No joint pain, swelling, or erythema.  NEUROLOGIC:  No dizziness, loss of consciousness, or seizures.  PSYCHIATRIC:  No depression, anxiety, or mood changes.    Vitals:    09/21/17 0741   BP: 108/52   Pulse: 88   Resp: 16   Temp: 98.7 °F (37.1 °C)   TempSrc: Oral   SpO2: 96%   Weight: 134 lb 6.4 oz (61 kg)   Height: 67.13\" (170.5 cm)   PainSc: 10-Worst pain ever   PainLoc: Neck       PHYSICAL EXAMINATION:  GENERAL:  Well-developed well-nourished male; awake, alert and oriented, in no acute distress.  SKIN:  radiation dermatitis primarily confined to his anterior and posterior cervical region  HEAD:  Normocephalic, atraumatic.  EYES:  Pupils equal, round and reactive to light.  Extraocular movements intact.  Conjunctivae normal.  EARS:  Hearing intact.  NOSE:  Septum midline.  No excoriations or nasal discharge.  MOUTH:  Ulcerative mucositis  THROAT:  Grade 2-3 mucositis is present in the posterior pharynx, this remains well controlled and unchanged today  LYMPHATICS:  Right cervical lymphadenopathy improving, currently measuring 1.5x1.5 cm  CHEST:  Lungs are clear to auscultation bilaterally.  No wheezes, rales, or rhonchi.  A Mediport is present in " the left subclavian area that is benign in appearance.  HEART:  Regular rate; normal rhythm.  No murmurs, gallops or rubs.  ABDOMEN:  Soft, non-tender, non-distended.  Normal active bowel sounds.  A PEG tube is present. Benign in appearance.  EXTREMITIES:  No clubbing, cyanosis, or edema.  NEUROLOGICAL:  No focal neurologic deficits.    DIAGNOSTIC DATA:  Lab Results   Component Value Date    WBC 1.71 (L) 09/21/2017    HGB 9.2 (L) 09/21/2017    HCT 26.1 (L) 09/21/2017    .3 (H) 09/21/2017     09/21/2017     Lab Results   Component Value Date    NEUTROABS 0.95 (L) 09/21/2017       Lab Results   Component Value Date    GLUCOSE 97 09/14/2017    BUN 17 09/14/2017    CREATININE 0.73 09/14/2017    EGFRIFNONA 107 09/14/2017    BCR 23.3 09/14/2017    K 4.3 09/14/2017    CO2 25.3 09/14/2017    CALCIUM 8.7 09/14/2017    ALBUMIN 3.40 (L) 09/14/2017    LABIL2 1.5 09/14/2017    AST 13 09/14/2017    ALT 10 09/14/2017       IMAGING:  None reviewed    ASSESSMENT:  This is a 67 y.o. male with:  1.  Stage IV a (T3, N2 a, M0) squamous cell carcinoma of the hypopharynx with metastatic lymphadenopathy to the right neck.  2.  Comorbidities including COPD.  3.  Recent PEG tube infection. Well healed at this point  4. Diarrhea secondary to chemotherapy. Improved with imodium and lomotil.  He continues on Lomotil with good control  5.  Grade 2-3 mucositis related to chemotherapy and radiation:Much improved on a fentanyl patch at 25 µg. He will introduce the morphine prior to bedtime as his pain has worsened, slightly.  We'll also continue with liquid hydrocodone for breakthrough pain.He has also been treating mild oral candidiasis with daily nystatin rinses.  6.  Cough with possible aspiration: Swallow study was recently done with no evidence of aspiration.  Patient will begin dysphagia therapy in the near future.  As of now, he will proceed with small sips and bites orally.    7.  Radiation dermatitis: Using Silvadene  cream.    PLAN:  1. We will discontinue chemotherapy at this point and allow him to finish out his radiation therapy.  He is due to complete his treatment next week on 9/27/2017.  2. Continue imodium and lomotil as needed for diarrhea  3. Continue hydration and nutrition through his PEG tube at this point.  Recent recommendation was for patient to introduce small sips and bites orally.  He will continue this also.   4. He will continue liquid morphine solution either orally or through his tube and he will continue on a 25 µg fentanyl patch.  He will begin using the morphine at night for additional pain relief.  Continue daily nystatin rinses for oral candidiasis  5. He'll return in one week and 2 weeks for nurse practitioner visit and labs.  He may require additional IV fluids or IV antiemetics at that time as supportive care.  6. 2 unit M.D. appointment in 3 weeks to assess recovery.  At that point he will have additional labs and possibly port flush.  We will decide at that point when to schedule a follow-up PET scan posttreatment (probably 3 or 4 weeks from that visit).  7. We provided new prescriptions from the office today for his fentanyl patches, Lomotil tablets, and Zofran.      Aakash Garcia MD

## 2017-09-21 NOTE — TELEPHONE ENCOUNTER
Called patient to inform him of appointment for blood draw at [facility] Monday at 8am . Voices understanding.

## 2017-09-22 ENCOUNTER — APPOINTMENT (OUTPATIENT)
Dept: ONCOLOGY | Facility: HOSPITAL | Age: 67
End: 2017-09-22

## 2017-09-25 ENCOUNTER — HOSPITAL ENCOUNTER (EMERGENCY)
Facility: HOSPITAL | Age: 67
Discharge: HOME | End: 2017-09-25
Attending: EMERGENCY MEDICINE | Admitting: EMERGENCY MEDICINE

## 2017-09-25 ENCOUNTER — LAB (OUTPATIENT)
Dept: LAB | Facility: HOSPITAL | Age: 67
End: 2017-09-25

## 2017-09-25 ENCOUNTER — APPOINTMENT (OUTPATIENT)
Dept: GENERAL RADIOLOGY | Facility: HOSPITAL | Age: 67
End: 2017-09-25

## 2017-09-25 VITALS
WEIGHT: 132 LBS | SYSTOLIC BLOOD PRESSURE: 112 MMHG | DIASTOLIC BLOOD PRESSURE: 71 MMHG | HEART RATE: 81 BPM | BODY MASS INDEX: 19.55 KG/M2 | HEIGHT: 69 IN | RESPIRATION RATE: 16 BRPM | OXYGEN SATURATION: 97 % | TEMPERATURE: 98.9 F

## 2017-09-25 DIAGNOSIS — D70.9 NEUTROPENIC FEVER (HCC): Primary | ICD-10-CM

## 2017-09-25 DIAGNOSIS — Z45.2 ENCOUNTER FOR FITTING AND ADJUSTMENT OF VASCULAR CATHETER: ICD-10-CM

## 2017-09-25 DIAGNOSIS — C13.9 SQUAMOUS CELL CANCER OF HYPOPHARYNX (HCC): ICD-10-CM

## 2017-09-25 DIAGNOSIS — K12.33 MUCOSITIS DUE TO RADIATION THERAPY: ICD-10-CM

## 2017-09-25 DIAGNOSIS — R50.81 NEUTROPENIC FEVER (HCC): Primary | ICD-10-CM

## 2017-09-25 DIAGNOSIS — C13.9 SQUAMOUS CELL CARCINOMA OF HYPOPHARYNX (HCC): ICD-10-CM

## 2017-09-25 DIAGNOSIS — Z92.3 S/P RADIATION THERAPY: ICD-10-CM

## 2017-09-25 LAB
ALBUMIN SERPL-MCNC: 3.3 G/DL (ref 3.5–5.2)
ALBUMIN/GLOB SERPL: 1.5 G/DL
ALP SERPL-CCNC: 68 U/L (ref 39–117)
ALT SERPL W P-5'-P-CCNC: 12 U/L (ref 1–41)
ANION GAP SERPL CALCULATED.3IONS-SCNC: 11.2 MMOL/L
AST SERPL-CCNC: 17 U/L (ref 1–40)
B PERT DNA SPEC QL NAA+PROBE: NOT DETECTED
BASOPHILS # BLD AUTO: 0.01 10*3/MM3 (ref 0–0.1)
BASOPHILS # BLD AUTO: 0.01 10*3/MM3 (ref 0–0.2)
BASOPHILS NFR BLD AUTO: 0.3 % (ref 0–1.1)
BASOPHILS NFR BLD AUTO: 0.4 % (ref 0–1.5)
BILIRUB SERPL-MCNC: 0.5 MG/DL (ref 0.1–1.2)
BILIRUB UR QL STRIP: NEGATIVE
BUN BLD-MCNC: 10 MG/DL (ref 8–23)
BUN/CREAT SERPL: 12.7 (ref 7–25)
C PNEUM DNA NPH QL NAA+NON-PROBE: NOT DETECTED
CALCIUM SPEC-SCNC: 9.2 MG/DL (ref 8.6–10.5)
CHLORIDE SERPL-SCNC: 100 MMOL/L (ref 98–107)
CLARITY UR: CLEAR
CO2 SERPL-SCNC: 27.8 MMOL/L (ref 22–29)
COLOR UR: YELLOW
CREAT BLD-MCNC: 0.79 MG/DL (ref 0.76–1.27)
CRP SERPL-MCNC: 9.74 MG/DL (ref 0–0.5)
D-LACTATE SERPL-SCNC: 0.7 MMOL/L (ref 0.5–2)
DEPRECATED RDW RBC AUTO: 55.9 FL (ref 37–49)
DEPRECATED RDW RBC AUTO: 58.9 FL (ref 37–54)
EOSINOPHIL # BLD AUTO: 0.01 10*3/MM3 (ref 0–0.36)
EOSINOPHIL # BLD AUTO: 0.01 10*3/MM3 (ref 0–0.7)
EOSINOPHIL NFR BLD AUTO: 0.3 % (ref 1–5)
EOSINOPHIL NFR BLD AUTO: 0.4 % (ref 0.3–6.2)
ERYTHROCYTE [DISTWIDTH] IN BLOOD BY AUTOMATED COUNT: 14.6 % (ref 11.7–14.5)
ERYTHROCYTE [DISTWIDTH] IN BLOOD BY AUTOMATED COUNT: 15.2 % (ref 11.5–14.5)
FLUAV H1 2009 PAND RNA NPH QL NAA+PROBE: NOT DETECTED
FLUAV H1 HA GENE NPH QL NAA+PROBE: NOT DETECTED
FLUAV H3 RNA NPH QL NAA+PROBE: NOT DETECTED
FLUAV SUBTYP SPEC NAA+PROBE: NOT DETECTED
FLUBV RNA ISLT QL NAA+PROBE: NOT DETECTED
GFR SERPL CREATININE-BSD FRML MDRD: 98 ML/MIN/1.73
GLOBULIN UR ELPH-MCNC: 2.2 GM/DL
GLUCOSE BLD-MCNC: 102 MG/DL (ref 65–99)
GLUCOSE UR STRIP-MCNC: NEGATIVE MG/DL
HADV DNA SPEC NAA+PROBE: NOT DETECTED
HCOV 229E RNA SPEC QL NAA+PROBE: NOT DETECTED
HCOV HKU1 RNA SPEC QL NAA+PROBE: NOT DETECTED
HCOV NL63 RNA SPEC QL NAA+PROBE: NOT DETECTED
HCOV OC43 RNA SPEC QL NAA+PROBE: NOT DETECTED
HCT VFR BLD AUTO: 26.3 % (ref 40–49)
HCT VFR BLD AUTO: 28.1 % (ref 40.4–52.2)
HGB BLD-MCNC: 9.5 G/DL (ref 13.5–16.5)
HGB BLD-MCNC: 9.7 G/DL (ref 13.7–17.6)
HGB UR QL STRIP.AUTO: NEGATIVE
HMPV RNA NPH QL NAA+NON-PROBE: NOT DETECTED
HOLD SPECIMEN: NORMAL
HOLD SPECIMEN: NORMAL
HPIV1 RNA SPEC QL NAA+PROBE: NOT DETECTED
HPIV2 RNA SPEC QL NAA+PROBE: NOT DETECTED
HPIV3 RNA NPH QL NAA+PROBE: NOT DETECTED
HPIV4 P GENE NPH QL NAA+PROBE: NOT DETECTED
IMM GRANULOCYTES # BLD: 0 10*3/MM3 (ref 0–0.03)
IMM GRANULOCYTES # BLD: 0.07 10*3/MM3 (ref 0–0.03)
IMM GRANULOCYTES NFR BLD: 0 % (ref 0–0.5)
IMM GRANULOCYTES NFR BLD: 2.1 % (ref 0–0.5)
INR PPP: 1.02 (ref 0.9–1.1)
KETONES UR QL STRIP: NEGATIVE
LEUKOCYTE ESTERASE UR QL STRIP.AUTO: NEGATIVE
LYMPHOCYTES # BLD AUTO: 0.66 10*3/MM3 (ref 0.9–4.8)
LYMPHOCYTES # BLD AUTO: 1.02 10*3/MM3 (ref 1–3.5)
LYMPHOCYTES NFR BLD AUTO: 28 % (ref 19.6–45.3)
LYMPHOCYTES NFR BLD AUTO: 31.2 % (ref 20–49)
M PNEUMO IGG SER IA-ACNC: NOT DETECTED
MCH RBC QN AUTO: 37.3 PG (ref 27–32.7)
MCH RBC QN AUTO: 38.6 PG (ref 27–33)
MCHC RBC AUTO-ENTMCNC: 34.5 G/DL (ref 32.6–36.4)
MCHC RBC AUTO-ENTMCNC: 36.1 G/DL (ref 32–35)
MCV RBC AUTO: 106.9 FL (ref 83–97)
MCV RBC AUTO: 108.1 FL (ref 79.8–96.2)
MONOCYTES # BLD AUTO: 0.42 10*3/MM3 (ref 0.2–1.2)
MONOCYTES # BLD AUTO: 0.6 10*3/MM3 (ref 0.25–0.8)
MONOCYTES NFR BLD AUTO: 17.8 % (ref 5–12)
MONOCYTES NFR BLD AUTO: 18.3 % (ref 4–12)
NEUTROPHILS # BLD AUTO: 1.26 10*3/MM3 (ref 1.9–8.1)
NEUTROPHILS # BLD AUTO: 1.56 10*3/MM3 (ref 1.5–7)
NEUTROPHILS NFR BLD AUTO: 47.8 % (ref 39–75)
NEUTROPHILS NFR BLD AUTO: 53.4 % (ref 42.7–76)
NITRITE UR QL STRIP: NEGATIVE
NRBC BLD MANUAL-RTO: 0.6 /100 WBC (ref 0–0)
PH UR STRIP.AUTO: 7 [PH] (ref 5–8)
PLATELET # BLD AUTO: 129 10*3/MM3 (ref 140–500)
PLATELET # BLD AUTO: 158 10*3/MM3 (ref 150–375)
PMV BLD AUTO: 9.7 FL (ref 8.9–12.1)
PMV BLD AUTO: 9.8 FL (ref 6–12)
POTASSIUM BLD-SCNC: 3.9 MMOL/L (ref 3.5–5.2)
PROCALCITONIN SERPL-MCNC: 0.15 NG/ML (ref 0.1–0.25)
PROT SERPL-MCNC: 5.5 G/DL (ref 6–8.5)
PROT UR QL STRIP: NEGATIVE
PROTHROMBIN TIME: 13 SECONDS (ref 11.7–14.2)
RBC # BLD AUTO: 2.46 10*6/MM3 (ref 4.3–5.5)
RBC # BLD AUTO: 2.6 10*6/MM3 (ref 4.6–6)
RHINOVIRUS RNA SPEC NAA+PROBE: NOT DETECTED
RSV RNA NPH QL NAA+NON-PROBE: NOT DETECTED
SODIUM BLD-SCNC: 139 MMOL/L (ref 136–145)
SP GR UR STRIP: 1.01 (ref 1–1.03)
UROBILINOGEN UR QL STRIP: ABNORMAL
WBC NRBC COR # BLD: 2.36 10*3/MM3 (ref 4.5–10.7)
WBC NRBC COR # BLD: 3.27 10*3/MM3 (ref 4–10)
WHOLE BLOOD HOLD SPECIMEN: NORMAL
WHOLE BLOOD HOLD SPECIMEN: NORMAL

## 2017-09-25 PROCEDURE — 36415 COLL VENOUS BLD VENIPUNCTURE: CPT | Performed by: INTERNAL MEDICINE

## 2017-09-25 PROCEDURE — 87798 DETECT AGENT NOS DNA AMP: CPT | Performed by: EMERGENCY MEDICINE

## 2017-09-25 PROCEDURE — 25010000002 HEPARIN FLUSH (PORCINE) 100 UNIT/ML SOLUTION: Performed by: EMERGENCY MEDICINE

## 2017-09-25 PROCEDURE — 87486 CHLMYD PNEUM DNA AMP PROBE: CPT | Performed by: EMERGENCY MEDICINE

## 2017-09-25 PROCEDURE — 83605 ASSAY OF LACTIC ACID: CPT | Performed by: EMERGENCY MEDICINE

## 2017-09-25 PROCEDURE — 99284 EMERGENCY DEPT VISIT MOD MDM: CPT

## 2017-09-25 PROCEDURE — 85025 COMPLETE CBC W/AUTO DIFF WBC: CPT | Performed by: INTERNAL MEDICINE

## 2017-09-25 PROCEDURE — 96365 THER/PROPH/DIAG IV INF INIT: CPT

## 2017-09-25 PROCEDURE — 86140 C-REACTIVE PROTEIN: CPT | Performed by: EMERGENCY MEDICINE

## 2017-09-25 PROCEDURE — 87633 RESP VIRUS 12-25 TARGETS: CPT | Performed by: EMERGENCY MEDICINE

## 2017-09-25 PROCEDURE — 87581 M.PNEUMON DNA AMP PROBE: CPT | Performed by: EMERGENCY MEDICINE

## 2017-09-25 PROCEDURE — 85025 COMPLETE CBC W/AUTO DIFF WBC: CPT | Performed by: EMERGENCY MEDICINE

## 2017-09-25 PROCEDURE — 87040 BLOOD CULTURE FOR BACTERIA: CPT | Performed by: EMERGENCY MEDICINE

## 2017-09-25 PROCEDURE — 36415 COLL VENOUS BLD VENIPUNCTURE: CPT

## 2017-09-25 PROCEDURE — 71020 HC CHEST PA AND LATERAL: CPT

## 2017-09-25 PROCEDURE — 25010000002 PIPERACILLIN SOD-TAZOBACTAM PER 1 G: Performed by: EMERGENCY MEDICINE

## 2017-09-25 PROCEDURE — 80053 COMPREHEN METABOLIC PANEL: CPT | Performed by: EMERGENCY MEDICINE

## 2017-09-25 PROCEDURE — 84145 PROCALCITONIN (PCT): CPT | Performed by: EMERGENCY MEDICINE

## 2017-09-25 PROCEDURE — 85610 PROTHROMBIN TIME: CPT | Performed by: EMERGENCY MEDICINE

## 2017-09-25 PROCEDURE — 81003 URINALYSIS AUTO W/O SCOPE: CPT | Performed by: EMERGENCY MEDICINE

## 2017-09-25 PROCEDURE — 96361 HYDRATE IV INFUSION ADD-ON: CPT

## 2017-09-25 RX ORDER — SODIUM CHLORIDE 0.9 % (FLUSH) 0.9 %
10 SYRINGE (ML) INJECTION AS NEEDED
Status: DISCONTINUED | OUTPATIENT
Start: 2017-09-25 | End: 2017-09-25 | Stop reason: HOSPADM

## 2017-09-25 RX ORDER — AMOXICILLIN 250 MG/5ML
500 POWDER, FOR SUSPENSION ORAL 3 TIMES DAILY
Qty: 210 ML | Refills: 0 | Status: SHIPPED | OUTPATIENT
Start: 2017-09-25 | End: 2017-10-06

## 2017-09-25 RX ADMIN — SODIUM CHLORIDE 1000 ML: 9 INJECTION, SOLUTION INTRAVENOUS at 09:55

## 2017-09-25 RX ADMIN — SODIUM CHLORIDE, PRESERVATIVE FREE 500 UNITS: 5 INJECTION INTRAVENOUS at 14:17

## 2017-09-25 RX ADMIN — TAZOBACTAM SODIUM AND PIPERACILLIN SODIUM 4.5 G: 500; 4 INJECTION, SOLUTION INTRAVENOUS at 13:15

## 2017-09-25 NOTE — ED PROVIDER NOTES
EMERGENCY DEPARTMENT ENCOUNTER    CHIEF COMPLAINT  Chief Complaint: fever  History given by: pt  History limited by: Vague historian   Room Number: 20/20  PMD: BETTE Guillen      HPI:  Pt is a 67 y.o. male who presents complaining of an intermittent fever with a measured tmax of 102 that began 2-3 days ago. Pt also c/o productive cough with clear sputum, chronic dizziness with multiple episodes today, generalized weakness, generalized myalgias, and diarrhea, but pt denies HA's, N/V, difficulty urinating, or any other pertinent symptoms. Pt has a feeding tube in place and is c/o pain around feeding tube site with observable blood around the site. Pt called his oncologist when his fever began and was instructed to come to ED. Pt claims to have taken Tylenol PTA for fever.    Duration: approximately 2-3 days   Onset: gradual   Timing: intermittent   Location: N/A  Radiation: N/A  Quality: Tmax 102  Intensity/Severity: moderate  Progression: improved   Associated Symptoms: productive cough with clear sputum, chronic dizziness, generalized myalgias, generalized weakness, diarrhea  Aggravating Factors: None reported   Alleviating Factors: None reported   Previous Episodes: Pt has a hx of throat cancer and is actively receiving chemo and radiation treatments.   Treatment before arrival: Pt claims to have taken Tylenol PTA for fever.     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   • Throat cancer 07/25/2017   • Squamous cell cancer of hypopharynx 07/26/2017   • Cellulitis,  abdominal wall 08/04/2017   • Cellulitis, wound, post-operative 08/05/2017   • Encounter for fitting and adjustment of vascular catheter 08/22/2017   • Mucositis due to radiation therapy 09/07/2017     Resolved Ambulatory Problems     Diagnosis Date Noted   • No Resolved Ambulatory Problems     Past Medical History:   Diagnosis Date   • Asthma    • Bruises easily    • COPD (chronic obstructive pulmonary disease)    • GERD (gastroesophageal reflux disease)    • History of chest pain 02/01/2017   • Hyperlipidemia    • Hypertension    • Low back pain    • Neck mass    • Osteoarthritis    • Squamous cell cancer of hypopharynx 2017   • Vision loss of left eye        PAST SURGICAL HISTORY  Past Surgical History:   Procedure Laterality Date   • ABDOMINAL SURGERY     • ELBOW ARTHROTOMY Bilateral     bursa removed   • ENDOSCOPY W/ PEG TUBE PLACEMENT N/A 7/31/2017    Procedure: ESOPHAGOGASTRODUODENOSCOPY WITH PERCUTANEOUS ENDOSCOPIC GASTROSTOMY TUBE INSERTION;  Surgeon: Raul Valenzuela MD;  Location: [facility];  Service:    • EPIDURAL BLOCK     • EYE SURGERY Left 1993    HEMORRHAGE LEFT EYE   • HAND TENDON REPAIR      LEFT THUMB   • INCISION AND DRAINAGE ABSCESS Left     LEFT HAND, STREP INFECTION   • VENOUS ACCESS DEVICE (PORT) INSERTION N/A 7/31/2017    Procedure: INSERTION VENOUS ACCESS DEVICE;  Surgeon: Raul Valenzuela MD;  Location: [facility];  Service:        FAMILY HISTORY  Family History   Problem Relation Age of Onset   • Cancer Father      malignant brenda   • Prostate cancer Father    • Heart disease Sister      cath stent placement    • Heart attack Sister    • Heart attack Brother    • Diabetes Brother    • Heart disease Brother    • Hypertension Other    • Malig Hyperthermia Neg Hx        SOCIAL HISTORY  Social History     Social History   • Marital status: Single     Spouse name: N/A   • Number of children: 0   • Years of education: High school     Occupational History   •  for  medical supply Disabled     Social History Main Topics   • Smoking status: Light Tobacco Smoker     Packs/day: 1.00     Years: 19.00     Types: Cigarettes   • Smokeless tobacco: Not on file      Comment: PT TO ED WEARING NICOTINE PATCH.   • Alcohol use No   • Drug use: No   • Sexual activity: Defer     Other Topics Concern   • Not on file     Social History Narrative   • No narrative on file       ALLERGIES  Codeine    REVIEW OF SYSTEMS  Review of Systems   Constitutional: Positive for fever (Tmax 102). Negative for activity change and appetite change.   HENT: Negative for congestion and sore throat.    Eyes: Negative.    Respiratory: Positive for cough (productive with clear sputum). Negative for shortness of breath.    Cardiovascular: Negative for chest pain and leg swelling.   Gastrointestinal: Positive for abdominal pain (around feeding tube) and diarrhea. Negative for nausea and vomiting.   Endocrine: Negative.    Genitourinary: Negative for decreased urine volume, difficulty urinating and dysuria.   Musculoskeletal: Positive for myalgias (generalized ). Negative for neck pain.   Skin: Negative for rash and wound.   Allergic/Immunologic: Negative.    Neurological: Positive for dizziness (chronic ) and weakness (generalized ). Negative for numbness and headaches.   Hematological: Negative.    Psychiatric/Behavioral: Negative.    All other systems reviewed and are negative.      PHYSICAL EXAM  ED Triage Vitals   Temp Heart Rate Resp BP SpO2   09/25/17 0910 09/25/17 0910 09/25/17 0910 -- 09/25/17 0910   97.7 °F (36.5 °C) 100 18  97 %      Temp src Heart Rate Source Patient Position BP Location FiO2 (%)   09/25/17 0910 -- -- -- --   Tympanic           Physical Exam   Constitutional: He is oriented to person, place, and time.   Chronically ill   HENT:   Head: Normocephalic and atraumatic.   Oropharynx was dry    Eyes: EOM are normal. Pupils are equal, round, and reactive to light.   Neck: Normal range of motion.  Neck supple.   Skin changes secondary to cancer and radiation   Cardiovascular: Normal rate, regular rhythm, normal heart sounds and intact distal pulses.  Exam reveals no gallop and no friction rub.    No murmur heard.  Pulmonary/Chest: Effort normal and breath sounds normal. No respiratory distress. He has no wheezes. He has no rales.   Scattered rhonchi    Abdominal: Soft. There is no tenderness. There is no rebound and no guarding.   Feeding tube in place, no bleeding, or erythema    Musculoskeletal: Normal range of motion. He exhibits no edema.   Neurological: He is alert and oriented to person, place, and time. He has normal sensation and normal strength.   Skin: Skin is warm and dry. No rash noted.   Psychiatric: Mood and affect normal.   Nursing note and vitals reviewed.      LAB RESULTS  Lab Results (last 24 hours)     Procedure Component Value Units Date/Time    CBC & Differential [[id number]] Collected:  09/25/17 0817    Specimen:  Blood Updated:  09/25/17 0826    Narrative:       The following orders were created for panel order CBC & Differential.  Procedure                               Abnormality         Status                     ---------                               -----------         ------                     CBC Auto Differential[[id number]]        Abnormal            Final result                 Please view results for these tests on the individual orders.    CBC Auto Differential [[id number]]  (Abnormal) Collected:  09/25/17 0817    Specimen:  Blood Updated:  09/25/17 0826     WBC 3.27 (L) 10*3/mm3      RBC 2.46 (L) 10*6/mm3      Hemoglobin 9.5 (L) g/dL      Hematocrit 26.3 (L) %      .9 (H) fL      MCH 38.6 (H) pg      MCHC 36.1 (H) g/dL      RDW 14.6 (H) %      RDW-SD 55.9 (H) fl      MPV 9.7 fL      Platelets 158 10*3/mm3      Neutrophil % 47.8 %      Lymphocyte % 31.2 %      Monocyte % 18.3 (H) %      Eosinophil % 0.3 (L) %      Basophil % 0.3 %      Immature Grans % 2.1 (H) %       Neutrophils, Absolute 1.56 10*3/mm3      Lymphocytes, Absolute 1.02 10*3/mm3      Monocytes, Absolute 0.60 10*3/mm3      Eosinophils, Absolute 0.01 10*3/mm3      Basophils, Absolute 0.01 10*3/mm3      Immature Grans, Absolute 0.07 (H) 10*3/mm3      nRBC 0.6 (H) /100 WBC     CBC & Differential [[id number]] Collected:  09/25/17 0954    Specimen:  Blood Updated:  09/25/17 1011    Narrative:       The following orders were created for panel order CBC & Differential.  Procedure                               Abnormality         Status                     ---------                               -----------         ------                     Scan Slide[[id number]]                                                                  CBC Auto Differential[[id number]]        Abnormal            Final result                 Please view results for these tests on the individual orders.    Comprehensive Metabolic Panel [[id number]]  (Abnormal) Collected:  09/25/17 0954    Specimen:  Blood Updated:  09/25/17 1034     Glucose 102 (H) mg/dL      BUN 10 mg/dL      Creatinine 0.79 mg/dL      Sodium 139 mmol/L      Potassium 3.9 mmol/L      Chloride 100 mmol/L      CO2 27.8 mmol/L      Calcium 9.2 mg/dL      Total Protein 5.5 (L) g/dL      Albumin 3.30 (L) g/dL      ALT (SGPT) 12 U/L      AST (SGOT) 17 U/L      Alkaline Phosphatase 68 U/L      Total Bilirubin 0.5 mg/dL      eGFR Non African Amer 98 mL/min/1.73      Globulin 2.2 gm/dL      A/G Ratio 1.5 g/dL      BUN/Creatinine Ratio 12.7     Anion Gap 11.2 mmol/L     Lactic Acid, Plasma [[id number]]  (Normal) Collected:  09/25/17 0954    Specimen:  Blood Updated:  09/25/17 1024     Lactate 0.7 mmol/L     Blood Culture [[id number]] Collected:  09/25/17 0954    Specimen:  Blood from Arm, Left Updated:  09/25/17 1001    C-reactive Protein [[id number]]  (Abnormal) Collected:  09/25/17 0954    Specimen:  Blood Updated:  09/25/17 1034     C-Reactive Protein 9.74 (H) mg/dL     Procalcitonin [[id number]]   "(Normal) Collected:  09/25/17 0954    Specimen:  Blood Updated:  09/25/17 1039     Procalcitonin 0.15 ng/mL     Narrative:       As a Marker for Sepsis (Non-Neonates):   1. <0.5 ng/mL represents a low risk of severe sepsis and/or septic shock.  1. >2 ng/mL represents a high risk of severe sepsis and/or septic shock.    As a Marker for Lower Respiratory Tract Infections that require antibiotic therapy:  PCT on Admission     Antibiotic Therapy             6-12 Hrs later  > 0.5                Strongly Recommended            >0.25 - <0.5         Recommended  0.1 - 0.25           Discouraged                   Remeasure/reassess PCT  <0.1                 Strongly Discouraged          Remeasure/reassess PCT      As 28 day mortality risk marker: \"Change in Procalcitonin Result\" (> 80 % or <=80 %) if Day 0 (or Day 1) and Day 4 values are available. Refer to http://[email or web address] Servicespct-[email or web address]/   Change in PCT <=80 %   A decrease of PCT levels below or equal to 80 % defines a positive change in PCT test result representing a higher risk for 28-day all-cause mortality of patients diagnosed with severe sepsis or septic shock.  Change in PCT > 80 %   A decrease of PCT levels of more than 80 % defines a negative change in PCT result representing a lower risk for 28-day all-cause mortality of patients diagnosed with severe sepsis or septic shock.                CBC Auto Differential [[id number]]  (Abnormal) Collected:  09/25/17 0954    Specimen:  Blood Updated:  09/25/17 1011     WBC 2.36 (L) 10*3/mm3      RBC 2.60 (L) 10*6/mm3      Hemoglobin 9.7 (L) g/dL      Hematocrit 28.1 (L) %      .1 (H) fL      MCH 37.3 (H) pg      MCHC 34.5 g/dL      RDW 15.2 (H) %      RDW-SD 58.9 (H) fl      MPV 9.8 fL      Platelets 129 (L) 10*3/mm3      Neutrophil % 53.4 %      Lymphocyte % 28.0 %      Monocyte % 17.8 (H) %      Eosinophil % 0.4 %      Basophil % 0.4 %      Immature Grans % 0.0 %      Neutrophils, Absolute 1.26 (L) 10*3/mm3     "  Lymphocytes, Absolute 0.66 (L) 10*3/mm3      Monocytes, Absolute 0.42 10*3/mm3      Eosinophils, Absolute 0.01 10*3/mm3      Basophils, Absolute 0.01 10*3/mm3      Immature Grans, Absolute 0.00 10*3/mm3     Protime-INR [[id number]]  (Normal) Collected:  09/25/17 0955    Specimen:  Blood Updated:  09/25/17 1021     Protime 13.0 Seconds      INR 1.02    Respiratory Panel, PCR [[id number]]  (Normal) Collected:  09/25/17 1018    Specimen:  Swab from Nasopharynx Updated:  09/25/17 1213     ADENOVIRUS, PCR Not Detected     Coronavirus 229E Not Detected     Coronavirus HKU1 Not Detected     Coronavirus NL63 Not Detected     Coronavirus OC43 Not Detected     Human Metapneumovirus Not Detected     Human Rhinovirus/Enterovirus Not Detected     Influenza B PCR Not Detected     Parainfluenza Virus 1 Not Detected     Parainfluenza Virus 2 Not Detected     Parainfluenza Virus 3 Not Detected     Parainfluenza Virus 4 Not Detected     Bordetella pertussis pcr Not Detected     Influenza 2009 H1N1 by PCR Not Detected     Chlamydophila pneumoniae PCR Not Detected     Mycoplasma pneumo by PCR Not Detected     Influenza A PCR Not Detected     Influenza A H3 Not Detected     Influenza A H1 Not Detected     RSV, PCR Not Detected    Urinalysis With / Culture If Indicated [[id number]]  (Abnormal) Collected:  09/25/17 1241    Specimen:  Urine from Urine, Clean Catch Updated:  09/25/17 1257     Color, UA Yellow     Appearance, UA Clear     pH, UA 7.0     Specific Gravity, UA 1.010     Glucose, UA Negative     Ketones, UA Negative     Bilirubin, UA Negative     Blood, UA Negative     Protein, UA Negative     Leuk Esterase, UA Negative     Nitrite, UA Negative     Urobilinogen, UA 2.0 E.U./dL (A)    Narrative:       Urine microscopic not indicated.          I ordered the above labs and reviewed the results    RADIOLOGY  XR Chest 2 View   Final Result   The lungs are well-expanded and appear free of infiltrates. There are no  pleural effusions.  The cardiomediastinal silhouette is unremarkable. A  left subclavian Mediport is in place in satisfactory position.     IMPRESSIONS: No evidence of acute disease within the chest.     This report was finalized on 9/25/2017 11:18 AM by Dr. Luisito Gore MD.        I ordered the above noted radiological studies. Interpreted by radiologist. Reviewed by me in PACS.       PROCEDURES  Procedures      PROGRESS AND CONSULTS  ED Course     0935: Ordered Chest XR and labs for further evaluation.     1242: Placed consult to Hematology and Oncology.     1243: Rechecked pt and discussed radiology and lab results. Plan to consult Dr Garcia.     1300: Discussed pt's case with Dr. Garcia who recommends we give pt a dose of Zosyn via IV in ER and discharge pt on amoxicillin. Dr. Garcia wants pt to f/u with him in office.     1301: Ordered Zosyn for infection per request of Dr. Garcia.     1305: Recheked pt and discussed consult to Dr. Garcia. Discussed discharge plan with pt who understands and agrees to plan. All questions addressed at time. Recommend pt f/u with Dr. Garcia in office.     MEDICAL DECISION MAKING  Results were reviewed/discussed with the patient and they were also made aware of online access. Pt also made aware that some labs, such as cultures, will not be resulted during ER visit and follow up with PMD is necessary.     MDM  Number of Diagnoses or Management Options     Amount and/or Complexity of Data Reviewed  Clinical lab tests: ordered and reviewed (C-Reactive Protein = 9.74, WBC = 2.36, HGB = 9.7, Albumin = 3.30, Total Protein = 5.5, Platelets = 129)  Tests in the radiology section of CPT®: ordered and reviewed (Chest XR:  The lungs are well-expanded and appear free of infiltrates. There are no pleural effusions. The cardiomediastinal silhouette is unremarkable. A left subclavian Mediport is in place in satisfactory position.  IMPRESSIONS: No evidence of acute disease within the chest.  This report was finalized on  9/25/2017 11:18 AM by Dr. Luisito Gore MD.)  Discussion of test results with the performing providers: yes (Dr. Garcia (oncology))  Decide to obtain previous medical records or to obtain history from someone other than the patient: yes  Review and summarize past medical records: yes (Pt has a hx of throat cancer and is actively receiving chemo and radiation treatments. Pt was seen in oncology office on 9/21/17. )  Independent visualization of images, tracings, or specimens: yes           DIAGNOSIS  Final diagnoses:   Neutropenic fever   Squamous cell cancer of hypopharynx   S/P radiation therapy       DISPOSITION  DISCHARGE    Patient discharged in stable condition.    Reviewed implications of results, diagnosis, meds, responsibility to follow up, warning signs and symptoms of possible worsening, potential complications and reasons to return to ER.    Patient/Family voiced understanding of above instructions.    Discussed plan for discharge, as there is no emergent indication for admission.  Pt/family is agreeable and understands need for follow up and repeat testing.  Pt is aware that discharge does not mean that nothing is wrong but it indicates no emergency is present that requires admission and they must continue care with follow-up as given below or physician of their choice.     FOLLOW-UP  Aakash Garcia MD  [address] [address]  [phone]    Schedule an appointment as soon as possible for a visit  follow up as scheduled         Medication List      New Prescriptions          amoxicillin 250 MG/5ML suspension   Commonly known as:  AMOXIL   Take 10 mL by mouth 3 (Three) Times a Day. Per G- tube               Latest Documented Vital Signs:  As of 1:12 PM  BP- 127/90 HR- 82 Temp- 98.3 °F (36.8 °C) (Tympanic) O2 sat- 99%    --  Documentation assistance provided by sarah Delcaruz for Dr. De La Torre.  Information recorded by the sarah was done at my direction and has been  verified and validated by me.    Oswaldo Delacruz  09/25/17 1213       Oswaldo Delacruz  09/25/17 1312       Dat De La Torre MD  09/25/17 [id number]

## 2017-09-25 NOTE — ED NOTES
Pt request if he can take his home RX of morphine 20 mg/ml Q three hours for pain. Pt reports he takes 0.5 ml ( last dose was at 0700 AM today.) MD states this is okay at this time. PT notified.     Rossana Schilling RN  09/25/17 9958

## 2017-09-25 NOTE — ED NOTES
Pt placed in reverse isolation due to decreased WBC count (RT chemo)     Rossana Schilling RN  09/25/17 5178

## 2017-09-27 ENCOUNTER — LAB (OUTPATIENT)
Dept: LAB | Facility: HOSPITAL | Age: 67
End: 2017-09-27

## 2017-09-27 ENCOUNTER — TELEPHONE (OUTPATIENT)
Dept: RADIATION ONCOLOGY | Facility: HOSPITAL | Age: 67
End: 2017-09-27

## 2017-09-27 ENCOUNTER — TREATMENT (OUTPATIENT)
Dept: RADIATION ONCOLOGY | Facility: HOSPITAL | Age: 67
End: 2017-09-27

## 2017-09-27 DIAGNOSIS — K12.33 MUCOSITIS DUE TO RADIATION THERAPY: ICD-10-CM

## 2017-09-27 DIAGNOSIS — Z45.2 ENCOUNTER FOR FITTING AND ADJUSTMENT OF VASCULAR CATHETER: ICD-10-CM

## 2017-09-27 DIAGNOSIS — C13.9 SQUAMOUS CELL CARCINOMA OF HYPOPHARYNX (HCC): ICD-10-CM

## 2017-09-27 LAB
BASOPHILS # BLD AUTO: 0.03 10*3/MM3 (ref 0–0.1)
BASOPHILS NFR BLD AUTO: 1.2 % (ref 0–1.1)
DEPRECATED RDW RBC AUTO: 56.4 FL (ref 37–49)
EOSINOPHIL # BLD AUTO: 0.03 10*3/MM3 (ref 0–0.36)
EOSINOPHIL NFR BLD AUTO: 1.2 % (ref 1–5)
ERYTHROCYTE [DISTWIDTH] IN BLOOD BY AUTOMATED COUNT: 14.7 % (ref 11.7–14.5)
HCT VFR BLD AUTO: 25.3 % (ref 40–49)
HGB BLD-MCNC: 8.9 G/DL (ref 13.5–16.5)
IMM GRANULOCYTES # BLD: 0.06 10*3/MM3 (ref 0–0.03)
IMM GRANULOCYTES NFR BLD: 2.5 % (ref 0–0.5)
LYMPHOCYTES # BLD AUTO: 0.92 10*3/MM3 (ref 1–3.5)
LYMPHOCYTES NFR BLD AUTO: 37.9 % (ref 20–49)
MCH RBC QN AUTO: 37.7 PG (ref 27–33)
MCHC RBC AUTO-ENTMCNC: 35.2 G/DL (ref 32–35)
MCV RBC AUTO: 107.2 FL (ref 83–97)
MONOCYTES # BLD AUTO: 0.44 10*3/MM3 (ref 0.25–0.8)
MONOCYTES NFR BLD AUTO: 18.1 % (ref 4–12)
NEUTROPHILS # BLD AUTO: 0.95 10*3/MM3 (ref 1.5–7)
NEUTROPHILS NFR BLD AUTO: 39.1 % (ref 39–75)
NRBC BLD MANUAL-RTO: 2.1 /100 WBC (ref 0–0)
PLATELET # BLD AUTO: 160 10*3/MM3 (ref 150–375)
PMV BLD AUTO: 9.9 FL (ref 8.9–12.1)
RBC # BLD AUTO: 2.36 10*6/MM3 (ref 4.3–5.5)
WBC NRBC COR # BLD: 2.43 10*3/MM3 (ref 4–10)

## 2017-09-27 PROCEDURE — 85025 COMPLETE CBC W/AUTO DIFF WBC: CPT | Performed by: INTERNAL MEDICINE

## 2017-09-27 PROCEDURE — 36416 COLLJ CAPILLARY BLOOD SPEC: CPT | Performed by: INTERNAL MEDICINE

## 2017-09-27 NOTE — TELEPHONE ENCOUNTER
[facility] called concerning script for Lydocaine - need frequency.  Arianna @ [phone]

## 2017-09-28 ENCOUNTER — OFFICE VISIT (OUTPATIENT)
Dept: ONCOLOGY | Facility: CLINIC | Age: 67
End: 2017-09-28

## 2017-09-28 ENCOUNTER — APPOINTMENT (OUTPATIENT)
Dept: ONCOLOGY | Facility: HOSPITAL | Age: 67
End: 2017-09-28

## 2017-09-28 ENCOUNTER — INFUSION (OUTPATIENT)
Dept: ONCOLOGY | Facility: HOSPITAL | Age: 67
End: 2017-09-28

## 2017-09-28 VITALS
BODY MASS INDEX: 20.97 KG/M2 | HEIGHT: 67 IN | HEART RATE: 75 BPM | RESPIRATION RATE: 16 BRPM | TEMPERATURE: 98.5 F | DIASTOLIC BLOOD PRESSURE: 56 MMHG | OXYGEN SATURATION: 98 % | SYSTOLIC BLOOD PRESSURE: 92 MMHG | WEIGHT: 133.6 LBS

## 2017-09-28 DIAGNOSIS — Z45.2 ENCOUNTER FOR FITTING AND ADJUSTMENT OF VASCULAR CATHETER: ICD-10-CM

## 2017-09-28 DIAGNOSIS — C13.9 SQUAMOUS CELL CARCINOMA OF HYPOPHARYNX (HCC): Primary | ICD-10-CM

## 2017-09-28 DIAGNOSIS — K12.33 MUCOSITIS DUE TO RADIATION THERAPY: ICD-10-CM

## 2017-09-28 DIAGNOSIS — C13.9 SQUAMOUS CELL CARCINOMA OF HYPOPHARYNX (HCC): ICD-10-CM

## 2017-09-28 LAB
ANION GAP SERPL CALCULATED.3IONS-SCNC: 9.4 MMOL/L
BASOPHILS # BLD AUTO: 0.02 10*3/MM3 (ref 0–0.1)
BASOPHILS NFR BLD AUTO: 0.9 % (ref 0–1.1)
BUN BLD-MCNC: 9 MG/DL (ref 6–20)
BUN/CREAT SERPL: 11.8 (ref 7.3–30)
CALCIUM SPEC-SCNC: 8.9 MG/DL (ref 8.5–10.2)
CHLORIDE SERPL-SCNC: 101 MMOL/L (ref 98–107)
CO2 SERPL-SCNC: 28.6 MMOL/L (ref 22–29)
CREAT BLD-MCNC: 0.76 MG/DL (ref 0.7–1.3)
DEPRECATED RDW RBC AUTO: 60.5 FL (ref 37–49)
EOSINOPHIL # BLD AUTO: 0.03 10*3/MM3 (ref 0–0.36)
EOSINOPHIL NFR BLD AUTO: 1.3 % (ref 1–5)
ERYTHROCYTE [DISTWIDTH] IN BLOOD BY AUTOMATED COUNT: 15 % (ref 11.7–14.5)
GFR SERPL CREATININE-BSD FRML MDRD: 102 ML/MIN/1.73
GLUCOSE BLD-MCNC: 119 MG/DL (ref 74–124)
HCT VFR BLD AUTO: 25.5 % (ref 40–49)
HGB BLD-MCNC: 8.9 G/DL (ref 13.5–16.5)
HOLD SPECIMEN: NORMAL
IMM GRANULOCYTES # BLD: 0.03 10*3/MM3 (ref 0–0.03)
IMM GRANULOCYTES NFR BLD: 1.3 % (ref 0–0.5)
LYMPHOCYTES # BLD AUTO: 0.7 10*3/MM3 (ref 1–3.5)
LYMPHOCYTES NFR BLD AUTO: 31.3 % (ref 20–49)
MCH RBC QN AUTO: 38.5 PG (ref 27–33)
MCHC RBC AUTO-ENTMCNC: 34.9 G/DL (ref 32–35)
MCV RBC AUTO: 110.4 FL (ref 83–97)
MONOCYTES # BLD AUTO: 0.31 10*3/MM3 (ref 0.25–0.8)
MONOCYTES NFR BLD AUTO: 13.8 % (ref 4–12)
NEUTROPHILS # BLD AUTO: 1.15 10*3/MM3 (ref 1.5–7)
NEUTROPHILS NFR BLD AUTO: 51.4 % (ref 39–75)
NRBC BLD MANUAL-RTO: 0 /100 WBC (ref 0–0)
PLATELET # BLD AUTO: 133 10*3/MM3 (ref 150–375)
PMV BLD AUTO: 9.6 FL (ref 8.9–12.1)
POTASSIUM BLD-SCNC: 4.3 MMOL/L (ref 3.5–4.7)
RBC # BLD AUTO: 2.31 10*6/MM3 (ref 4.3–5.5)
SODIUM BLD-SCNC: 139 MMOL/L (ref 134–145)
WBC NRBC COR # BLD: 2.24 10*3/MM3 (ref 4–10)

## 2017-09-28 PROCEDURE — 85025 COMPLETE CBC W/AUTO DIFF WBC: CPT

## 2017-09-28 PROCEDURE — 99213 OFFICE O/P EST LOW 20 MIN: CPT | Performed by: NURSE PRACTITIONER

## 2017-09-28 PROCEDURE — 96523 IRRIG DRUG DELIVERY DEVICE: CPT | Performed by: NURSE PRACTITIONER

## 2017-09-28 PROCEDURE — 25010000002 HEPARIN FLUSH (PORCINE) 100 UNIT/ML SOLUTION: Performed by: NURSE PRACTITIONER

## 2017-09-28 PROCEDURE — 80048 BASIC METABOLIC PNL TOTAL CA: CPT

## 2017-09-28 RX ORDER — MORPHINE SULFATE 20 MG/ML
10 SOLUTION ORAL
Qty: 120 ML | Refills: 0 | Status: SHIPPED | OUTPATIENT
Start: 2017-09-28 | End: 2017-10-28

## 2017-09-28 RX ORDER — SODIUM CHLORIDE 0.9 % (FLUSH) 0.9 %
10 SYRINGE (ML) INJECTION AS NEEDED
Status: ACTIVE | OUTPATIENT
Start: 2017-09-28

## 2017-09-28 RX ORDER — SODIUM CHLORIDE 0.9 % (FLUSH) 0.9 %
10 SYRINGE (ML) INJECTION AS NEEDED
Status: CANCELLED | OUTPATIENT
Start: 2017-09-28

## 2017-09-28 RX ADMIN — Medication 10 ML: at 10:13

## 2017-09-28 RX ADMIN — SODIUM CHLORIDE, PRESERVATIVE FREE 500 UNITS: 5 INJECTION INTRAVENOUS at 10:13

## 2017-09-28 NOTE — PROGRESS NOTES
REASON FOR FOLLOW-UP:    1. 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  2.  Plans for combined radiation and chemotherapy as definitive treatment with cisplatin and Taxol initiated on 8/10/2017 concurrent with radiation.  He will receive Taxol on day 1 and cisplatin on day 2 with IV fluid hydration.  3.  Long smoking history with COPD.  4.  Mediport and PEG tube placed by Dr. Valenzuela on 7/31/2017.  Subsequent PEG tube infection requiring antibiotics.    HISTORY OF PRESENT ILLNESS:  King Parson is a 67 y.o. male with the above-mentioned history, who returns today for close monitoring.  He ultimately received 6 weekly doses of Taxol/Cisplatin with week #7 being held due to significant wet discoloration of the skin in the radiation site and worsening blood counts.  Unfortunately the patient has still been unable to complete his radiation due to persistent myelosuppression.  He believes he has 5-6 treatments left.  When he last saw Dr. Grayson there was even discussion and possibly splitting these in the 2 weeks just for better tolerance.    Because the patient has been over a week without radiation or chemotherapy, he is swallowing a bit better, starting to eat soft foods and continuing to sip on liquids.  He is still using his PEG tube, getting in 4 cans of Jevity feeding along with free water each day.  His weight is stable to slightly improved today. The patient has been evaluated by speech therapy with swallow study being performed.  No evidence of aspiration was found    He does continue to use liquid morphine for pain control and is requesting a refill today.  He also continues with fentanyl patch.  To the significant burns on his neck he is using lidocaine followed by Silvadene cream and this is helping.    He denies other concerns today.    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  This diagnosis was made by FNA of the neck node on 6/23/2017.  He is undergone staging CT scans on 6/6/2017 and a PET scan on 7/6/2017.  His disease appears to be clinical stage TIII N2 a stage IV a squamous carcinoma of the hypopharynx.     He was originally referred to the [facility] and was seen by Dr. Moreno Camp of radiation oncology on 7/14/2017.  The patient lives in [place] and has some transportation difficulties.  He has a sister who works in the lab at [facility] and for this reason they have decided to receive their treatment in the [facility] system.     He was seen initially by Dr. Garcia in the company of 2 of his sisters.  We recommended combined chemotherapy and radiation.  He will be seeing Dr. Grayson of the [facility] to discuss the radiation portion of his treatment.  We plan to deliver weekly chemotherapy during radiation with combination of cisplatin and Taxol.  Taxol will be delivered day 1 and cisplatin be delivered along with hydration on day 2 each week.    He saw Dr. Grayson who was in agreement with concurrent chemotherapy and radiation.    Mediport and PEG tube placed by Dr. Valenzuela on 7/31/2017.  Subsequent PEG tube infection requiring antibiotics.    He saw nutrition.  He had a chemotherapy education session.    Cycle #1 of therapy was initiated on 8/10/2017.    Patient completed his chemotherapy (6 cycles) on 9/14/2017    Radiation therapy completed 9/27/2017      PAST MEDICAL, SURGICAL, FAMILY, AND SOCIAL HISTORIES were reviewed with the patient and in the electronic medical record, and were updated if indicated.    ALLERGIES:  Allergies   Allergen Reactions   • Codeine      He dont like to take it because it makes him feel strange.        MEDICATIONS:  The medication list has been reviewed with the patient by the medical assistant, and the list has been updated  "in the electronic medical record, which I reviewed.  Medication dosages and frequencies were confirmed to be accurate.    I have reviewed the patient's medical history in detail and updated the computerized patient record.    REVIEW OF SYSTEMS:  PAIN:  See Vital Signs below.  GENERAL:  See HPI  SKIN:  Radiation dermatitis, See HPI   HEME/LYMPH:  No abnormal bleeding.  No palpable lymphadenopathy.  EYES:  No vision changes or diplopia.  ENT: See HPI  RESPIRATORY:  No cough, shortness of breath, hemoptysis, or wheezing.  CARDIOVASCULAR:  No chest pain, palpitations, orthopnea, or dyspnea on exertion.  GASTROINTESTINAL:  See HPI  GENITOURINARY:  No dysuria or hematuria.  MUSCULOSKELETAL:  No joint pain, swelling, or erythema.  NEUROLOGIC:  No dizziness, loss of consciousness, or seizures.  PSYCHIATRIC:  No depression, anxiety, or mood changes.    Vitals:    09/28/17 0932   BP: 92/56   Pulse: 75   Resp: 16   Temp: 98.5 °F (36.9 °C)   SpO2: 98%   Weight: 133 lb 9.6 oz (60.6 kg)   Height: 67.13\" (170.5 cm)   PainSc: 8  Comment: Pain from radiation       PHYSICAL EXAMINATION:  GENERAL:  Well-developed well-nourished male; awake, alert and oriented, in no acute distress.  SKIN:  Radiation dermatitis primarily confined to his anterior and posterior cervical region bilaterally.  HEAD:  Normocephalic, atraumatic.  EYES:  Pupils equal, round and reactive to light.  Extraocular movements intact.  Conjunctivae normal.  EARS:  Hearing intact.  NOSE:  Septum midline.  No excoriations or nasal discharge.  MOUTH:  Ulcerative mucositis  THROAT:  Grade 2-3 mucositis is present in the posterior pharynx, this remains well controlled and unchanged today  LYMPHATICS:  Right cervical lymphadenopathy improving, currently measuring roughly 1 cm.  CHEST:  Lungs are clear to auscultation bilaterally.  No wheezes, rales, or rhonchi.  A Mediport is present in the left subclavian area that is benign in appearance.  HEART:  Regular rate; normal " rhythm.  No murmurs, gallops or rubs.  ABDOMEN:  Soft, non-tender, non-distended.  Normal active bowel sounds.  A PEG tube is present. Benign in appearance.  EXTREMITIES:  No clubbing, cyanosis, or edema.  NEUROLOGICAL:  No focal neurologic deficits.    DIAGNOSTIC DATA:  Lab Results   Component Value Date    WBC 2.24 (L) 09/28/2017    HGB 8.9 (L) 09/28/2017    HCT 25.5 (L) 09/28/2017    .4 (H) 09/28/2017     (L) 09/28/2017     Lab Results   Component Value Date    NEUTROABS 1.15 (L) 09/28/2017       Lab Results   Component Value Date    GLUCOSE 119 09/28/2017    BUN 9 09/28/2017    CREATININE 0.76 09/28/2017    EGFRIFNONA 102 09/28/2017    BCR 11.8 09/28/2017    K 4.3 09/28/2017    CO2 28.6 09/28/2017    CALCIUM 8.9 09/28/2017    ALBUMIN 3.30 (L) 09/25/2017    LABIL2 1.5 09/25/2017    AST 17 09/25/2017    ALT 12 09/25/2017       IMAGING:  None reviewed    ASSESSMENT:  This is a 67 y.o. male with:  1.  Stage IV a (T3, N2 a, M0) squamous cell carcinoma of the hypopharynx with metastatic lymphadenopathy to the right neck.Received 6 weeks of Taxol/cisplatin.  Week #7 held due to wet desquamation in radiation field and myelosuppression.  Mild Marcus continues with the patient still unable to complete radiation.  Roughly 5-6 treatments remaining.  2.  Comorbidities including COPD.  3.  Recent PEG tube infection. Well healed at this point  4.  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  She continues to use lidocaine followed by Silvadene and this is helping.  5.  Pain secondary to radiation burns and dysphagia.  The patient continues on fentanyl patch 25 µg and liquid morphine.  He is requesting a has been given a refill of morphine today.  6.  Previous mucositis almost completely resolved now with the patient being 2+ weeks out from chemotherapy and radiation.   7.  Previous weight loss and poor intake due to underlying disease and accompanying treatment.  However the patient now having been off of  chemotherapy and radiation for a few weeks this had stabilization of his weight and is beginning to eat and drink better.  His chemistries are normal today with no evidence of dehydration.  Therefore he will not require IV fluids today.  8.  Myelosuppression secondary to chemotherapy.  The patient's counts remain low with hemoglobin of 8.9 and WBC of 2.2.  He is asymptomatic and we will continue to monitor closely.      PLAN:  1. Morphine 100 mg/5 mL, 0.5 mL every 3 hours as needed, #120 mL, no refills prescription given.  2. Continue hydration and nutrition through his PEG tube at this point. Also continue small sips and bites orally.    3. Patient will be reevaluated by radiation oncology on Monday, 10-17 for consideration of completion of radiation.  4. Patient return in 1 week for nurse practitioner follow-up and continued close monitoring of counts and overall status.  5. Return in 2 weeks for M.D. follow-up. At that point he will have additional labs and possibly port flush.  We will decide at that point when to schedule a follow-up PET scan posttreatment, all based on when his final completion date of radiation may be.    BETTE Guzmán

## 2017-09-30 LAB — BACTERIA SPEC AEROBE CULT: NORMAL

## 2017-10-01 ENCOUNTER — APPOINTMENT (OUTPATIENT)
Dept: RADIATION ONCOLOGY | Facility: HOSPITAL | Age: 67
End: 2017-10-01

## 2017-10-02 ENCOUNTER — LAB (OUTPATIENT)
Dept: LAB | Facility: HOSPITAL | Age: 67
End: 2017-10-02

## 2017-10-02 DIAGNOSIS — G89.29 CHRONIC BILATERAL LOW BACK PAIN WITH RIGHT-SIDED SCIATICA: Primary | ICD-10-CM

## 2017-10-02 DIAGNOSIS — C13.9 SQUAMOUS CELL CARCINOMA OF HYPOPHARYNX (HCC): Primary | ICD-10-CM

## 2017-10-02 DIAGNOSIS — M54.41 CHRONIC BILATERAL LOW BACK PAIN WITH RIGHT-SIDED SCIATICA: Primary | ICD-10-CM

## 2017-10-02 LAB
BASOPHILS # BLD AUTO: 0.02 10*3/MM3 (ref 0–0.1)
BASOPHILS NFR BLD AUTO: 0.5 % (ref 0–1.1)
DEPRECATED RDW RBC AUTO: 58.6 FL (ref 37–49)
EOSINOPHIL # BLD AUTO: 0.07 10*3/MM3 (ref 0–0.36)
EOSINOPHIL NFR BLD AUTO: 1.8 % (ref 1–5)
ERYTHROCYTE [DISTWIDTH] IN BLOOD BY AUTOMATED COUNT: 15.2 % (ref 11.7–14.5)
HCT VFR BLD AUTO: 25.9 % (ref 40–49)
HGB BLD-MCNC: 9.3 G/DL (ref 13.5–16.5)
IMM GRANULOCYTES # BLD: 0.18 10*3/MM3 (ref 0–0.03)
IMM GRANULOCYTES NFR BLD: 4.7 % (ref 0–0.5)
LYMPHOCYTES # BLD AUTO: 0.89 10*3/MM3 (ref 1–3.5)
LYMPHOCYTES NFR BLD AUTO: 23.5 % (ref 20–49)
MCH RBC QN AUTO: 38.3 PG (ref 27–33)
MCHC RBC AUTO-ENTMCNC: 35.9 G/DL (ref 32–35)
MCV RBC AUTO: 106.6 FL (ref 83–97)
MONOCYTES # BLD AUTO: 0.45 10*3/MM3 (ref 0.25–0.8)
MONOCYTES NFR BLD AUTO: 11.9 % (ref 4–12)
NEUTROPHILS # BLD AUTO: 2.18 10*3/MM3 (ref 1.5–7)
NEUTROPHILS NFR BLD AUTO: 57.6 % (ref 39–75)
NRBC BLD MANUAL-RTO: 12.1 /100 WBC (ref 0–0)
PLATELET # BLD AUTO: 161 10*3/MM3 (ref 150–375)
PMV BLD AUTO: 10.3 FL (ref 8.9–12.1)
RBC # BLD AUTO: 2.43 10*6/MM3 (ref 4.3–5.5)
WBC NRBC COR # BLD: 3.79 10*3/MM3 (ref 4–10)

## 2017-10-02 PROCEDURE — 77014 CHG CT GUIDANCE RADIATION THERAPY FLDS PLACEMENT: CPT | Performed by: RADIOLOGY

## 2017-10-02 PROCEDURE — 36416 COLLJ CAPILLARY BLOOD SPEC: CPT | Performed by: INTERNAL MEDICINE

## 2017-10-02 PROCEDURE — 85025 COMPLETE CBC W/AUTO DIFF WBC: CPT | Performed by: INTERNAL MEDICINE

## 2017-10-02 PROCEDURE — 77386 CHG INTENSITY MODULATED RADIATION TX DLVR COMPLEX: CPT | Performed by: RADIOLOGY

## 2017-10-02 PROCEDURE — 77386: CPT | Performed by: RADIOLOGY

## 2017-10-03 PROCEDURE — 77014 CHG CT GUIDANCE RADIATION THERAPY FLDS PLACEMENT: CPT | Performed by: RADIOLOGY

## 2017-10-03 PROCEDURE — 77386 CHG INTENSITY MODULATED RADIATION TX DLVR COMPLEX: CPT | Performed by: RADIOLOGY

## 2017-10-03 PROCEDURE — 77386: CPT | Performed by: RADIOLOGY

## 2017-10-04 ENCOUNTER — APPOINTMENT (OUTPATIENT)
Dept: ONCOLOGY | Facility: CLINIC | Age: 67
End: 2017-10-04

## 2017-10-04 ENCOUNTER — INFUSION (OUTPATIENT)
Dept: ONCOLOGY | Facility: HOSPITAL | Age: 67
End: 2017-10-04

## 2017-10-04 ENCOUNTER — APPOINTMENT (OUTPATIENT)
Dept: ONCOLOGY | Facility: HOSPITAL | Age: 67
End: 2017-10-04

## 2017-10-04 ENCOUNTER — OFFICE VISIT (OUTPATIENT)
Dept: ONCOLOGY | Facility: CLINIC | Age: 67
End: 2017-10-04

## 2017-10-04 VITALS
RESPIRATION RATE: 18 BRPM | DIASTOLIC BLOOD PRESSURE: 44 MMHG | BODY MASS INDEX: 21.06 KG/M2 | TEMPERATURE: 97.7 F | HEART RATE: 67 BPM | OXYGEN SATURATION: 93 % | HEIGHT: 67 IN | WEIGHT: 134.2 LBS | SYSTOLIC BLOOD PRESSURE: 96 MMHG

## 2017-10-04 VITALS — DIASTOLIC BLOOD PRESSURE: 61 MMHG | SYSTOLIC BLOOD PRESSURE: 104 MMHG

## 2017-10-04 DIAGNOSIS — K12.33 MUCOSITIS DUE TO RADIATION THERAPY: ICD-10-CM

## 2017-10-04 DIAGNOSIS — I95.9 HYPOTENSION, UNSPECIFIED HYPOTENSION TYPE: ICD-10-CM

## 2017-10-04 DIAGNOSIS — C13.9 SQUAMOUS CELL CARCINOMA OF HYPOPHARYNX (HCC): Primary | ICD-10-CM

## 2017-10-04 DIAGNOSIS — C14.0 THROAT CANCER (HCC): ICD-10-CM

## 2017-10-04 DIAGNOSIS — R42 DIZZINESS: ICD-10-CM

## 2017-10-04 LAB
ALBUMIN SERPL-MCNC: 3.1 G/DL (ref 3.5–5.2)
ALBUMIN/GLOB SERPL: 1.5 G/DL (ref 1.1–2.4)
ALP SERPL-CCNC: 69 U/L (ref 38–116)
ALT SERPL W P-5'-P-CCNC: 7 U/L (ref 0–41)
ANION GAP SERPL CALCULATED.3IONS-SCNC: 10.7 MMOL/L
AST SERPL-CCNC: 12 U/L (ref 0–40)
BASOPHILS # BLD AUTO: 0.01 10*3/MM3 (ref 0–0.1)
BASOPHILS NFR BLD AUTO: 0.3 % (ref 0–1.1)
BILIRUB SERPL-MCNC: 0.4 MG/DL (ref 0.1–1.2)
BUN BLD-MCNC: 10 MG/DL (ref 6–20)
BUN/CREAT SERPL: 13.7 (ref 7.3–30)
CALCIUM SPEC-SCNC: 8.7 MG/DL (ref 8.5–10.2)
CHLORIDE SERPL-SCNC: 103 MMOL/L (ref 98–107)
CO2 SERPL-SCNC: 28.3 MMOL/L (ref 22–29)
CREAT BLD-MCNC: 0.73 MG/DL (ref 0.7–1.3)
DEPRECATED RDW RBC AUTO: 62 FL (ref 37–49)
EOSINOPHIL # BLD AUTO: 0.03 10*3/MM3 (ref 0–0.36)
EOSINOPHIL NFR BLD AUTO: 0.8 % (ref 1–5)
ERYTHROCYTE [DISTWIDTH] IN BLOOD BY AUTOMATED COUNT: 15.7 % (ref 11.7–14.5)
GFR SERPL CREATININE-BSD FRML MDRD: 107 ML/MIN/1.73
GLOBULIN UR ELPH-MCNC: 2.1 GM/DL (ref 1.8–3.5)
GLUCOSE BLD-MCNC: 104 MG/DL (ref 74–124)
HCT VFR BLD AUTO: 24.4 % (ref 40–49)
HGB BLD-MCNC: 8.3 G/DL (ref 13.5–16.5)
HOLD SPECIMEN: NORMAL
IMM GRANULOCYTES # BLD: 0.02 10*3/MM3 (ref 0–0.03)
IMM GRANULOCYTES NFR BLD: 0.5 % (ref 0–0.5)
LYMPHOCYTES # BLD AUTO: 1.05 10*3/MM3 (ref 1–3.5)
LYMPHOCYTES NFR BLD AUTO: 27.8 % (ref 20–49)
MCH RBC QN AUTO: 37.9 PG (ref 27–33)
MCHC RBC AUTO-ENTMCNC: 34 G/DL (ref 32–35)
MCV RBC AUTO: 111.4 FL (ref 83–97)
MONOCYTES # BLD AUTO: 0.46 10*3/MM3 (ref 0.25–0.8)
MONOCYTES NFR BLD AUTO: 12.2 % (ref 4–12)
NEUTROPHILS # BLD AUTO: 2.21 10*3/MM3 (ref 1.5–7)
NEUTROPHILS NFR BLD AUTO: 58.4 % (ref 39–75)
NRBC BLD MANUAL-RTO: 0 /100 WBC (ref 0–0)
PLATELET # BLD AUTO: 120 10*3/MM3 (ref 150–375)
PMV BLD AUTO: 9.4 FL (ref 8.9–12.1)
POTASSIUM BLD-SCNC: 3.8 MMOL/L (ref 3.5–4.7)
PROT SERPL-MCNC: 5.2 G/DL (ref 6.3–8)
RBC # BLD AUTO: 2.19 10*6/MM3 (ref 4.3–5.5)
SODIUM BLD-SCNC: 142 MMOL/L (ref 134–145)
WBC NRBC COR # BLD: 3.78 10*3/MM3 (ref 4–10)

## 2017-10-04 PROCEDURE — 77014 CHG CT GUIDANCE RADIATION THERAPY FLDS PLACEMENT: CPT | Performed by: RADIOLOGY

## 2017-10-04 PROCEDURE — 96360 HYDRATION IV INFUSION INIT: CPT | Performed by: NURSE PRACTITIONER

## 2017-10-04 PROCEDURE — 85025 COMPLETE CBC W/AUTO DIFF WBC: CPT

## 2017-10-04 PROCEDURE — 77386 CHG INTENSITY MODULATED RADIATION TX DLVR COMPLEX: CPT | Performed by: RADIOLOGY

## 2017-10-04 PROCEDURE — 77386: CPT | Performed by: RADIOLOGY

## 2017-10-04 PROCEDURE — 80053 COMPREHEN METABOLIC PANEL: CPT

## 2017-10-04 PROCEDURE — 99214 OFFICE O/P EST MOD 30 MIN: CPT | Performed by: NURSE PRACTITIONER

## 2017-10-04 RX ORDER — GABAPENTIN 800 MG/1
800 TABLET ORAL 4 TIMES DAILY
Qty: 120 TABLET | Refills: 5 | OUTPATIENT
Start: 2017-10-04 | End: 2018-04-01 | Stop reason: SDUPTHER

## 2017-10-04 RX ORDER — SODIUM CHLORIDE 9 MG/ML
1000 INJECTION, SOLUTION INTRAVENOUS ONCE
Status: COMPLETED | OUTPATIENT
Start: 2017-10-04 | End: 2017-10-04

## 2017-10-04 RX ADMIN — SODIUM CHLORIDE 1000 ML/HR: 900 INJECTION, SOLUTION INTRAVENOUS at 09:23

## 2017-10-04 NOTE — PROGRESS NOTES
REASON FOR FOLLOW-UP:    1. 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  2.  Plans for combined radiation and chemotherapy as definitive treatment with cisplatin and Taxol initiated on 8/10/2017 concurrent with radiation.  He will receive Taxol on day 1 and cisplatin on day 2 with IV fluid hydration.  3.  Long smoking history with COPD.  4.  Mediport and PEG tube placed by Dr. Valenzuela on 7/31/2017.  Subsequent PEG tube infection requiring antibiotics.    HISTORY OF PRESENT ILLNESS:  King Parson is a 67 y.o. male with the above-mentioned history, who returns today for close monitoring.  Patient received 6 weekly doses of Taxol/cisplatin.  His last dose was delivered on 914 and 9/15/2017.  His seventh dose was held due to significant moist desquamation of the skin as well as worsening blood counts.  Mr. Parson is still has a few radiation treatments remaining.  He is hoping to finish by Monday of next week.  He states that he has been able to gradually increase his diet.  He is eating soft foods, and continuing to sip liquids.  He also still uses his PEG tube, getting in 4 cans of Jevity along with free water each day.  He is using liquid morphine for pain control, and feels like his pain is adequately controlled.  He does have just a few areas on his neck that he is using Silvadene for.  He does report he is feeling dizzy, particularly with position change.  The patient states he has not had any falls or syncopal episodes.  He is on several blood pressure medications.    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  This diagnosis was made by FNA of the neck node on 6/23/2017.  He is undergone staging CT scans on 6/6/2017 and a PET scan on 7/6/2017.  His disease appears to be clinical stage TIII N2 a  stage IV a squamous carcinoma of the hypopharynx.     He was originally referred to the [facility] and was seen by Dr. Moreno Camp of radiation oncology on 7/14/2017.  The patient lives in [place] and has some transportation difficulties.  He has a sister who works in the lab at [facility] and for this reason they have decided to receive their treatment in the [facility] system.     He was seen initially by Dr. Garcia in the company of 2 of his sisters.  We recommended combined chemotherapy and radiation.  He will be seeing Dr. Grayson of the [facility] to discuss the radiation portion of his treatment.  We plan to deliver weekly chemotherapy during radiation with combination of cisplatin and Taxol.  Taxol will be delivered day 1 and cisplatin be delivered along with hydration on day 2 each week.    He saw Dr. Grayson who was in agreement with concurrent chemotherapy and radiation.    Mediport and PEG tube placed by Dr. Valenzuela on 7/31/2017.  Subsequent PEG tube infection requiring antibiotics.    He saw nutrition.  He had a chemotherapy education session.    Cycle #1 of therapy was initiated on 8/10/2017.    Patient completed his chemotherapy (6 cycles) on 9/14/2017    Radiation therapy completed 9/27/2017      PAST MEDICAL, SURGICAL, FAMILY, AND SOCIAL HISTORIES were reviewed with the patient and in the electronic medical record, and were updated if indicated.    ALLERGIES:  Allergies   Allergen Reactions   • Codeine      He dont like to take it because it makes him feel strange.        MEDICATIONS:  The medication list has been reviewed with the patient by the medical assistant, and the list has been updated in the electronic medical record, which I reviewed.  Medication dosages and frequencies were confirmed to be accurate.    I have reviewed the patient's medical history in detail and updated the computerized patient record.    REVIEW OF SYSTEMS:  PAIN:  See Vital Signs  "below.  GENERAL:  See HPI  SKIN:  Radiation dermatitis, See HPI   HEME/LYMPH:  No abnormal bleeding.  No palpable lymphadenopathy.  EYES:  No vision changes or diplopia.  ENT: See HPI  RESPIRATORY:  No cough, shortness of breath, hemoptysis, or wheezing.  CARDIOVASCULAR:  No chest pain, palpitations, orthopnea, or dyspnea on exertion.  GASTROINTESTINAL:  See HPI  GENITOURINARY:  No dysuria or hematuria.  MUSCULOSKELETAL:  No joint pain, swelling, or erythema.  NEUROLOGIC:  No dizziness, loss of consciousness, or seizures.  PSYCHIATRIC:  No depression, anxiety, or mood changes.    Vitals:    10/04/17 0751   BP: 96/44   Pulse: 67   Resp: 18   Temp: 97.7 °F (36.5 °C)   TempSrc: Oral   SpO2: 93%   Weight: 134 lb 3.2 oz (60.9 kg)   Height: 67.13\" (170.5 cm)   PainSc: 5  Comment: Dizzy spells since yesterday.   PainLoc: Neck       PHYSICAL EXAMINATION:  GENERAL:  Well-developed well-nourished male; awake, alert and oriented, in no acute distress.  SKIN:  Moist desquamation bilaterally with areas of hyperpigmentation of the neck.    HEAD:  Normocephalic, atraumatic.  EYES:  Pupils equal, round and reactive to light.  Extraocular movements intact.  Conjunctivae normal.  EARS:  Hearing intact.  NOSE:  Septum midline.  No excoriations or nasal discharge.  MOUTH:  Ulcerative mucositis  THROAT:  Grade 1 mucositis is present in the posterior pharynx.CHEST:  Lungs are clear to auscultation bilaterally.  No wheezes, rales, or rhonchi.  A Mediport is present in the left subclavian area that is benign in appearance.  HEART:  Regular rate; normal rhythm.  No murmurs, gallops or rubs.  ABDOMEN:  Soft, non-tender, non-distended.  Normal active bowel sounds.  A PEG tube is present. Benign in appearance.  EXTREMITIES:  No clubbing, cyanosis, or edema.  NEUROLOGICAL:  No focal neurologic deficits.    DIAGNOSTIC DATA:  Lab Results   Component Value Date    WBC 3.78 (L) 10/04/2017    HGB 8.3 (L) 10/04/2017    HCT 24.4 (L) 10/04/2017    MCV " 111.4 (H) 10/04/2017     (L) 10/04/2017     Lab Results   Component Value Date    NEUTROABS 2.21 10/04/2017       Lab Results   Component Value Date    GLUCOSE 104 10/04/2017    BUN 10 10/04/2017    CREATININE 0.73 10/04/2017    EGFRIFNONA 107 10/04/2017    BCR 13.7 10/04/2017    K 3.8 10/04/2017    CO2 28.3 10/04/2017    CALCIUM 8.7 10/04/2017    ALBUMIN 3.10 (L) 10/04/2017    LABIL2 1.5 10/04/2017    AST 12 10/04/2017    ALT 7 10/04/2017       IMAGING:  None reviewed    ASSESSMENT:  This is a 67 y.o. male with:  1.  Stage IV a (T3, N2 a, M0) squamous cell carcinoma of the hypopharynx with metastatic lymphadenopathy to the right neck.Received 6 weeks of Taxol/cisplatin.  Week #7 held due to wet desquamation in radiation field and myelosuppression.  Mild Marcus continues with the patient still unable to complete radiation.  Roughly 5-6 treatments remaining.  2.  Comorbidities including COPD.  3.  Recent PEG tube infection. Well healed at this point  4.  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  She continues to use lidocaine followed by Silvadene and this is helping.  5.  Pain secondary to radiation burns and dysphagia.  The patient continues on fentanyl patch 25 µg and liquid morphine.  He is requesting a has been given a refill of morphine today.  6.  Previous mucositis almost completely resolved now with the patient being 2+ weeks out from chemotherapy and radiation.   7.  Previous weight loss and poor intake due to underlying disease and accompanying treatment.  8.  Myelosuppression secondary to chemotherapy.  The patient's counts remain low with hemoglobin of 8.3 and WBC improving at 3.78.  9. Hypotension with dizzy spells: Patient's blood pressure is 96/44 today.  He is having dizzy spells, particularly with position change.  He is on two different blood pressure medications.  He is also anemic, which could be contributing.  At this time we will  have him decrease his diltiazem to once daily,  instructed him not taking the same time as his Cardura.  We'll also provide him with 1 L of IV normal saline in the office.  We'll reevaluate the patient on Friday to see if his hemoglobin continues to be low is a blood transfusion will be needed.    PLAN:  1. One liter IV normal saline in office today  2. Decrease diltiazem to once daily (do not take at same time as Cardura).  3. Return Friday, 10/6/2017 to recheck labs and for possible IV fluids  4. Continue hydration and nutrition through his PEG tube at this point. Also continue small sips and bites orally.    5. Return 10/11/2017 for follow up with Dr. Gracia.  We will decide at that point when to schedule a follow-up PET scan posttreatment, all based on when his final completion date of radiation may be.    BETTE Monroe

## 2017-10-05 ENCOUNTER — APPOINTMENT (OUTPATIENT)
Dept: ONCOLOGY | Facility: HOSPITAL | Age: 67
End: 2017-10-05

## 2017-10-05 ENCOUNTER — RADIATION ONCOLOGY WEEKLY ASSESSMENT (OUTPATIENT)
Dept: RADIATION ONCOLOGY | Facility: HOSPITAL | Age: 67
End: 2017-10-05

## 2017-10-05 VITALS
BODY MASS INDEX: 20.91 KG/M2 | WEIGHT: 134 LBS | SYSTOLIC BLOOD PRESSURE: 94 MMHG | RESPIRATION RATE: 16 BRPM | TEMPERATURE: 98 F | HEART RATE: 95 BPM | OXYGEN SATURATION: 96 % | DIASTOLIC BLOOD PRESSURE: 44 MMHG

## 2017-10-05 DIAGNOSIS — C13.9 SQUAMOUS CELL CARCINOMA OF HYPOPHARYNX (HCC): ICD-10-CM

## 2017-10-05 PROCEDURE — 77338 DESIGN MLC DEVICE FOR IMRT: CPT | Performed by: RADIOLOGY

## 2017-10-05 PROCEDURE — 77386: CPT | Performed by: RADIOLOGY

## 2017-10-05 PROCEDURE — 77386 CHG INTENSITY MODULATED RADIATION TX DLVR COMPLEX: CPT | Performed by: RADIOLOGY

## 2017-10-05 PROCEDURE — 77014 CHG CT GUIDANCE RADIATION THERAPY FLDS PLACEMENT: CPT | Performed by: RADIOLOGY

## 2017-10-05 PROCEDURE — 77336 RADIATION PHYSICS CONSULT: CPT | Performed by: RADIOLOGY

## 2017-10-05 NOTE — PROGRESS NOTES
Physician Weekly Management Note    Diagnosis:   Squamous cell carcinoma of hypopharynx        Clinical: Stage EDMAR (T3, N2a, M0)     Reason for Visit:  Tx: 31/33    Concurrent Chemo:   Yes    Notes on Treatment course, Films, Medical progress and Plan:  Skin dry now, no desquam noted. OC/OP stable, erythematous. DOing well - eating solids and soft foods. Had a donut yesterday, eggs, mashed potatoes. Weight stable. Being seen for possible fluids at [facility] tomorrow and Dr. Garcia on 10/11. Will finish here on Monday. F/u in 2 weeks.    ROS - Other than as listed above, as follows:  Constitutional - Normal - no complaints of fatigue, denies lack of appetite, fever, night sweats or change in weight.  Neck - Normal - denies neck masses, muscle weakness, neck pain, decreased range of motion or swelling.  Cardiovascular - Normal - denies arrhythmias, chest pain, dyspnea, edema, orthopnea or palpitations.  Respiratory - Normal - denies cough, dyspnea, hemoptysis, hiccoughs, pleuritic chest pain or wheezing.  Gastrointestinal - Normal - no complaints of constipation, abdominal pain, diarrhea, heartburn/dyspepsia, hematemesis, hemorrhoids, melena or GI bleeding, nausea, pain or cramping or vomiting.    PHYSICAL EXAM - Other than listed above, as follows:  Vitals:    10/05/17 0837   BP: 94/44   Pulse: 95   Resp: 16   Temp: 98 °F (36.7 °C)   TempSrc: Tympanic   SpO2: 96%   Weight: 134 lb (60.8 kg)   PainSc: 0-No pain       Constitutional - Normal - no evidence of impaired alertness, inadequate appearance, premature or advanced chronologic age, uncooperativeness, altered mood, affect or disorientation.  Neck - Normal - no evidence of tender or enlarged lymph nodes, neck abnormalities, restricted range of motion or enlarged thyroid.  Chest - Normal - no evidence of chest abnormalities, tender or enlarged lymph nodes.  Respiratory - Normal - no evidence of abnormal breat sounds, chest abnormalities on palpation and chest  "abnormalities on percussion and normal breath sounds.  Hematologic/Lymphatic - Normal - no evidence of tender or enlarged axillary lymph nodes nor tender or enlarged neck nodes.    Performance Status: (2) Ambulatory and capable of self care, unable to carry out work activity, up and about > 50% or waking hours    Problem added:  No problems updated.  Medications added: No orders of the defined types were placed in this encounter.    Ancillary referrals made: No orders of the defined types were placed in this encounter.      Technical aspects reviewed:  Weekly OBI approved if applicable?  Yes  Weekly port films approved?  Yes  Change requests noted if applicable?  Yes  Patient setup and plan reviewed?  Yes    Chart Reviewed:  Continue current treatment plan?  Yes  Treatment plan change requested?  No    I have reviewed and marked as \"reviewed\" the current medications, allergies and problem list in the patients EMR.    I have reviewed the patient's medical, surgical  history in detail, reviewed any pertinent lab work  and updated the computerized patient record if needed.    Patient's Care Team:  Patient Care Team:  BETTE Guillen as PCP - General  BETTE Ruvalcaba as PCP - Claims Attributed  Clinton Styles MD as Referring Physician (Otolaryngology)  Aakash Garcia MD as Consulting Physician (Hematology and Oncology)  Cosmo Conway MD as Consulting Physician (Cardiology)  Edgar Prince MD as Consulting Physician (Pain Medicine)  John Presley MD as Consulting Physician (Hematology and Oncology)    Seen and approved by:  Marlene Grayson MD, 08/17/2017  "

## 2017-10-06 ENCOUNTER — APPOINTMENT (OUTPATIENT)
Dept: ONCOLOGY | Facility: HOSPITAL | Age: 67
End: 2017-10-06

## 2017-10-06 ENCOUNTER — OFFICE VISIT (OUTPATIENT)
Dept: ONCOLOGY | Facility: CLINIC | Age: 67
End: 2017-10-06

## 2017-10-06 ENCOUNTER — INFUSION (OUTPATIENT)
Dept: ONCOLOGY | Facility: HOSPITAL | Age: 67
End: 2017-10-06

## 2017-10-06 VITALS
HEIGHT: 67 IN | SYSTOLIC BLOOD PRESSURE: 90 MMHG | WEIGHT: 134.4 LBS | HEART RATE: 76 BPM | DIASTOLIC BLOOD PRESSURE: 60 MMHG | BODY MASS INDEX: 21.09 KG/M2 | RESPIRATION RATE: 16 BRPM | TEMPERATURE: 98.1 F

## 2017-10-06 DIAGNOSIS — C13.9 SQUAMOUS CELL CARCINOMA OF HYPOPHARYNX (HCC): ICD-10-CM

## 2017-10-06 DIAGNOSIS — K12.33 MUCOSITIS DUE TO RADIATION THERAPY: ICD-10-CM

## 2017-10-06 DIAGNOSIS — C13.9 SQUAMOUS CELL CARCINOMA OF HYPOPHARYNX (HCC): Primary | ICD-10-CM

## 2017-10-06 DIAGNOSIS — M54.41 CHRONIC BILATERAL LOW BACK PAIN WITH RIGHT-SIDED SCIATICA: Primary | ICD-10-CM

## 2017-10-06 DIAGNOSIS — G89.29 CHRONIC BILATERAL LOW BACK PAIN WITH RIGHT-SIDED SCIATICA: Primary | ICD-10-CM

## 2017-10-06 DIAGNOSIS — I95.9 HYPOTENSION, UNSPECIFIED HYPOTENSION TYPE: ICD-10-CM

## 2017-10-06 LAB
ALBUMIN SERPL-MCNC: 3.1 G/DL (ref 3.5–5.2)
ALBUMIN/GLOB SERPL: 1.4 G/DL (ref 1.1–2.4)
ALP SERPL-CCNC: 71 U/L (ref 38–116)
ALT SERPL W P-5'-P-CCNC: 8 U/L (ref 0–41)
ANION GAP SERPL CALCULATED.3IONS-SCNC: 13 MMOL/L
AST SERPL-CCNC: 13 U/L (ref 0–40)
BASOPHILS # BLD AUTO: 0.01 10*3/MM3 (ref 0–0.1)
BASOPHILS NFR BLD AUTO: 0.2 % (ref 0–1.1)
BILIRUB SERPL-MCNC: 0.5 MG/DL (ref 0.1–1.2)
BUN BLD-MCNC: 14 MG/DL (ref 6–20)
BUN/CREAT SERPL: 17.7 (ref 7.3–30)
CALCIUM SPEC-SCNC: 8.8 MG/DL (ref 8.5–10.2)
CHLORIDE SERPL-SCNC: 102 MMOL/L (ref 98–107)
CO2 SERPL-SCNC: 28 MMOL/L (ref 22–29)
CREAT BLD-MCNC: 0.79 MG/DL (ref 0.7–1.3)
DEPRECATED RDW RBC AUTO: 66.4 FL (ref 37–49)
EOSINOPHIL # BLD AUTO: 0.05 10*3/MM3 (ref 0–0.36)
EOSINOPHIL NFR BLD AUTO: 1.2 % (ref 1–5)
ERYTHROCYTE [DISTWIDTH] IN BLOOD BY AUTOMATED COUNT: 16.7 % (ref 11.7–14.5)
GFR SERPL CREATININE-BSD FRML MDRD: 98 ML/MIN/1.73
GLOBULIN UR ELPH-MCNC: 2.2 GM/DL (ref 1.8–3.5)
GLUCOSE BLD-MCNC: 84 MG/DL (ref 74–124)
HCT VFR BLD AUTO: 25.9 % (ref 40–49)
HGB BLD-MCNC: 8.7 G/DL (ref 13.5–16.5)
HOLD SPECIMEN: NORMAL
IMM GRANULOCYTES # BLD: 0.02 10*3/MM3 (ref 0–0.03)
IMM GRANULOCYTES NFR BLD: 0.5 % (ref 0–0.5)
LYMPHOCYTES # BLD AUTO: 1.1 10*3/MM3 (ref 1–3.5)
LYMPHOCYTES NFR BLD AUTO: 25.8 % (ref 20–49)
MCH RBC QN AUTO: 38.5 PG (ref 27–33)
MCHC RBC AUTO-ENTMCNC: 33.6 G/DL (ref 32–35)
MCV RBC AUTO: 114.6 FL (ref 83–97)
MONOCYTES # BLD AUTO: 0.51 10*3/MM3 (ref 0.25–0.8)
MONOCYTES NFR BLD AUTO: 11.9 % (ref 4–12)
NEUTROPHILS # BLD AUTO: 2.58 10*3/MM3 (ref 1.5–7)
NEUTROPHILS NFR BLD AUTO: 60.4 % (ref 39–75)
NRBC BLD MANUAL-RTO: 0 /100 WBC (ref 0–0)
PLATELET # BLD AUTO: 124 10*3/MM3 (ref 150–375)
PMV BLD AUTO: 9.1 FL (ref 8.9–12.1)
POTASSIUM BLD-SCNC: 4 MMOL/L (ref 3.5–4.7)
PROT SERPL-MCNC: 5.3 G/DL (ref 6.3–8)
RBC # BLD AUTO: 2.26 10*6/MM3 (ref 4.3–5.5)
SODIUM BLD-SCNC: 143 MMOL/L (ref 134–145)
WBC NRBC COR # BLD: 4.27 10*3/MM3 (ref 4–10)

## 2017-10-06 PROCEDURE — 85025 COMPLETE CBC W/AUTO DIFF WBC: CPT | Performed by: NURSE PRACTITIONER

## 2017-10-06 PROCEDURE — 36415 COLL VENOUS BLD VENIPUNCTURE: CPT | Performed by: NURSE PRACTITIONER

## 2017-10-06 PROCEDURE — 77386 CHG INTENSITY MODULATED RADIATION TX DLVR COMPLEX: CPT | Performed by: RADIOLOGY

## 2017-10-06 PROCEDURE — 99213 OFFICE O/P EST LOW 20 MIN: CPT | Performed by: NURSE PRACTITIONER

## 2017-10-06 PROCEDURE — 77386: CPT | Performed by: RADIOLOGY

## 2017-10-06 PROCEDURE — 80053 COMPREHEN METABOLIC PANEL: CPT | Performed by: NURSE PRACTITIONER

## 2017-10-06 PROCEDURE — 77014 CHG CT GUIDANCE RADIATION THERAPY FLDS PLACEMENT: CPT | Performed by: RADIOLOGY

## 2017-10-06 RX ORDER — DOXAZOSIN MESYLATE 4 MG/1
4 TABLET ORAL DAILY
Qty: 30 TABLET | Refills: 0 | Status: SHIPPED | OUTPATIENT
Start: 2017-10-06 | End: 2017-10-27

## 2017-10-06 NOTE — PROGRESS NOTES
REASON FOR FOLLOW-UP:    1. 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  2.  Plans for combined radiation and chemotherapy as definitive treatment with cisplatin and Taxol initiated on 8/10/2017 concurrent with radiation.  He will receive Taxol on day 1 and cisplatin on day 2 with IV fluid hydration.  3.  Long smoking history with COPD.  4.  Mediport and PEG tube placed by Dr. Valenzuela on 7/31/2017.  Subsequent PEG tube infection requiring antibiotics.    HISTORY OF PRESENT ILLNESS:  King Parson is a 67 y.o. male with the above-mentioned history here today for follow-up on his hypotension, and anemia, and dizzy spells.  The patient completed chemotherapy on 9/15/2017.  He received 6 weekly doses of Taxol/ cisplatin.  The seventh dose was held due to significant moist desquamation of the skin as well as worsening blood counts.  Mr. Parson should finish his radiation on Monday, 10/2/2017.  He reports he has been gradually increasing his diet.  He is eating soft foods such as scrambled eggs, and even tried some lunch meat.  He is also continuing to sip liquids.  He is using his PEG tube, getting in 4 cans of Jevity, along with free water each day.  He feels like he is getting in adequate amounts of fluid.    Patient denies any further episodes of dizzy spells.  He has decreased his Cardizem to once daily, and continues on Cardura once daily as well.      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  This diagnosis was made by FNA of the neck node on 6/23/2017.  He is undergone staging CT scans on 6/6/2017 and a PET scan on 7/6/2017.  His disease appears to be clinical stage TIII N2 a stage IV a squamous carcinoma of the hypopharynx.     He was originally referred to the [facility] and was seen  by Dr. Moreno Camp of radiation oncology on 7/14/2017.  The patient lives in [place] and has some transportation difficulties.  He has a sister who works in the lab at [facility] and for this reason they have decided to receive their treatment in the [facility] system.     He was seen initially by Dr. Garcia in the company of 2 of his sisters.  We recommended combined chemotherapy and radiation.  He will be seeing Dr. Grayson of the [facility] to discuss the radiation portion of his treatment.  We plan to deliver weekly chemotherapy during radiation with combination of cisplatin and Taxol.  Taxol will be delivered day 1 and cisplatin be delivered along with hydration on day 2 each week.    He saw Dr. Grayson who was in agreement with concurrent chemotherapy and radiation.    Mediport and PEG tube placed by Dr. Valenzuela on 7/31/2017.  Subsequent PEG tube infection requiring antibiotics.    He saw nutrition.  He had a chemotherapy education session.    Cycle #1 of therapy was initiated on 8/10/2017.    Patient completed his chemotherapy (6 cycles) on 9/14/2017    Radiation therapy completed 9/27/2017      PAST MEDICAL, SURGICAL, FAMILY, AND SOCIAL HISTORIES were reviewed with the patient and in the electronic medical record, and were updated if indicated.    ALLERGIES:  Allergies   Allergen Reactions   • Codeine      He dont like to take it because it makes him feel strange.        MEDICATIONS:  The medication list has been reviewed with the patient by the medical assistant, and the list has been updated in the electronic medical record, which I reviewed.  Medication dosages and frequencies were confirmed to be accurate.    I have reviewed the patient's medical history in detail and updated the computerized patient record.    Review of Systems   Constitutional: Positive for fatigue. Negative for appetite change, chills, fever and unexpected weight change.   HENT:   Negative for nosebleeds.         See  "HPI     Respiratory: Negative for cough and shortness of breath.    Cardiovascular: Negative for chest pain and leg swelling.   Gastrointestinal: Negative for abdominal pain, constipation, diarrhea, nausea and vomiting.   Endocrine: Negative for hot flashes.   Genitourinary: Negative for difficulty urinating.    Musculoskeletal: Negative for arthralgias and myalgias.   Skin:        Radiation dermatitis see history of present illness   Neurological: Negative for dizziness and extremity weakness.   Hematological: Negative for adenopathy. Does not bruise/bleed easily.   Psychiatric/Behavioral: Negative for sleep disturbance.       Vitals:    10/06/17 1107   BP: 90/60   Pulse: 76   Resp: 16   Temp: 98.1 °F (36.7 °C)   Weight: 134 lb 6.4 oz (61 kg)   Height: 67.13\" (170.5 cm)   PainSc: 5  Comment: neck and back       Physical Exam   Constitutional: He is oriented to person, place, and time. He appears well-developed and well-nourished.   HENT:   Head: Normocephalic and atraumatic.   Nose: Nose normal.   Mouth/Throat: Mucous membranes are normal.   Mild grade 1 mucositis in the posterior pharynx   Eyes: Pupils are equal, round, and reactive to light.   Neck: Normal range of motion. Neck supple.   Cardiovascular: Normal rate, regular rhythm and normal heart sounds.    Pulmonary/Chest: Effort normal and breath sounds normal. No respiratory distress. He has no wheezes. He has no rhonchi. He has no rales.   Abdominal: Soft. Normal appearance and bowel sounds are normal. He exhibits no distension. There is no hepatosplenomegaly. There is no tenderness.   Musculoskeletal: Normal range of motion. He exhibits no edema.   Neurological: He is alert and oriented to person, place, and time.   Skin: Skin is warm and dry.   Psychiatric: He has a normal mood and affect. His behavior is normal.   Nursing note and vitals reviewed.    DIAGNOSTIC DATA:  Lab Results   Component Value Date    WBC 4.27 10/06/2017    HGB 8.7 (L) 10/06/2017    " HCT 25.9 (L) 10/06/2017    .6 (H) 10/06/2017     (L) 10/06/2017     Lab Results   Component Value Date    NEUTROABS 2.58 10/06/2017       Lab Results   Component Value Date    GLUCOSE 84 10/06/2017    BUN 14 10/06/2017    CREATININE 0.79 10/06/2017    EGFRIFNONA 98 10/06/2017    BCR 17.7 10/06/2017    K 4.0 10/06/2017    CO2 28.0 10/06/2017    CALCIUM 8.8 10/06/2017    ALBUMIN 3.10 (L) 10/06/2017    LABIL2 1.4 10/06/2017    AST 13 10/06/2017    ALT 8 10/06/2017       IMAGING:  None reviewed    ASSESSMENT:  This is a 67 y.o. male with:  1.  Stage IV a (T3, N2 a, M0) squamous cell carcinoma of the hypopharynx with metastatic lymphadenopathy to the right neck.Received 6 weeks of Taxol/cisplatin.  Week #7 held due to wet desquamation in radiation field and myelosuppression.  [place] continues with the patient still unable to complete radiation.  Roughly 5-6 treatments remaining.  2.  Comorbidities including COPD.  3.  Recent PEG tube infection. Well healed at this point  4.  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  She continues to use lidocaine followed by Silvadene and this is helping.  5.  Pain secondary to radiation burns and dysphagia.  The patient continues on fentanyl patch 25 µg and liquid morphine.  He is requesting a has been given a refill of morphine today.  6.  Previous mucositis almost completely resolved now with the patient being 2+ weeks out from chemotherapy and radiation.   7.  Previous weight loss and poor intake due to underlying disease and accompanying treatment.  8.  Myelosuppression secondary to chemotherapy.  His hemoglobin is 8.7 today.  His white blood cell count is improving at 4.27.  He is asymptomatic currently from his anemia.  We will continue to closely monitor.   9. Hypotension with dizzy spells: His blood pressure today is 90/60.  He does not feel like he is in need of IV fluids today.  He reports that he is no longer having difficulty spells, and overall  is feeling better.  He is taking the diltiazem once daily, and the Cardura once daily as well.  We will discontinue his Cardizem for now, I advised the patient to continue on his Cardura, and monitor his blood pressure at home.      PLAN:  1. Discontinue diltiazem.  2. Return on 10/11/2017 for follow-up with Dr. Garcia with a repeat CBC at that time.  3. Continue hydration and nutrition through PEG tube.  Also continue to advance small sips and soft foods orally.  BETTE Monroe

## 2017-10-09 ENCOUNTER — DOCUMENTATION (OUTPATIENT)
Dept: NUTRITION | Facility: HOSPITAL | Age: 67
End: 2017-10-09

## 2017-10-09 PROCEDURE — 77386: CPT | Performed by: RADIOLOGY

## 2017-10-09 PROCEDURE — 77386 CHG INTENSITY MODULATED RADIATION TX DLVR COMPLEX: CPT | Performed by: RADIOLOGY

## 2017-10-09 PROCEDURE — 77014 CHG CT GUIDANCE RADIATION THERAPY FLDS PLACEMENT: CPT | Performed by: RADIOLOGY

## 2017-10-09 NOTE — PROGRESS NOTES
ONC Nutrition Follow Up:     Weight 134# 10/6/17.    Today is final day of radiation treatments.  He has completed chemotherapy.   Continues to use PEG, infusing 4 cans Jevity 1.5 per day, flushing with water before and after.  He is also beginning to eat some soft foods, drinking water.     He plans to continue using the PEG and work on eating more. Encouraged patient to call with any issues or questions.      Will follow.

## 2017-10-10 DIAGNOSIS — C13.9 SQUAMOUS CELL CARCINOMA OF HYPOPHARYNX (HCC): Primary | ICD-10-CM

## 2017-10-11 ENCOUNTER — OFFICE VISIT (OUTPATIENT)
Dept: ONCOLOGY | Facility: CLINIC | Age: 67
End: 2017-10-11

## 2017-10-11 ENCOUNTER — INFUSION (OUTPATIENT)
Dept: ONCOLOGY | Facility: HOSPITAL | Age: 67
End: 2017-10-11

## 2017-10-11 VITALS
HEIGHT: 67 IN | RESPIRATION RATE: 18 BRPM | TEMPERATURE: 98.2 F | DIASTOLIC BLOOD PRESSURE: 58 MMHG | BODY MASS INDEX: 20.56 KG/M2 | WEIGHT: 131 LBS | SYSTOLIC BLOOD PRESSURE: 122 MMHG | HEART RATE: 82 BPM | OXYGEN SATURATION: 97 %

## 2017-10-11 DIAGNOSIS — Z45.2 ENCOUNTER FOR FITTING AND ADJUSTMENT OF VASCULAR CATHETER: ICD-10-CM

## 2017-10-11 DIAGNOSIS — C13.9 SQUAMOUS CELL CARCINOMA OF HYPOPHARYNX (HCC): Primary | ICD-10-CM

## 2017-10-11 DIAGNOSIS — C14.0 THROAT CANCER (HCC): ICD-10-CM

## 2017-10-11 DIAGNOSIS — K12.33 MUCOSITIS DUE TO RADIATION THERAPY: ICD-10-CM

## 2017-10-11 LAB
ALBUMIN SERPL-MCNC: 3.2 G/DL (ref 3.5–5.2)
ALBUMIN/GLOB SERPL: 1.2 G/DL (ref 1.1–2.4)
ALP SERPL-CCNC: 68 U/L (ref 38–116)
ALT SERPL W P-5'-P-CCNC: 7 U/L (ref 0–41)
ANION GAP SERPL CALCULATED.3IONS-SCNC: 12.6 MMOL/L
AST SERPL-CCNC: 14 U/L (ref 0–40)
BASOPHILS # BLD AUTO: 0.01 10*3/MM3 (ref 0–0.1)
BASOPHILS NFR BLD AUTO: 0.3 % (ref 0–1.1)
BILIRUB SERPL-MCNC: 0.4 MG/DL (ref 0.1–1.2)
BUN BLD-MCNC: 14 MG/DL (ref 6–20)
BUN/CREAT SERPL: 20 (ref 7.3–30)
CALCIUM SPEC-SCNC: 8.9 MG/DL (ref 8.5–10.2)
CHLORIDE SERPL-SCNC: 101 MMOL/L (ref 98–107)
CO2 SERPL-SCNC: 27.4 MMOL/L (ref 22–29)
CREAT BLD-MCNC: 0.7 MG/DL (ref 0.7–1.3)
DEPRECATED RDW RBC AUTO: 72.4 FL (ref 37–49)
EOSINOPHIL # BLD AUTO: 0.04 10*3/MM3 (ref 0–0.36)
EOSINOPHIL NFR BLD AUTO: 1.2 % (ref 1–5)
ERYTHROCYTE [DISTWIDTH] IN BLOOD BY AUTOMATED COUNT: 17.9 % (ref 11.7–14.5)
GFR SERPL CREATININE-BSD FRML MDRD: 112 ML/MIN/1.73
GLOBULIN UR ELPH-MCNC: 2.6 GM/DL (ref 1.8–3.5)
GLUCOSE BLD-MCNC: 82 MG/DL (ref 74–124)
HCT VFR BLD AUTO: 26.8 % (ref 40–49)
HGB BLD-MCNC: 9.2 G/DL (ref 13.5–16.5)
IMM GRANULOCYTES # BLD: 0.02 10*3/MM3 (ref 0–0.03)
IMM GRANULOCYTES NFR BLD: 0.6 % (ref 0–0.5)
LYMPHOCYTES # BLD AUTO: 0.95 10*3/MM3 (ref 1–3.5)
LYMPHOCYTES NFR BLD AUTO: 27.5 % (ref 20–49)
MCH RBC QN AUTO: 39 PG (ref 27–33)
MCHC RBC AUTO-ENTMCNC: 34.3 G/DL (ref 32–35)
MCV RBC AUTO: 113.6 FL (ref 83–97)
MONOCYTES # BLD AUTO: 0.4 10*3/MM3 (ref 0.25–0.8)
MONOCYTES NFR BLD AUTO: 11.6 % (ref 4–12)
NEUTROPHILS # BLD AUTO: 2.03 10*3/MM3 (ref 1.5–7)
NEUTROPHILS NFR BLD AUTO: 58.8 % (ref 39–75)
NRBC BLD MANUAL-RTO: 0 /100 WBC (ref 0–0)
PLATELET # BLD AUTO: 155 10*3/MM3 (ref 150–375)
PMV BLD AUTO: 9 FL (ref 8.9–12.1)
POTASSIUM BLD-SCNC: 4 MMOL/L (ref 3.5–4.7)
PROT SERPL-MCNC: 5.8 G/DL (ref 6.3–8)
RBC # BLD AUTO: 2.36 10*6/MM3 (ref 4.3–5.5)
SODIUM BLD-SCNC: 141 MMOL/L (ref 134–145)
WBC NRBC COR # BLD: 3.45 10*3/MM3 (ref 4–10)

## 2017-10-11 PROCEDURE — 96523 IRRIG DRUG DELIVERY DEVICE: CPT | Performed by: INTERNAL MEDICINE

## 2017-10-11 PROCEDURE — 85025 COMPLETE CBC W/AUTO DIFF WBC: CPT

## 2017-10-11 PROCEDURE — 80053 COMPREHEN METABOLIC PANEL: CPT

## 2017-10-11 PROCEDURE — 99213 OFFICE O/P EST LOW 20 MIN: CPT | Performed by: INTERNAL MEDICINE

## 2017-10-11 PROCEDURE — 25010000002 HEPARIN FLUSH (PORCINE) 100 UNIT/ML SOLUTION: Performed by: INTERNAL MEDICINE

## 2017-10-11 RX ORDER — SODIUM CHLORIDE 0.9 % (FLUSH) 0.9 %
10 SYRINGE (ML) INJECTION AS NEEDED
Status: CANCELLED | OUTPATIENT
Start: 2017-10-11

## 2017-10-11 RX ORDER — SODIUM CHLORIDE 0.9 % (FLUSH) 0.9 %
10 SYRINGE (ML) INJECTION AS NEEDED
Status: ACTIVE | OUTPATIENT
Start: 2017-10-11

## 2017-10-11 RX ADMIN — SODIUM CHLORIDE, PRESERVATIVE FREE 500 UNITS: 5 INJECTION INTRAVENOUS at 13:11

## 2017-10-11 RX ADMIN — Medication 10 ML: at 13:10

## 2017-10-11 NOTE — PROGRESS NOTES
REASON FOR FOLLOW-UP:    1. 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  2.  Plans for combined radiation and chemotherapy as definitive treatment with cisplatin and Taxol initiated on 8/10/2017 concurrent with radiation.  He will receive Taxol on day 1 and cisplatin on day 2 with IV fluid hydration.  3.  Long smoking history with COPD.  4.  Mediport and PEG tube placed by Dr. Valenzuela on 7/31/2017.  Subsequent PEG tube infection requiring antibiotics.  5.  Chemotherapy completed 9/15/2017 and radiation therapy completed 10/9/2017.    HISTORY OF PRESENT ILLNESS:  King Parson is a 67 y.o. male with the above-mentioned history here today for follow-up of his squamous cell carcinoma of the head and neck treated with combined chemotherapy and radiation.  The patient completed chemotherapy on 9/15/2017.  He received 6 weekly doses of Taxol/ cisplatin.  The seventh dose was held due to significant moist desquamation of the skin as well as worsening blood counts.     He was able to complete his radiation therapy on 10/9/2017 and is here for his first visit back since completing all treatment.  He reports his skin is healing nicely.  His oral mucositis has improved and his oral intake has improved.  He still has a PEG tube in place and is using this to supplement his fluid and nutrition but is now able to eat scrambled eggs and drink nutritional supplements orally.    His blood counts are improving and he actually looks good in the office today.  We discussed repeating a PET scan about 5 weeks from now and seeing him back in the office to review those results in 6 weeks.  On the next visit we can discuss whether or not to consider removing his PEG tube.  He will need to continue to have his port flushed every 6 weeks also.      ONCOLOGIC HISTORY:  He was referred to us by his ENT physician due to the new diagnosis of squamous cell carcinoma of  the hypopharynx with metastatic lymphadenopathy in the right neck.  This diagnosis was made by FNA of the neck node on 6/23/2017.  He is undergone staging CT scans on 6/6/2017 and a PET scan on 7/6/2017.  His disease appears to be clinical stage TIII N2 a stage IV a squamous carcinoma of the hypopharynx.     He was originally referred to the [facility] and was seen by Dr. Moreno Camp of radiation oncology on 7/14/2017.  The patient lives in [place] and has some transportation difficulties.  He has a sister who works in the lab at [facility] and for this reason they have decided to receive their treatment in the [facility] system.     He was seen initially by Dr. Garcia in the company of 2 of his sisters.  We recommended combined chemotherapy and radiation.  He will be seeing Dr. Grayson of the [facility] to discuss the radiation portion of his treatment.  We plan to deliver weekly chemotherapy during radiation with combination of cisplatin and Taxol.  Taxol will be delivered day 1 and cisplatin be delivered along with hydration on day 2 each week.    He saw Dr. Grayson who was in agreement with concurrent chemotherapy and radiation.    Mediport and PEG tube placed by Dr. Valenzuela on 7/31/2017.  Subsequent PEG tube infection requiring antibiotics.    He saw nutrition.  He had a chemotherapy education session.    Cycle #1 of therapy was initiated on 8/10/2017.    Patient completed his chemotherapy (6 cycles) on 9/14/2017    Radiation therapy completed 10/9/2017.      PAST MEDICAL, SURGICAL, FAMILY, AND SOCIAL HISTORIES were reviewed with the patient and in the electronic medical record, and were updated if indicated.    ALLERGIES:  Allergies   Allergen Reactions   • Codeine      He dont like to take it because it makes him feel strange.        MEDICATIONS:  The medication list has been reviewed with the patient by the medical assistant, and the list has been updated in the electronic medical  "record, which I reviewed.  Medication dosages and frequencies were confirmed to be accurate.    I have reviewed the patient's medical history in detail and updated the computerized patient record.    Review of Systems   Constitutional: Positive for fatigue. Negative for appetite change, chills, fever and unexpected weight change.   HENT:   Negative for nosebleeds.         See HPI     Respiratory: Negative for cough and shortness of breath.    Cardiovascular: Negative for chest pain and leg swelling.   Gastrointestinal: Negative for abdominal pain, constipation, diarrhea, nausea and vomiting.   Endocrine: Negative for hot flashes.   Genitourinary: Negative for difficulty urinating.    Musculoskeletal: Negative for arthralgias and myalgias.   Skin:        Radiation dermatitis see history of present illness   Neurological: Negative for dizziness and extremity weakness.   Hematological: Negative for adenopathy. Does not bruise/bleed easily.   Psychiatric/Behavioral: Negative for sleep disturbance.       Vitals:    10/11/17 1220   BP: 122/58   Pulse: 82   Resp: 18   Temp: 98.2 °F (36.8 °C)   TempSrc: Oral   SpO2: 97%   Weight: 131 lb (59.4 kg)   Height: 67.13\" (170.5 cm)   PainSc:   5   PainLoc: Throat       Physical Exam   Constitutional: He is oriented to person, place, and time. He appears well-developed and well-nourished.   HENT:   Head: Normocephalic and atraumatic.   Nose: Nose normal.   Mouth/Throat: Mucous membranes are normal.   Mild grade 1 mucositis in the posterior pharynx   Eyes: Pupils are equal, round, and reactive to light.   Neck: Normal range of motion. Neck supple.   Cardiovascular: Normal rate, regular rhythm and normal heart sounds.    Pulmonary/Chest: Effort normal and breath sounds normal. No respiratory distress. He has no wheezes. He has no rhonchi. He has no rales.   Abdominal: Soft. Normal appearance and bowel sounds are normal. He exhibits no distension. There is no hepatosplenomegaly. There " is no tenderness.   Musculoskeletal: Normal range of motion. He exhibits no edema.   Neurological: He is alert and oriented to person, place, and time.   Skin: Skin is warm and dry.   Psychiatric: He has a normal mood and affect. His behavior is normal.   Nursing note and vitals reviewed.    DIAGNOSTIC DATA:  Lab Results   Component Value Date    WBC 3.45 (L) 10/11/2017    HGB 9.2 (L) 10/11/2017    HCT 26.8 (L) 10/11/2017    .6 (H) 10/11/2017     10/11/2017     Lab Results   Component Value Date    NEUTROABS 2.03 10/11/2017       Lab Results   Component Value Date    GLUCOSE 82 10/11/2017    BUN 14 10/11/2017    CREATININE 0.70 10/11/2017    EGFRIFNONA 112 10/11/2017    BCR 20.0 10/11/2017    K 4.0 10/11/2017    CO2 27.4 10/11/2017    CALCIUM 8.9 10/11/2017    ALBUMIN 3.20 (L) 10/11/2017    LABIL2 1.2 10/11/2017    AST 14 10/11/2017    ALT 7 10/11/2017       IMAGING:  None reviewed    ASSESSMENT:  This is a 67 y.o. male with:  1.  Stage IV a (T3, N2 a, M0) squamous cell carcinoma of the hypopharynx with metastatic lymphadenopathy to the right neck.Received 6 weeks of Taxol/cisplatin.  Week #7 held due to wet desquamation in radiation field and myelosuppression.  Chemotherapy was completed 9/15/2017 and radiation completed earlier this week on 10/9/2017.  2.  Comorbidities including COPD.  3.  Recent PEG tube infection. Well healed at this point  4.  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  She continues to use lidocaine followed by Silvadene and this is helping.  5.  Pain secondary to radiation burns and dysphagia.  The patient continues on fentanyl patch 25 µg and liquid morphine.  He is requesting a has been given a refill of morphine today.  6.  Previous weight loss and poor intake due to underlying disease and accompanying treatment.   7.  Hypotension with dizzy spells: He required modification of his antihypertensive medication and this has improved his symptoms  significantly.    PLAN:  1. Patient is now completed all of his planned chemotherapy and radiation and will be given time to recuperate.  We will plan to see him back in the office with repeat PET/CT scan about 6 weeks from now.  2. Continue hydration and nutrition through PEG tube.  Also continue to advance soft foods orally.  3. He will need to have his port flushed every 4-6 weeks.    Aakash Garcia MD

## 2017-10-16 ENCOUNTER — TELEPHONE (OUTPATIENT)
Dept: OTHER | Facility: HOSPITAL | Age: 67
End: 2017-10-16

## 2017-10-16 NOTE — TELEPHONE ENCOUNTER
Call placed to patient RE: offer treatment summary and/or survivorship clinic appointment. Left VM. We have been unable to reach patient after 3 attempts/VMs. Survivorship clinic informational handout and contact information mailed to patient.

## 2017-10-26 ENCOUNTER — OFFICE VISIT (OUTPATIENT)
Dept: RADIATION ONCOLOGY | Facility: HOSPITAL | Age: 67
End: 2017-10-26

## 2017-10-26 VITALS
DIASTOLIC BLOOD PRESSURE: 83 MMHG | RESPIRATION RATE: 16 BRPM | WEIGHT: 126 LBS | HEART RATE: 106 BPM | SYSTOLIC BLOOD PRESSURE: 118 MMHG | TEMPERATURE: 97 F | BODY MASS INDEX: 19.66 KG/M2 | OXYGEN SATURATION: 98 %

## 2017-10-26 DIAGNOSIS — C13.9 SQUAMOUS CELL CANCER OF HYPOPHARYNX (HCC): ICD-10-CM

## 2017-10-26 DIAGNOSIS — C13.9 SQUAMOUS CELL CARCINOMA OF HYPOPHARYNX (HCC): Primary | ICD-10-CM

## 2017-10-26 PROCEDURE — 99024 POSTOP FOLLOW-UP VISIT: CPT | Performed by: RADIOLOGY

## 2017-10-26 NOTE — PROGRESS NOTES
DIAGNOSIS and REASON FOR FOLLOW UP: Squamous cell carcinoma of hypopharynx      Patient Care Team:  BETTE Guillen as PCP - General  BETTE Ruvalcaba as PCP - Claims Attributed  Clinton Styles MD as Referring Physician (Otolaryngology)  Aakash Garcia MD as Consulting Physician (Hematology and Oncology)  Cosmo Conway MD as Consulting Physician (Cardiology)  Edgar Prince MD as Consulting Physician (Pain Medicine)  John Presley MD as Consulting Physician (Hematology and Oncology)    CHIEF COMPLAINT:  Post-radiation follow up  I had the pleasure of seeing King Parson  back in the department today, now approximately 2 weeks out from the radiation therapy portion of his treatment for the above mentioned diagnosis.    Clinically he is doing wonderfully well.  He is already back to eating solid foods. He states his taste is improving and he has minimal to no pain. He continues to use the tube feeds as well but is trying to move from the tube to oral foods and supplements. He is without he pain patch now and using only his baseline back pain meds. He is thrilled with the progress though discouraged that he is not yet gaining weight. Marli also met with him this morning in that regard.    Past Medical History: he  has a past medical history of Asthma; Bruises easily; COPD (chronic obstructive pulmonary disease); GERD (gastroesophageal reflux disease); History of chest pain (02/01/2017); Hyperlipidemia; Hypertension; Low back pain; Neck mass; Osteoarthritis; Squamous cell cancer of hypopharynx (2017); and Vision loss of left eye.   No history exists.       Past Surgical History:  he has a past surgical history that includes Epidural block injection; Elbow arthrotomy (Bilateral); Hand Tendon Repair; Eye surgery (Left, 1993); Incision and Drainage Abscess (Left); Venous Access Device (Port) (N/A, 7/31/2017); Esophagogastroduodenoscopy w/ PEG (N/A, 7/31/2017); and Abdominal  surgery.    Meds:    Current Outpatient Prescriptions:   •  albuterol (PROVENTIL HFA;VENTOLIN HFA) 108 (90 BASE) MCG/ACT inhaler, Inhale 2 puffs 2 (two) times a day., Disp: , Rfl:   •  beclomethasone (QVAR) 80 MCG/ACT inhaler, Inhale 2 puffs 2 (Two) Times a Day., Disp: , Rfl:   •  doxazosin (CARDURA) 4 MG tablet, Take 1 tablet by mouth Daily., Disp: 30 tablet, Rfl: 0  •  famotidine (PEPCID) 20 MG tablet, Take 1 tablet by mouth Daily., Disp: 30 tablet, Rfl: 1  •  gabapentin (NEURONTIN) 800 MG tablet, Take 1 tablet by mouth 4 (Four) Times a Day., Disp: 120 tablet, Rfl: 5  •  celecoxib (CeleBREX) 200 MG capsule, Take 200 mg by mouth Daily., Disp: , Rfl:   •  desonide (DESOWEN) 0.05 % cream, Apply 1 application topically Daily As Needed., Disp: , Rfl:   •  diltiazem SR (CARDIZEM SR) 120 MG 12 hr capsule, Take 120 mg by mouth 2 (Two) Times a Day., Disp: , Rfl:   •  diphenoxylate-atropine (LOMOTIL) 2.5-0.025 MG per tablet, Take 1 tablet by mouth 4 (Four) Times a Day As Needed for Diarrhea., Disp: 30 tablet, Rfl: 1  •  lidocaine (XYLOCAINE) 2 % jelly, Apply  topically As Needed for Mild Pain ., Disp: 85 g, Rfl: 2  •  morphine 100 MG/5ML solution concentrated solution, Take 0.5 mL by mouth Every 3 (Three) Hours As Needed (for severe pain) for up to 30 days. 10mg -20mg  Which is 0.5mL--1mL, Disp: 120 mL, Rfl: 0  •  nicotine (NICODERM CQ) 21 MG/24HR patch, Place 1 patch on the skin Daily., Disp: , Rfl:   •  nystatin (MYCOSTATIN) 841725 UNIT/ML suspension, , Disp: , Rfl:   •  omeprazole (priLOSEC) 40 MG capsule, Take 40 mg by mouth Daily., Disp: , Rfl:   •  ondansetron (ZOFRAN) 8 MG tablet, Take 1 tablet by mouth 3 (Three) Times a Day As Needed for Nausea or Vomiting., Disp: 30 tablet, Rfl: 5  •  silver sulfadiazine (SILVADENE) 1 % cream, Apply  topically 2 (Two) Times a Day., Disp: 50 g, Rfl: 2  No current facility-administered medications for this visit.     Facility-Administered Medications Ordered in Other Visits:   •   heparin flush (porcine) 100 UNIT/ML injection 500 Units, 500 Units, Intravenous, PRN, John Presley MD, 500 Units at 09/28/17 1013  •  heparin flush (porcine) 100 UNIT/ML injection 500 Units, 500 Units, Intravenous, PRN, John Presley MD, 500 Units at 10/11/17 1311  •  sodium chloride 0.9 % flush 10 mL, 10 mL, Intravenous, PRJohn COHN MD, 10 mL at 09/28/17 1013  •  sodium chloride 0.9 % flush 10 mL, 10 mL, Intravenous, PRNJohn MD, 10 mL at 10/11/17 1310    Allergies:    Allergies   Allergen Reactions   • Codeine      He dont like to take it because it makes him feel strange.        Family History:  his family history includes Cancer in his father; Diabetes in his brother; Heart attack in his brother and sister; Heart disease in his brother and sister; Hypertension in his other; Prostate cancer in his father. There is no history of Malig Hyperthermia.    Social History:  he  reports that he has been smoking Cigarettes.  He has a 19.00 pack-year smoking history. He does not have any smokeless tobacco history on file. He reports that he does not drink alcohol or use illicit drugs.    Pertinent Findings on Review of Systems:  Fourteen systems have been reviewed with the patient and are negative other than as mentioned above.    Performance Status:  (2) Ambulatory and capable of self care, unable to carry out work activity, up and about > 50% or waking hours    Pertinent Findings on Physical Exam:  Vitals:    10/26/17 0941   BP: 118/83   Pulse: 106   Resp: 16   Temp: 97 °F (36.1 °C)   TempSrc: Tympanic   SpO2: 98%   Weight: 126 lb (57.2 kg)   PainSc: 0-No pain       Physical Exam   Constitutional: He is oriented to person, place, and time.   Thin   HENT:   Head: Normocephalic and atraumatic.   Eyes: EOM are normal.   Neck: Normal range of motion.   Hyperpigmentation on bilateral necks, no desquam.   Pulmonary/Chest: Effort normal.   Abdominal: Soft.   Musculoskeletal: Normal range of motion.    Lymphadenopathy:   Palpable nodule in high right neck, smaller since conclusion of radiation therapy - measuring 1.5 x 1.5 cm.   Neurological: He is alert and oriented to person, place, and time.   Skin: Skin is warm and dry.   Psychiatric: He has a normal mood and affect. His behavior is normal. Judgment and thought content normal.   Vitals reviewed.        Assessment:  Squamous cell carcinoma of hypopharynx - recovering well      Plan:   We discussed the progression of the eating and the desire to remove the feeding tube. He understands the calories he needs to take in and Marli reviewed that today as well. He is currently without any nausea, dysphagia or pain. He is increasing his diet as appropriate.    We reviewed his upcoming PET scan schedule for November 15th and follow up with the [facility] physicians on November 21st. I recommended we get him back to Dr. Styles shortly thereafter - approximately 6 weeks out from treatment for initiation of follow up. We will need to reevaluate the right palpable abnormality both with the PET and with time as it continues to respond. He understands there may be a need for biopsy or excision based on the above. We will call him with the above appointments.

## 2017-10-27 ENCOUNTER — TELEPHONE (OUTPATIENT)
Dept: OTHER | Facility: HOSPITAL | Age: 67
End: 2017-10-27

## 2017-10-27 ENCOUNTER — LAB (OUTPATIENT)
Dept: ONCOLOGY | Facility: HOSPITAL | Age: 67
End: 2017-10-27

## 2017-10-27 ENCOUNTER — TELEPHONE (OUTPATIENT)
Dept: ONCOLOGY | Facility: HOSPITAL | Age: 67
End: 2017-10-27

## 2017-10-27 ENCOUNTER — INFUSION (OUTPATIENT)
Dept: ONCOLOGY | Facility: HOSPITAL | Age: 67
End: 2017-10-27

## 2017-10-27 ENCOUNTER — OFFICE VISIT (OUTPATIENT)
Dept: ONCOLOGY | Facility: CLINIC | Age: 67
End: 2017-10-27

## 2017-10-27 VITALS
WEIGHT: 127 LBS | RESPIRATION RATE: 12 BRPM | SYSTOLIC BLOOD PRESSURE: 102 MMHG | OXYGEN SATURATION: 100 % | HEART RATE: 93 BPM | BODY MASS INDEX: 19.25 KG/M2 | DIASTOLIC BLOOD PRESSURE: 62 MMHG | HEIGHT: 68 IN | TEMPERATURE: 98.4 F

## 2017-10-27 DIAGNOSIS — Z45.2 ENCOUNTER FOR FITTING AND ADJUSTMENT OF VASCULAR CATHETER: ICD-10-CM

## 2017-10-27 DIAGNOSIS — R42 DIZZINESS: ICD-10-CM

## 2017-10-27 DIAGNOSIS — C14.0 THROAT CANCER (HCC): ICD-10-CM

## 2017-10-27 DIAGNOSIS — I95.9 HYPOTENSION, UNSPECIFIED HYPOTENSION TYPE: ICD-10-CM

## 2017-10-27 DIAGNOSIS — C13.9 SQUAMOUS CELL CARCINOMA OF HYPOPHARYNX (HCC): Primary | ICD-10-CM

## 2017-10-27 DIAGNOSIS — K12.33 MUCOSITIS DUE TO RADIATION THERAPY: ICD-10-CM

## 2017-10-27 DIAGNOSIS — R30.0 DYSURIA: ICD-10-CM

## 2017-10-27 DIAGNOSIS — C13.9 SQUAMOUS CELL CARCINOMA OF HYPOPHARYNX (HCC): ICD-10-CM

## 2017-10-27 LAB
ALBUMIN SERPL-MCNC: 3.9 G/DL (ref 3.5–5.2)
ALBUMIN/GLOB SERPL: 1.7 G/DL (ref 1.1–2.4)
ALP SERPL-CCNC: 70 U/L (ref 38–116)
ALT SERPL W P-5'-P-CCNC: 17 U/L (ref 0–41)
ANION GAP SERPL CALCULATED.3IONS-SCNC: 12.3 MMOL/L
AST SERPL-CCNC: 18 U/L (ref 0–40)
BACTERIA UR QL AUTO: NEGATIVE /HPF
BASOPHILS # BLD AUTO: 0.03 10*3/MM3 (ref 0–0.1)
BASOPHILS NFR BLD AUTO: 0.9 % (ref 0–1.1)
BILIRUB SERPL-MCNC: 0.4 MG/DL (ref 0.1–1.2)
BILIRUB UR QL STRIP: ABNORMAL
BUN BLD-MCNC: 13 MG/DL (ref 6–20)
BUN/CREAT SERPL: 15.3 (ref 7.3–30)
CALCIUM SPEC-SCNC: 9.3 MG/DL (ref 8.5–10.2)
CHLORIDE SERPL-SCNC: 106 MMOL/L (ref 98–107)
CLARITY UR: CLEAR
CO2 SERPL-SCNC: 23.7 MMOL/L (ref 22–29)
COLOR UR: YELLOW
CREAT BLD-MCNC: 0.85 MG/DL (ref 0.7–1.3)
DEPRECATED RDW RBC AUTO: 71.5 FL (ref 37–49)
EOSINOPHIL # BLD AUTO: 0.1 10*3/MM3 (ref 0–0.36)
EOSINOPHIL NFR BLD AUTO: 2.8 % (ref 1–5)
ERYTHROCYTE [DISTWIDTH] IN BLOOD BY AUTOMATED COUNT: 16.8 % (ref 11.7–14.5)
GFR SERPL CREATININE-BSD FRML MDRD: 90 ML/MIN/1.73
GLOBULIN UR ELPH-MCNC: 2.3 GM/DL (ref 1.8–3.5)
GLUCOSE BLD-MCNC: 91 MG/DL (ref 74–124)
GLUCOSE UR STRIP-MCNC: NEGATIVE MG/DL
HCT VFR BLD AUTO: 35.5 % (ref 40–49)
HGB BLD-MCNC: 12.3 G/DL (ref 13.5–16.5)
HGB UR QL STRIP.AUTO: NEGATIVE
IMM GRANULOCYTES # BLD: 0 10*3/MM3 (ref 0–0.03)
IMM GRANULOCYTES NFR BLD: 0 % (ref 0–0.5)
KETONES UR QL STRIP: NEGATIVE
LEUKOCYTE ESTERASE UR QL STRIP.AUTO: NEGATIVE
LYMPHOCYTES # BLD AUTO: 1.25 10*3/MM3 (ref 1–3.5)
LYMPHOCYTES NFR BLD AUTO: 35.6 % (ref 20–49)
MAGNESIUM SERPL-MCNC: 1.9 MG/DL (ref 1.8–2.5)
MCH RBC QN AUTO: 39.9 PG (ref 27–33)
MCHC RBC AUTO-ENTMCNC: 34.6 G/DL (ref 32–35)
MCV RBC AUTO: 115.3 FL (ref 83–97)
MONOCYTES # BLD AUTO: 0.46 10*3/MM3 (ref 0.25–0.8)
MONOCYTES NFR BLD AUTO: 13.1 % (ref 4–12)
NEUTROPHILS # BLD AUTO: 1.67 10*3/MM3 (ref 1.5–7)
NEUTROPHILS NFR BLD AUTO: 47.6 % (ref 39–75)
NITRITE UR QL STRIP: NEGATIVE
NRBC BLD MANUAL-RTO: 0 /100 WBC (ref 0–0)
PH UR STRIP.AUTO: 5.5 [PH] (ref 4.5–8)
PLATELET # BLD AUTO: 164 10*3/MM3 (ref 150–375)
PMV BLD AUTO: 9.5 FL (ref 8.9–12.1)
POTASSIUM BLD-SCNC: 4.3 MMOL/L (ref 3.5–4.7)
PROT SERPL-MCNC: 6.2 G/DL (ref 6.3–8)
PROT UR QL STRIP: ABNORMAL
RBC # BLD AUTO: 3.08 10*6/MM3 (ref 4.3–5.5)
RBC # UR: ABNORMAL /HPF
REF LAB TEST METHOD: ABNORMAL
SODIUM BLD-SCNC: 142 MMOL/L (ref 134–145)
SP GR UR STRIP: 1.02 (ref 1–1.03)
SQUAMOUS #/AREA URNS HPF: ABNORMAL /HPF
UROBILINOGEN UR QL STRIP: ABNORMAL
WBC NRBC COR # BLD: 3.51 10*3/MM3 (ref 4–10)
WBC UR QL AUTO: ABNORMAL /HPF

## 2017-10-27 PROCEDURE — 99214 OFFICE O/P EST MOD 30 MIN: CPT | Performed by: NURSE PRACTITIONER

## 2017-10-27 PROCEDURE — 81001 URINALYSIS AUTO W/SCOPE: CPT | Performed by: NURSE PRACTITIONER

## 2017-10-27 PROCEDURE — 80053 COMPREHEN METABOLIC PANEL: CPT | Performed by: INTERNAL MEDICINE

## 2017-10-27 PROCEDURE — 96360 HYDRATION IV INFUSION INIT: CPT | Performed by: NURSE PRACTITIONER

## 2017-10-27 PROCEDURE — 83735 ASSAY OF MAGNESIUM: CPT | Performed by: INTERNAL MEDICINE

## 2017-10-27 PROCEDURE — 36415 COLL VENOUS BLD VENIPUNCTURE: CPT | Performed by: INTERNAL MEDICINE

## 2017-10-27 PROCEDURE — 85025 COMPLETE CBC W/AUTO DIFF WBC: CPT | Performed by: INTERNAL MEDICINE

## 2017-10-27 RX ORDER — SODIUM CHLORIDE 9 MG/ML
750 INJECTION, SOLUTION INTRAVENOUS ONCE
Status: COMPLETED | OUTPATIENT
Start: 2017-10-27 | End: 2017-10-27

## 2017-10-27 RX ADMIN — SODIUM CHLORIDE 750 ML: 900 INJECTION, SOLUTION INTRAVENOUS at 17:18

## 2017-10-27 NOTE — TELEPHONE ENCOUNTER
Call placed to patient RE: open referral to survivorship clinic from Dr Grayson. I spoke to the patient and reviewed purpose and goals of survivorship visit as well as what to expect. Patient declines at present due to not feeling well and too many appointments. He did receive the materials I mailed and states he will call if he should reconsider. Thank you for your referral.

## 2017-10-27 NOTE — PROGRESS NOTES
REASON FOR FOLLOW-UP:    1. 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  2.  Plans for combined radiation and chemotherapy as definitive treatment with cisplatin and Taxol initiated on 8/10/2017 concurrent with radiation.  He will receive Taxol on day 1 and cisplatin on day 2 with IV fluid hydration.  3.  Long smoking history with COPD.  4.  Mediport and PEG tube placed by Dr. Valenzuela on 7/31/2017.  Subsequent PEG tube infection requiring antibiotics.  5.  Chemotherapy completed 9/15/2017 and radiation therapy completed 10/9/2017.    HISTORY OF PRESENT ILLNESS:  King Parson is a 67 y.o. male with a history of squamous cell carcinoma of the head and neck treated with combined chemotherapy and radiation.  The patient completed chemotherapy on 9/15/2017.  He received 6 weekly doses of Taxol/ cisplatin.  The seventh dose was held due to significant moist desquamation of the skin as well as worsening blood counts.  He was able to complete his radiation therapy on 10/9/2017.    Mr. Lopez is here today because of complaints of feeling very weak and fatigued over the past few days.  He states he is having difficulty sleeping for the past 4 days.  He does report chills but denies fevers.  He denies issues with nausea, vomiting, diarrhea, or constipation.  He reports he has had difficulty urinating over the past 4 days.  He states he is not urinating much at all today.  Denies any burning with urination.  He states he does not feel like there is pressure like he needs to urinate, but cannot.  He feels like he is taking in adequate amounts of food and fluids.  He is using 2 cans of feeding in his tubes, plus drinking to insure and livens per day.  He is also eating small meals.  He is also consuming about 3-4, 20 ounce glasses of water per day.     Patient is reporting he has been getting dizzy at times, particularly at night.  He had discontinued  his diltiazem.  He is only taking Cardura at bedtime.  The patient insists that he was started on that medication initially for blood pressure control, and not prostate problems.  He has been unsteady on his feet, but has not passed out or fallen.    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  This diagnosis was made by FNA of the neck node on 6/23/2017.  He is undergone staging CT scans on 6/6/2017 and a PET scan on 7/6/2017.  His disease appears to be clinical stage TIII N2 a stage IV a squamous carcinoma of the hypopharynx.     He was originally referred to the [facility] and was seen by Dr. Moreno Camp of radiation oncology on 7/14/2017.  The patient lives in [place] and has some transportation difficulties.  He has a sister who works in the lab at [employer] and for this reason they have decided to receive their treatment in the [facility] system.     He was seen initially by Dr. Garcia in the company of 2 of his sisters.  We recommended combined chemotherapy and radiation.  He will be seeing Dr. Grayson of the [facility] to discuss the radiation portion of his treatment.  We plan to deliver weekly chemotherapy during radiation with combination of cisplatin and Taxol.  Taxol will be delivered day 1 and cisplatin be delivered along with hydration on day 2 each week.    He saw Dr. Grayson who was in agreement with concurrent chemotherapy and radiation.    Mediport and PEG tube placed by Dr. Valenzuela on 7/31/2017.  Subsequent PEG tube infection requiring antibiotics.    He saw nutrition.  He had a chemotherapy education session.    Cycle #1 of therapy was initiated on 8/10/2017.    Patient completed his chemotherapy (6 cycles) on 9/14/2017    Radiation therapy completed 10/9/2017.      PAST MEDICAL, SURGICAL, FAMILY, AND SOCIAL HISTORIES were  "reviewed with the patient and in the electronic medical record, and were updated if indicated.    ALLERGIES:  Allergies   Allergen Reactions   • Codeine      He dont like to take it because it makes him feel strange.        MEDICATIONS:  The medication list has been reviewed with the patient by the medical assistant, and the list has been updated in the electronic medical record, which I reviewed.  Medication dosages and frequencies were confirmed to be accurate.    I have reviewed the patient's medical history in detail and updated the computerized patient record.    Review of Systems   Constitutional: Positive for chills and fatigue. Negative for appetite change, fever and unexpected weight change.   HENT:   Negative for nosebleeds.         See HPI   Respiratory: Negative for cough and shortness of breath.    Cardiovascular: Negative for chest pain and leg swelling.   Gastrointestinal: Negative for abdominal pain, constipation, diarrhea, nausea and vomiting.   Endocrine: Negative for hot flashes.   Genitourinary: Positive for difficulty urinating.    Musculoskeletal: Negative for arthralgias and myalgias.   Skin:             Neurological: Positive for dizziness. Negative for extremity weakness.   Hematological: Negative for adenopathy. Does not bruise/bleed easily.   Psychiatric/Behavioral: Negative for sleep disturbance.       Vitals:    10/27/17 1519   BP: 102/62   Pulse: 93   Resp: 12   Temp: 98.4 °F (36.9 °C)   TempSrc: Oral   SpO2: 100%   Weight: 127 lb (57.6 kg)   Height: 67.52\" (171.5 cm)  Comment: new height   PainSc: 0-No pain       Physical Exam   Constitutional: He is oriented to person, place, and time. He appears well-developed and well-nourished.   HENT:   Head: Normocephalic and atraumatic.   Nose: Nose normal.   Mouth/Throat: Oropharynx is clear and moist and mucous membranes are normal.   Eyes: Pupils are equal, round, and reactive to light.   Neck: Normal range of motion. Neck supple.   Mild " erythema and hypopigmentation of the neck bilaterally from radiation.  Right submandibular node measuring about 1.5 cm.   Cardiovascular: Normal rate, regular rhythm and normal heart sounds.    Pulmonary/Chest: Effort normal and breath sounds normal. No respiratory distress. He has no wheezes. He has no rhonchi. He has no rales.   Abdominal: Soft. Normal appearance and bowel sounds are normal. He exhibits no distension. There is no hepatosplenomegaly. There is no tenderness.   Musculoskeletal: Normal range of motion. He exhibits no edema.   Neurological: He is alert and oriented to person, place, and time.   Skin: Skin is warm and dry.   Psychiatric: He has a normal mood and affect. His behavior is normal.   Nursing note and vitals reviewed.    DIAGNOSTIC DATA:  Lab Results   Component Value Date    WBC 3.51 (L) 10/27/2017    HGB 12.3 (L) 10/27/2017    HCT 35.5 (L) 10/27/2017    .3 (H) 10/27/2017     10/27/2017     Lab Results   Component Value Date    NEUTROABS 1.67 10/27/2017       Lab Results   Component Value Date    GLUCOSE 91 10/27/2017    BUN 13 10/27/2017    CREATININE 0.85 10/27/2017    EGFRIFNONA 90 10/27/2017    BCR 15.3 10/27/2017    K 4.3 10/27/2017    CO2 23.7 10/27/2017    CALCIUM 9.3 10/27/2017    ALBUMIN 3.90 10/27/2017    LABIL2 1.7 10/27/2017    AST 18 10/27/2017    ALT 17 10/27/2017       IMAGING:  None reviewed    ASSESSMENT:  This is a 67 y.o. male with:  1.  Stage IV a (T3, N2 a, M0) squamous cell carcinoma of the hypopharynx with metastatic lymphadenopathy to the right neck.Received 6 weeks of Taxol/cisplatin.  Week #7 held due to wet desquamation in radiation field and myelosuppression.  Chemotherapy was completed 9/15/2017 and radiation completed earlier this week on 10/9/2017.  2.  Comorbidities including COPD.  3.  Recent PEG tube infection. Well healed at this point  4.  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  She continues to use lidocaine followed by Silvadene  and this is helping.  5.  Pain secondary to radiation burns and dysphagia.  The patient continues on fentanyl patch 25 µg and liquid morphine.  He is requesting a has been given a refill of morphine today.  6.  Previous weight loss and poor intake due to underlying disease and accompanying treatment.   7.  Hypotension with dizzy spells: He required modification of his antihypertensive medication.  He has discontinued his diltiazem, but continues on Cardura 4 mg at bedtime.  I discussed with Dr. Garcia.  We will discontinue the Cardura to see if this improves his dizzy spells.     PLAN:  1. Proceed with 1 L IV normal saline today  2. We'll check a urinalysis at the patient is able to provide a specimen  3. Encouraged him to eat small more frequent meals in addition to his current regimen.  4. Discontinue Cardura.  5. Patient scheduled for PET/CT scan on 11/15/2017  6. Return for follow-up on 11/21/2017 with Dr. Garcia to review his scans and for follow-up.  7. Continue hydration and nutrition through PEG tube.  Also continue to advance soft foods orally.  8. He will need to have his port flushed every 4-6 weeks.    BETTE Monroe

## 2017-10-27 NOTE — TELEPHONE ENCOUNTER
Pt calling states he is losing weight. He is done with chemo/radiation. Pt states he hasn't slept in 4 nights, he has chills no fever that he is aware of, and is weak. He states he is taking fluids through his PEG tube and he is eating/drinking orally too. He states he feels like this is adequate. Spoke with BETTE Saavedra and per her order add pt of to see NP 2 unit with CBC and Stat CMP today. Called pt and informed him of this. He V/U. Message sent to scheduling desk to set up appt.

## 2017-11-09 ENCOUNTER — DOCUMENTATION (OUTPATIENT)
Dept: RADIATION ONCOLOGY | Facility: HOSPITAL | Age: 67
End: 2017-11-09

## 2017-11-09 NOTE — PROGRESS NOTES
RADIATION THERAPY TREATMENT SUMMARY    DIAGNOSIS:   Squamous cell carcinoma of hypopharynx    Stage EDMAR (T3, N2a, M0)     Patient Care Team:  BETTE Guillen as PCP - General  BETTE Ruvalcaba as PCP - Claims Attributed  Clinton Styles MD as Referring Physician (Otolaryngology)  Aakash Garcia MD as Consulting Physician (Hematology and Oncology)  Cosmo Conway MD as Consulting Physician (Cardiology)  Edgar Prince MD as Consulting Physician (Pain Medicine)  John Presley MD as Consulting Physician (Hematology and Oncology)    The patient is a 67 y.o. year old male who has recently completed radiation therapy treatment for the above mentioned diagnosis.     Please see the specifics of the course of treatment below.    Dates of treatment:  8/10/2017 - 10/9/2017.  Treatment Site - Bilateral Head and Neck  Treatment Intent - Curative, Postoperative, Concurrent Chemo  Total Dose in cGy - 5000  Number of Treatments - 25  Dose per fraction - 200 cGy per fraction  Fractions per day - 1 fx/day  Fractions per week - 5 fx/week  Treatment Type - Rapid Arc, See below  Energy - 6 MVP  Normalization - Volumetric Normalization-- see below  Imaging/Field Verification - Simulation before first treatment to verify field, blocking, placement and positioning, IGRT using CBCT daily    Treatment Site - Right Hypopharynx with Right Neck  Treatment Intent - Curative, Postoperative, Concurrent Chemo  Total Dose in cGy - 1000  Number of Treatments - 5  Dose per fraction - 200 cGy per fraction  Fractions per day - 1 fx/day  Fractions per week - 5 fx/week  Treatment Type - Rapid Arc, See below  Energy - 6 MVP  Normalization - Volumetric Normalization-- see below  Imaging/Field Verification - Simulation before first treatment to verify field, blocking, placement and positioning, IGRT using CBCT daily    Treatment Site - Right Hypopharyngeal Primary  Treatment Intent - Curative, Postoperative, Concurrent  Chemo  Total Dose in cGy - 600  Number of Treatments - 3  Dose per fraction - 200 cGy per fraction  Fractions per day - 1 fx/day  Fractions per week - 3 fx/week  Treatment Type - Rapid Arc, See below  Energy - 6 MVP  Normalization - Volumetric Normalization-- see below  Imaging/Field Verification - Simulation before first treatment to verify field, blocking, placement and positioning, IGRT using CBCT daily    Tolerance and Toxicities:  he tolerated the treatments with the expected skin irritation, mucositis, dysphagia, weight loss. He did have a significant skin issue requiring a treatment break from September 18 and 19 and September 21 thru 29. He was then able to complete treatment without further break. The above course of treatments was completed in 60 elapsed days    F/U plans:  I have asked him to return to see me in 2-3  weeks

## 2017-11-15 ENCOUNTER — HOSPITAL ENCOUNTER (OUTPATIENT)
Dept: PET IMAGING | Facility: HOSPITAL | Age: 67
Discharge: HOME | End: 2017-11-15
Attending: INTERNAL MEDICINE | Admitting: INTERNAL MEDICINE

## 2017-11-15 ENCOUNTER — HOSPITAL ENCOUNTER (OUTPATIENT)
Dept: PET IMAGING | Facility: HOSPITAL | Age: 67
Discharge: HOME | End: 2017-11-15
Attending: INTERNAL MEDICINE

## 2017-11-15 DIAGNOSIS — C14.0 THROAT CANCER (HCC): ICD-10-CM

## 2017-11-15 DIAGNOSIS — Z45.2 ENCOUNTER FOR FITTING AND ADJUSTMENT OF VASCULAR CATHETER: ICD-10-CM

## 2017-11-15 DIAGNOSIS — C13.9 SQUAMOUS CELL CARCINOMA OF HYPOPHARYNX (HCC): ICD-10-CM

## 2017-11-15 DIAGNOSIS — K12.33 MUCOSITIS DUE TO RADIATION THERAPY: ICD-10-CM

## 2017-11-15 LAB — GLUCOSE BLDC GLUCOMTR-MCNC: 107 MG/DL (ref 70–130)

## 2017-11-15 PROCEDURE — 78815 PET IMAGE W/CT SKULL-THIGH: CPT

## 2017-11-15 PROCEDURE — 0 FLUDEOXYGLUCOSE F18 SOLUTION: Performed by: INTERNAL MEDICINE

## 2017-11-15 PROCEDURE — 82962 GLUCOSE BLOOD TEST: CPT

## 2017-11-15 PROCEDURE — A9552 F18 FDG: HCPCS | Performed by: INTERNAL MEDICINE

## 2017-11-15 RX ADMIN — FLUDEOXYGLUCOSE F18 1 DOSE: 300 INJECTION INTRAVENOUS at 07:34

## 2017-11-16 DIAGNOSIS — C13.9 SQUAMOUS CELL CARCINOMA OF HYPOPHARYNX (HCC): Primary | ICD-10-CM

## 2017-11-16 DIAGNOSIS — C13.9 SQUAMOUS CELL CANCER OF HYPOPHARYNX (HCC): ICD-10-CM

## 2017-11-21 ENCOUNTER — INFUSION (OUTPATIENT)
Dept: ONCOLOGY | Facility: HOSPITAL | Age: 67
End: 2017-11-21

## 2017-11-21 ENCOUNTER — OFFICE VISIT (OUTPATIENT)
Dept: ONCOLOGY | Facility: CLINIC | Age: 67
End: 2017-11-21

## 2017-11-21 VITALS
DIASTOLIC BLOOD PRESSURE: 68 MMHG | HEIGHT: 68 IN | WEIGHT: 129.6 LBS | HEART RATE: 92 BPM | OXYGEN SATURATION: 97 % | TEMPERATURE: 98.3 F | RESPIRATION RATE: 18 BRPM | BODY MASS INDEX: 19.64 KG/M2 | SYSTOLIC BLOOD PRESSURE: 126 MMHG

## 2017-11-21 DIAGNOSIS — C14.0 THROAT CANCER (HCC): Primary | ICD-10-CM

## 2017-11-21 DIAGNOSIS — C13.9 SQUAMOUS CELL CARCINOMA OF HYPOPHARYNX (HCC): ICD-10-CM

## 2017-11-21 DIAGNOSIS — C13.9 SQUAMOUS CELL CANCER OF HYPOPHARYNX (HCC): ICD-10-CM

## 2017-11-21 DIAGNOSIS — Z45.2 ENCOUNTER FOR FITTING AND ADJUSTMENT OF VASCULAR CATHETER: ICD-10-CM

## 2017-11-21 DIAGNOSIS — C13.9 SQUAMOUS CELL CARCINOMA OF HYPOPHARYNX (HCC): Primary | ICD-10-CM

## 2017-11-21 LAB
ALBUMIN SERPL-MCNC: 4.1 G/DL (ref 3.5–5.2)
ALBUMIN/GLOB SERPL: 1.8 G/DL (ref 1.1–2.4)
ALP SERPL-CCNC: 70 U/L (ref 38–116)
ALT SERPL W P-5'-P-CCNC: 10 U/L (ref 0–41)
ANION GAP SERPL CALCULATED.3IONS-SCNC: 11.5 MMOL/L
AST SERPL-CCNC: 18 U/L (ref 0–40)
BASOPHILS # BLD AUTO: 0.02 10*3/MM3 (ref 0–0.1)
BASOPHILS NFR BLD AUTO: 0.5 % (ref 0–1.1)
BILIRUB SERPL-MCNC: 0.4 MG/DL (ref 0.1–1.2)
BUN BLD-MCNC: 15 MG/DL (ref 6–20)
BUN/CREAT SERPL: 19.5 (ref 7.3–30)
CALCIUM SPEC-SCNC: 9.3 MG/DL (ref 8.5–10.2)
CHLORIDE SERPL-SCNC: 103 MMOL/L (ref 98–107)
CO2 SERPL-SCNC: 27.5 MMOL/L (ref 22–29)
CREAT BLD-MCNC: 0.77 MG/DL (ref 0.7–1.3)
DEPRECATED RDW RBC AUTO: 54.8 FL (ref 37–49)
EOSINOPHIL # BLD AUTO: 0.14 10*3/MM3 (ref 0–0.36)
EOSINOPHIL NFR BLD AUTO: 3.8 % (ref 1–5)
ERYTHROCYTE [DISTWIDTH] IN BLOOD BY AUTOMATED COUNT: 13 % (ref 11.7–14.5)
GFR SERPL CREATININE-BSD FRML MDRD: 101 ML/MIN/1.73
GLOBULIN UR ELPH-MCNC: 2.3 GM/DL (ref 1.8–3.5)
GLUCOSE BLD-MCNC: 88 MG/DL (ref 74–124)
HCT VFR BLD AUTO: 37 % (ref 40–49)
HGB BLD-MCNC: 13.4 G/DL (ref 13.5–16.5)
HOLD SPECIMEN: NORMAL
IMM GRANULOCYTES # BLD: 0.02 10*3/MM3 (ref 0–0.03)
IMM GRANULOCYTES NFR BLD: 0.5 % (ref 0–0.5)
LYMPHOCYTES # BLD AUTO: 0.93 10*3/MM3 (ref 1–3.5)
LYMPHOCYTES NFR BLD AUTO: 25.5 % (ref 20–49)
MCH RBC QN AUTO: 41.1 PG (ref 27–33)
MCHC RBC AUTO-ENTMCNC: 36.2 G/DL (ref 32–35)
MCV RBC AUTO: 113.5 FL (ref 83–97)
MONOCYTES # BLD AUTO: 0.44 10*3/MM3 (ref 0.25–0.8)
MONOCYTES NFR BLD AUTO: 12.1 % (ref 4–12)
NEUTROPHILS # BLD AUTO: 2.09 10*3/MM3 (ref 1.5–7)
NEUTROPHILS NFR BLD AUTO: 57.6 % (ref 39–75)
NRBC BLD MANUAL-RTO: 0 /100 WBC (ref 0–0)
PLATELET # BLD AUTO: 141 10*3/MM3 (ref 150–375)
PMV BLD AUTO: 8.9 FL (ref 8.9–12.1)
POTASSIUM BLD-SCNC: 4.6 MMOL/L (ref 3.5–4.7)
PROT SERPL-MCNC: 6.4 G/DL (ref 6.3–8)
RBC # BLD AUTO: 3.26 10*6/MM3 (ref 4.3–5.5)
SODIUM BLD-SCNC: 142 MMOL/L (ref 134–145)
WBC NRBC COR # BLD: 3.64 10*3/MM3 (ref 4–10)

## 2017-11-21 PROCEDURE — 96523 IRRIG DRUG DELIVERY DEVICE: CPT | Performed by: INTERNAL MEDICINE

## 2017-11-21 PROCEDURE — 99214 OFFICE O/P EST MOD 30 MIN: CPT | Performed by: INTERNAL MEDICINE

## 2017-11-21 PROCEDURE — 85025 COMPLETE CBC W/AUTO DIFF WBC: CPT | Performed by: INTERNAL MEDICINE

## 2017-11-21 PROCEDURE — 25010000002 HEPARIN FLUSH (PORCINE) 100 UNIT/ML SOLUTION: Performed by: INTERNAL MEDICINE

## 2017-11-21 PROCEDURE — 80053 COMPREHEN METABOLIC PANEL: CPT | Performed by: INTERNAL MEDICINE

## 2017-11-21 PROCEDURE — 36415 COLL VENOUS BLD VENIPUNCTURE: CPT | Performed by: INTERNAL MEDICINE

## 2017-11-21 RX ORDER — SODIUM CHLORIDE 0.9 % (FLUSH) 0.9 %
10 SYRINGE (ML) INJECTION AS NEEDED
Status: CANCELLED | OUTPATIENT
Start: 2017-11-21

## 2017-11-21 RX ORDER — SODIUM CHLORIDE 0.9 % (FLUSH) 0.9 %
10 SYRINGE (ML) INJECTION AS NEEDED
Status: DISCONTINUED | OUTPATIENT
Start: 2017-11-21 | End: 2017-11-21 | Stop reason: HOSPADM

## 2017-11-21 RX ORDER — DOXAZOSIN MESYLATE 4 MG/1
4 TABLET ORAL EVERY MORNING
COMMUNITY
Start: 2017-11-01 | End: 2018-12-10

## 2017-11-21 RX ADMIN — Medication 10 ML: at 09:18

## 2017-11-21 RX ADMIN — SODIUM CHLORIDE, PRESERVATIVE FREE 500 UNITS: 5 INJECTION INTRAVENOUS at 09:19

## 2017-11-21 NOTE — PROGRESS NOTES
REASON FOR FOLLOW-UP:    1. 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  2.  Plans for combined radiation and chemotherapy as definitive treatment with cisplatin and Taxol initiated on 8/10/2017 concurrent with radiation.  He will receive Taxol on day 1 and cisplatin on day 2 with IV fluid hydration.  3.  Long smoking history with COPD.  4.  Mediport and PEG tube placed by Dr. Valenzuela on 7/31/2017.  Subsequent PEG tube infection requiring antibiotics.  5.  Chemotherapy completed 9/15/2017 and radiation therapy completed 10/9/2017.    HISTORY OF PRESENT ILLNESS:  King Parsno is a 67 y.o. male with the above-mentioned history here today for follow-up of his squamous cell carcinoma of the head and neck treated with combined chemotherapy and radiation.  The patient completed chemotherapy on 9/15/2017.  He received 6 weekly doses of Taxol/ cisplatin.  The seventh dose was held due to significant moist desquamation of the skin as well as worsening blood counts.     He was able to complete his radiation therapy on 10/9/2017 and is here for follow-up and review of a post treatment PET scan that was performed on 11/15/2017.  Thankfully, the PET scan does not show any distant metastatic disease.  He's had resolution of the hypermetabolic activity in the hypopharynx.  There is only some mild residual lymphadenopathy and hypermetabolic activity in the right neck this is dramatically improved from the original study.    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  This diagnosis was made by FNA of the neck node on 6/23/2017.  He is undergone staging CT scans on 6/6/2017 and a PET scan on 7/6/2017.  His disease appears to be clinical stage TIII N2 a stage IV a squamous carcinoma of the hypopharynx.     He was  originally referred to the [facility] and was seen by Dr. Moreno Camp of radiation oncology on 7/14/2017.  The patient lives in [place] and has some transportation difficulties.  He has a sister who works in the lab at [employer] [facility] [place] and for this reason they have decided to receive their treatment in the [facility].     He was seen initially by Dr. Garcia in the company of 2 of his sisters.  We recommended combined chemotherapy and radiation.  He will be seeing Dr. Grayson of the [facility] to discuss the radiation portion of his treatment.  We plan to deliver weekly chemotherapy during radiation with combination of cisplatin and Taxol.  Taxol will be delivered day 1 and cisplatin be delivered along with hydration on day 2 each week.    He saw Dr. Grayson who was in agreement with concurrent chemotherapy and radiation.    Mediport and PEG tube placed by Dr. Valenzuela on 7/31/2017.  Subsequent PEG tube infection requiring antibiotics.    He saw nutrition.  He had a chemotherapy education session.    Cycle #1 of therapy was initiated on 8/10/2017.    Patient completed his chemotherapy (6 cycles) on 9/14/2017    Radiation therapy completed 10/9/2017.      PAST MEDICAL, SURGICAL, FAMILY, AND SOCIAL HISTORIES were reviewed with the patient and in the electronic medical record, and were updated if indicated.    ALLERGIES:  Allergies   Allergen Reactions   • Codeine      He dont like to take it because it makes him feel strange.        MEDICATIONS:  The medication list has been reviewed with the patient by the medical assistant, and the list has been updated in the electronic medical record, which I reviewed.  Medication dosages and frequencies were confirmed to be accurate.    I have reviewed the patient's medical history in detail and updated the computerized patient record.    Review of Systems   Constitutional: Positive for fatigue. Negative for appetite change, chills, fever and unexpected  "weight change.   HENT:   Negative for nosebleeds.         See HPI     Respiratory: Negative for cough and shortness of breath.    Cardiovascular: Negative for chest pain and leg swelling.   Gastrointestinal: Negative for abdominal pain, constipation, diarrhea, nausea and vomiting.   Endocrine: Negative for hot flashes.   Genitourinary: Negative for difficulty urinating.    Musculoskeletal: Negative for arthralgias and myalgias.   Skin:        Radiation dermatitis see history of present illness   Neurological: Negative for dizziness and extremity weakness.   Hematological: Negative for adenopathy. Does not bruise/bleed easily.   Psychiatric/Behavioral: Negative for sleep disturbance.       Vitals:    11/21/17 0925   BP: 126/68   Pulse: 92   Resp: 18   Temp: 98.3 °F (36.8 °C)   TempSrc: Oral   SpO2: 97%   Weight: 129 lb 9.6 oz (58.8 kg)   Height: 67.52\" (171.5 cm)   PainSc:   7   PainLoc: Back       Physical Exam   Constitutional: He is oriented to person, place, and time. He appears well-developed and well-nourished.   HENT:   Head: Normocephalic and atraumatic.   Nose: Nose normal.   Mouth/Throat: Mucous membranes are normal.   Mild grade 1 mucositis in the posterior pharynx   Eyes: Pupils are equal, round, and reactive to light.   Neck: Normal range of motion. Neck supple.   Cardiovascular: Normal rate, regular rhythm and normal heart sounds.    Pulmonary/Chest: Effort normal and breath sounds normal. No respiratory distress. He has no wheezes. He has no rhonchi. He has no rales.   Abdominal: Soft. Normal appearance and bowel sounds are normal. He exhibits no distension. There is no hepatosplenomegaly. There is no tenderness.   Musculoskeletal: Normal range of motion. He exhibits no edema.   Neurological: He is alert and oriented to person, place, and time.   Skin: Skin is warm and dry.   Psychiatric: He has a normal mood and affect. His behavior is normal.   Nursing note and vitals reviewed.    DIAGNOSTIC " DATA:  Lab Results   Component Value Date    WBC 3.64 (L) 11/21/2017    HGB 13.4 (L) 11/21/2017    HCT 37.0 (L) 11/21/2017    .5 (H) 11/21/2017     (L) 11/21/2017     Lab Results   Component Value Date    NEUTROABS 2.09 11/21/2017       Lab Results   Component Value Date    GLUCOSE 91 10/27/2017    BUN 13 10/27/2017    CREATININE 0.85 10/27/2017    EGFRIFNONA 90 10/27/2017    BCR 15.3 10/27/2017    K 4.3 10/27/2017    CO2 23.7 10/27/2017    CALCIUM 9.3 10/27/2017    ALBUMIN 3.90 10/27/2017    LABIL2 1.7 10/27/2017    AST 18 10/27/2017    ALT 17 10/27/2017       IMAGING: Personally reviewed    PET/CT 11/15/2017  FINDINGS: Hypermetabolism within the patient's small primary lesion in  the right side of the hypopharynx has essentially completely resolved.  No residual soft tissue mass is evident in this area on the CT images.  The previous PET/CT study demonstrated a single enlarged right jugular  chain lymph node showing moderately intense hypermetabolism with maximum  SUV of 14.5 g/mL. This is decreased in size markedly, now measuring only  10 mm in diameter. It shows only mild residual hypermetabolism with  maximum SUV of 4.3 g/mL. No other hypermetabolic lymphadenopathy is  identified in the neck. There are no foci of pathologic hypermetabolism  within the chest, abdomen or pelvis. There is no metabolic evidence of  distant metastatic disease.      IMPRESSION:  Complete metabolic response of the patient's small primary  hypopharyngeal neoplasm to chemotherapy. Metastatic disease within the  single enlarged right jugular chain lymph node also demonstrates  significant response to therapy. The lymph node has decreased in size  markedly and now demonstrates only mild residual hypermetabolism. There  continues to be no metabolic evidence of distant metastatic disease  within the chest, abdomen or pelvis.      ASSESSMENT:  This is a 67 y.o. male with:  1.  Stage IV a (T3, N2 a, M0) squamous cell carcinoma  of the hypopharynx with metastatic lymphadenopathy to the right neck.Received 6 weeks of Taxol/cisplatin.  Week #7 held due to wet desquamation in radiation field and myelosuppression.  Chemotherapy was completed 9/15/2017 and radiation completed earlier this week on 10/9/2017.  The patient has had an excellent response although there is still some mild residual activity in the right cervical node on PET scan from 11/15/2017.  For now we will observe this area with plans to repeat a CT scan of the neck in 3 months.  2.  Comorbidities including COPD.  3.  Previous weight loss and poor intake due to underlying disease and accompanying treatment.  Patient is now eating 3 meals a day and supplementing with Ensure.  At this point I think we can remove his PEG tube.  4.  MediPort which requires flushing every 6 weeks.  Patient already had the port flushed as part of the visit today.    PLAN:  1. Patient will be referred back to Dr. Valenzuela for removal of his PEG tube.  2.  Schedule port flush in 6 weeks.  3. M.D. follow-up in 12 weeks.  Patient will undergo labs and follow-up CT scan of the neck on with a port flush one week prior to that visit.    Aakash Garcia MD

## 2017-11-28 RX ORDER — DOXAZOSIN MESYLATE 4 MG/1
TABLET ORAL
Qty: 30 TABLET | Refills: 0 | OUTPATIENT
Start: 2017-11-28

## 2017-11-30 ENCOUNTER — OFFICE VISIT (OUTPATIENT)
Dept: SURGERY | Facility: CLINIC | Age: 67
End: 2017-11-30

## 2017-11-30 VITALS — WEIGHT: 135 LBS | HEART RATE: 78 BPM | BODY MASS INDEX: 19.99 KG/M2 | HEIGHT: 69 IN | OXYGEN SATURATION: 99 %

## 2017-11-30 DIAGNOSIS — C13.9 SQUAMOUS CELL CARCINOMA OF HYPOPHARYNX (HCC): Primary | ICD-10-CM

## 2017-11-30 PROCEDURE — 99212 OFFICE O/P EST SF 10 MIN: CPT | Performed by: SURGERY

## 2017-11-30 NOTE — PROGRESS NOTES
CC:  Follow-up squamous cell carcinoma of the hypopharynx    HPI:  67-year-old gentleman who underwent left subclavian 8 [place] PowerPort placement with percutaneous endoscopic gastrostomy tube placement on 7/31/2017 secondary to squamous cell carcinoma of the hypopharynx.  He finished chemoradiation 7 weeks ago.  He has not been using his gastrostomy tube for feeding for 2 weeks and has continued to gain weight.  He has no dysphagia and no pain with eating.  He has been cleared for G-tube removal by his oncologist.  The port is supposed to stay for now.    PE:    Awake, alert  Abdomen: Soft, nontender, G-tube in good position with no evidence of infection or other problems.    IMPRESSION & PLAN:  67-year-old gentleman with completed treatment for squamous cell carcinoma the hypopharynx.  Gastrostomy tube was pulled out with no difficulty today.  Wound was treated with silver nitrate.  Wound care instructions given.  Follow-up when time for port removal.

## 2018-01-04 ENCOUNTER — INFUSION (OUTPATIENT)
Dept: ONCOLOGY | Facility: HOSPITAL | Age: 68
End: 2018-01-04

## 2018-01-04 DIAGNOSIS — Z45.2 ENCOUNTER FOR FITTING AND ADJUSTMENT OF VASCULAR CATHETER: Primary | ICD-10-CM

## 2018-01-04 PROCEDURE — 96523 IRRIG DRUG DELIVERY DEVICE: CPT | Performed by: INTERNAL MEDICINE

## 2018-01-04 RX ORDER — SODIUM CHLORIDE 0.9 % (FLUSH) 0.9 %
10 SYRINGE (ML) INJECTION AS NEEDED
Status: DISCONTINUED | OUTPATIENT
Start: 2018-01-04 | End: 2018-01-04 | Stop reason: HOSPADM

## 2018-01-04 RX ORDER — SODIUM CHLORIDE 0.9 % (FLUSH) 0.9 %
10 SYRINGE (ML) INJECTION AS NEEDED
Status: CANCELLED | OUTPATIENT
Start: 2018-01-04

## 2018-01-16 RX ORDER — CELECOXIB 200 MG/1
CAPSULE ORAL
Qty: 30 CAPSULE | Refills: 0 | Status: SHIPPED | OUTPATIENT
Start: 2018-01-16 | End: 2018-02-12 | Stop reason: SDUPTHER

## 2018-01-18 DIAGNOSIS — C13.9 SQUAMOUS CELL CARCINOMA OF HYPOPHARYNX (HCC): ICD-10-CM

## 2018-01-18 DIAGNOSIS — Z45.2 ENCOUNTER FOR FITTING AND ADJUSTMENT OF VASCULAR CATHETER: ICD-10-CM

## 2018-01-18 DIAGNOSIS — K12.33 MUCOSITIS DUE TO RADIATION THERAPY: ICD-10-CM

## 2018-01-18 RX ORDER — ONDANSETRON HYDROCHLORIDE 8 MG/1
8 TABLET, FILM COATED ORAL 3 TIMES DAILY PRN
Qty: 30 TABLET | Refills: 5 | Status: SHIPPED | OUTPATIENT
Start: 2018-01-18 | End: 2019-02-18 | Stop reason: HOSPADM

## 2018-01-25 ENCOUNTER — DOCUMENTATION (OUTPATIENT)
Dept: ONCOLOGY | Facility: CLINIC | Age: 68
End: 2018-01-25

## 2018-01-25 NOTE — PROGRESS NOTES
Fax rec from [employer] stating pts [employer] needed a PA. I have submitted the PA to [employer] through [email or web address]    Awaiting decision.

## 2018-01-26 ENCOUNTER — DOCUMENTATION (OUTPATIENT)
Dept: ONCOLOGY | Facility: CLINIC | Age: 68
End: 2018-01-26

## 2018-02-06 ENCOUNTER — INFUSION (OUTPATIENT)
Dept: ONCOLOGY | Facility: HOSPITAL | Age: 68
End: 2018-02-06

## 2018-02-06 ENCOUNTER — HOSPITAL ENCOUNTER (OUTPATIENT)
Dept: PET IMAGING | Facility: HOSPITAL | Age: 68
Discharge: HOME | End: 2018-02-06
Attending: INTERNAL MEDICINE | Admitting: INTERNAL MEDICINE

## 2018-02-06 DIAGNOSIS — C14.0 THROAT CANCER (HCC): ICD-10-CM

## 2018-02-06 DIAGNOSIS — C13.9 SQUAMOUS CELL CANCER OF HYPOPHARYNX (HCC): ICD-10-CM

## 2018-02-06 DIAGNOSIS — C14.0 THROAT CANCER (HCC): Primary | ICD-10-CM

## 2018-02-06 DIAGNOSIS — C13.9 SQUAMOUS CELL CARCINOMA OF HYPOPHARYNX (HCC): ICD-10-CM

## 2018-02-06 DIAGNOSIS — Z45.2 ENCOUNTER FOR FITTING AND ADJUSTMENT OF VASCULAR CATHETER: ICD-10-CM

## 2018-02-06 LAB
ALBUMIN SERPL-MCNC: 3.9 G/DL (ref 3.5–5.2)
ALBUMIN/GLOB SERPL: 1.6 G/DL (ref 1.1–2.4)
ALP SERPL-CCNC: 60 U/L (ref 38–116)
ALT SERPL W P-5'-P-CCNC: 25 U/L (ref 0–41)
ANION GAP SERPL CALCULATED.3IONS-SCNC: 11.7 MMOL/L
AST SERPL-CCNC: 34 U/L (ref 0–40)
BASOPHILS # BLD AUTO: 0.03 10*3/MM3 (ref 0–0.1)
BASOPHILS NFR BLD AUTO: 0.8 % (ref 0–1.1)
BILIRUB SERPL-MCNC: 0.5 MG/DL (ref 0.1–1.2)
BUN BLD-MCNC: 13 MG/DL (ref 6–20)
BUN/CREAT SERPL: 15.1 (ref 7.3–30)
CALCIUM SPEC-SCNC: 9.3 MG/DL (ref 8.5–10.2)
CHLORIDE SERPL-SCNC: 100 MMOL/L (ref 98–107)
CO2 SERPL-SCNC: 28.3 MMOL/L (ref 22–29)
CREAT BLD-MCNC: 0.86 MG/DL (ref 0.7–1.3)
CREAT BLDA-MCNC: 0.9 MG/DL (ref 0.6–1.3)
DEPRECATED RDW RBC AUTO: 52.7 FL (ref 37–49)
EOSINOPHIL # BLD AUTO: 0.08 10*3/MM3 (ref 0–0.36)
EOSINOPHIL NFR BLD AUTO: 2.2 % (ref 1–5)
ERYTHROCYTE [DISTWIDTH] IN BLOOD BY AUTOMATED COUNT: 12.7 % (ref 11.7–14.5)
GFR SERPL CREATININE-BSD FRML MDRD: 89 ML/MIN/1.73
GLOBULIN UR ELPH-MCNC: 2.5 GM/DL (ref 1.8–3.5)
GLUCOSE BLD-MCNC: 97 MG/DL (ref 74–124)
HCT VFR BLD AUTO: 35 % (ref 40–49)
HGB BLD-MCNC: 12.4 G/DL (ref 13.5–16.5)
IMM GRANULOCYTES # BLD: 0.03 10*3/MM3 (ref 0–0.03)
IMM GRANULOCYTES NFR BLD: 0.8 % (ref 0–0.5)
LYMPHOCYTES # BLD AUTO: 0.69 10*3/MM3 (ref 1–3.5)
LYMPHOCYTES NFR BLD AUTO: 18.5 % (ref 20–49)
MCH RBC QN AUTO: 39.9 PG (ref 27–33)
MCHC RBC AUTO-ENTMCNC: 35.4 G/DL (ref 32–35)
MCV RBC AUTO: 112.5 FL (ref 83–97)
MONOCYTES # BLD AUTO: 0.45 10*3/MM3 (ref 0.25–0.8)
MONOCYTES NFR BLD AUTO: 12.1 % (ref 4–12)
NEUTROPHILS # BLD AUTO: 2.44 10*3/MM3 (ref 1.5–7)
NEUTROPHILS NFR BLD AUTO: 65.6 % (ref 39–75)
NRBC BLD MANUAL-RTO: 0 /100 WBC (ref 0–0)
PLATELET # BLD AUTO: 158 10*3/MM3 (ref 150–375)
PMV BLD AUTO: 9.2 FL (ref 8.9–12.1)
POTASSIUM BLD-SCNC: 4.2 MMOL/L (ref 3.5–4.7)
PROT SERPL-MCNC: 6.4 G/DL (ref 6.3–8)
RBC # BLD AUTO: 3.11 10*6/MM3 (ref 4.3–5.5)
SODIUM BLD-SCNC: 140 MMOL/L (ref 134–145)
WBC NRBC COR # BLD: 3.72 10*3/MM3 (ref 4–10)

## 2018-02-06 PROCEDURE — 82565 ASSAY OF CREATININE: CPT

## 2018-02-06 PROCEDURE — 36415 COLL VENOUS BLD VENIPUNCTURE: CPT | Performed by: INTERNAL MEDICINE

## 2018-02-06 PROCEDURE — 0 IOPAMIDOL 61 % SOLUTION: Performed by: INTERNAL MEDICINE

## 2018-02-06 PROCEDURE — 80053 COMPREHEN METABOLIC PANEL: CPT | Performed by: INTERNAL MEDICINE

## 2018-02-06 PROCEDURE — 85025 COMPLETE CBC W/AUTO DIFF WBC: CPT | Performed by: INTERNAL MEDICINE

## 2018-02-06 PROCEDURE — 96523 IRRIG DRUG DELIVERY DEVICE: CPT | Performed by: INTERNAL MEDICINE

## 2018-02-06 PROCEDURE — 70491 CT SOFT TISSUE NECK W/DYE: CPT

## 2018-02-06 RX ORDER — SODIUM CHLORIDE 0.9 % (FLUSH) 0.9 %
10 SYRINGE (ML) INJECTION AS NEEDED
Status: CANCELLED | OUTPATIENT
Start: 2018-02-06

## 2018-02-06 RX ORDER — SODIUM CHLORIDE 0.9 % (FLUSH) 0.9 %
10 SYRINGE (ML) INJECTION AS NEEDED
Status: DISCONTINUED | OUTPATIENT
Start: 2018-02-06 | End: 2018-02-06 | Stop reason: HOSPADM

## 2018-02-06 RX ADMIN — IOPAMIDOL 75 ML: 612 INJECTION, SOLUTION INTRAVENOUS at 08:23

## 2018-02-06 RX ADMIN — Medication 10 ML: at 08:17

## 2018-02-12 RX ORDER — CELECOXIB 200 MG/1
CAPSULE ORAL
Qty: 30 CAPSULE | Refills: 0 | Status: SHIPPED | OUTPATIENT
Start: 2018-02-12 | End: 2018-03-13 | Stop reason: SDUPTHER

## 2018-02-13 ENCOUNTER — OFFICE VISIT (OUTPATIENT)
Dept: ONCOLOGY | Facility: CLINIC | Age: 68
End: 2018-02-13

## 2018-02-13 ENCOUNTER — APPOINTMENT (OUTPATIENT)
Dept: LAB | Facility: HOSPITAL | Age: 68
End: 2018-02-13

## 2018-02-13 VITALS
BODY MASS INDEX: 19.97 KG/M2 | DIASTOLIC BLOOD PRESSURE: 70 MMHG | RESPIRATION RATE: 16 BRPM | HEART RATE: 82 BPM | SYSTOLIC BLOOD PRESSURE: 122 MMHG | WEIGHT: 131.8 LBS | TEMPERATURE: 98.8 F | HEIGHT: 68 IN

## 2018-02-13 DIAGNOSIS — Z45.2 ENCOUNTER FOR FITTING AND ADJUSTMENT OF VASCULAR CATHETER: ICD-10-CM

## 2018-02-13 DIAGNOSIS — C13.9 SQUAMOUS CELL CARCINOMA OF HYPOPHARYNX (HCC): Primary | ICD-10-CM

## 2018-02-13 PROBLEM — K12.33 MUCOSITIS DUE TO RADIATION THERAPY: Status: RESOLVED | Noted: 2017-09-07 | Resolved: 2018-02-13

## 2018-02-13 PROBLEM — L03.311 CELLULITIS, ABDOMINAL WALL: Status: RESOLVED | Noted: 2017-08-04 | Resolved: 2018-02-13

## 2018-02-13 PROBLEM — T81.49XA CELLULITIS, WOUND, POST-OPERATIVE: Status: RESOLVED | Noted: 2017-08-05 | Resolved: 2018-02-13

## 2018-02-13 PROCEDURE — 99214 OFFICE O/P EST MOD 30 MIN: CPT | Performed by: INTERNAL MEDICINE

## 2018-02-13 PROCEDURE — G0463 HOSPITAL OUTPT CLINIC VISIT: HCPCS | Performed by: INTERNAL MEDICINE

## 2018-02-13 RX ORDER — IPRATROPIUM BROMIDE 42 UG/1
1 SPRAY, METERED NASAL DAILY PRN
COMMUNITY
Start: 2018-02-03 | End: 2019-02-18 | Stop reason: HOSPADM

## 2018-02-13 NOTE — PROGRESS NOTES
REASON FOR FOLLOW-UP:    1. 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  2.  Plans for combined radiation and chemotherapy as definitive treatment with cisplatin and Taxol initiated on 8/10/2017 concurrent with radiation.  He will receive Taxol on day 1 and cisplatin on day 2 with IV fluid hydration.  3.  Long smoking history with COPD.  4.  Mediport and PEG tube placed by Dr. Valenzuela on 7/31/2017.  Subsequent PEG tube infection requiring antibiotics.  5.  Chemotherapy completed 9/15/2017 and radiation therapy completed 10/9/2017.    HISTORY OF PRESENT ILLNESS:  King Parson is a 67 y.o. male with the above-mentioned history here today for follow-up of his squamous cell carcinoma of the head and neck treated with combined chemotherapy and radiation.  The patient completed chemotherapy on 9/15/2017.  He received 6 weekly doses of Taxol/ cisplatin.  The seventh dose was held due to significant moist desquamation of the skin as well as worsening blood counts. He was able to complete his radiation therapy on 10/9/2017.     PET scan from November 2017 at the end of therapy showed an excellent response.  Patient has subsequently had his PEG tube removed in late November and has continued to have his port flushed in our office every 4-6 weeks.  He is here today for routine follow-up and review of surveillance CT scan of the neck.  His CT scan was performed on 2/6/2018 and shows no evidence of disease progression.      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  This diagnosis was made by FNA of the neck node on 6/23/2017.  He is undergone staging CT scans on 6/6/2017 and a PET scan on 7/6/2017.  His disease appears to be clinical stage TIII N2 a stage IV a squamous carcinoma of the hypopharynx.     He was  originally referred to the [facility] and was seen by Dr. Moreno Camp of radiation oncology on 7/14/2017.  The patient lives in [place] and has some transportation difficulties.  He has a sister who works in the lab at [facility] [employer] and for this reason they have decided to receive their treatment in the [facility] system.     He was seen initially by Dr. Garcia in the company of 2 of his sisters.  We recommended combined chemotherapy and radiation.  He will be seeing Dr. Grayson of the [facility] to discuss the radiation portion of his treatment.  We plan to deliver weekly chemotherapy during radiation with combination of cisplatin and Taxol.  Taxol will be delivered day 1 and cisplatin be delivered along with hydration on day 2 each week.    He saw Dr. Grayson who was in agreement with concurrent chemotherapy and radiation.    Mediport and PEG tube placed by Dr. Valenzuela on 7/31/2017.  Subsequent PEG tube infection requiring antibiotics.    He saw nutrition.  He had a chemotherapy education session.    Cycle #1 of therapy was initiated on 8/10/2017.    Patient completed his chemotherapy (6 cycles) on 9/14/2017    Radiation therapy completed 10/9/2017.    PET scan at completion of treatment in November 2017 showed excellent response.    Surveillance CT scan of the neck 2/6/2018 showed no evidence of disease progression.      PAST MEDICAL, SURGICAL, FAMILY, AND SOCIAL HISTORIES were reviewed with the patient and in the electronic medical record, and were updated if indicated.    ALLERGIES:  Allergies   Allergen Reactions   • Codeine      He dont like to take it because it makes him feel strange.        MEDICATIONS:  The medication list has been reviewed with the patient by the medical assistant, and the list has been updated in the electronic medical record, which I reviewed.  Medication dosages and frequencies were confirmed to be accurate.    I have reviewed the patient's medical history in  "detail and updated the computerized patient record.    Review of Systems   Constitutional: Positive for fatigue. Negative for appetite change, chills, fever and unexpected weight change.   HENT:   Negative for nosebleeds.         See HPI     Respiratory: Negative for cough and shortness of breath.    Cardiovascular: Negative for chest pain and leg swelling.   Gastrointestinal: Negative for abdominal pain, constipation, diarrhea, nausea and vomiting.   Endocrine: Negative for hot flashes.   Genitourinary: Negative for difficulty urinating.    Musculoskeletal: Negative for arthralgias and myalgias.   Skin: Negative for rash and wound.   Neurological: Negative for dizziness and extremity weakness.   Hematological: Negative for adenopathy. Does not bruise/bleed easily.   Psychiatric/Behavioral: Negative for sleep disturbance.       Vitals:    02/13/18 0922   BP: 122/70   Pulse: 82   Resp: 16   Temp: 98.8 °F (37.1 °C)   Weight: 59.8 kg (131 lb 12.8 oz)   Height: 171.5 cm (67.52\")   PainSc:   7   PainLoc: Back       Physical Exam   Constitutional: He is oriented to person, place, and time. He appears well-developed and well-nourished.   HENT:   Head: Normocephalic and atraumatic.   Nose: Nose normal.   Mouth/Throat: Mucous membranes are normal.   Edentulous   Eyes: Pupils are equal, round, and reactive to light.   Neck: Normal range of motion. Neck supple.   Palpable, firm 1 cm node in mid right neck.     Cardiovascular: Normal rate, regular rhythm and normal heart sounds.    Pulmonary/Chest: Effort normal and breath sounds normal. No respiratory distress. He has no wheezes. He has no rhonchi. He has no rales.   Mediport anterior chest wall. No evidence of infection or thrombosis.   Abdominal: Soft. Normal appearance and bowel sounds are normal. He exhibits no distension. There is no hepatosplenomegaly. There is no tenderness.   Musculoskeletal: Normal range of motion. He exhibits no edema.   Neurological: He is alert and " oriented to person, place, and time.   Skin: Skin is warm and dry.   Psychiatric: He has a normal mood and affect. His behavior is normal.   Nursing note and vitals reviewed.    DIAGNOSTIC DATA:  Lab Results   Component Value Date    WBC 3.72 (L) 02/06/2018    HGB 12.4 (L) 02/06/2018    HCT 35.0 (L) 02/06/2018    .5 (H) 02/06/2018     02/06/2018     Lab Results   Component Value Date    NEUTROABS 2.44 02/06/2018       Lab Results   Component Value Date    GLUCOSE 97 02/06/2018    BUN 13 02/06/2018    CREATININE 0.90 02/06/2018    EGFRIFNONA 89 02/06/2018    BCR 15.1 02/06/2018    K 4.2 02/06/2018    CO2 28.3 02/06/2018    CALCIUM 9.3 02/06/2018    ALBUMIN 3.90 02/06/2018    LABIL2 1.6 02/06/2018    AST 34 02/06/2018    ALT 25 02/06/2018       IMAGING: Personally reviewed    CT NECK 2/6/2018  IMPRESSION:  1. The findings suggesting radiation related changes with stranding  involving the subcutaneous soft tissues and fat planes bilaterally at  the level of the hypopharynx. The epiglottis, false cord and  parapharyngeal soft tissues appear edematous. There is no evidence of  abscess.  2. No evidence of mass or of abnormal enhancement to suggest residual or  recurrent tumor. However residual tumor cannot be excluded. Further  evaluation could be performed with a PET examination.  3. There is an enlarged node involving the anterior cervical chain on  the right measuring 13 mm in size similar to the PET examination of  11/15/2017 but substantially smaller as compared to the PET examination  of 07/06/2017. There is no evidence of new pathologic adenopathy.    ASSESSMENT:  This is a 67 y.o. male with:  1.  Stage IV a (T3, N2 a, M0) squamous cell carcinoma of the hypopharynx with metastatic lymphadenopathy to the right neck.Treated with combined radiation and cisplatin and Taxol chemotherapy.  Chemotherapy was completed 9/15/2017 and radiation completed  on 10/9/2017.  The patient had an excellent response.  2.   Comorbidities including COPD.  3.  PEG tube moved by Dr. Valenzuela 11/30/2017.  4.  MediPort which requires flushing every 6 weeks.  Patient already had the port flushed as part of the visit today.  5.  Residual palpable lymph node in the right neck which appears stable on CT scan.  Obviously this area will need to be watched closely.    PLAN:  1. We discussed the results of the CT scan with the patient and recommended continued surveillance for now.  2. He will follow-up with Dr. Clinton Styles of ENT in a few days for further endoscopic assessment.  3. We will schedule port flush and labs in 6 weeks.  4. M.D. follow-up with labs and port flush and 12 weeks.  We'll reexamine the right neck lymph node at that time.  If he is otherwise doing well and the node appears stable clinically, we may weight to repeat CT scans until 6 months from now.  5. We discussed with the patient that if he does exhibit disease progression in the future we most likely would discuss the option of immunotherapy with Opdivo as further treatment.    Aakash Garcia MD

## 2018-02-23 ENCOUNTER — OFFICE VISIT (OUTPATIENT)
Dept: PAIN MEDICINE | Facility: CLINIC | Age: 68
End: 2018-02-23

## 2018-02-23 VITALS
OXYGEN SATURATION: 97 % | BODY MASS INDEX: 20.28 KG/M2 | WEIGHT: 133.8 LBS | HEART RATE: 96 BPM | HEIGHT: 68 IN | DIASTOLIC BLOOD PRESSURE: 71 MMHG | SYSTOLIC BLOOD PRESSURE: 134 MMHG | TEMPERATURE: 98.1 F | RESPIRATION RATE: 16 BRPM

## 2018-02-23 DIAGNOSIS — M47.816 LUMBAR FACET ARTHROPATHY: ICD-10-CM

## 2018-02-23 DIAGNOSIS — G89.29 OTHER CHRONIC PAIN: Primary | ICD-10-CM

## 2018-02-23 DIAGNOSIS — M48.061 SPINAL STENOSIS OF LUMBAR REGION, UNSPECIFIED WHETHER NEUROGENIC CLAUDICATION PRESENT: ICD-10-CM

## 2018-02-23 DIAGNOSIS — M51.36 DEGENERATION OF INTERVERTEBRAL DISC OF LUMBAR REGION: ICD-10-CM

## 2018-02-23 DIAGNOSIS — M54.16 LUMBAR RADICULOPATHY: ICD-10-CM

## 2018-02-23 DIAGNOSIS — M54.2 NECK PAIN: ICD-10-CM

## 2018-02-23 PROCEDURE — 99214 OFFICE O/P EST MOD 30 MIN: CPT | Performed by: NURSE PRACTITIONER

## 2018-02-23 RX ORDER — CYCLOBENZAPRINE HCL 10 MG
10 TABLET ORAL 3 TIMES DAILY PRN
Qty: 90 TABLET | Refills: 0 | COMMUNITY
End: 2018-04-09

## 2018-02-23 NOTE — PROGRESS NOTES
CHIEF COMPLAINT  F/U back pain. He states he has been through cancer treatment and just had his 3 month checkup and is cancer free.    Subjective   King Parson is a 67 y.o. male  who presents to the office for follow-up of procedure.  He completed a Bilateral L2-L5 RF   on  8-1-17 performed by Dr. Prince for management of back pain. Patient reports 90% relief from the procedure which is ongoing.     Since last office visit patient was diagnosed with throat cancer.  He has completed chemo and radiation. Had follow up with Dr. Garcia last week, was told his CT scan was clear. Will continue with surveillance for now.       He is reporting that his low back is still doing well since the RFL. Pain today is present in the legs bilaterally.  He reports burning and aching down bilateral legs, typically stopping at the ankles.  He has previously done well with lumbar TFESI's, last was L5 LTFESI on 5/23/2017.      He continues with use of Gabapentin and Celebrex.  These help to reduce pain moderately.      Reporting a lot of pain and discomfort in his neck and trapezius bilaterally.  Started a few months ago.  Has not tried anything in terms of heat, stretching, massage, therapy.      Back Pain   This is a chronic problem. The current episode started more than 1 year ago. The problem occurs constantly. The problem has been gradually worsening since onset. The pain is present in the lumbar spine. The quality of the pain is described as aching and burning. The pain radiates to the left knee, left thigh, left foot, right knee, right foot and right thigh. The pain is at a severity of 7/10. The pain is mild. The pain is worse during the night. The symptoms are aggravated by standing, bending and twisting (walking). Stiffness is present at night. Pertinent negatives include no bladder incontinence, bowel incontinence, chest pain, dysuria, fever, headaches, numbness, tingling or weakness. Risk factors include sedentary  "lifestyle. He has tried NSAIDs (Celebrex, Gabapentin, Bilateral L5 TFESI's, lumbar RFL) for the symptoms. The treatment provided significant relief.      PEG Assessment   What number best describes your pain on average in the past week?7  What number best describes how, during the past week, pain has interfered with your enjoyment of life?7  What number best describes how, during the past week, pain has interfered with your general activity?  7    The following portions of the patient's history were reviewed and updated as appropriate: allergies, current medications, past family history, past medical history, past social history, past surgical history and problem list.    Review of Systems   Constitutional: Negative for appetite change, fatigue and fever.   HENT: Negative for congestion.    Eyes: Negative for visual disturbance.   Respiratory: Positive for shortness of breath (smoker). Negative for cough and wheezing.    Cardiovascular: Negative.  Negative for chest pain.   Gastrointestinal: Negative for bowel incontinence, constipation, diarrhea and nausea.   Genitourinary: Negative for bladder incontinence, difficulty urinating and dysuria.   Musculoskeletal: Positive for back pain.   Neurological: Positive for dizziness (\"dizzy spell sometimes\"). Negative for tingling, weakness, numbness and headaches.   Psychiatric/Behavioral: Negative for agitation, confusion, hallucinations, sleep disturbance and suicidal ideas. The patient is not nervous/anxious.        Vitals:    02/23/18 0924   BP: 134/71   Pulse: 96   Resp: 16   Temp: 98.1 °F (36.7 °C)   SpO2: 97%   Weight: 60.7 kg (133 lb 12.8 oz)   Height: 171.5 cm (67.52\")   PainSc:   7   PainLoc: Back     Objective   Physical Exam   Constitutional: He is oriented to person, place, and time. He appears well-developed and well-nourished. He is cooperative.   HENT:   Head: Normocephalic and atraumatic.   Nose: Nose normal.   Eyes: Conjunctivae and lids are normal.   Neck: " Trachea normal and normal range of motion. Neck supple. Muscular tenderness present.   Cardiovascular: Normal rate.    Pulmonary/Chest: Effort normal. No respiratory distress.   Abdominal: Soft. Normal appearance.   Musculoskeletal:        Cervical back: He exhibits spasm.        Lumbar back: He exhibits decreased range of motion and tenderness (minimal).   +lumbar facet tenderness    Neurological: He is alert and oriented to person, place, and time. He has normal reflexes. Gait normal.   Reflex Scores:       Patellar reflexes are 2+ on the right side and 2+ on the left side.  +SLR bilaterally   Skin: Skin is warm, dry and intact.   Psychiatric: He has a normal mood and affect. His speech is normal and behavior is normal. Judgment and thought content normal. Cognition and memory are normal.   Nursing note and vitals reviewed.    Assessment/Plan   King was seen today for back pain.    Diagnoses and all orders for this visit:    Other chronic pain    Lumbar radiculopathy  -     Case Request    Degeneration of intervertebral disc of lumbar region    Spinal stenosis of lumbar region, unspecified whether neurogenic claudication present  -     Case Request    Lumbar facet arthropathy    Neck pain  -     Ambulatory Referral to Physical Therapy Evaluate and treat      --- Bilateral L5 LTFESI X 2, 2-4 weeks apart. Reviewed the procedure at length with the patient.  Included in the review was expectations, complications, risk and benefits.The procedure was described in detail and the risks, benefits and alternatives were discussed with the patient (including but not limited to: bleeding, infection, nerve damage, worsening of pain, inability to perform injection, paralysis, seizures, and death) who agreed to proceed.  Discussed the potential for sedation if warranted/wanted.  Questions were answered and in a way the patient could understand.  Patient verbalized understanding and wishes to proceed.  This intervention will be  ordered.  --- Continue Gabapentin and Celebrex  --- PT to eval/treat new complaint of neck pain  --- Trial muscle relaxant. Discussed medication with the patient.  Included in this discussion was the potential for side effects and adverse events.  Patient verbalized understanding and wished to proceed.  Prescription will be sent to pharmacy (Cyclobenzaprine sent to pharmacy).  --- Dispensed samples of Flector patches to trial (#4)  --- Follow-up 2/22/2019         ADA REPORT  As part of the patient's treatment plan, I am prescribing controlled substances. The patient has been made aware of appropriate use of such medications, including potential risk of somnolence, limited ability to drive and/or work safely, and the potential for dependence or overdose. It has also bee made clear that these medications are for use by this patient only, without concomitant use of alcohol or other substances unless prescribed.     Patient has completed prescribing agreement detailing terms of continued prescribing of controlled substances, including monitoring ADA reports, urine drug screening, and pill counts if necessary. The patient is aware that inappropriate use will results in cessation of prescribing such medications.    ADA report has been reviewed and scanned into the patient's chart.    Date of last ADA : 2/22/2018    History and physical exam exhibit continued safe and appropriate use of controlled substances.     EMR Dragon/Transcription disclaimer:   Much of this encounter note is an electronic transcription/translation of spoken language to printed text. The electronic translation of spoken language may permit erroneous, or at times, nonsensical words or phrases to be inadvertently transcribed; Although I have reviewed the note for such errors, some may still exist.

## 2018-03-06 ENCOUNTER — DOCUMENTATION (OUTPATIENT)
Dept: PAIN MEDICINE | Facility: CLINIC | Age: 68
End: 2018-03-06

## 2018-03-06 ENCOUNTER — OUTSIDE FACILITY SERVICE (OUTPATIENT)
Dept: PAIN MEDICINE | Facility: CLINIC | Age: 68
End: 2018-03-06

## 2018-03-06 PROCEDURE — 64483 NJX AA&/STRD TFRM EPI L/S 1: CPT | Performed by: ANESTHESIOLOGY

## 2018-03-06 NOTE — PROGRESS NOTES
Bilateral L5 Lumbar Transforaminal Epidural Steroid Injection  [facility]      PREOPERATIVE DIAGNOSIS:  Lumbar Radiculopathy, Lumbar Degenerative Disc Disease and Lumbar Spinal Stenosis unspecified regarding Neurogenic Claudication    POSTOPERATIVE DIAGNOSIS:  Same as preop diagnosis    PROCEDURE:  CPT 08663(-50) --  Diagnostic Transforaminal Epidural Steroid Injection at the L5 level, bilaterally    PRE-PROCEDURE DISCUSSION WITH PATIENT:    Risks and complications were discussed with the patient prior to starting the procedure and informed consent was obtained.  We discussed various topics including but not limited to bleeding, infection, injury, nerve injury, paralysis, coma, death, postprocedural painful flare-up, postprocedural site soreness, and a lack of pain relief.  We discussed the diagnostic aspect of transforaminal epidural / selective nerve root blockade.    SURGEON:  Edgar Prince MD    REASON FOR PROCEDURE:    Previous clinically significant therapeutic effect is noted., Making some clinical improvement after the previous procedure., Degenerative changes are noted in the area., Radicular pain pattern seems consistent with this dermatome., Interlaminar approach is relatively contraindicated.  , Interlaminar approach is not possible or advised due to pathology and Other diagnoses include history of axial back pain decreased with RF, but this radicular component previously treated well with ARABELLA in the past.    SEDATION:  Versed 2mg & Fentanyl 50 mcg IV  ANESTHETIC:  Marcaine 0.25%  STEROID:  Methylprednisolone (DEPO MEDROL) 80mg/ml    DESCRIPTON OF PROCEDURE:  After obtaining informed consent, an I.V. was started in the preoperative area. The patient taken to the operating room and was placed in the prone position with a pillow under the abdomen.  All pressure points were well padded.  EKG, blood pressure, and pulse oximeter were monitored.  The lumbosacral area was prepped with  Chloraprep and draped in a sterile fashion. Under fluoroscopic guidance in an oblique dimension, the transverse process of the aforementioned vertebra at the junction of the body at 6 o'clock position on one side was identified. Skin and subcutaneous tissue was anesthetized with 1% lidocaine. A 22-gauge spinal needle was introduced under fluoroscopic guidance at the above junction into the foramen without parasthesias and into the epidural space. After confirming the position of the needle with PA, lateral, and oblique fluoroscopic views, aspiration was checked and was clear of blood or CSF.  Next, 1 mL of Omnipaque was injected. After seeing adequate spread on the corresponding nerve root, a total volume 2mL of injectate containing 0.5ml of the above mentioned local anesthetic, 1 ml saline,  and half of the above mentioned corticosteroid was injected into the epidural space.    The needle was removed intact.      Next, the same vertebral level and corresponding foramen on the contralateral side was visualized with fluoroscopy in an oblique view, and a similar approach was used to place the spinal needle in that foramen.  After confirming the position of the needle with PA, lateral, and oblique fluoroscopic views, aspiration was checked and was clear of blood or CSF.  Next, 1 mL of Omnipaque was injected. After seeing adequate spread on the corresponding nerve root, a total volume 2mL of injectate containing 0.5ml of the above mentioned local anesthetic, 1 ml saline, and half of the above mentioned corticosteroid was injected into the epidural space. The needle was removed intact.  Vital signs were stable throughout.      ESTIMATED BLOOD LOSS:  <5 mL  SPECIMENS:  none    COMPLICATIONS:   No complications were noted., There was no indication of vascular uptake on live injection of contrast dye. and There was no indication of intrathecal uptake on live injection of contrast dye.    TOLERANCE & DISCHARGE CONDITION:     The patient tolerated the procedure well.  The patient was transported to the recovery area without difficulties.  The patient was discharged to home under the care of family in stable and satisfactory condition.    PLAN OF CARE:  1. The patient was given our standard instruction sheet.  2. The patient will Repeat injection 2-4 wks.  3. The patient will resume all medications as per the medication reconciliation sheet.

## 2018-03-13 RX ORDER — CELECOXIB 200 MG/1
CAPSULE ORAL
Qty: 30 CAPSULE | Refills: 11 | Status: SHIPPED | OUTPATIENT
Start: 2018-03-13 | End: 2018-04-09 | Stop reason: SDUPTHER

## 2018-03-27 ENCOUNTER — INFUSION (OUTPATIENT)
Dept: ONCOLOGY | Facility: HOSPITAL | Age: 68
End: 2018-03-27

## 2018-03-27 ENCOUNTER — DOCUMENTATION (OUTPATIENT)
Dept: PAIN MEDICINE | Facility: CLINIC | Age: 68
End: 2018-03-27

## 2018-03-27 ENCOUNTER — OUTSIDE FACILITY SERVICE (OUTPATIENT)
Dept: PAIN MEDICINE | Facility: CLINIC | Age: 68
End: 2018-03-27

## 2018-03-27 DIAGNOSIS — Z45.2 ENCOUNTER FOR FITTING AND ADJUSTMENT OF VASCULAR CATHETER: ICD-10-CM

## 2018-03-27 DIAGNOSIS — C13.9 SQUAMOUS CELL CARCINOMA OF HYPOPHARYNX (HCC): Primary | ICD-10-CM

## 2018-03-27 LAB
ALBUMIN SERPL-MCNC: 4.1 G/DL (ref 3.5–5.2)
ALBUMIN/GLOB SERPL: 1.4 G/DL
ALP SERPL-CCNC: 74 U/L (ref 39–117)
ALT SERPL W P-5'-P-CCNC: 24 U/L (ref 1–41)
ANION GAP SERPL CALCULATED.3IONS-SCNC: 16.9 MMOL/L
AST SERPL-CCNC: 29 U/L (ref 1–40)
BASOPHILS # BLD AUTO: 0.03 10*3/MM3 (ref 0–0.2)
BASOPHILS NFR BLD AUTO: 0.5 % (ref 0–1.5)
BILIRUB SERPL-MCNC: 0.4 MG/DL (ref 0.1–1.2)
BUN BLD-MCNC: 19 MG/DL (ref 8–23)
BUN/CREAT SERPL: 19.6 (ref 7–25)
CALCIUM SPEC-SCNC: 9.7 MG/DL (ref 8.6–10.5)
CHLORIDE SERPL-SCNC: 92 MMOL/L (ref 98–107)
CO2 SERPL-SCNC: 26.1 MMOL/L (ref 22–29)
CREAT BLD-MCNC: 0.97 MG/DL (ref 0.76–1.27)
DEPRECATED RDW RBC AUTO: 53.1 FL (ref 37–54)
EOSINOPHIL # BLD AUTO: 0.02 10*3/MM3 (ref 0–0.7)
EOSINOPHIL NFR BLD AUTO: 0.3 % (ref 0.3–6.2)
ERYTHROCYTE [DISTWIDTH] IN BLOOD BY AUTOMATED COUNT: 13 % (ref 11.5–14.5)
GFR SERPL CREATININE-BSD FRML MDRD: 77 ML/MIN/1.73
GLOBULIN UR ELPH-MCNC: 2.9 GM/DL
GLUCOSE BLD-MCNC: 122 MG/DL (ref 65–99)
HCT VFR BLD AUTO: 36.2 % (ref 40.4–52.2)
HGB BLD-MCNC: 13.3 G/DL (ref 13.7–17.6)
IMM GRANULOCYTES # BLD: 0.08 10*3/MM3 (ref 0–0.03)
IMM GRANULOCYTES NFR BLD: 1.4 % (ref 0–0.5)
LYMPHOCYTES # BLD AUTO: 0.42 10*3/MM3 (ref 0.9–4.8)
LYMPHOCYTES NFR BLD AUTO: 7.1 % (ref 19.6–45.3)
MCH RBC QN AUTO: 41 PG (ref 27–32.7)
MCHC RBC AUTO-ENTMCNC: 36.7 G/DL (ref 32.6–36.4)
MCV RBC AUTO: 111.7 FL (ref 79.8–96.2)
MONOCYTES # BLD AUTO: 0.52 10*3/MM3 (ref 0.2–1.2)
MONOCYTES NFR BLD AUTO: 8.8 % (ref 5–12)
NEUTROPHILS # BLD AUTO: 4.82 10*3/MM3 (ref 1.9–8.1)
NEUTROPHILS NFR BLD AUTO: 81.9 % (ref 42.7–76)
NRBC BLD MANUAL-RTO: 0 /100 WBC (ref 0–0)
PLAT MORPH BLD: NORMAL
PLATELET # BLD AUTO: 154 10*3/MM3 (ref 140–500)
PMV BLD AUTO: 9.1 FL (ref 6–12)
POTASSIUM BLD-SCNC: 4.6 MMOL/L (ref 3.5–5.2)
PROT SERPL-MCNC: 7 G/DL (ref 6–8.5)
RBC # BLD AUTO: 3.24 10*6/MM3 (ref 4.6–6)
RBC MORPH BLD: NORMAL
SODIUM BLD-SCNC: 135 MMOL/L (ref 136–145)
WBC MORPH BLD: NORMAL
WBC NRBC COR # BLD: 5.89 10*3/MM3 (ref 4.5–10.7)

## 2018-03-27 PROCEDURE — 96523 IRRIG DRUG DELIVERY DEVICE: CPT | Performed by: INTERNAL MEDICINE

## 2018-03-27 PROCEDURE — 36415 COLL VENOUS BLD VENIPUNCTURE: CPT | Performed by: INTERNAL MEDICINE

## 2018-03-27 PROCEDURE — 64483 NJX AA&/STRD TFRM EPI L/S 1: CPT | Performed by: ANESTHESIOLOGY

## 2018-03-27 PROCEDURE — 85025 COMPLETE CBC W/AUTO DIFF WBC: CPT | Performed by: INTERNAL MEDICINE

## 2018-03-27 PROCEDURE — 85007 BL SMEAR W/DIFF WBC COUNT: CPT | Performed by: INTERNAL MEDICINE

## 2018-03-27 PROCEDURE — 25010000002 HEPARIN FLUSH (PORCINE) 100 UNIT/ML SOLUTION: Performed by: INTERNAL MEDICINE

## 2018-03-27 PROCEDURE — 80053 COMPREHEN METABOLIC PANEL: CPT | Performed by: INTERNAL MEDICINE

## 2018-03-27 RX ORDER — SODIUM CHLORIDE 0.9 % (FLUSH) 0.9 %
10 SYRINGE (ML) INJECTION AS NEEDED
Status: DISCONTINUED | OUTPATIENT
Start: 2018-03-27 | End: 2018-03-27 | Stop reason: HOSPADM

## 2018-03-27 RX ORDER — SODIUM CHLORIDE 0.9 % (FLUSH) 0.9 %
10 SYRINGE (ML) INJECTION AS NEEDED
Status: CANCELLED | OUTPATIENT
Start: 2018-03-27

## 2018-03-27 RX ADMIN — HEPARIN 500 UNITS: 100 SYRINGE at 13:03

## 2018-03-27 RX ADMIN — Medication 10 ML: at 13:03

## 2018-03-27 NOTE — PROGRESS NOTES
Bilateral L5 Lumbar Transforaminal Epidural Steroid Injection  [facility]      PREOPERATIVE DIAGNOSIS:  Lumbar Radiculopathy, Lumbar Degenerative Disc Disease and Lumbar Spinal Stenosis unspecified regarding Neurogenic Claudication    POSTOPERATIVE DIAGNOSIS:  Same as preop diagnosis    PROCEDURE:  CPT 26226(-50) --  Diagnostic Transforaminal Epidural Steroid Injection at the L5 level, bilaterally    PRE-PROCEDURE DISCUSSION WITH PATIENT:    Risks and complications were discussed with the patient prior to starting the procedure and informed consent was obtained.  We discussed various topics including but not limited to bleeding, infection, injury, nerve injury, paralysis, coma, death, postprocedural painful flare-up, postprocedural site soreness, and a lack of pain relief.  We discussed the diagnostic aspect of transforaminal epidural / selective nerve root blockade.    SURGEON:  Edgar Prince MD    REASON FOR PROCEDURE:    Previous diagnostic positivity from injection at same location, Degenerative changes are noted in the area. and Radiating pattern of pain is likely consistent with degenerative changes in the area.    SEDATION:  Versed 2mg & Fentanyl 50 mcg IV  ANESTHETIC:  Marcaine 0.25%  STEROID:  Methylprednisolone (DEPO MEDROL) 80mg/ml    DESCRIPTON OF PROCEDURE:  After obtaining informed consent, an I.V. was started in the preoperative area. The patient taken to the operating room and was placed in the prone position with a pillow under the abdomen.  All pressure points were well padded.  EKG, blood pressure, and pulse oximeter were monitored.  The lumbosacral area was prepped with Chloraprep and draped in a sterile fashion. Under fluoroscopic guidance in an oblique dimension, the transverse process of the aforementioned vertebra at the junction of the body at 6 o'clock position on one side was identified. Skin and subcutaneous tissue was anesthetized with 1% lidocaine. A 22-gauge  spinal needle was introduced under fluoroscopic guidance at the above junction into the foramen without parasthesias and into the epidural space. After confirming the position of the needle with PA, lateral, and oblique fluoroscopic views, aspiration was checked and was clear of blood or CSF.  Next, 1 mL of Omnipaque was injected. After seeing adequate spread on the corresponding nerve root, a total volume 2mL of injectate containing 0.5ml of the above mentioned local anesthetic, 1 ml saline,  and half of the above mentioned corticosteroid was injected into the epidural space.    The needle was removed intact.      Next, the same vertebral level and corresponding foramen on the contralateral side was visualized with fluoroscopy in an oblique view, and a similar approach was used to place the spinal needle in that foramen.  After confirming the position of the needle with PA, lateral, and oblique fluoroscopic views, aspiration was checked and was clear of blood or CSF.  Next, 1 mL of Omnipaque was injected. After seeing adequate spread on the corresponding nerve root, a total volume 2mL of injectate containing 0.5ml of the above mentioned local anesthetic, 1 ml saline, and half of the above mentioned corticosteroid was injected into the epidural space. The needle was removed intact.  Vital signs were stable throughout.      ESTIMATED BLOOD LOSS:  <5 mL  SPECIMENS:  none    COMPLICATIONS:   No complications were noted. and There was no indication of vascular uptake on live injection of contrast dye.    TOLERANCE & DISCHARGE CONDITION:    The patient tolerated the procedure well.  The patient was transported to the recovery area without difficulties.  The patient was discharged to home under the care of family in stable and satisfactory condition.    PLAN OF CARE:  1. The patient was given our standard instruction sheet.  2. The patient will Return to clinic 2 wks.  3. The patient will resume all medications as per the  medication reconciliation sheet.

## 2018-03-28 ENCOUNTER — DOCUMENTATION (OUTPATIENT)
Dept: ONCOLOGY | Facility: CLINIC | Age: 68
End: 2018-03-28

## 2018-03-28 NOTE — PROGRESS NOTES
Patient called on 3/27, states he saw Dr. Styles and he wants to do a biopsy on a place on his throat. The patient wants to make sure Dr. Garcia agrees before he makes a decision.  Spoke with Dr Garcia this am, he is in agreement with Dr. Styles, advised the patient that Dr. Garcia wants him to have the biopsy.

## 2018-04-03 RX ORDER — GABAPENTIN 800 MG/1
TABLET ORAL
Qty: 120 TABLET | Refills: 5 | OUTPATIENT
Start: 2018-04-03 | End: 2018-05-09

## 2018-04-09 ENCOUNTER — OFFICE VISIT (OUTPATIENT)
Dept: PAIN MEDICINE | Facility: CLINIC | Age: 68
End: 2018-04-09

## 2018-04-09 VITALS
TEMPERATURE: 98.2 F | RESPIRATION RATE: 16 BRPM | HEIGHT: 68 IN | DIASTOLIC BLOOD PRESSURE: 67 MMHG | SYSTOLIC BLOOD PRESSURE: 107 MMHG | BODY MASS INDEX: 19.7 KG/M2 | WEIGHT: 130 LBS | HEART RATE: 100 BPM | OXYGEN SATURATION: 97 %

## 2018-04-09 DIAGNOSIS — M47.816 LUMBAR FACET ARTHROPATHY: ICD-10-CM

## 2018-04-09 DIAGNOSIS — M47.896 OTHER OSTEOARTHRITIS OF SPINE, LUMBAR REGION: ICD-10-CM

## 2018-04-09 DIAGNOSIS — M51.36 DEGENERATION OF INTERVERTEBRAL DISC OF LUMBAR REGION: ICD-10-CM

## 2018-04-09 DIAGNOSIS — M48.061 SPINAL STENOSIS OF LUMBAR REGION, UNSPECIFIED WHETHER NEUROGENIC CLAUDICATION PRESENT: ICD-10-CM

## 2018-04-09 DIAGNOSIS — G89.29 OTHER CHRONIC PAIN: Primary | ICD-10-CM

## 2018-04-09 DIAGNOSIS — M54.16 LUMBAR RADICULOPATHY: ICD-10-CM

## 2018-04-09 PROCEDURE — 99214 OFFICE O/P EST MOD 30 MIN: CPT | Performed by: NURSE PRACTITIONER

## 2018-04-09 RX ORDER — DILTIAZEM HYDROCHLORIDE 60 MG/1
30 TABLET, FILM COATED ORAL 2 TIMES DAILY
COMMUNITY
End: 2018-12-10

## 2018-04-09 NOTE — PROGRESS NOTES
"CHIEF COMPLAINT  Pt continues with basically unchanged LBP, but Pt reports reduction of L leg pain following 3/27/18 L5 TFESI      Subjective   King Parson is a 67 y.o. male  who presents to the office for follow-up of procedure.  He completed a TF ARABELLA bilateral L5   on  3-27-18 and 3-6-18 performed by Dr. Prince for management of low back and leg pain. Patient reports 50% relief from the procedure for his leg pain. His back pain remains unchanged.     Complains of pain in his low back. Today his pain is 6/10VAs. Describes the back pain as continuous and unchanged. Since the injections, his leg pain is improved. Pain increases with walking, standing; pain decreases with medication, injection and rest. Continues with Celebrex 200 mg daily, Gabapentin 800 mg 4/day.  Denies any side effects from the regimen. The regimen helps decrease his pain moderately. ADL's by self. Helps care for his mother.     Has had lumbar RFL previously. \"That really helped for months.\"    Back Pain   This is a chronic problem. The current episode started more than 1 year ago. The problem occurs constantly. The pain is present in the lumbar spine. The quality of the pain is described as aching and burning. The pain radiates to the left knee, left thigh, left foot, right knee, right foot and right thigh (leg pain is improved since last office visit). The pain is at a severity of 6/10. The pain is mild. The pain is worse during the night. The symptoms are aggravated by standing, bending and twisting (walking). Stiffness is present at night. Associated symptoms include numbness (feet bilat.). Pertinent negatives include no bladder incontinence, bowel incontinence, chest pain, dysuria, fever, headaches, tingling or weakness. Risk factors include sedentary lifestyle. He has tried NSAIDs (Celebrex, Gabapentin, Bilateral L5 TFESI's, lumbar RFL) for the symptoms. The treatment provided significant relief.      Procedure List  --3-27-18-- TF ARABELLA " "bilateral L5  -- 3-6-18-- TF ARABELLA bilateral L5  -- 8-1-17-- bilateral L2-L5 RFL    PEG Assessment   What number best describes your pain on average in the past week?6  What number best describes how, during the past week, pain has interfered with your enjoyment of life?5  What number best describes how, during the past week, pain has interfered with your general activity?  4    The following portions of the patient's history were reviewed and updated as appropriate: allergies, current medications, past family history, past medical history, past social history, past surgical history and problem list.    Review of Systems   Constitutional: Positive for activity change (decreased). Negative for appetite change (\"eat well\"), fatigue and fever.   HENT: Negative for congestion.    Eyes: Negative for visual disturbance.   Respiratory: Positive for shortness of breath (smoker). Negative for apnea, cough and wheezing.    Cardiovascular: Negative.  Negative for chest pain.   Gastrointestinal: Negative for bowel incontinence, constipation, diarrhea and nausea.   Genitourinary: Negative for bladder incontinence, difficulty urinating and dysuria.   Musculoskeletal: Positive for back pain.   Neurological: Positive for dizziness (\"dizzy spell sometimes\") and numbness (feet bilat.). Negative for tingling, weakness, light-headedness and headaches.   Psychiatric/Behavioral: Positive for sleep disturbance. Negative for agitation, confusion, hallucinations and suicidal ideas. The patient is not nervous/anxious.        Vitals:    04/09/18 1155   BP: 107/67   Pulse: 100   Resp: 16   Temp: 98.2 °F (36.8 °C)   SpO2: 97%   Weight: 59 kg (130 lb)   Height: 171.5 cm (67.52\")   PainSc: 6  Comment: LBP bilaterally ranges from 6-8/10   PainLoc: Back     Objective   Physical Exam   Constitutional: He is oriented to person, place, and time. He appears well-developed and well-nourished. He is cooperative.   HENT:   Head: Normocephalic and " atraumatic.   Nose: Nose normal.   Eyes: Conjunctivae and lids are normal.   Neck: Trachea normal. Neck supple.   Cardiovascular: Normal rate.    Pulmonary/Chest: Effort normal.   Abdominal: Soft. Normal appearance.   Musculoskeletal:        Cervical back: He exhibits spasm.        Lumbar back: He exhibits decreased range of motion, tenderness and bony tenderness (moderate tenderness of bilateral L2-L5 facets; + loading manuever).   +lumbar facet tenderness   Mild to moderate SI joint tenderness   Neurological: He is alert and oriented to person, place, and time. Gait normal.   Reflex Scores:       Patellar reflexes are 2+ on the right side and 2+ on the left side.  Negative SLR bilaterally   Skin: Skin is warm, dry and intact.   Psychiatric: He has a normal mood and affect. His speech is normal and behavior is normal. Judgment and thought content normal. Cognition and memory are normal.   Nursing note and vitals reviewed.      Assessment/Plan   King was seen today for back pain.    Diagnoses and all orders for this visit:    Other chronic pain    Lumbar facet arthropathy  -     Case Request    Other osteoarthritis of spine, lumbar region  -     Case Request    Degeneration of intervertebral disc of lumbar region    Spinal stenosis of lumbar region, unspecified whether neurogenic claudication present    Lumbar radiculopathy      --- Continue with medication regimen as previously prescribed. Declines prescription for Flexeril.  --- repeat bilateral L2-L5  MBB. No blood thinners. Reviewed the procedure at length with the patient.  Included in the review was expectations, complications, risk and benefits.The procedure was described in detail and the risks, benefits and alternatives were discussed with the patient (including but not limited to: bleeding, infection, nerve damage, worsening of pain, inability to perform injection, paralysis, seizures, and death) who agreed to proceed.  Discussed the potential for  "sedation if warranted/wanted.  Questions were answered and in a way the patient could understand.  Patient verbalized understanding and wishes to proceed.  This intervention will be ordered.  --- Follow-up after procedure or sooner if needed.    -------  Education about Medial Branch Blockade and RF Therapy:    This medial branch blockade (MBB) suggested is intended for diagnostic purposes, with the intent of offering the patient Radiofrequency thermal rhizotomy (RF) if the MBB is diagnostically effective.  The diagnostic blockade is necessary to determine the likelihood that RF therapy could be efficacious in providing long term relief to the patient.    Medial branches are sensory nerve branches that connect to a facet joint and transmit sensations & pain signals from that joint.  Facet is a term for the type of joints found in the spine.  Medial branches are the nerves that go to a facet, and therefore are also sometimes called \"facet joint nerves\" (FJNs).      In a medial branch blockade procedure, xray fluoroscopy is used to verify the locations of the outside of the joint lines which are being targeted.  Under xray guidance, needles are placed to these areas.  Contrast dye is injected to confirm proper placement, with dye flowing over the joint area, and to ensure that the dye does not flow into unintended areas such as a vein.  When this is confirmed, local anesthetic is injected to block the medial branch at that joint level.      If MBBs are diagnostically successful in blocking pain, then the patient is most likely a great candidate for Radiofrequency of those facet joint nerves.  In the RF procedure, needles are placed to the joint lines in the same fashion, and after testing, the needle tips are heated to thermally treat the nerves, blocking the nerves by in essence damaging the nerves with the heat treatment.       Medically, a successful RF procedure should provide a patient with 50% pain relief or " more for at least 6 months.  Clinical experience suggests that successful patients receive relief more in the range of 12 months on average.  We also discussed that a fortunate minority of patients receive therapeutic success from the MBB, and may not require RF ablation.  If a patient receives more than 8 weeks of relief from MBB, then occasional repeat MBB for therapeutic purposes is a very reasonable alternative therapy.  This course of therapy is consistent with our LCDs according to our CMS  in the area, and therefore other insurance providers should follow accordingly.  We will monitor our patients to screen for these therapeutic responders and will offer RF therapy only when necessary.        We discussed that MBB & RF are not without risks.  Guidelines regarding anticoagulant use & neuraxial procedures will be respected.  Patients that are ill or otherwise may be at risk for sepsis will not have their spines accessed by neuraxial injections of any type.  This patient will not be offered these therapies if there is an increased risk.   We discussed that there is a risk of postprocedural pain and also a risk of worsening of clinical picture with these procedures as with any neuraxial procedure.    -------             ADA REPORT    As part of the patient's treatment plan, I am prescribing controlled substances. The patient has been made aware of appropriate use of such medications, including potential risk of somnolence, limited ability to drive and/or work safely, and the potential for dependence or overdose. It has also bee made clear that these medications are for use by this patient only, without concomitant use of alcohol or other substances unless prescribed.     Patient has completed prescribing agreement detailing terms of continued prescribing of controlled substances, including monitoring ADA reports, urine drug screening, and pill counts if necessary. The patient is aware that  inappropriate use will results in cessation of prescribing such medications.    ADA report has been reviewed and scanned into the patient's chart.    Date of last ADA : 4-6-18    History and physical exam exhibit continued safe and appropriate use of controlled substances.       EMR Dragon/Transcription disclaimer:   Much of this encounter note is an electronic transcription/translation of spoken language to printed text. The electronic translation of spoken language may permit erroneous, or at times, nonsensical words or phrases to be inadvertently transcribed; Although I have reviewed the note for such errors, some may still exist.

## 2018-04-24 ENCOUNTER — DOCUMENTATION (OUTPATIENT)
Dept: PAIN MEDICINE | Facility: CLINIC | Age: 68
End: 2018-04-24

## 2018-04-24 ENCOUNTER — OUTSIDE FACILITY SERVICE (OUTPATIENT)
Dept: PAIN MEDICINE | Facility: CLINIC | Age: 68
End: 2018-04-24

## 2018-04-24 DIAGNOSIS — M47.816 SPONDYLOSIS OF LUMBAR REGION WITHOUT MYELOPATHY OR RADICULOPATHY: Primary | ICD-10-CM

## 2018-04-24 PROCEDURE — 64494 INJ PARAVERT F JNT L/S 2 LEV: CPT | Performed by: ANESTHESIOLOGY

## 2018-04-24 PROCEDURE — 64495 INJ PARAVERT F JNT L/S 3 LEV: CPT | Performed by: ANESTHESIOLOGY

## 2018-04-24 PROCEDURE — 64493 INJ PARAVERT F JNT L/S 1 LEV: CPT | Performed by: ANESTHESIOLOGY

## 2018-04-25 NOTE — PROGRESS NOTES
Bilateral L2-5 Lumbar Medial Branch Blockade  [facility]    PREOPERATIVE DIAGNOSIS:  Lumbar spondylosis without myelopathy    POSTOPERATIVE DIAGNOSIS:  Lumbar spondylosis without myelopathy    PROCEDURE:   Diagnostic Bilateral Lumbar Medial Branch Nerve Blockades, with fluoroscopy:  L2, L3, L4, and L5 nerves (at the L3, L4, L5 transverse processes and the sacral alar groove) to block facet joints L3-4, L4-5, and L5-S1  1. 74381-49 -- Bilateral Lumbar Facet blocks, 1st Level  2. 99843-62 -- Bilateral Lumbar Facet blocks, 2nd  Level  3. 24674-54 -- Bilateral Lumbar Facet blocks, 3rd Level    PRE-PROCEDURE DISCUSSION WITH PATIENT:    Risks and complications were discussed with the patient prior to starting the procedure and informed consent was obtained.      SURGEON:  Edgar Prince MD    REASON FOR PROCEDURE:    Diagnostic injection at this level is needed, Previous clinically significant therapeutic effect is noted. and that previous therapeutic effect was of RF... 8-2017, >50% relief for > 6 months.    SEDATION:  Versed 4mg IV  ANESTHETIC:  Marcaine 0.5%  STEROID:  NONE  TOTAL VOLUME OF SOLUTION: 8ml    DESCRIPTON OF PROCEDURE:  After obtaining informed consent, IV access was obtained in the preoperative area.   The patient was taken to the operating room.  The patient was placed in the prone position with a pillow under the abdomen. All pressure points were well padded.  EKG, blood pressure, and pulse oximeter were monitored.  The patient was monitored and sedated by the RN under my direction. The lumbosacral area was prepped with Chloraprep and draped in a sterile fashion. Under fluoroscopic guidance the transverse processes of the L3, L4, and L5 vertebrae at the junctions of the superior articular processes were identified on the right. Also identified was the groove between the ala and the superior articular process of the sacrum on the ipsilateral side.  Skin and subcutaneous tissue were  anesthetized with 1% lidocaine above each of these points. A 22-gauge spinal needle was introduced under fluoroscopic guidance at the above junctions. Aspiration was negative for blood and CSF.  After confirming the position of the needle with fluoroscope in all views, 0.25 mL of Omnipaque was injected, and after seeing the proper spread a total of 1 mL of the anesthetic solution noted above was injected at each of these points.  Needles were removed intact from each of the areas.  A similar procedure was repeated to block the L2, L3, L4, and L5 nerves on the contralateral side.   Onset of analgesia was noted.  Vital signs remained stable throughout.      ESTIMATED BLOOD LOSS:  <5 mL  SPECIMENS:  none    COMPLICATIONS:   No complications were noted. and There was no indication of vascular uptake on live injection of contrast dye.    TOLERANCE & DISCHARGE CONDITION:    The patient tolerated the procedure well.  The patient was transported to the recovery area without difficulties.  The patient was discharged to home under the care of family in stable and satisfactory condition.    PLAN OF CARE:  1. The patient was given our standard instruction sheet.  2. We discussed that Lumbar Medial Branch Blockade is a diagnostic procedure in consideration for radiofrequency ablation if two diagnostic procedures prove to be positive for significant benefit.  If sustained relief of 6 to eight weeks is obtained, then an alternative plan could be therapeutic lumbar branch blockades.  3. The patient is asked to keep a pain log each hour for 8 hours after the procedure today.  4. The patient will  Plan for Radiofrequency procedure when authorized., be examined in the PACU to assess diagnostic effect., The examination in the PACU revealed a diagnostically positive effect with >80% pain relief., Plan for Radiofrequency procedure of the same levels as we have noted significant diagnostic effect on exam after the procedure and of note,  he was fully examined about 45 minutes after the procedure.... Lumbar loading negative, facets nontender.  Indicative of a positive diagnostic result. .  5. The patient will resume all medications as per the medication reconciliation sheet.

## 2018-05-09 ENCOUNTER — APPOINTMENT (OUTPATIENT)
Dept: PREADMISSION TESTING | Facility: HOSPITAL | Age: 68
End: 2018-05-09

## 2018-05-09 ENCOUNTER — DOCUMENTATION (OUTPATIENT)
Dept: ONCOLOGY | Facility: CLINIC | Age: 68
End: 2018-05-09

## 2018-05-09 VITALS
HEIGHT: 69 IN | TEMPERATURE: 99 F | DIASTOLIC BLOOD PRESSURE: 73 MMHG | RESPIRATION RATE: 18 BRPM | HEART RATE: 100 BPM | OXYGEN SATURATION: 98 % | SYSTOLIC BLOOD PRESSURE: 122 MMHG | BODY MASS INDEX: 18.22 KG/M2 | WEIGHT: 123 LBS

## 2018-05-09 LAB
ANION GAP SERPL CALCULATED.3IONS-SCNC: 17 MMOL/L
BUN BLD-MCNC: 16 MG/DL (ref 8–23)
BUN/CREAT SERPL: 20.5 (ref 7–25)
CALCIUM SPEC-SCNC: 9.8 MG/DL (ref 8.6–10.5)
CHLORIDE SERPL-SCNC: 99 MMOL/L (ref 98–107)
CO2 SERPL-SCNC: 23 MMOL/L (ref 22–29)
CREAT BLD-MCNC: 0.78 MG/DL (ref 0.76–1.27)
DEPRECATED RDW RBC AUTO: 54 FL (ref 37–54)
ERYTHROCYTE [DISTWIDTH] IN BLOOD BY AUTOMATED COUNT: 13.2 % (ref 11.5–14.5)
GFR SERPL CREATININE-BSD FRML MDRD: 99 ML/MIN/1.73
GLUCOSE BLD-MCNC: 80 MG/DL (ref 65–99)
HCT VFR BLD AUTO: 39.2 % (ref 40.4–52.2)
HGB BLD-MCNC: 13.6 G/DL (ref 13.7–17.6)
MCH RBC QN AUTO: 39.2 PG (ref 27–32.7)
MCHC RBC AUTO-ENTMCNC: 34.7 G/DL (ref 32.6–36.4)
MCV RBC AUTO: 113 FL (ref 79.8–96.2)
PLATELET # BLD AUTO: 153 10*3/MM3 (ref 140–500)
PMV BLD AUTO: 9.9 FL (ref 6–12)
POTASSIUM BLD-SCNC: 4.1 MMOL/L (ref 3.5–5.2)
RBC # BLD AUTO: 3.47 10*6/MM3 (ref 4.6–6)
SODIUM BLD-SCNC: 139 MMOL/L (ref 136–145)
WBC NRBC COR # BLD: 7.03 10*3/MM3 (ref 4.5–10.7)

## 2018-05-09 PROCEDURE — 80048 BASIC METABOLIC PNL TOTAL CA: CPT | Performed by: OTOLARYNGOLOGY

## 2018-05-09 PROCEDURE — 85027 COMPLETE CBC AUTOMATED: CPT | Performed by: OTOLARYNGOLOGY

## 2018-05-09 PROCEDURE — 93005 ELECTROCARDIOGRAM TRACING: CPT

## 2018-05-09 PROCEDURE — 36415 COLL VENOUS BLD VENIPUNCTURE: CPT

## 2018-05-09 PROCEDURE — 93010 ELECTROCARDIOGRAM REPORT: CPT | Performed by: INTERNAL MEDICINE

## 2018-05-09 RX ORDER — FERROUS SULFATE 325(65) MG
325 TABLET ORAL
COMMUNITY
End: 2019-01-12

## 2018-05-09 RX ORDER — CHOLECALCIFEROL (VITAMIN D3) 25 MCG
1 TABLET,CHEWABLE ORAL DAILY
COMMUNITY

## 2018-05-09 RX ORDER — ACETAMINOPHEN 325 MG/1
650 TABLET ORAL EVERY 6 HOURS PRN
COMMUNITY

## 2018-05-09 RX ORDER — GABAPENTIN 800 MG/1
800 TABLET ORAL 4 TIMES DAILY
COMMUNITY
End: 2018-09-30 | Stop reason: SDUPTHER

## 2018-05-09 NOTE — DISCHARGE INSTRUCTIONS
Take the following medications the morning of surgery with a small sip of water:    GABAPENTIN, CARDIZEM, CARDURA, BREO  AND OMEPRAZOLE    TIME OF ARRIVAL WILL BE CALLED TO YOU THE DAY BEFORE SURGERY    General Instructions:  • Do not eat solid food after midnight the night before surgery.  • You may drink clear liquids day of surgery but must stop at least one hour before your hospital arrival time.  • It is beneficial for you to have a clear drink that contains carbohydrates the day of surgery.  We suggest a 12 to 20 ounce bottle of Gatorade or Powerade for non-diabetic patients or a 12 to 20 ounce bottle of G2 or Powerade Zero for diabetic patients. (Pediatric patients, are not advised to drink a 12 to 20 ounce carbohydrate drink)    Clear liquids are liquids you can see through.  Nothing red in color.     Plain water                               Sports drinks  Sodas                                   Gelatin (Jell-O)  Fruit juices without pulp such as white grape juice and apple juice  Popsicles that contain no fruit or yogurt  Tea or coffee (no cream or milk added)  Gatorade / Powerade  G2 / Powerade Zero    • Infants may have breast milk up to four hours before surgery.  • Infants drinking formula may drink formula up to six hours before surgery.   • Patients who avoid smoking, chewing tobacco and alcohol for 4 weeks prior to surgery have a reduced risk of post-operative complications.  Quit smoking as many days before surgery as you can.  • Do not smoke, use chewing tobacco or drink alcohol the day of surgery.   • If applicable bring your C-PAP/ BI-PAP machine.  • Bring any papers given to you in the doctor’s office.  • Wear clean comfortable clothes and socks.  • Do not wear contact lenses or make-up.  Bring a case for your glasses.   • Bring crutches or walker if applicable.  • Remove all piercings.  Leave jewelry and any other valuables at home.  • Hair extensions with metal clips must be removed prior to  surgery.  • The Pre-Admission Testing nurse will instruct you to bring medications if unable to obtain an accurate list in Pre-Admission Testing.        If you were given a blood bank ID arm band remember to bring it with you the day of surgery.    Preventing a Surgical Site Infection:  • For 2 to 3 days before surgery, avoid shaving with a razor because the razor can irritate skin and make it easier to develop an infection.  • The night prior to surgery sleep in a clean bed with clean clothing.  Do not allow pets to sleep with you.  • Shower on the morning of surgery using a fresh bar of anti-bacterial soap (such as Dial) and clean washcloth.  Dry with a clean towel and dress in clean clothing.  • Ask your surgeon if you will be receiving antibiotics prior to surgery.  • Make sure you, your family, and all healthcare providers clean their hands with soap and water or an alcohol based hand  before caring for you or your wound.    Day of surgery:  Upon arrival, a Pre-op nurse and Anesthesiologist will review your health history, obtain vital signs, and answer questions you may have.  The only belongings needed at this time will be your home medications and if applicable your C-PAP/BI-PAP machine.  If you are staying overnight your family can leave the rest of your belongings in the car and bring them to your room later.  A Pre-op nurse will start an IV and you may receive medication in preparation for surgery, including something to help you relax.  Your family will be able to see you in the Pre-op area.  While you are in surgery your family should notify the waiting room  if they leave the waiting room area and provide a contact phone number.    Please be aware that surgery does come with discomfort.  We want to make every effort to control your discomfort so please discuss any uncontrolled symptoms with your nurse.   Your doctor will most likely have prescribed pain medications.      If you are  going home after surgery you will receive individualized written care instructions before being discharged.  A responsible adult must drive you to and from the hospital on the day of your surgery and stay with you for 24 hours.    If you are staying overnight following surgery, you will be transported to your hospital room following the recovery period.  [facility] has all private rooms.    If you have any questions please call Pre-Admission Testing at [phone].  Deductibles and co-payments are collected on the day of service. Please be prepared to pay the required co-pay, deductible or deposit on the day of service as defined by your plan.

## 2018-05-09 NOTE — PROGRESS NOTES
Pt calling in reference to appointment scheduled with Dr Barrera on 05/11  Pt having surgery on the 17th of may and wants to know if he still needs to keep appointment and have labs done  States he had labs and EKG done today  Pt instructed to keep appointment with Dr barrera on the 11th for labs and see MD  Pt V/U

## 2018-05-10 ENCOUNTER — OUTSIDE FACILITY SERVICE (OUTPATIENT)
Dept: PAIN MEDICINE | Facility: CLINIC | Age: 68
End: 2018-05-10

## 2018-05-10 ENCOUNTER — DOCUMENTATION (OUTPATIENT)
Dept: PAIN MEDICINE | Facility: CLINIC | Age: 68
End: 2018-05-10

## 2018-05-10 PROCEDURE — 64636 DESTROY L/S FACET JNT ADDL: CPT | Performed by: ANESTHESIOLOGY

## 2018-05-10 PROCEDURE — 99152 MOD SED SAME PHYS/QHP 5/>YRS: CPT | Performed by: ANESTHESIOLOGY

## 2018-05-10 PROCEDURE — 64635 DESTROY LUMB/SAC FACET JNT: CPT | Performed by: ANESTHESIOLOGY

## 2018-05-11 ENCOUNTER — OFFICE VISIT (OUTPATIENT)
Dept: ONCOLOGY | Facility: CLINIC | Age: 68
End: 2018-05-11

## 2018-05-11 ENCOUNTER — INFUSION (OUTPATIENT)
Dept: ONCOLOGY | Facility: HOSPITAL | Age: 68
End: 2018-05-11

## 2018-05-11 VITALS
TEMPERATURE: 99 F | RESPIRATION RATE: 16 BRPM | HEART RATE: 104 BPM | DIASTOLIC BLOOD PRESSURE: 63 MMHG | WEIGHT: 123.2 LBS | OXYGEN SATURATION: 93 % | SYSTOLIC BLOOD PRESSURE: 96 MMHG | HEIGHT: 68 IN | BODY MASS INDEX: 18.67 KG/M2

## 2018-05-11 DIAGNOSIS — Z45.2 ENCOUNTER FOR FITTING AND ADJUSTMENT OF VASCULAR CATHETER: Primary | ICD-10-CM

## 2018-05-11 DIAGNOSIS — C13.9 SQUAMOUS CELL CARCINOMA OF HYPOPHARYNX (HCC): Primary | ICD-10-CM

## 2018-05-11 PROCEDURE — 96523 IRRIG DRUG DELIVERY DEVICE: CPT | Performed by: INTERNAL MEDICINE

## 2018-05-11 PROCEDURE — 25010000002 HEPARIN FLUSH (PORCINE) 100 UNIT/ML SOLUTION: Performed by: INTERNAL MEDICINE

## 2018-05-11 PROCEDURE — 99214 OFFICE O/P EST MOD 30 MIN: CPT | Performed by: INTERNAL MEDICINE

## 2018-05-11 RX ORDER — SODIUM CHLORIDE 0.9 % (FLUSH) 0.9 %
10 SYRINGE (ML) INJECTION AS NEEDED
Status: CANCELLED | OUTPATIENT
Start: 2018-05-11

## 2018-05-11 RX ORDER — SODIUM CHLORIDE 0.9 % (FLUSH) 0.9 %
10 SYRINGE (ML) INJECTION AS NEEDED
Status: DISCONTINUED | OUTPATIENT
Start: 2018-05-11 | End: 2018-05-11 | Stop reason: HOSPADM

## 2018-05-11 RX ADMIN — SODIUM CHLORIDE, PRESERVATIVE FREE 500 UNITS: 5 INJECTION INTRAVENOUS at 10:23

## 2018-05-11 RX ADMIN — Medication 10 ML: at 10:23

## 2018-05-11 NOTE — PROGRESS NOTES
REASON FOR FOLLOW-UP:    1. 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  2.  Plans for combined radiation and chemotherapy as definitive treatment with cisplatin and Taxol initiated on 8/10/2017 concurrent with radiation.  He will receive Taxol on day 1 and cisplatin on day 2 with IV fluid hydration.  3.  Long smoking history with COPD.  4.  Mediport and PEG tube placed by Dr. Valenzuela on 7/31/2017.  Subsequent PEG tube infection requiring antibiotics.  5.  Chemotherapy completed 9/15/2017 and radiation therapy completed 10/9/2017.    HISTORY OF PRESENT ILLNESS:  King Parson is a 67 y.o. male with the above-mentioned history here today for follow-up of his squamous cell carcinoma of the head and neck treated with combined chemotherapy and radiation.  The patient completed chemotherapy on 9/15/2017.  He received 6 weekly doses of Taxol/ cisplatin.  The seventh dose was held due to significant moist desquamation of the skin as well as worsening blood counts. He was able to complete his radiation therapy on 10/9/2017.     PET scan from November 2017 at the end of therapy showed an excellent response.  Patient has subsequently had his PEG tube removed in late November and has continued to have his port flushed in our office every 4-6 weeks.  He is here today for routine follow-up and review of surveillance CT scan of the neck.  His CT scan was performed on 2/6/2018 and shows no evidence of disease progression.    He has been following up with his ENT surgeon Dr. Clinton Stlyes and underwent a biopsy of the assistant right neck node.  Unfortunately this did show squamous cell carcinoma and Dr. Styles is planning to proceed with a right neck dissection surgery 5/17/2018.    ONCOLOGIC HISTORY:  He was referred to us by his ENT physician due to the new diagnosis of squamous cell carcinoma of the hypopharynx with metastatic lymphadenopathy in the  right neck.  This diagnosis was made by FNA of the neck node on 6/23/2017.  He is undergone staging CT scans on 6/6/2017 and a PET scan on 7/6/2017.  His disease appears to be clinical stage TIII N2 a stage IV a squamous carcinoma of the hypopharynx.     He was originally referred to the [facility] and was seen by Dr. Moreno Camp of radiation oncology on 7/14/2017.  The patient lives in [place] and has some transportation difficulties.  He has a sister who works in the lab at [facility] [facility] and for this reason they have decided to receive their treatment in the [facility].     He was seen initially by Dr. Garcia in the company of 2 of his sisters.  We recommended combined chemotherapy and radiation.  He will be seeing Dr. Grayson of the [facility] to discuss the radiation portion of his treatment.  We plan to deliver weekly chemotherapy during radiation with combination of cisplatin and Taxol.  Taxol will be delivered day 1 and cisplatin be delivered along with hydration on day 2 each week.    He saw Dr. Grayson who was in agreement with concurrent chemotherapy and radiation.    Mediport and PEG tube placed by Dr. Valenzuela on 7/31/2017.  Subsequent PEG tube infection requiring antibiotics.    He saw nutrition.  He had a chemotherapy education session.    Cycle #1 of therapy was initiated on 8/10/2017.    Patient completed his chemotherapy (6 cycles) on 9/14/2017    Radiation therapy completed 10/9/2017.    PET scan at completion of treatment in November 2017 showed excellent response.    Surveillance CT scan of the neck 2/6/2018 showed no evidence of disease progression.    Needle biopsy for persistent right neck node in March 2018 was positive for squamous cell carcinoma.  Dr. Styles recommended right neck dissection surgery scheduled for 5/17/2018.      PAST MEDICAL, SURGICAL, FAMILY, AND SOCIAL HISTORIES were reviewed with the patient and in the electronic medical record, and were  "updated if indicated.    ALLERGIES:  Allergies   Allergen Reactions   • Codeine      He dont like to take it because it makes him feel strange.        MEDICATIONS:  The medication list has been reviewed with the patient by the medical assistant, and the list has been updated in the electronic medical record, which I reviewed.  Medication dosages and frequencies were confirmed to be accurate.    I have reviewed the patient's medical history in detail and updated the computerized patient record.    Review of Systems   Constitutional: Positive for fatigue. Negative for appetite change, chills, fever and unexpected weight change.   HENT:   Negative for nosebleeds.         See HPI     Respiratory: Negative for cough and shortness of breath.    Cardiovascular: Negative for chest pain and leg swelling.   Gastrointestinal: Negative for abdominal pain, constipation, diarrhea, nausea and vomiting.   Endocrine: Negative for hot flashes.   Genitourinary: Negative for difficulty urinating.    Musculoskeletal: Negative for arthralgias and myalgias.   Skin: Negative for rash and wound.   Neurological: Negative for dizziness and extremity weakness.   Hematological: Negative for adenopathy. Does not bruise/bleed easily.   Psychiatric/Behavioral: Negative for sleep disturbance.       Vitals:    05/11/18 0940   BP: 96/63   Pulse: 104   Resp: 16   Temp: 99 °F (37.2 °C)   TempSrc: Oral   SpO2: 93%   Weight: 55.9 kg (123 lb 3.2 oz)   Height: 171.5 cm (67.52\")   PainSc:   6   PainLoc: Back       Physical Exam   Constitutional: He is oriented to person, place, and time. He appears well-developed and well-nourished.   HENT:   Head: Normocephalic and atraumatic.   Nose: Nose normal.   Mouth/Throat: Mucous membranes are normal.   Edentulous   Eyes: Pupils are equal, round, and reactive to light.   Neck: Normal range of motion. Neck supple.   Palpable, firm 2 cm node in the jugulodigastric area of the right neck.     Cardiovascular: Normal " rate, regular rhythm and normal heart sounds.    Pulmonary/Chest: Effort normal and breath sounds normal. No respiratory distress. He has no wheezes. He has no rhonchi. He has no rales.   Mediport anterior chest wall. No evidence of infection or thrombosis.   Abdominal: Soft. Normal appearance and bowel sounds are normal. He exhibits no distension. There is no hepatosplenomegaly. There is no tenderness.   Musculoskeletal: Normal range of motion. He exhibits no edema.   Neurological: He is alert and oriented to person, place, and time.   Skin: Skin is warm and dry.   Psychiatric: He has a normal mood and affect. His behavior is normal.   Nursing note and vitals reviewed.    DIAGNOSTIC DATA:  Lab Results   Component Value Date    WBC 7.03 05/09/2018    HGB 13.6 (L) 05/09/2018    HCT 39.2 (L) 05/09/2018    .0 (H) 05/09/2018     05/09/2018     Lab Results   Component Value Date    NEUTROABS 4.82 03/27/2018       Lab Results   Component Value Date    GLUCOSE 80 05/09/2018    BUN 16 05/09/2018    CREATININE 0.78 05/09/2018    EGFRIFNONA 99 05/09/2018    BCR 20.5 05/09/2018    K 4.1 05/09/2018    CO2 23.0 05/09/2018    CALCIUM 9.8 05/09/2018    ALBUMIN 4.10 03/27/2018    LABIL2 1.4 03/27/2018    AST 29 03/27/2018    ALT 24 03/27/2018         ASSESSMENT:  This is a 67 y.o. male with:  1.  Stage IV a (T3, N2 a, M0) squamous cell carcinoma of the hypopharynx with metastatic lymphadenopathy to the right neck.Treated with combined radiation and cisplatin and Taxol chemotherapy.  Chemotherapy was completed 9/15/2017 and radiation completed  on 10/9/2017.  The patient had an excellent response but unfortunately as noted above he does have persistent squamous cell carcinoma in the right neck node and will need to undergo surgical salvage.  2.  Comorbidities including COPD.  3.  PEG tube removed by Dr. Valenzuela 11/30/2017.  4.  MediPort which requires flushing every 6 weeks.  Patient will have the port flushed as part  of the visit today.    PLAN:  1. Discussed the situation at length with the patient and answered all of his questions.  We also reviewed the recent office notes and biopsy results from Dr. Styles's office.  We certainly agree with the plan for surgical salvage with right neck dissection which is currently scheduled at [facility] on 5/17/2018.  2. We we will schedule a routine follow-up in our office in about 3 months but certainly we will review the operative note and pathology results were may become available.  We also would be happy to see the patient during his hospitalization if so desired.  3. We most likely will be observing him postoperatively with periodic surveillance scans.  We discussed with the patient that if he develops further disease recurrence, the next line of therapy most likely would be immunotherapy with Nivolumab.    Aakash Garcia MD

## 2018-05-11 NOTE — PROGRESS NOTES
Bilateral L2-5 Lumbar Medial Branch RADIOFREQUENCY  [facility]      PREOPERATIVE DIAGNOSIS:  Lumbar spondylosis without myelopathy    POSTOPERATIVE DIAGNOSIS:  Lumbar spondylosis without myelopathy    PROCEDURE:   Diagnostic Bilateral Lumbar Medial Branch Nerve thermal radiofrequency lesioning, with fluoroscopy:  L2, L3, L4, and L5 nerves (at the L3, L4, L5 transverse processes and the sacral alar groove) to thermally treat the innervation to facet joints L3-4, L4-5, and L5-S1  1. 47152-50 -- Bilateral L/S facet neuro destr., 1st Level  2. 63800-75 -- Bilateral L/S facet neuro destr., 2nd  Level  3. 67639-96 -- Bilateral  L/S facet neuro destr., 3rd Level    PRE-PROCEDURE DISCUSSION WITH PATIENT:    Risks and complications were discussed with the patient prior to starting the procedure and informed consent was obtained.      SURGEON:  Edgar Prince MD    REASON FOR PROCEDURE:    Previous diagnostic positivity of a diagnostic injection to the same structural target. Previous clinically significant therapeutic effect after prior Radiofrequency procedure.    SEDATION:  Fentanyl 150 mcg IV  TIME OF PROCEDURE:   The intraoperative procedure time after administration of the sedative was 30 minutes.       ANESTHETIC:  Lidocaine 2%  STEROID:  NONE      DESCRIPTON OF PROCEDURE:  After obtaining informed consent, IV access was obtained in the preoperative area.   The patient was taken to the operating room.  The patient was placed in the prone position with a pillow under the abdomen. All pressure points were well padded.  EKG, blood pressure, and pulse oximeter were monitored.  The patient was monitored and sedated by the RN under my direction. The lumbosacral area was prepped with Chloraprep and draped in a sterile fashion.     Under fluoroscopic guidance the transverse processes of the L3, L4, and L5 vertebrae at the junctions of the superior articular processes were identified on the right.  Also  identified was the groove between the ala and the superior articular process of the sacrum on the ipsilateral side.  Skin and subcutaneous tissue were anesthetized with 1ml of 1% lidocaine above each of these points. Then, radiofrequency probe needles were advanced in this fluoro view to the above junctions.  Aspiration was negative for blood and CSF.  After confirming the position of the needle with fluoroscope in all views, testing was initiated.  First, sensory testing was started on each needle a 1V and 50Hz and slowly decreased until painful pressure stimulation diminished at 0.5V.  Next, motor testing was confirmed to be negative at 3V and 2Hz for any radicular stimulation.  Then 1mL of the local anesthetic was instilled in each needle.  Two minutes elapsed, and during this time a lateral fluoroscopic view was confirmed again to ensure the needles had not advanced nor retracted.  Then, Radiofrequency Lesioning was initiated for 1.5 minutes at 85 degrees Celsius.  Needles were removed intact from each of the areas.     A similar procedure was repeated to address the L2, L3, L4, and L5 nerves on the contralateral side.   Onset of analgesia was noted.  Vital signs remained stable throughout.      ESTIMATED BLOOD LOSS:  <5 mL  SPECIMENS:  none    COMPLICATIONS:   No complications were noted. and The patient did not have any signs of postprocedure numbness nor weakness.    TOLERANCE & DISCHARGE CONDITION:    The patient tolerated the procedure well.  The patient was transported to the recovery area without difficulties.  The patient was discharged to home under the care of family in stable and satisfactory condition.    PLAN OF CARE:  1. The patient was given our standard instruction sheet.  2. The patient will  Return to clinic 6 wks.  3. The patient will resume all medications as per the medication reconciliation sheet.

## 2018-05-17 ENCOUNTER — ANESTHESIA EVENT (OUTPATIENT)
Dept: PERIOP | Facility: HOSPITAL | Age: 68
End: 2018-05-17

## 2018-05-17 ENCOUNTER — HOSPITAL ENCOUNTER (INPATIENT)
Facility: HOSPITAL | Age: 68
LOS: 1 days | Discharge: HOME | End: 2018-05-18
Attending: OTOLARYNGOLOGY | Admitting: OTOLARYNGOLOGY

## 2018-05-17 ENCOUNTER — ANESTHESIA (OUTPATIENT)
Dept: PERIOP | Facility: HOSPITAL | Age: 68
End: 2018-05-17

## 2018-05-17 DIAGNOSIS — IMO0002 SQUAMOUS CELL CARCINOMA: ICD-10-CM

## 2018-05-17 PROCEDURE — G0378 HOSPITAL OBSERVATION PER HR: HCPCS

## 2018-05-17 PROCEDURE — 25010000002 FENTANYL CITRATE (PF) 100 MCG/2ML SOLUTION: Performed by: NURSE ANESTHETIST, CERTIFIED REGISTERED

## 2018-05-17 PROCEDURE — 25010000002 MORPHINE PER 10 MG: Performed by: OTOLARYNGOLOGY

## 2018-05-17 PROCEDURE — 25010000002 ONDANSETRON PER 1 MG: Performed by: NURSE ANESTHETIST, CERTIFIED REGISTERED

## 2018-05-17 PROCEDURE — 25010000002 PHENYLEPHRINE PER 1 ML: Performed by: NURSE ANESTHETIST, CERTIFIED REGISTERED

## 2018-05-17 PROCEDURE — 94799 UNLISTED PULMONARY SVC/PX: CPT

## 2018-05-17 PROCEDURE — 25010000002 PROPOFOL 10 MG/ML EMULSION: Performed by: NURSE ANESTHETIST, CERTIFIED REGISTERED

## 2018-05-17 PROCEDURE — 94640 AIRWAY INHALATION TREATMENT: CPT

## 2018-05-17 PROCEDURE — 25010000002 HYDROMORPHONE HCL PF 500 MG/50ML SOLUTION: Performed by: ANESTHESIOLOGY

## 2018-05-17 PROCEDURE — 25010000002 MIDAZOLAM PER 1 MG: Performed by: ANESTHESIOLOGY

## 2018-05-17 PROCEDURE — 25010000003 CEFAZOLIN IN DEXTROSE 2-4 GM/100ML-% SOLUTION: Performed by: NURSE ANESTHETIST, CERTIFIED REGISTERED

## 2018-05-17 PROCEDURE — 88307 TISSUE EXAM BY PATHOLOGIST: CPT | Performed by: OTOLARYNGOLOGY

## 2018-05-17 PROCEDURE — 07T10ZZ RESECTION OF RIGHT NECK LYMPHATIC, OPEN APPROACH: ICD-10-PCS | Performed by: OTOLARYNGOLOGY

## 2018-05-17 PROCEDURE — 25010000002 DEXAMETHASONE PER 1 MG: Performed by: NURSE ANESTHETIST, CERTIFIED REGISTERED

## 2018-05-17 PROCEDURE — 25010000002 SUCCINYLCHOLINE PER 20 MG: Performed by: NURSE ANESTHETIST, CERTIFIED REGISTERED

## 2018-05-17 RX ORDER — CEFAZOLIN SODIUM 2 G/100ML
INJECTION, SOLUTION INTRAVENOUS AS NEEDED
Status: DISCONTINUED | OUTPATIENT
Start: 2018-05-17 | End: 2018-05-17 | Stop reason: SURG

## 2018-05-17 RX ORDER — PROMETHAZINE HYDROCHLORIDE 25 MG/1
25 TABLET ORAL EVERY 6 HOURS PRN
Status: DISCONTINUED | OUTPATIENT
Start: 2018-05-17 | End: 2018-05-18 | Stop reason: HOSPADM

## 2018-05-17 RX ORDER — PROMETHAZINE HYDROCHLORIDE 25 MG/1
25 SUPPOSITORY RECTAL ONCE AS NEEDED
Status: DISCONTINUED | OUTPATIENT
Start: 2018-05-17 | End: 2018-05-17 | Stop reason: HOSPADM

## 2018-05-17 RX ORDER — LIDOCAINE HYDROCHLORIDE AND EPINEPHRINE 10; 10 MG/ML; UG/ML
INJECTION, SOLUTION INFILTRATION; PERINEURAL AS NEEDED
Status: DISCONTINUED | OUTPATIENT
Start: 2018-05-17 | End: 2018-05-17 | Stop reason: HOSPADM

## 2018-05-17 RX ORDER — PROMETHAZINE HYDROCHLORIDE 25 MG/ML
12.5 INJECTION, SOLUTION INTRAMUSCULAR; INTRAVENOUS EVERY 6 HOURS PRN
Status: DISCONTINUED | OUTPATIENT
Start: 2018-05-17 | End: 2018-05-18 | Stop reason: HOSPADM

## 2018-05-17 RX ORDER — MORPHINE SULFATE 10 MG/ML
4 INJECTION INTRAMUSCULAR; INTRAVENOUS; SUBCUTANEOUS
Status: DISCONTINUED | OUTPATIENT
Start: 2018-05-17 | End: 2018-05-18 | Stop reason: HOSPADM

## 2018-05-17 RX ORDER — SODIUM CHLORIDE 0.9 % (FLUSH) 0.9 %
1-10 SYRINGE (ML) INJECTION AS NEEDED
Status: DISCONTINUED | OUTPATIENT
Start: 2018-05-17 | End: 2018-05-17 | Stop reason: HOSPADM

## 2018-05-17 RX ORDER — FAMOTIDINE 10 MG/ML
20 INJECTION, SOLUTION INTRAVENOUS ONCE
Status: COMPLETED | OUTPATIENT
Start: 2018-05-17 | End: 2018-05-17

## 2018-05-17 RX ORDER — SUCCINYLCHOLINE CHLORIDE 20 MG/ML
INJECTION INTRAMUSCULAR; INTRAVENOUS AS NEEDED
Status: DISCONTINUED | OUTPATIENT
Start: 2018-05-17 | End: 2018-05-17 | Stop reason: SURG

## 2018-05-17 RX ORDER — HYDROCODONE BITARTRATE AND ACETAMINOPHEN 7.5; 325 MG/1; MG/1
1 TABLET ORAL ONCE AS NEEDED
Status: DISCONTINUED | OUTPATIENT
Start: 2018-05-17 | End: 2018-05-17 | Stop reason: HOSPADM

## 2018-05-17 RX ORDER — HYDROMORPHONE HYDROCHLORIDE 1 MG/ML
0.5 INJECTION, SOLUTION INTRAMUSCULAR; INTRAVENOUS; SUBCUTANEOUS
Status: DISCONTINUED | OUTPATIENT
Start: 2018-05-17 | End: 2018-05-17 | Stop reason: HOSPADM

## 2018-05-17 RX ORDER — HYDRALAZINE HYDROCHLORIDE 20 MG/ML
5 INJECTION INTRAMUSCULAR; INTRAVENOUS
Status: DISCONTINUED | OUTPATIENT
Start: 2018-05-17 | End: 2018-05-17 | Stop reason: HOSPADM

## 2018-05-17 RX ORDER — PANTOPRAZOLE SODIUM 40 MG/1
40 TABLET, DELAYED RELEASE ORAL EVERY MORNING
Status: DISCONTINUED | OUTPATIENT
Start: 2018-05-17 | End: 2018-05-18

## 2018-05-17 RX ORDER — DEXAMETHASONE SODIUM PHOSPHATE 10 MG/ML
INJECTION INTRAMUSCULAR; INTRAVENOUS AS NEEDED
Status: DISCONTINUED | OUTPATIENT
Start: 2018-05-17 | End: 2018-05-17 | Stop reason: SURG

## 2018-05-17 RX ORDER — BUDESONIDE AND FORMOTEROL FUMARATE DIHYDRATE 160; 4.5 UG/1; UG/1
2 AEROSOL RESPIRATORY (INHALATION)
Status: DISCONTINUED | OUTPATIENT
Start: 2018-05-17 | End: 2018-05-18 | Stop reason: HOSPADM

## 2018-05-17 RX ORDER — ONDANSETRON 2 MG/ML
INJECTION INTRAMUSCULAR; INTRAVENOUS AS NEEDED
Status: DISCONTINUED | OUTPATIENT
Start: 2018-05-17 | End: 2018-05-17 | Stop reason: SURG

## 2018-05-17 RX ORDER — PROPOFOL 10 MG/ML
VIAL (ML) INTRAVENOUS AS NEEDED
Status: DISCONTINUED | OUTPATIENT
Start: 2018-05-17 | End: 2018-05-17 | Stop reason: SURG

## 2018-05-17 RX ORDER — DIPHENHYDRAMINE HYDROCHLORIDE 50 MG/ML
6.25 INJECTION INTRAMUSCULAR; INTRAVENOUS
Status: DISCONTINUED | OUTPATIENT
Start: 2018-05-17 | End: 2018-05-17 | Stop reason: HOSPADM

## 2018-05-17 RX ORDER — PROMETHAZINE HYDROCHLORIDE 25 MG/1
25 TABLET ORAL ONCE AS NEEDED
Status: DISCONTINUED | OUTPATIENT
Start: 2018-05-17 | End: 2018-05-17 | Stop reason: HOSPADM

## 2018-05-17 RX ORDER — MAGNESIUM HYDROXIDE 1200 MG/15ML
LIQUID ORAL AS NEEDED
Status: DISCONTINUED | OUTPATIENT
Start: 2018-05-17 | End: 2018-05-17 | Stop reason: HOSPADM

## 2018-05-17 RX ORDER — LIDOCAINE HYDROCHLORIDE 10 MG/ML
0.5 INJECTION, SOLUTION EPIDURAL; INFILTRATION; INTRACAUDAL; PERINEURAL ONCE AS NEEDED
Status: COMPLETED | OUTPATIENT
Start: 2018-05-17 | End: 2018-05-17

## 2018-05-17 RX ORDER — PROMETHAZINE HYDROCHLORIDE 25 MG/ML
6.25 INJECTION, SOLUTION INTRAMUSCULAR; INTRAVENOUS ONCE AS NEEDED
Status: DISCONTINUED | OUTPATIENT
Start: 2018-05-17 | End: 2018-05-17 | Stop reason: HOSPADM

## 2018-05-17 RX ORDER — FLUMAZENIL 0.1 MG/ML
0.2 INJECTION INTRAVENOUS AS NEEDED
Status: DISCONTINUED | OUTPATIENT
Start: 2018-05-17 | End: 2018-05-17 | Stop reason: HOSPADM

## 2018-05-17 RX ORDER — TERAZOSIN 5 MG/1
5 CAPSULE ORAL NIGHTLY
Status: DISCONTINUED | OUTPATIENT
Start: 2018-05-17 | End: 2018-05-18 | Stop reason: HOSPADM

## 2018-05-17 RX ORDER — HYDROCODONE BITARTRATE AND ACETAMINOPHEN 7.5; 325 MG/1; MG/1
1 TABLET ORAL EVERY 4 HOURS PRN
Status: DISCONTINUED | OUTPATIENT
Start: 2018-05-17 | End: 2018-05-18 | Stop reason: HOSPADM

## 2018-05-17 RX ORDER — LABETALOL HYDROCHLORIDE 5 MG/ML
5 INJECTION, SOLUTION INTRAVENOUS
Status: DISCONTINUED | OUTPATIENT
Start: 2018-05-17 | End: 2018-05-17 | Stop reason: HOSPADM

## 2018-05-17 RX ORDER — FENTANYL CITRATE 50 UG/ML
100 INJECTION, SOLUTION INTRAMUSCULAR; INTRAVENOUS
Status: DISCONTINUED | OUTPATIENT
Start: 2018-05-17 | End: 2018-05-17 | Stop reason: HOSPADM

## 2018-05-17 RX ORDER — SODIUM CHLORIDE, SODIUM LACTATE, POTASSIUM CHLORIDE, CALCIUM CHLORIDE 600; 310; 30; 20 MG/100ML; MG/100ML; MG/100ML; MG/100ML
9 INJECTION, SOLUTION INTRAVENOUS CONTINUOUS
Status: DISCONTINUED | OUTPATIENT
Start: 2018-05-17 | End: 2018-05-18 | Stop reason: HOSPADM

## 2018-05-17 RX ORDER — FERROUS SULFATE 325(65) MG
325 TABLET ORAL
Status: DISCONTINUED | OUTPATIENT
Start: 2018-05-17 | End: 2018-05-18 | Stop reason: HOSPADM

## 2018-05-17 RX ORDER — ONDANSETRON 2 MG/ML
4 INJECTION INTRAMUSCULAR; INTRAVENOUS ONCE AS NEEDED
Status: DISCONTINUED | OUTPATIENT
Start: 2018-05-17 | End: 2018-05-17 | Stop reason: HOSPADM

## 2018-05-17 RX ORDER — ONDANSETRON 4 MG/1
8 TABLET, FILM COATED ORAL 3 TIMES DAILY PRN
Status: DISCONTINUED | OUTPATIENT
Start: 2018-05-17 | End: 2018-05-18 | Stop reason: HOSPADM

## 2018-05-17 RX ORDER — MIDAZOLAM HYDROCHLORIDE 1 MG/ML
1 INJECTION INTRAMUSCULAR; INTRAVENOUS
Status: DISCONTINUED | OUTPATIENT
Start: 2018-05-17 | End: 2018-05-17 | Stop reason: HOSPADM

## 2018-05-17 RX ORDER — BACITRACIN ZINC 500 [USP'U]/G
OINTMENT TOPICAL AS NEEDED
Status: DISCONTINUED | OUTPATIENT
Start: 2018-05-17 | End: 2018-05-17 | Stop reason: HOSPADM

## 2018-05-17 RX ORDER — NALOXONE HCL 0.4 MG/ML
0.4 VIAL (ML) INJECTION
Status: DISCONTINUED | OUTPATIENT
Start: 2018-05-17 | End: 2018-05-18 | Stop reason: HOSPADM

## 2018-05-17 RX ORDER — PROMETHAZINE HYDROCHLORIDE 25 MG/1
25 SUPPOSITORY RECTAL EVERY 6 HOURS PRN
Status: DISCONTINUED | OUTPATIENT
Start: 2018-05-17 | End: 2018-05-18 | Stop reason: HOSPADM

## 2018-05-17 RX ORDER — SODIUM CHLORIDE 0.9 % (FLUSH) 0.9 %
1-10 SYRINGE (ML) INJECTION AS NEEDED
Status: DISCONTINUED | OUTPATIENT
Start: 2018-05-17 | End: 2018-05-18 | Stop reason: HOSPADM

## 2018-05-17 RX ORDER — OXYCODONE HYDROCHLORIDE AND ACETAMINOPHEN 5; 325 MG/1; MG/1
2 TABLET ORAL ONCE AS NEEDED
Status: DISCONTINUED | OUTPATIENT
Start: 2018-05-17 | End: 2018-05-17 | Stop reason: HOSPADM

## 2018-05-17 RX ORDER — FENTANYL CITRATE 50 UG/ML
50 INJECTION, SOLUTION INTRAMUSCULAR; INTRAVENOUS
Status: DISCONTINUED | OUTPATIENT
Start: 2018-05-17 | End: 2018-05-17 | Stop reason: HOSPADM

## 2018-05-17 RX ORDER — MIDAZOLAM HYDROCHLORIDE 1 MG/ML
2 INJECTION INTRAMUSCULAR; INTRAVENOUS
Status: DISCONTINUED | OUTPATIENT
Start: 2018-05-17 | End: 2018-05-17 | Stop reason: HOSPADM

## 2018-05-17 RX ORDER — EPHEDRINE SULFATE 50 MG/ML
5 INJECTION, SOLUTION INTRAVENOUS ONCE AS NEEDED
Status: DISCONTINUED | OUTPATIENT
Start: 2018-05-17 | End: 2018-05-17 | Stop reason: HOSPADM

## 2018-05-17 RX ORDER — LIDOCAINE HYDROCHLORIDE 20 MG/ML
INJECTION, SOLUTION INFILTRATION; PERINEURAL AS NEEDED
Status: DISCONTINUED | OUTPATIENT
Start: 2018-05-17 | End: 2018-05-17 | Stop reason: SURG

## 2018-05-17 RX ORDER — ALBUTEROL SULFATE 2.5 MG/3ML
2.5 SOLUTION RESPIRATORY (INHALATION) EVERY 4 HOURS PRN
Status: DISCONTINUED | OUTPATIENT
Start: 2018-05-17 | End: 2018-05-18 | Stop reason: HOSPADM

## 2018-05-17 RX ORDER — FENTANYL CITRATE 50 UG/ML
INJECTION, SOLUTION INTRAMUSCULAR; INTRAVENOUS AS NEEDED
Status: DISCONTINUED | OUTPATIENT
Start: 2018-05-17 | End: 2018-05-17 | Stop reason: SURG

## 2018-05-17 RX ADMIN — SODIUM CHLORIDE, POTASSIUM CHLORIDE, SODIUM LACTATE AND CALCIUM CHLORIDE 9 ML/HR: 600; 310; 30; 20 INJECTION, SOLUTION INTRAVENOUS at 07:10

## 2018-05-17 RX ADMIN — LIDOCAINE HYDROCHLORIDE 0.5 ML: 10 INJECTION, SOLUTION EPIDURAL; INFILTRATION; INTRACAUDAL; PERINEURAL at 07:10

## 2018-05-17 RX ADMIN — HYDROCODONE BITARTRATE AND ACETAMINOPHEN 1 TABLET: 7.5; 325 TABLET ORAL at 21:54

## 2018-05-17 RX ADMIN — FAMOTIDINE 20 MG: 10 INJECTION INTRAVENOUS at 07:10

## 2018-05-17 RX ADMIN — MORPHINE SULFATE 4 MG: 10 INJECTION INTRAVENOUS at 12:35

## 2018-05-17 RX ADMIN — PHENYLEPHRINE HYDROCHLORIDE 100 MCG: 10 INJECTION INTRAVENOUS at 09:12

## 2018-05-17 RX ADMIN — SODIUM CHLORIDE, POTASSIUM CHLORIDE, SODIUM LACTATE AND CALCIUM CHLORIDE: 600; 310; 30; 20 INJECTION, SOLUTION INTRAVENOUS at 08:25

## 2018-05-17 RX ADMIN — FENTANYL CITRATE 50 MCG: 50 INJECTION, SOLUTION INTRAMUSCULAR; INTRAVENOUS at 09:33

## 2018-05-17 RX ADMIN — HYDROCODONE BITARTRATE AND ACETAMINOPHEN 1 TABLET: 7.5; 325 TABLET ORAL at 17:47

## 2018-05-17 RX ADMIN — SUCCINYLCHOLINE CHLORIDE 100 MG: 20 INJECTION, SOLUTION INTRAMUSCULAR; INTRAVENOUS; PARENTERAL at 07:37

## 2018-05-17 RX ADMIN — CEFAZOLIN SODIUM 2 G: 2 INJECTION, SOLUTION INTRAVENOUS at 07:47

## 2018-05-17 RX ADMIN — PHENYLEPHRINE HYDROCHLORIDE 200 MCG: 10 INJECTION INTRAVENOUS at 09:27

## 2018-05-17 RX ADMIN — PHENYLEPHRINE HYDROCHLORIDE 200 MCG: 10 INJECTION INTRAVENOUS at 07:46

## 2018-05-17 RX ADMIN — LIDOCAINE HYDROCHLORIDE 100 MG: 20 INJECTION, SOLUTION INFILTRATION; PERINEURAL at 07:37

## 2018-05-17 RX ADMIN — ONDANSETRON 4 MG: 2 INJECTION INTRAMUSCULAR; INTRAVENOUS at 09:21

## 2018-05-17 RX ADMIN — PHENYLEPHRINE HYDROCHLORIDE 200 MCG: 10 INJECTION INTRAVENOUS at 07:53

## 2018-05-17 RX ADMIN — PANTOPRAZOLE SODIUM 40 MG: 40 TABLET, DELAYED RELEASE ORAL at 17:47

## 2018-05-17 RX ADMIN — MIDAZOLAM HYDROCHLORIDE 1 MG: 1 INJECTION, SOLUTION INTRAMUSCULAR; INTRAVENOUS at 07:10

## 2018-05-17 RX ADMIN — BUDESONIDE AND FORMOTEROL FUMARATE DIHYDRATE 2 PUFF: 160; 4.5 AEROSOL RESPIRATORY (INHALATION) at 21:21

## 2018-05-17 RX ADMIN — PHENYLEPHRINE HYDROCHLORIDE 200 MCG: 10 INJECTION INTRAVENOUS at 07:56

## 2018-05-17 RX ADMIN — PHENYLEPHRINE HYDROCHLORIDE 200 MCG: 10 INJECTION INTRAVENOUS at 07:50

## 2018-05-17 RX ADMIN — DEXAMETHASONE SODIUM PHOSPHATE 8 MG: 10 INJECTION INTRAMUSCULAR; INTRAVENOUS at 09:21

## 2018-05-17 RX ADMIN — FERROUS SULFATE TAB 325 MG (65 MG ELEMENTAL FE) 325 MG: 325 (65 FE) TAB at 17:47

## 2018-05-17 RX ADMIN — DILTIAZEM HYDROCHLORIDE 30 MG: 30 TABLET, FILM COATED ORAL at 21:53

## 2018-05-17 RX ADMIN — PHENYLEPHRINE HYDROCHLORIDE 200 MCG: 10 INJECTION INTRAVENOUS at 09:02

## 2018-05-17 RX ADMIN — EPHEDRINE SULFATE 10 MG: 50 INJECTION INTRAMUSCULAR; INTRAVENOUS; SUBCUTANEOUS at 07:46

## 2018-05-17 RX ADMIN — HYDROMORPHONE HYDROCHLORIDE 0.5 MG: 10 INJECTION INTRAMUSCULAR; INTRAVENOUS; SUBCUTANEOUS at 10:24

## 2018-05-17 RX ADMIN — FENTANYL CITRATE 100 MCG: 50 INJECTION, SOLUTION INTRAMUSCULAR; INTRAVENOUS at 07:37

## 2018-05-17 RX ADMIN — FENTANYL CITRATE 100 MCG: 50 INJECTION, SOLUTION INTRAMUSCULAR; INTRAVENOUS at 09:01

## 2018-05-17 RX ADMIN — PROPOFOL 200 MG: 10 INJECTION, EMULSION INTRAVENOUS at 07:37

## 2018-05-17 RX ADMIN — PHENYLEPHRINE HYDROCHLORIDE 100 MCG: 10 INJECTION INTRAVENOUS at 07:44

## 2018-05-17 RX ADMIN — PHENYLEPHRINE HYDROCHLORIDE 200 MCG: 10 INJECTION INTRAVENOUS at 08:52

## 2018-05-17 RX ADMIN — EPHEDRINE SULFATE 10 MG: 50 INJECTION INTRAMUSCULAR; INTRAVENOUS; SUBCUTANEOUS at 07:50

## 2018-05-17 NOTE — ANESTHESIA POSTPROCEDURE EVALUATION
Patient: King Parson    Procedure Summary     Date:  05/17/18 Room / Location:  [facility] OR [address] [place] [address] [facility] OR [facility]    Anesthesia Start:  0734 Anesthesia Stop:  0946    Procedure:  RIGHT MODIFIED RADICAL  NECK DISSECTION (Right Neck) Diagnosis:      Surgeon:  Clinton Styles MD Provider:  Wade Montero MD    Anesthesia Type:  general ASA Status:  3          Anesthesia Type: general  Last vitals  BP   120/69 (05/17/18 1115)   Temp   37.4 °C (99.4 °F) (05/17/18 1115)   Pulse   90 (05/17/18 1115)   Resp   20 (05/17/18 1115)     SpO2   97 % (05/17/18 1115)     Post Anesthesia Care and Evaluation    Patient location during evaluation: bedside  Patient participation: complete - patient participated  Level of consciousness: awake  Pain score: 1  Pain management: adequate  Airway patency: patent  Anesthetic complications: No anesthetic complications    Cardiovascular status: acceptable  Respiratory status: acceptable  Hydration status: acceptable    Comments: ---------------------------               05/17/18 1115         ---------------------------   BP:          120/69         Pulse:         90           Resp:          20           Temp:   37.4 °C (99.4 °F)   SpO2:          97%         ---------------------------

## 2018-05-17 NOTE — ANESTHESIA PROCEDURE NOTES
Airway  Urgency: elective    Airway not difficult    General Information and Staff    Patient location during procedure: OR  Anesthesiologist: GEORGIANA SAMS  CRNA: VENKAT LEON    Indications and Patient Condition  Indications for airway management: airway protection    Preoxygenated: yes  Mask difficulty assessment: 2 - vent by mask + OA or adjuvant +/- NMBA    Final Airway Details  Final airway type: endotracheal airway      Successful airway: ETT  Cuffed: yes   Successful intubation technique: direct laryngoscopy  Endotracheal tube insertion site: oral  Blade: Chavez  Blade size: #2  ETT size: 8.0 mm  Cormack-Lehane Classification: grade I - full view of glottis  Placement verified by: chest auscultation and capnometry   Cuff volume (mL): 8  Number of attempts at approach: 1    Additional Comments  PreO2 with 100% O2;  FeO2 >85%;  sniff position; easy mask ventilation;  Intubated with no difficultly after eyes taped; Appears atraumatic;  Lips and teeth intact as preop condition;  Airway secured. Connected to ventilator.

## 2018-05-17 NOTE — ANESTHESIA PREPROCEDURE EVALUATION
Anesthesia Evaluation     Patient summary reviewed and Nursing notes reviewed   NPO Solid Status: > 8 hours             Airway   Mallampati: II  TM distance: >3 FB  Neck ROM: full  no difficulty expected  Dental - normal exam   (+) edentulous    Pulmonary - normal exam   (+) a smoker Current Smoked day of surgery, COPD, asthma,   Cardiovascular - normal exam    (+) hypertension,       Neuro/Psych- negative ROS  GI/Hepatic/Renal/Endo - negative ROS     Musculoskeletal (-) negative ROS    Abdominal  - normal exam   Substance History - negative use     OB/GYN negative ob/gyn ROS         Other        ROS/Med Hx Other: Neck mass - s/p xrt and chemo                  Anesthesia Plan    ASA 3     general     intravenous induction   Anesthetic plan and risks discussed with patient.    Plan discussed with CRNA.

## 2018-05-18 VITALS
HEART RATE: 68 BPM | HEIGHT: 68 IN | TEMPERATURE: 96.9 F | RESPIRATION RATE: 16 BRPM | BODY MASS INDEX: 18.71 KG/M2 | WEIGHT: 123.46 LBS | SYSTOLIC BLOOD PRESSURE: 112 MMHG | DIASTOLIC BLOOD PRESSURE: 66 MMHG | OXYGEN SATURATION: 96 %

## 2018-05-18 LAB
CYTO UR: NORMAL
LAB AP CASE REPORT: NORMAL
Lab: NORMAL
PATH REPORT.FINAL DX SPEC: NORMAL
PATH REPORT.GROSS SPEC: NORMAL

## 2018-05-18 PROCEDURE — 94640 AIRWAY INHALATION TREATMENT: CPT

## 2018-05-18 RX ORDER — HYDROCODONE BITARTRATE AND ACETAMINOPHEN 5; 325 MG/1; MG/1
1-2 TABLET ORAL EVERY 4 HOURS PRN
Qty: 30 TABLET | Refills: 0
Start: 2018-05-18 | End: 2018-06-21 | Stop reason: SDUPTHER

## 2018-05-18 RX ORDER — PANTOPRAZOLE SODIUM 40 MG/1
40 TABLET, DELAYED RELEASE ORAL
Status: DISCONTINUED | OUTPATIENT
Start: 2018-05-18 | End: 2018-05-18 | Stop reason: HOSPADM

## 2018-05-18 RX ADMIN — FERROUS SULFATE TAB 325 MG (65 MG ELEMENTAL FE) 325 MG: 325 (65 FE) TAB at 08:19

## 2018-05-18 RX ADMIN — HYDROCODONE BITARTRATE AND ACETAMINOPHEN 1 TABLET: 7.5; 325 TABLET ORAL at 13:28

## 2018-05-18 RX ADMIN — DILTIAZEM HYDROCHLORIDE 30 MG: 30 TABLET, FILM COATED ORAL at 08:19

## 2018-05-18 RX ADMIN — PANTOPRAZOLE SODIUM 40 MG: 40 TABLET, DELAYED RELEASE ORAL at 06:20

## 2018-05-18 RX ADMIN — BUDESONIDE AND FORMOTEROL FUMARATE DIHYDRATE 2 PUFF: 160; 4.5 AEROSOL RESPIRATORY (INHALATION) at 08:52

## 2018-05-18 RX ADMIN — HYDROCODONE BITARTRATE AND ACETAMINOPHEN 1 TABLET: 7.5; 325 TABLET ORAL at 08:18

## 2018-06-05 ENCOUNTER — LAB (OUTPATIENT)
Dept: ONCOLOGY | Facility: HOSPITAL | Age: 68
End: 2018-06-05

## 2018-06-05 ENCOUNTER — OFFICE VISIT (OUTPATIENT)
Dept: ONCOLOGY | Facility: CLINIC | Age: 68
End: 2018-06-05

## 2018-06-05 VITALS
WEIGHT: 123.5 LBS | TEMPERATURE: 98.6 F | OXYGEN SATURATION: 97 % | BODY MASS INDEX: 18.72 KG/M2 | DIASTOLIC BLOOD PRESSURE: 53 MMHG | RESPIRATION RATE: 16 BRPM | HEART RATE: 98 BPM | SYSTOLIC BLOOD PRESSURE: 92 MMHG | HEIGHT: 68 IN

## 2018-06-05 DIAGNOSIS — Z45.2 ENCOUNTER FOR FITTING AND ADJUSTMENT OF VASCULAR CATHETER: ICD-10-CM

## 2018-06-05 DIAGNOSIS — C13.9 SQUAMOUS CELL CARCINOMA OF HYPOPHARYNX (HCC): Primary | ICD-10-CM

## 2018-06-05 DIAGNOSIS — R93.89 ABNORMAL FINDINGS ON DIAGNOSTIC IMAGING OF OTHER SPECIFIED BODY STRUCTURES: ICD-10-CM

## 2018-06-05 LAB
ALBUMIN SERPL-MCNC: 3.9 G/DL (ref 3.5–5.2)
ALBUMIN/GLOB SERPL: 1.5 G/DL
ALP SERPL-CCNC: 68 U/L (ref 39–117)
ALT SERPL W P-5'-P-CCNC: 8 U/L (ref 1–41)
ANION GAP SERPL CALCULATED.3IONS-SCNC: 9.9 MMOL/L
AST SERPL-CCNC: 14 U/L (ref 1–40)
BASOPHILS # BLD AUTO: 0.03 10*3/MM3 (ref 0–0.2)
BASOPHILS NFR BLD AUTO: 0.5 % (ref 0–1.5)
BILIRUB SERPL-MCNC: 0.5 MG/DL (ref 0.1–1.2)
BUN BLD-MCNC: 13 MG/DL (ref 8–23)
BUN/CREAT SERPL: 16.7 (ref 7–25)
CALCIUM SPEC-SCNC: 9.2 MG/DL (ref 8.6–10.5)
CHLORIDE SERPL-SCNC: 103 MMOL/L (ref 98–107)
CO2 SERPL-SCNC: 26.1 MMOL/L (ref 22–29)
CREAT BLD-MCNC: 0.78 MG/DL (ref 0.76–1.27)
DEPRECATED RDW RBC AUTO: 51.2 FL (ref 37–54)
EOSINOPHIL # BLD AUTO: 0.04 10*3/MM3 (ref 0–0.7)
EOSINOPHIL NFR BLD AUTO: 0.7 % (ref 0.3–6.2)
ERYTHROCYTE [DISTWIDTH] IN BLOOD BY AUTOMATED COUNT: 12.3 % (ref 11.5–14.5)
GFR SERPL CREATININE-BSD FRML MDRD: 99 ML/MIN/1.73
GLOBULIN UR ELPH-MCNC: 2.6 GM/DL
GLUCOSE BLD-MCNC: 85 MG/DL (ref 65–99)
HCT VFR BLD AUTO: 31.2 % (ref 40.4–52.2)
HGB BLD-MCNC: 11 G/DL (ref 13.7–17.6)
IMM GRANULOCYTES # BLD: 0.03 10*3/MM3 (ref 0–0.03)
IMM GRANULOCYTES NFR BLD: 0.5 % (ref 0–0.5)
LYMPHOCYTES # BLD AUTO: 0.73 10*3/MM3 (ref 0.9–4.8)
LYMPHOCYTES NFR BLD AUTO: 13.2 % (ref 19.6–45.3)
MCH RBC QN AUTO: 39.4 PG (ref 27–32.7)
MCHC RBC AUTO-ENTMCNC: 35.3 G/DL (ref 32.6–36.4)
MCV RBC AUTO: 111.8 FL (ref 79.8–96.2)
MONOCYTES # BLD AUTO: 0.55 10*3/MM3 (ref 0.2–1.2)
MONOCYTES NFR BLD AUTO: 9.9 % (ref 5–12)
NEUTROPHILS # BLD AUTO: 4.17 10*3/MM3 (ref 1.9–8.1)
NEUTROPHILS NFR BLD AUTO: 75.2 % (ref 42.7–76)
NRBC BLD MANUAL-RTO: 0 /100 WBC (ref 0–0)
PLATELET # BLD AUTO: 232 10*3/MM3 (ref 140–500)
PMV BLD AUTO: 8.6 FL (ref 6–12)
POTASSIUM BLD-SCNC: 4 MMOL/L (ref 3.5–5.2)
PROT SERPL-MCNC: 6.5 G/DL (ref 6–8.5)
RBC # BLD AUTO: 2.79 10*6/MM3 (ref 4.6–6)
SODIUM BLD-SCNC: 139 MMOL/L (ref 136–145)
WBC NRBC COR # BLD: 5.55 10*3/MM3 (ref 4.5–10.7)

## 2018-06-05 PROCEDURE — 25010000002 HEPARIN FLUSH (PORCINE) 100 UNIT/ML SOLUTION: Performed by: INTERNAL MEDICINE

## 2018-06-05 PROCEDURE — 99214 OFFICE O/P EST MOD 30 MIN: CPT | Performed by: INTERNAL MEDICINE

## 2018-06-05 PROCEDURE — 80053 COMPREHEN METABOLIC PANEL: CPT | Performed by: INTERNAL MEDICINE

## 2018-06-05 PROCEDURE — 85025 COMPLETE CBC W/AUTO DIFF WBC: CPT | Performed by: INTERNAL MEDICINE

## 2018-06-05 PROCEDURE — 36415 COLL VENOUS BLD VENIPUNCTURE: CPT

## 2018-06-05 PROCEDURE — 96523 IRRIG DRUG DELIVERY DEVICE: CPT | Performed by: INTERNAL MEDICINE

## 2018-06-05 RX ORDER — SODIUM CHLORIDE 0.9 % (FLUSH) 0.9 %
10 SYRINGE (ML) INJECTION AS NEEDED
Status: CANCELLED | OUTPATIENT
Start: 2018-06-05

## 2018-06-05 RX ORDER — SODIUM CHLORIDE 0.9 % (FLUSH) 0.9 %
10 SYRINGE (ML) INJECTION AS NEEDED
Status: DISCONTINUED | OUTPATIENT
Start: 2018-06-05 | End: 2018-06-05 | Stop reason: HOSPADM

## 2018-06-05 RX ADMIN — Medication 10 ML: at 11:20

## 2018-06-05 RX ADMIN — SODIUM CHLORIDE, PRESERVATIVE FREE 500 UNITS: 5 INJECTION INTRAVENOUS at 11:20

## 2018-06-05 NOTE — PROGRESS NOTES
REASON FOR FOLLOW-UP:    1. 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  2.  Plans for combined radiation and chemotherapy as definitive treatment with cisplatin and Taxol initiated on 8/10/2017 concurrent with radiation.  He will receive Taxol on day 1 and cisplatin on day 2 with IV fluid hydration.  3.  Long smoking history with COPD.  4.  Mediport and PEG tube placed by Dr. Valenzuela on 7/31/2017.  Subsequent PEG tube infection requiring antibiotics.  5.  Chemotherapy completed 9/15/2017 and radiation therapy completed 10/9/2017.  6.  Right neck dissection by Dr. Clinton Styles 5/17/2018.    HISTORY OF PRESENT ILLNESS:  King Parson is a 67 y.o. male with the above-mentioned history here today for follow-up of his squamous cell carcinoma of the head and neck treated with combined chemotherapy and radiation.  The patient completed chemotherapy on 9/15/2017.  He received 6 weekly doses of Taxol/ cisplatin.  The seventh dose was held due to significant moist desquamation of the skin as well as worsening blood counts. He was able to complete his radiation therapy on 10/9/2017.     PET scan from November 2017 at the end of therapy showed an excellent response.      He had a persistent palpable node on the right which was biopsied by Dr. Styles and found to be positive for residual squamous cell carcinoma.  He subsequently underwent a right neck dissection performed by Dr. Styles on 5/17/2018.  There was metastatic carcinoma in 3 out of 14 lymph nodes and squamous cell carcinoma noted in fibroadipose tissue measuring 2.7 cm.  Carcinoma was present at the soft tissue margin.    He seems to be healing well from the surgery.  We will plan to pursue a PET scan in a few weeks and decide from there whether to observe him or consider immunotherapy with Opdivo.      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  This diagnosis was made by FNA of the neck node on 6/23/2017.  He is undergone staging CT scans on 6/6/2017 and a PET scan on 7/6/2017.  His disease appears to be clinical stage TIII N2 a stage IV a squamous carcinoma of the hypopharynx.     He was originally referred to the [facility] and was seen by Dr. Moreno Camp of radiation oncology on 7/14/2017.  The patient lives in [place] and has some transportation difficulties.  He has a sister who works in the lab at [facility] and for this reason they have decided to receive their treatment in the [facility] system.     He was seen initially by Dr. Garcia in the company of 2 of his sisters.  We recommended combined chemotherapy and radiation.  He will be seeing Dr. Grayson of the [facility] to discuss the radiation portion of his treatment.  We plan to deliver weekly chemotherapy during radiation with combination of cisplatin and Taxol.  Taxol will be delivered day 1 and cisplatin be delivered along with hydration on day 2 each week.    He saw Dr. Grayson who was in agreement with concurrent chemotherapy and radiation.    Mediport and PEG tube placed by Dr. Valenzuela on 7/31/2017.  Subsequent PEG tube infection requiring antibiotics.    He saw nutrition.  He had a chemotherapy education session.    Cycle #1 of therapy was initiated on 8/10/2017.    Patient completed his chemotherapy (6 cycles) on 9/14/2017    Radiation therapy completed 10/9/2017.    PET scan at completion of treatment in November 2017 showed excellent response.    Surveillance CT scan of the neck 2/6/2018 showed no evidence of disease progression.    Needle biopsy for persistent right neck node in March 2018 was positive for squamous cell carcinoma.  Dr. Styles performed right neck dissection surgery  5/17/2018.  3 out of 14 lymph nodes were positive for squamous cell carcinoma and  "squamous cell carcinoma was found in fibroadipose tissue measuring 2.7 cm with positive soft tissue margin.      PAST MEDICAL, SURGICAL, FAMILY, AND SOCIAL HISTORIES were reviewed with the patient and in the electronic medical record, and were updated if indicated.    ALLERGIES:  Allergies   Allergen Reactions   • Codeine      He dont like to take it because it makes him feel strange.        MEDICATIONS:  The medication list has been reviewed with the patient by the medical assistant, and the list has been updated in the electronic medical record, which I reviewed.  Medication dosages and frequencies were confirmed to be accurate.    I have reviewed the patient's medical history in detail and updated the computerized patient record.    Review of Systems   Constitutional: Positive for fatigue. Negative for appetite change, chills, fever and unexpected weight change.   HENT:   Negative for nosebleeds.         See HPI     Respiratory: Negative for cough and shortness of breath.    Cardiovascular: Negative for chest pain and leg swelling.   Gastrointestinal: Negative for abdominal pain, constipation, diarrhea, nausea and vomiting.   Endocrine: Negative for hot flashes.   Genitourinary: Negative for difficulty urinating.    Musculoskeletal: Negative for arthralgias and myalgias.   Skin: Negative for rash and wound.   Neurological: Negative for dizziness and extremity weakness.   Hematological: Negative for adenopathy. Does not bruise/bleed easily.   Psychiatric/Behavioral: Negative for sleep disturbance.       Vitals:    06/05/18 1121   BP: 92/53   Pulse: 98   Resp: 16   Temp: 98.6 °F (37 °C)   TempSrc: Oral   SpO2: 97%   Weight: 56 kg (123 lb 8 oz)   Height: 171.5 cm (67.52\")   PainSc:   6   PainLoc: Back       Physical Exam   Constitutional: He is oriented to person, place, and time. He appears well-developed and well-nourished.   HENT:   Head: Normocephalic and atraumatic.   Nose: Nose normal.   Mouth/Throat: Mucous " membranes are normal.   Edentulous   Eyes: Pupils are equal, round, and reactive to light.   Neck: Normal range of motion. Neck supple.   Palpable, firm 2 cm node in the jugulodigastric area of the right neck.     Cardiovascular: Normal rate, regular rhythm and normal heart sounds.    Pulmonary/Chest: Effort normal and breath sounds normal. No respiratory distress. He has no wheezes. He has no rhonchi. He has no rales.   Mediport anterior chest wall. No evidence of infection or thrombosis.   Abdominal: Soft. Normal appearance and bowel sounds are normal. He exhibits no distension. There is no hepatosplenomegaly. There is no tenderness.   Musculoskeletal: Normal range of motion. He exhibits no edema.   Neurological: He is alert and oriented to person, place, and time.   Skin: Skin is warm and dry.   Psychiatric: He has a normal mood and affect. His behavior is normal.   Nursing note and vitals reviewed.    DIAGNOSTIC DATA:  Lab Results   Component Value Date    WBC 5.55 06/05/2018    HGB 11.0 (L) 06/05/2018    HCT 31.2 (L) 06/05/2018    .8 (H) 06/05/2018     06/05/2018     Lab Results   Component Value Date    NEUTROABS 4.17 06/05/2018       Lab Results   Component Value Date    GLUCOSE 80 05/09/2018    BUN 16 05/09/2018    CREATININE 0.78 05/09/2018    EGFRIFNONA 99 05/09/2018    BCR 20.5 05/09/2018    K 4.1 05/09/2018    CO2 23.0 05/09/2018    CALCIUM 9.8 05/09/2018    ALBUMIN 4.10 03/27/2018    LABIL2 1.4 03/27/2018    AST 29 03/27/2018    ALT 24 03/27/2018       PATHOLOGY 5/17/2018  Final Diagnosis   RIGHT NECK DISSECTION:        METASTATIC SQUAMOUS CELL CARCINOMA IN FIBROADIPOSE TISSUE (2.7 CM).        METASTATIC CARCINOMA PRESENT IN 3 OF 14 LYMPH NODES.        TUMOR INVADES MUSCULAR WALL OF A VEIN.        CARCINOMA IS PRESENT AT SOFT TISSUE MARGIN.              COMMENT: Vascular invasion (venular) is present focally at the inked margin.        ASSESSMENT:  This is a 67 y.o. male with:  1.  Stage  IV a (T3, N2 a, M0) squamous cell carcinoma of the hypopharynx with metastatic lymphadenopathy to the right neck.Treated with combined radiation and cisplatin and Taxol chemotherapy.  Chemotherapy was completed 9/15/2017 and radiation completed  on 10/9/2017.  The patient had an excellent response but unfortunately as noted above he does have persistent squamous cell carcinoma in the right neck node and Underwent right neck dissection 5/17/2018.  There is concern for positive soft tissue margin and 3 out of 14 lymph nodes were positive.    2.  Comorbidities including COPD.  3.  PEG tube removed by Dr. Valenzuela 11/30/2017.  4.  MediPort which requires flushing every 6 weeks.  Patient will have the port flushed as part of the visit today.    PLAN:  1.  Discussed the situation at length with the patient and answered all of his questions.  2.  We will pursue a post surgical PET scan to be performed in 3 weeks.  3.  2 unit M.D. follow-up will be scheduled in 4 weeks to review the results of the PET scan.  If there is evidence of residual activity in the right neck we most likely will discuss proceeding with further palliative immunotherapy with Opdivo.  If the PET scan appears to be completely negative we could consider observing him for a while longer although certainly he is at very high risk for further recurrent/metastatic disease.              Aakash Garcia MD

## 2018-06-21 ENCOUNTER — OFFICE VISIT (OUTPATIENT)
Dept: PAIN MEDICINE | Facility: CLINIC | Age: 68
End: 2018-06-21

## 2018-06-21 VITALS
WEIGHT: 125 LBS | DIASTOLIC BLOOD PRESSURE: 60 MMHG | OXYGEN SATURATION: 93 % | HEART RATE: 115 BPM | TEMPERATURE: 99.6 F | BODY MASS INDEX: 18.94 KG/M2 | SYSTOLIC BLOOD PRESSURE: 97 MMHG | HEIGHT: 68 IN

## 2018-06-21 DIAGNOSIS — M54.2 CERVICALGIA: ICD-10-CM

## 2018-06-21 DIAGNOSIS — M51.36 DEGENERATION OF INTERVERTEBRAL DISC OF LUMBAR REGION: ICD-10-CM

## 2018-06-21 DIAGNOSIS — M48.02 CERVICAL SPINAL STENOSIS: ICD-10-CM

## 2018-06-21 DIAGNOSIS — M47.816 LUMBAR FACET ARTHROPATHY: ICD-10-CM

## 2018-06-21 DIAGNOSIS — G89.29 OTHER CHRONIC PAIN: Primary | ICD-10-CM

## 2018-06-21 DIAGNOSIS — C14.0 THROAT CANCER (HCC): ICD-10-CM

## 2018-06-21 PROCEDURE — 99214 OFFICE O/P EST MOD 30 MIN: CPT | Performed by: NURSE PRACTITIONER

## 2018-06-21 RX ORDER — CELECOXIB 200 MG/1
CAPSULE ORAL
COMMUNITY
Start: 2018-06-06 | End: 2018-07-20 | Stop reason: SDUPTHER

## 2018-06-21 RX ORDER — HYDROCODONE BITARTRATE AND ACETAMINOPHEN 5; 325 MG/1; MG/1
1 TABLET ORAL EVERY 8 HOURS PRN
Qty: 90 TABLET | Refills: 0 | Status: SHIPPED | OUTPATIENT
Start: 2018-06-21 | End: 2018-07-20 | Stop reason: SDUPTHER

## 2018-06-21 NOTE — PROGRESS NOTES
CHIEF COMPLAINT pt is here for a follow up on left leg and back pain. He states that it is better, but he is now having trouble with his right shoulder and neck hurting him     Subjective   King Parson is a 67 y.o. male  who presents to the office for follow-up of procedure.  He completed a bilateral L2-L5 RFL   on  5/10/2018 performed by Dr. Prince for management of back pain. Patient reports significant relief from the procedure.     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 He completed chemotherapy 9/15/17 and radiation 10/9/17.  He has persistent squamous cell carcinoma in a right neck lymph node.  He underwent a right radical neck dissection for.  Having PET scan next week, there is concern for positive soft tissue margins.      He complains of neck pain that is incisional from surgery last month but also reports increased chronic neck pain complaints.  C/o posterior neck pain with radiation into the shoulders bilaterally.  Occasional radiation down into the right arm.  Reports that he had some Flector patches from his PCP which helped.  He did have some pain medication post operatively, has run out of this, it was helpful, no side effects.  He continues with Celebrex daily.      Back Pain   This is a chronic problem. The current episode started more than 1 year ago. The problem occurs constantly. The problem has been gradually improving since onset. The pain is present in the lumbar spine. The quality of the pain is described as aching and burning. The pain radiates to the left knee, left thigh, left foot, right knee, right foot and right thigh. The pain is at a severity of 3/10. The pain is worse during the night. The symptoms are aggravated by standing, bending and twisting (walking). Stiffness is present at night. Associated symptoms include numbness (feet bilat.). Pertinent negatives include no bladder  "incontinence, bowel incontinence, chest pain, dysuria, fever, headaches, tingling or weakness. Risk factors include sedentary lifestyle. He has tried NSAIDs (Celebrex, Gabapentin, Bilateral L5 TFESI's, lumbar RFL) for the symptoms. The treatment provided significant relief.      PEG Assessment   What number best describes your pain on average in the past week?5  What number best describes how, during the past week, pain has interfered with your enjoyment of life?4  What number best describes how, during the past week, pain has interfered with your general activity?  4            The following portions of the patient's history were reviewed and updated as appropriate: allergies, current medications, past family history, past medical history, past social history, past surgical history and problem list.    Review of Systems   Constitutional: Positive for activity change (decreased). Negative for appetite change (\"eat well\"), fatigue and fever.   HENT: Negative for congestion.    Eyes: Negative for visual disturbance.   Respiratory: Positive for shortness of breath (smoker). Negative for apnea, cough and wheezing.    Cardiovascular: Negative.  Negative for chest pain.   Gastrointestinal: Negative for bowel incontinence, constipation, diarrhea and nausea.   Genitourinary: Negative for bladder incontinence, difficulty urinating and dysuria.   Musculoskeletal: Positive for back pain and neck pain.   Neurological: Positive for dizziness (\"dizzy spell sometimes\") and numbness (feet bilat.). Negative for tingling, weakness, light-headedness and headaches.   Psychiatric/Behavioral: Positive for sleep disturbance. Negative for agitation, confusion, hallucinations and suicidal ideas. The patient is not nervous/anxious.        Vitals:    06/21/18 0942   BP: 97/60   BP Location: Left arm   Patient Position: Sitting   Cuff Size: Adult   Pulse: 115   Temp: 99.6 °F (37.6 °C)   TempSrc: Oral   SpO2: 93%   Weight: 56.7 kg (125 lb) " "  Height: 171.5 cm (67.52\")   PainSc:   4   PainLoc: Back         Objective   Physical Exam   Constitutional: He is oriented to person, place, and time. He appears well-developed and well-nourished. He is cooperative.   HENT:   Head: Normocephalic and atraumatic.   Nose: Nose normal.   Eyes: Conjunctivae and lids are normal.   Neck: Trachea normal and normal range of motion. Neck supple. Muscular tenderness present.   Cardiovascular: Normal rate.    Pulmonary/Chest: Effort normal. No respiratory distress.   Abdominal: Soft. Normal appearance.   Musculoskeletal:        Cervical back: He exhibits tenderness, pain and spasm.        Lumbar back: He exhibits tenderness.   Neurological: He is alert and oriented to person, place, and time. He has normal reflexes. Gait normal.   Reflex Scores:       Tricep reflexes are 2+ on the right side and 2+ on the left side.       Bicep reflexes are 2+ on the right side and 2+ on the left side.       Brachioradialis reflexes are 2+ on the right side and 2+ on the left side.       Patellar reflexes are 2+ on the right side and 2+ on the left side.  Skin: Skin is warm, dry and intact.   Psychiatric: He has a normal mood and affect. His speech is normal and behavior is normal. Judgment and thought content normal. Cognition and memory are normal.   Nursing note and vitals reviewed.    Assessment/Plan   King was seen today for neck pain, shoulder pain, back pain and leg pain.    Diagnoses and all orders for this visit:    Other chronic pain    Degeneration of intervertebral disc of lumbar region    Lumbar facet arthropathy    Throat cancer    Cervicalgia    Cervical spinal stenosis    Other orders  -     HYDROcodone-acetaminophen (NORCO) 5-325 MG per tablet; Take 1 tablet by mouth Every 8 (Eight) Hours As Needed for Mild Pain .      --- Consider NAVARRO if neck pain worsens. He wishes to first try conservative options  --- Given samples of Flector patches which have helped his neck before "   --- Hydrocodone prescribed today for acute post operative pain as well as acute on chronic flare of neck pain.    --- Follow-up 1 month         ADA REPORT  As part of the patient's treatment plan, I am prescribing controlled substances. The patient has been made aware of appropriate use of such medications, including potential risk of somnolence, limited ability to drive and/or work safely, and the potential for dependence or overdose. It has also bee made clear that these medications are for use by this patient only, without concomitant use of alcohol or other substances unless prescribed.     Patient has completed prescribing agreement detailing terms of continued prescribing of controlled substances, including monitoring ADA reports, urine drug screening, and pill counts if necessary. The patient is aware that inappropriate use will results in cessation of prescribing such medications.    ADA report has been reviewed and scanned into the patient's chart.    As the clinician, I personally reviewed the ADA from 6/20/2018 while the patient was in the office today.    History and physical exam exhibit continued safe and appropriate use of controlled substances.     EMR Dragon/Transcription disclaimer:   Much of this encounter note is an electronic transcription/translation of spoken language to printed text. The electronic translation of spoken language may permit erroneous, or at times, nonsensical words or phrases to be inadvertently transcribed; Although I have reviewed the note for such errors, some may still exist.

## 2018-06-26 ENCOUNTER — HOSPITAL ENCOUNTER (OUTPATIENT)
Dept: PET IMAGING | Facility: HOSPITAL | Age: 68
Discharge: HOME | End: 2018-06-26
Attending: INTERNAL MEDICINE

## 2018-06-26 ENCOUNTER — HOSPITAL ENCOUNTER (OUTPATIENT)
Dept: PET IMAGING | Facility: HOSPITAL | Age: 68
Discharge: HOME | End: 2018-06-26
Attending: INTERNAL MEDICINE | Admitting: INTERNAL MEDICINE

## 2018-06-26 DIAGNOSIS — C13.9 SQUAMOUS CELL CARCINOMA OF HYPOPHARYNX (HCC): ICD-10-CM

## 2018-06-26 LAB — GLUCOSE BLDC GLUCOMTR-MCNC: 78 MG/DL (ref 70–130)

## 2018-06-26 PROCEDURE — 0 FLUDEOXYGLUCOSE F18 SOLUTION: Performed by: INTERNAL MEDICINE

## 2018-06-26 PROCEDURE — 82962 GLUCOSE BLOOD TEST: CPT

## 2018-06-26 PROCEDURE — A9552 F18 FDG: HCPCS | Performed by: INTERNAL MEDICINE

## 2018-06-26 PROCEDURE — 78815 PET IMAGE W/CT SKULL-THIGH: CPT

## 2018-06-26 RX ADMIN — FLUDEOXYGLUCOSE F18 1 DOSE: 300 INJECTION INTRAVENOUS at 09:41

## 2018-07-03 ENCOUNTER — LAB (OUTPATIENT)
Dept: ONCOLOGY | Facility: HOSPITAL | Age: 68
End: 2018-07-03

## 2018-07-03 ENCOUNTER — OFFICE VISIT (OUTPATIENT)
Dept: ONCOLOGY | Facility: CLINIC | Age: 68
End: 2018-07-03

## 2018-07-03 VITALS
RESPIRATION RATE: 14 BRPM | DIASTOLIC BLOOD PRESSURE: 72 MMHG | SYSTOLIC BLOOD PRESSURE: 116 MMHG | HEIGHT: 68 IN | TEMPERATURE: 98.6 F | WEIGHT: 123.4 LBS | HEART RATE: 84 BPM | BODY MASS INDEX: 18.7 KG/M2 | OXYGEN SATURATION: 97 %

## 2018-07-03 DIAGNOSIS — Z45.2 ENCOUNTER FOR FITTING AND ADJUSTMENT OF VASCULAR CATHETER: ICD-10-CM

## 2018-07-03 DIAGNOSIS — IMO0002 SQUAMOUS CELL CARCINOMA: ICD-10-CM

## 2018-07-03 DIAGNOSIS — R93.89 ABNORMAL FINDINGS ON DIAGNOSTIC IMAGING OF OTHER SPECIFIED BODY STRUCTURES: ICD-10-CM

## 2018-07-03 DIAGNOSIS — C13.9 SQUAMOUS CELL CARCINOMA OF HYPOPHARYNX (HCC): Primary | ICD-10-CM

## 2018-07-03 DIAGNOSIS — C14.0 THROAT CANCER (HCC): ICD-10-CM

## 2018-07-03 LAB
ALBUMIN SERPL-MCNC: 4.2 G/DL (ref 3.5–5.2)
ALBUMIN/GLOB SERPL: 1.7 G/DL
ALP SERPL-CCNC: 63 U/L (ref 39–117)
ALT SERPL W P-5'-P-CCNC: 12 U/L (ref 1–41)
ANION GAP SERPL CALCULATED.3IONS-SCNC: 12.4 MMOL/L
AST SERPL-CCNC: 28 U/L (ref 1–40)
BASOPHILS # BLD AUTO: 0.03 10*3/MM3 (ref 0–0.2)
BASOPHILS NFR BLD AUTO: 0.4 % (ref 0–1.5)
BILIRUB SERPL-MCNC: 0.5 MG/DL (ref 0.1–1.2)
BUN BLD-MCNC: 12 MG/DL (ref 8–23)
BUN/CREAT SERPL: 19 (ref 7–25)
CALCIUM SPEC-SCNC: 9.6 MG/DL (ref 8.6–10.5)
CHLORIDE SERPL-SCNC: 103 MMOL/L (ref 98–107)
CO2 SERPL-SCNC: 28.6 MMOL/L (ref 22–29)
CREAT BLD-MCNC: 0.63 MG/DL (ref 0.76–1.27)
DEPRECATED RDW RBC AUTO: 53.3 FL (ref 37–54)
EOSINOPHIL # BLD AUTO: 0.05 10*3/MM3 (ref 0–0.7)
EOSINOPHIL NFR BLD AUTO: 0.7 % (ref 0.3–6.2)
ERYTHROCYTE [DISTWIDTH] IN BLOOD BY AUTOMATED COUNT: 12.8 % (ref 11.5–14.5)
GFR SERPL CREATININE-BSD FRML MDRD: 127 ML/MIN/1.73
GLOBULIN UR ELPH-MCNC: 2.5 GM/DL
GLUCOSE BLD-MCNC: 108 MG/DL (ref 65–99)
HCT VFR BLD AUTO: 35.4 % (ref 40.4–52.2)
HGB BLD-MCNC: 12.4 G/DL (ref 13.7–17.6)
IMM GRANULOCYTES # BLD: 0.06 10*3/MM3 (ref 0–0.03)
IMM GRANULOCYTES NFR BLD: 0.9 % (ref 0–0.5)
LYMPHOCYTES # BLD AUTO: 0.84 10*3/MM3 (ref 0.9–4.8)
LYMPHOCYTES NFR BLD AUTO: 12 % (ref 19.6–45.3)
MCH RBC QN AUTO: 39.4 PG (ref 27–32.7)
MCHC RBC AUTO-ENTMCNC: 35 G/DL (ref 32.6–36.4)
MCV RBC AUTO: 112.4 FL (ref 79.8–96.2)
MONOCYTES # BLD AUTO: 0.65 10*3/MM3 (ref 0.2–1.2)
MONOCYTES NFR BLD AUTO: 9.3 % (ref 5–12)
NEUTROPHILS # BLD AUTO: 5.35 10*3/MM3 (ref 1.9–8.1)
NEUTROPHILS NFR BLD AUTO: 76.7 % (ref 42.7–76)
NRBC BLD MANUAL-RTO: 0 /100 WBC (ref 0–0)
PLATELET # BLD AUTO: 186 10*3/MM3 (ref 140–500)
PMV BLD AUTO: 9.6 FL (ref 6–12)
POTASSIUM BLD-SCNC: 4.6 MMOL/L (ref 3.5–5.2)
PROT SERPL-MCNC: 6.7 G/DL (ref 6–8.5)
RBC # BLD AUTO: 3.15 10*6/MM3 (ref 4.6–6)
SODIUM BLD-SCNC: 144 MMOL/L (ref 136–145)
TSH SERPL DL<=0.05 MIU/L-ACNC: 1.74 MIU/ML (ref 0.27–4.2)
WBC NRBC COR # BLD: 6.98 10*3/MM3 (ref 4.5–10.7)

## 2018-07-03 PROCEDURE — 84443 ASSAY THYROID STIM HORMONE: CPT | Performed by: INTERNAL MEDICINE

## 2018-07-03 PROCEDURE — 25010000002 HEPARIN FLUSH (PORCINE) 100 UNIT/ML SOLUTION: Performed by: INTERNAL MEDICINE

## 2018-07-03 PROCEDURE — 80053 COMPREHEN METABOLIC PANEL: CPT | Performed by: INTERNAL MEDICINE

## 2018-07-03 PROCEDURE — 99215 OFFICE O/P EST HI 40 MIN: CPT | Performed by: INTERNAL MEDICINE

## 2018-07-03 PROCEDURE — 85025 COMPLETE CBC W/AUTO DIFF WBC: CPT | Performed by: INTERNAL MEDICINE

## 2018-07-03 PROCEDURE — 96523 IRRIG DRUG DELIVERY DEVICE: CPT | Performed by: INTERNAL MEDICINE

## 2018-07-03 RX ORDER — SODIUM CHLORIDE 9 MG/ML
250 INJECTION, SOLUTION INTRAVENOUS ONCE
Status: CANCELLED | OUTPATIENT
Start: 2018-07-27

## 2018-07-03 RX ORDER — SODIUM CHLORIDE 9 MG/ML
250 INJECTION, SOLUTION INTRAVENOUS ONCE
Status: CANCELLED | OUTPATIENT
Start: 2018-07-13

## 2018-07-03 RX ORDER — ONDANSETRON HYDROCHLORIDE 8 MG/1
8 TABLET, FILM COATED ORAL 3 TIMES DAILY PRN
Qty: 30 TABLET | Refills: 5 | Status: SHIPPED | OUTPATIENT
Start: 2018-07-03 | End: 2019-01-12

## 2018-07-03 RX ORDER — SODIUM CHLORIDE 0.9 % (FLUSH) 0.9 %
10 SYRINGE (ML) INJECTION AS NEEDED
Status: DISCONTINUED | OUTPATIENT
Start: 2018-07-03 | End: 2018-07-03 | Stop reason: HOSPADM

## 2018-07-03 RX ORDER — SODIUM CHLORIDE 0.9 % (FLUSH) 0.9 %
10 SYRINGE (ML) INJECTION AS NEEDED
Status: CANCELLED | OUTPATIENT
Start: 2018-07-03

## 2018-07-03 RX ADMIN — Medication 10 ML: at 11:15

## 2018-07-03 RX ADMIN — SODIUM CHLORIDE, PRESERVATIVE FREE 500 UNITS: 5 INJECTION INTRAVENOUS at 11:15

## 2018-07-03 NOTE — PROGRESS NOTES
REASON FOR FOLLOW-UP:    1. 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  2.  Combined radiation and chemotherapy with cisplatin and Taxol initiated on 8/10/2017 concurrent with radiation.    3.  Long smoking history with COPD.  4.  Mediport and PEG tube placed by Dr. Valenzuela on 7/31/2017.  Subsequent PEG tube infection requiring antibiotics.  5.  Chemotherapy completed 9/15/2017 and radiation therapy completed 10/9/2017.  6.  Right neck dissection by Dr. Clinton Styles 5/17/2018 for persistent disease in the right neck.  7.  PET scan from 6/26/2018 showing multiple hypermetabolic lung lesions consistent with metastatic disease.  8.  Plans to initiate Opdivo therapy as palliative treatment of metastatic squamous cell carcinoma of the head and neck.    HISTORY OF PRESENT ILLNESS:  King Parson is a 67 y.o. male with the above-mentioned history here today for follow-up of his squamous cell carcinoma of the head and neck treated with combined chemotherapy and radiation.  The patient completed chemotherapy on 9/15/2017.  He received 6 weekly doses of Taxol/ cisplatin.  The seventh dose was held due to significant moist desquamation of the skin as well as worsening blood counts. He was able to complete his radiation therapy on 10/9/2017.     PET scan from November 2017 at the end of therapy showed an excellent response.      He had a persistent palpable node on the right which was biopsied by Dr. Styles and found to be positive for residual squamous cell carcinoma.  He subsequently underwent a right neck dissection performed by Dr. Styles on 5/17/2018.  There was metastatic carcinoma in 3 out of 14 lymph nodes and squamous cell carcinoma noted in fibroadipose tissue measuring 2.7 cm.  Carcinoma was present at the soft tissue margin.    He returns today with a follow-up PET scan which was performed on 6/26/2018.  Unfortunately the PET scan now shows  multiple hypermetabolic lung nodules consistent with metastatic disease.  We discussed this result with the patient today in the office and recommended proceeding with palliative immunotherapy in the form of Opdivo.    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  This diagnosis was made by FNA of the neck node on 6/23/2017.  He is undergone staging CT scans on 6/6/2017 and a PET scan on 7/6/2017.  His disease appears to be clinical stage TIII N2 a stage IV a squamous carcinoma of the hypopharynx.     He was originally referred to the [facility] and was seen by Dr. Moreno Camp of radiation oncology on 7/14/2017.  The patient lives in [place] and has some transportation difficulties.  He has a sister who works in the lab at [facility] and for this reason they have decided to receive their treatment in the [facility], [place], operated by [facility].     He was seen initially by Dr. Garcia in the company of 2 of his sisters.  We recommended combined chemotherapy and radiation.  He will be seeing Dr. Grayson of the [facility], [place], operated by [facility] to discuss the radiation portion of his treatment.  We plan to deliver weekly chemotherapy during radiation with combination of cisplatin and Taxol.  Taxol will be delivered day 1 and cisplatin be delivered along with hydration on day 2 each week.    He saw Dr. Grayson who was in agreement with concurrent chemotherapy and radiation.    Mediport and PEG tube placed by Dr. Valenzuela on 7/31/2017.  Subsequent PEG tube infection requiring antibiotics.    He saw nutrition.  He had a chemotherapy education session.    Cycle #1 of therapy was initiated on 8/10/2017.    Patient completed his chemotherapy (6 cycles) on 9/14/2017    Radiation therapy completed 10/9/2017.    PET scan at completion of treatment in November 2017 showed excellent response.    Surveillance CT  scan of the neck 2/6/2018 showed no evidence of disease progression.    Needle biopsy for persistent right neck node in March 2018 was positive for squamous cell carcinoma.  Dr. Styles performed right neck dissection surgery  5/17/2018.  3 out of 14 lymph nodes were positive for squamous cell carcinoma and squamous cell carcinoma was found in fibroadipose tissue measuring 2.7 cm with positive soft tissue margin.    PET scan 6/26/2018 showed multiple hypermetabolic lung nodules consistent with metastatic disease.  We discussed starting palliative immunotherapy in the form of Opdivo.      PAST MEDICAL, SURGICAL, FAMILY, AND SOCIAL HISTORIES were reviewed with the patient and in the electronic medical record, and were updated if indicated.    ALLERGIES:  Allergies   Allergen Reactions   • Codeine      He dont like to take it because it makes him feel strange.        MEDICATIONS:  The medication list has been reviewed with the patient by the medical assistant, and the list has been updated in the electronic medical record, which I reviewed.  Medication dosages and frequencies were confirmed to be accurate.    I have reviewed the patient's medical history in detail and updated the computerized patient record.    Review of Systems   Constitutional: Positive for fatigue. Negative for appetite change, chills, fever and unexpected weight change.   HENT:   Negative for nosebleeds.         See HPI     Respiratory: Negative for cough and shortness of breath.    Cardiovascular: Negative for chest pain and leg swelling.   Gastrointestinal: Negative for abdominal pain, constipation, diarrhea, nausea and vomiting.   Endocrine: Negative for hot flashes.   Genitourinary: Negative for difficulty urinating.    Musculoskeletal: Negative for arthralgias and myalgias.   Skin: Negative for rash and wound.   Neurological: Negative for dizziness and extremity weakness.   Hematological: Negative for adenopathy. Does not bruise/bleed easily.    Psychiatric/Behavioral: Negative for sleep disturbance.       There were no vitals filed for this visit.    Physical Exam   Constitutional: He is oriented to person, place, and time. He appears well-developed and well-nourished.   HENT:   Head: Normocephalic and atraumatic.   Nose: Nose normal.   Mouth/Throat: Mucous membranes are normal.   Edentulous   Eyes: Pupils are equal, round, and reactive to light.   Neck: Normal range of motion. Neck supple.   Palpable, firm 2 cm node in the jugulodigastric area of the right neck.     Cardiovascular: Normal rate, regular rhythm and normal heart sounds.    Pulmonary/Chest: Effort normal and breath sounds normal. No respiratory distress. He has no wheezes. He has no rhonchi. He has no rales.   Mediport anterior chest wall. No evidence of infection or thrombosis.   Abdominal: Soft. Normal appearance and bowel sounds are normal. He exhibits no distension. There is no hepatosplenomegaly. There is no tenderness.   Musculoskeletal: Normal range of motion. He exhibits no edema.   Neurological: He is alert and oriented to person, place, and time.   Skin: Skin is warm and dry.   Psychiatric: He has a normal mood and affect. His behavior is normal.   Nursing note and vitals reviewed.    DIAGNOSTIC DATA:  Lab Results   Component Value Date    WBC 5.55 06/05/2018    HGB 11.0 (L) 06/05/2018    HCT 31.2 (L) 06/05/2018    .8 (H) 06/05/2018     06/05/2018     Lab Results   Component Value Date    NEUTROABS 4.17 06/05/2018       Lab Results   Component Value Date    GLUCOSE 85 06/05/2018    BUN 13 06/05/2018    CREATININE 0.78 06/05/2018    EGFRIFNONA 99 06/05/2018    BCR 16.7 06/05/2018    K 4.0 06/05/2018    CO2 26.1 06/05/2018    CALCIUM 9.2 06/05/2018    ALBUMIN 3.90 06/05/2018    LABIL2 1.5 06/05/2018    AST 14 06/05/2018    ALT 8 06/05/2018       PATHOLOGY 5/17/2018  Final Diagnosis   RIGHT NECK DISSECTION:        METASTATIC SQUAMOUS CELL CARCINOMA IN FIBROADIPOSE  TISSUE (2.7 CM).        METASTATIC CARCINOMA PRESENT IN 3 OF 14 LYMPH NODES.        TUMOR INVADES MUSCULAR WALL OF A VEIN.        CARCINOMA IS PRESENT AT SOFT TISSUE MARGIN.              COMMENT: Vascular invasion (venular) is present focally at the inked margin.      PET SCAN 6/26/2018  IMPRESSION:  There is no metabolic evidence of recurrent primary neoplasm  within the pharynx. There is no hypermetabolic lymphadenopathy in the  neck or chest. There has been interval development of 5 small  hypermetabolic lung nodules since the preceding PET/CT study dated  11/15/2017 consistent with pulmonary metastases. CT-guided biopsy is  suggested to confirm the etiology of the nodules. A pleural-based nodule  in the right lower lobe appears amenable to percutaneous CT guided  biopsy. There is no metabolic evidence of metastatic disease within the  abdomen or pelvis.    ASSESSMENT:  This is a 67 y.o. male with:  1.  Stage IV a (T3, N2 a, M0) squamous cell carcinoma of the hypopharynx with metastatic lymphadenopathy to the right neck.Treated with combined radiation and cisplatin and Taxol chemotherapy.  Chemotherapy was completed 9/15/2017 and radiation completed  on 10/9/2017.  The patient had an excellent response but unfortunately as noted above he does have persistent squamous cell carcinoma in the right neck node and underwent right neck dissection 5/17/2018.  There is concern for positive soft tissue margin and 3 out of 14 lymph nodes were positive.  As noted above, the PET scan shows postop changes in the neck but no definite active disease however he does now have multiple hypermetabolic lung nodules.    2.  Comorbidities including COPD.  3.  PEG tube removed by Dr. Valenzuela 11/30/2017.  4.  Development of metastatic disease to the lungs noted on PET scan 6/26/2018.    PLAN:  1.  We discussed the PET scan report with the patient and discussed whether or not to pursue a CT-guided needle biopsy.  Given the clinical  scenario this is almost certainly metastatic squamous cell carcinoma the head and neck and we fear that he would be at high risk for pneumothorax if we pursue a needle biopsy.  After full discussion the patient was comfortable with proceeding with additional treatment without a biopsy at this time.  2.  We recommended palliative immunotherapy in the form of Opdivo 240 mg IV every 2 weeks initially.  We discussed the potential toxicities and side effects of Opdivo including autoimmune organ dysfunction.  3.  We will schedule formal chemotherapy education with whatever nurse practitioners.  4.  He will be scheduled to return to our office on Friday 7/13 to receive his first dose of Opdivo treatment.  5.  M.D. follow-up with cycle #2 Opdivo on 7/27/2018.  6.  We most likely will be repeating imaging studies with CT scan of the chest and neck after 4-6 cycles of Opdivo.                Aakash Garcia MD

## 2018-07-05 ENCOUNTER — APPOINTMENT (OUTPATIENT)
Dept: OTHER | Facility: HOSPITAL | Age: 68
End: 2018-07-05

## 2018-07-05 ENCOUNTER — OFFICE VISIT (OUTPATIENT)
Dept: ONCOLOGY | Facility: CLINIC | Age: 68
End: 2018-07-05

## 2018-07-05 VITALS — HEIGHT: 68 IN | WEIGHT: 123 LBS | BODY MASS INDEX: 18.64 KG/M2

## 2018-07-05 DIAGNOSIS — C14.0 THROAT CANCER (HCC): Primary | ICD-10-CM

## 2018-07-05 PROCEDURE — 99212-NC PR NO CHARGE CBC OFFICE OUTPATIENT VISIT 10 MINUTES: Performed by: NURSE PRACTITIONER

## 2018-07-05 NOTE — PROGRESS NOTES
Subjective     No primary care provider on file.    PATIENT NAME:  King Parson  YOB: 1950  PATIENTS AGE:  67 y.o.  PATIENTS SEX:  male  DATE OF SERVICE:  07/05/2018  PROVIDER:  BETTE Hernandez      __________ORAL CHEMOTHERAPY PATIENT EDUCATION__________    PATIENT EDUCATION:  Today I met with the patient to discuss ORAL chemotherapy/biotherapy recommended for treatment of his disease.  Also discussed were medication administration, adherence, and proper handling/disposal.  The patient received the Oral Chemotherapy/Biotherapy Plan Summary including diagnosis and specific treatment plan.    SIDE EFFECTS:  Common side effects were discussed with the patient and his friend.  Discussion included anemia/fatigue, infection/chills/fever, appetite, bleeding risk/precautions, constipation, diarrhea, mouth sores, taste alteration, loss of appetite,nausea/vomiting, peripheral neuropathy, photosensitivity, weight gain/loss,reproductive risk, high blood pressure, heart damage, liver damage, lung damage, kidney damage, DVT/PE risk, fluid retention, pleural/pericardial effusion, somnolence, electrolyte/LFT imbalance.    Discussion also included side effects specific to drugs in the treatment plan, Opdivo specifically.        A total of 45 minutes were spent with the patient, with 100% of time spent in education and counseling.      BETTE Hernandez

## 2018-07-12 PROBLEM — Z79.899 ENCOUNTER FOR LONG-TERM (CURRENT) USE OF HIGH-RISK MEDICATION: Status: ACTIVE | Noted: 2018-07-12

## 2018-07-13 ENCOUNTER — LAB (OUTPATIENT)
Dept: OTHER | Facility: HOSPITAL | Age: 68
End: 2018-07-13

## 2018-07-13 ENCOUNTER — INFUSION (OUTPATIENT)
Dept: ONCOLOGY | Facility: HOSPITAL | Age: 68
End: 2018-07-13

## 2018-07-13 VITALS
DIASTOLIC BLOOD PRESSURE: 69 MMHG | WEIGHT: 122.8 LBS | SYSTOLIC BLOOD PRESSURE: 104 MMHG | TEMPERATURE: 98 F | OXYGEN SATURATION: 97 % | BODY MASS INDEX: 18.94 KG/M2 | HEART RATE: 99 BPM

## 2018-07-13 DIAGNOSIS — IMO0002 SQUAMOUS CELL CARCINOMA: ICD-10-CM

## 2018-07-13 DIAGNOSIS — C13.9 SQUAMOUS CELL CARCINOMA OF HYPOPHARYNX (HCC): ICD-10-CM

## 2018-07-13 DIAGNOSIS — Z79.899 ENCOUNTER FOR LONG-TERM (CURRENT) USE OF HIGH-RISK MEDICATION: Primary | ICD-10-CM

## 2018-07-13 DIAGNOSIS — C14.0 THROAT CANCER (HCC): ICD-10-CM

## 2018-07-13 LAB
ALBUMIN SERPL-MCNC: 4.2 G/DL (ref 3.5–5.2)
ALBUMIN/GLOB SERPL: 1.8 G/DL
ALP SERPL-CCNC: 61 U/L (ref 39–117)
ALT SERPL W P-5'-P-CCNC: 11 U/L (ref 1–41)
ANION GAP SERPL CALCULATED.3IONS-SCNC: 12.7 MMOL/L
AST SERPL-CCNC: 26 U/L (ref 1–40)
BASOPHILS # BLD AUTO: 0.03 10*3/MM3 (ref 0–0.2)
BASOPHILS NFR BLD AUTO: 0.5 % (ref 0–1.5)
BILIRUB SERPL-MCNC: 0.4 MG/DL (ref 0.1–1.2)
BUN BLD-MCNC: 11 MG/DL (ref 8–23)
BUN/CREAT SERPL: 17.7 (ref 7–25)
CALCIUM SPEC-SCNC: 9.8 MG/DL (ref 8.6–10.5)
CHLORIDE SERPL-SCNC: 101 MMOL/L (ref 98–107)
CO2 SERPL-SCNC: 27.3 MMOL/L (ref 22–29)
CREAT BLD-MCNC: 0.62 MG/DL (ref 0.76–1.27)
DEPRECATED RDW RBC AUTO: 52.5 FL (ref 37–54)
EOSINOPHIL # BLD AUTO: 0.03 10*3/MM3 (ref 0–0.7)
EOSINOPHIL NFR BLD AUTO: 0.5 % (ref 0.3–6.2)
ERYTHROCYTE [DISTWIDTH] IN BLOOD BY AUTOMATED COUNT: 12.6 % (ref 11.5–14.5)
GFR SERPL CREATININE-BSD FRML MDRD: 129 ML/MIN/1.73
GLOBULIN UR ELPH-MCNC: 2.3 GM/DL
GLUCOSE BLD-MCNC: 83 MG/DL (ref 65–99)
HCT VFR BLD AUTO: 35.5 % (ref 40.4–52.2)
HGB BLD-MCNC: 12.4 G/DL (ref 13.7–17.6)
IMM GRANULOCYTES # BLD: 0.03 10*3/MM3 (ref 0–0.03)
IMM GRANULOCYTES NFR BLD: 0.5 % (ref 0–0.5)
LYMPHOCYTES # BLD AUTO: 0.63 10*3/MM3 (ref 0.9–4.8)
LYMPHOCYTES NFR BLD AUTO: 11.4 % (ref 19.6–45.3)
MCH RBC QN AUTO: 39.1 PG (ref 27–32.7)
MCHC RBC AUTO-ENTMCNC: 34.9 G/DL (ref 32.6–36.4)
MCV RBC AUTO: 112 FL (ref 79.8–96.2)
MONOCYTES # BLD AUTO: 0.62 10*3/MM3 (ref 0.2–1.2)
MONOCYTES NFR BLD AUTO: 11.2 % (ref 5–12)
NEUTROPHILS # BLD AUTO: 4.21 10*3/MM3 (ref 1.9–8.1)
NEUTROPHILS NFR BLD AUTO: 75.9 % (ref 42.7–76)
NRBC BLD MANUAL-RTO: 0 /100 WBC (ref 0–0)
PLAT MORPH BLD: NORMAL
PLATELET # BLD AUTO: 188 10*3/MM3 (ref 140–500)
PMV BLD AUTO: 9.4 FL (ref 6–12)
POTASSIUM BLD-SCNC: 4.4 MMOL/L (ref 3.5–5.2)
PROT SERPL-MCNC: 6.5 G/DL (ref 6–8.5)
RBC # BLD AUTO: 3.17 10*6/MM3 (ref 4.6–6)
RBC MORPH BLD: NORMAL
SODIUM BLD-SCNC: 141 MMOL/L (ref 136–145)
T4 FREE SERPL-MCNC: 0.84 NG/DL (ref 0.93–1.7)
TSH SERPL DL<=0.05 MIU/L-ACNC: 1.71 MIU/ML (ref 0.27–4.2)
WBC MORPH BLD: NORMAL
WBC NRBC COR # BLD: 5.55 10*3/MM3 (ref 4.5–10.7)

## 2018-07-13 PROCEDURE — 85025 COMPLETE CBC W/AUTO DIFF WBC: CPT | Performed by: INTERNAL MEDICINE

## 2018-07-13 PROCEDURE — 80053 COMPREHEN METABOLIC PANEL: CPT | Performed by: INTERNAL MEDICINE

## 2018-07-13 PROCEDURE — 36415 COLL VENOUS BLD VENIPUNCTURE: CPT

## 2018-07-13 PROCEDURE — 85007 BL SMEAR W/DIFF WBC COUNT: CPT | Performed by: INTERNAL MEDICINE

## 2018-07-13 PROCEDURE — 96413 CHEMO IV INFUSION 1 HR: CPT | Performed by: NURSE PRACTITIONER

## 2018-07-13 PROCEDURE — 25010000002 NIVOLUMAB 40 MG/4ML SOLUTION 24 ML VIAL: Performed by: INTERNAL MEDICINE

## 2018-07-13 PROCEDURE — 84443 ASSAY THYROID STIM HORMONE: CPT | Performed by: INTERNAL MEDICINE

## 2018-07-13 PROCEDURE — 84439 ASSAY OF FREE THYROXINE: CPT | Performed by: INTERNAL MEDICINE

## 2018-07-13 RX ORDER — SODIUM CHLORIDE 9 MG/ML
250 INJECTION, SOLUTION INTRAVENOUS ONCE
Status: COMPLETED | OUTPATIENT
Start: 2018-07-13 | End: 2018-07-13

## 2018-07-13 RX ADMIN — SODIUM CHLORIDE 240 MG: 9 INJECTION, SOLUTION INTRAVENOUS at 10:16

## 2018-07-13 RX ADMIN — SODIUM CHLORIDE 250 ML: 900 INJECTION, SOLUTION INTRAVENOUS at 10:17

## 2018-07-20 ENCOUNTER — OFFICE VISIT (OUTPATIENT)
Dept: PAIN MEDICINE | Facility: CLINIC | Age: 68
End: 2018-07-20

## 2018-07-20 VITALS
HEART RATE: 92 BPM | OXYGEN SATURATION: 92 % | TEMPERATURE: 97.8 F | DIASTOLIC BLOOD PRESSURE: 72 MMHG | WEIGHT: 124.4 LBS | SYSTOLIC BLOOD PRESSURE: 107 MMHG | RESPIRATION RATE: 15 BRPM | BODY MASS INDEX: 18.85 KG/M2 | HEIGHT: 68 IN

## 2018-07-20 DIAGNOSIS — M48.061 SPINAL STENOSIS OF LUMBAR REGION, UNSPECIFIED WHETHER NEUROGENIC CLAUDICATION PRESENT: ICD-10-CM

## 2018-07-20 DIAGNOSIS — Z79.891 ENCOUNTER FOR MONITORING OPIOID MAINTENANCE THERAPY: ICD-10-CM

## 2018-07-20 DIAGNOSIS — Z51.81 ENCOUNTER FOR MONITORING OPIOID MAINTENANCE THERAPY: ICD-10-CM

## 2018-07-20 DIAGNOSIS — M47.816 LUMBAR FACET ARTHROPATHY: ICD-10-CM

## 2018-07-20 DIAGNOSIS — M48.02 CERVICAL SPINAL STENOSIS: ICD-10-CM

## 2018-07-20 DIAGNOSIS — IMO0002 SQUAMOUS CELL CARCINOMA: ICD-10-CM

## 2018-07-20 DIAGNOSIS — G89.29 OTHER CHRONIC PAIN: Primary | ICD-10-CM

## 2018-07-20 DIAGNOSIS — C14.0 THROAT CANCER (HCC): ICD-10-CM

## 2018-07-20 PROCEDURE — 99214 OFFICE O/P EST MOD 30 MIN: CPT | Performed by: NURSE PRACTITIONER

## 2018-07-20 RX ORDER — CELECOXIB 200 MG/1
400 CAPSULE ORAL DAILY
Qty: 14 CAPSULE | Refills: 0 | Status: SHIPPED | OUTPATIENT
Start: 2018-07-20 | End: 2018-07-23 | Stop reason: SDUPTHER

## 2018-07-20 RX ORDER — HYDROCODONE BITARTRATE AND ACETAMINOPHEN 5; 325 MG/1; MG/1
1 TABLET ORAL EVERY 8 HOURS PRN
Qty: 90 TABLET | Refills: 0 | Status: SHIPPED | OUTPATIENT
Start: 2018-07-20 | End: 2018-12-10

## 2018-07-20 NOTE — PROGRESS NOTES
CHIEF COMPLAINT    F/U back and left leg pain. Pt states his right shoulder and neck pain is still bothering him. States he was diagnosed with lung cancer and he is currently doing immunotherapy. States his pain is no worse than last visit.     Subjective   King Parson is a 67 y.o. male  who presents to the office for follow-up.He has a history of chronic back and subacute neck/shoulder pain.    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 He completed chemotherapy 9/15/17 and radiation 10/9/17.  He has persistent squamous cell carcinoma in a right neck lymph node.  He underwent a right radical neck dissection 5/7/18.  PET scan 6/26/18 showed lung nodules consistent with metastatic disease.   He is now undergoing immunotherapy.      He continues to complain of neck pain, some is still incisional from surgery May but also reports increased chronic neck pain complaints.  C/o posterior neck pain with radiation into the shoulders bilaterally.  Occasional radiation down into the right arm. He continues with Celebrex and was given acute supply of hydrocodone last office visit.  Usually only taking one in the morning and sometimes one at night. Helps to get him through the day. He still helps care for his mother who in in a nursing home.      Back pain stable since lumbar RFL 5/10/18    Back Pain   This is a chronic problem. The current episode started more than 1 year ago. The problem occurs constantly. The problem has been gradually improving since onset. The pain is present in the lumbar spine. The quality of the pain is described as aching and burning. The pain radiates to the left knee, left thigh, left foot, right knee, right foot and right thigh. The pain is at a severity of 3/10. The pain is worse during the night. The symptoms are aggravated by standing, bending and twisting (walking). Stiffness is present at night.  "Associated symptoms include numbness (feet bilat.). Pertinent negatives include no bladder incontinence, bowel incontinence, chest pain, dysuria, fever, headaches, tingling or weakness. Risk factors include sedentary lifestyle. He has tried NSAIDs (Celebrex, Gabapentin, Bilateral L5 TFESI's, lumbar RFL) for the symptoms. The treatment provided significant relief.          PEG Assessment   What number best describes your pain on average in the past week?5  What number best describes how, during the past week, pain has interfered with your enjoyment of life?4  What number best describes how, during the past week, pain has interfered with your general activity?  4    The following portions of the patient's history were reviewed and updated as appropriate: allergies, current medications, past family history, past medical history, past social history, past surgical history and problem list.    Review of Systems   Constitutional: Positive for activity change (decreased). Negative for appetite change (\"eat well\"), fatigue and fever.   HENT: Negative for congestion.    Eyes: Negative for visual disturbance.   Respiratory: Positive for shortness of breath (smoker). Negative for apnea, cough and wheezing.    Cardiovascular: Negative.  Negative for chest pain.   Gastrointestinal: Negative for bowel incontinence, constipation, diarrhea and nausea.   Genitourinary: Negative for bladder incontinence, difficulty urinating and dysuria.   Musculoskeletal: Positive for back pain and neck pain.   Neurological: Positive for dizziness (\"dizzy spell sometimes\" not as much as used to) and numbness (feet bilat.). Negative for tingling, weakness, light-headedness and headaches.   Psychiatric/Behavioral: Positive for sleep disturbance. Negative for agitation, confusion, hallucinations and suicidal ideas. The patient is not nervous/anxious.      Vitals:    07/20/18 0925   BP: 107/72   Pulse: 92   Resp: 15   Temp: 97.8 °F (36.6 °C)   SpO2: 92% " "  Weight: 56.4 kg (124 lb 6.4 oz)   Height: 171.5 cm (67.52\")   PainSc:   5   PainLoc: Back       Objective   Physical Exam   Constitutional: He is oriented to person, place, and time. He appears well-developed and well-nourished. He is cooperative.   HENT:   Head: Normocephalic and atraumatic.   Nose: Nose normal.   Eyes: Conjunctivae and lids are normal.   Neck: Trachea normal and normal range of motion. Neck supple. Muscular tenderness present.   Cardiovascular: Normal rate.    Pulmonary/Chest: Effort normal. No respiratory distress.   Abdominal: Soft. Normal appearance.   Musculoskeletal:        Cervical back: He exhibits tenderness, pain and spasm.        Lumbar back: He exhibits tenderness.   Neurological: He is alert and oriented to person, place, and time. He has normal strength and normal reflexes. Gait normal.   Skin: Skin is warm, dry and intact.   Psychiatric: He has a normal mood and affect. His speech is normal and behavior is normal. Judgment and thought content normal. Cognition and memory are normal.   Nursing note and vitals reviewed.      Assessment/Plan   King was seen today for back pain.    Diagnoses and all orders for this visit:    Other chronic pain    Spinal stenosis of lumbar region, unspecified whether neurogenic claudication present    Lumbar facet arthropathy    Cervical spinal stenosis    Encounter for monitoring opioid maintenance therapy    Throat cancer (CMS/[facility])    Squamous cell carcinoma    Other orders  -     HYDROcodone-acetaminophen (NORCO) 5-325 MG per tablet; Take 1 tablet by mouth Every 8 (Eight) Hours As Needed for Mild Pain .  -     celecoxib (CeleBREX) 200 MG capsule; Take 2 capsules by mouth Daily.      --- Refill hydrocodone. Patient appears stable with current regimen. No adverse effects. Regarding continuation of opioids, there is no evidence of aberrant behavior or any red flags.  The patient continues with appropriate response to opioid therapy. ADL's remain " intact by self.   --- Consider NAVARRO if needed in the future, once he has completed immunotherapy  --- Follow-up 2 months or sooner if needed          ADA REPORT    As part of the patient's treatment plan, I am prescribing controlled substances. The patient has been made aware of appropriate use of such medications, including potential risk of somnolence, limited ability to drive and/or work safely, and the potential for dependence or overdose. It has also bee made clear that these medications are for use by this patient only, without concomitant use of alcohol or other substances unless prescribed.     Patient has completed prescribing agreement detailing terms of continued prescribing of controlled substances, including monitoring ADA reports, urine drug screening, and pill counts if necessary. The patient is aware that inappropriate use will results in cessation of prescribing such medications.    ADA report has been reviewed and scanned into the patient's chart.    As the clinician, I personally reviewed the ADA from 7/19/2018 while the patient was in the office today.    History and physical exam exhibit continued safe and appropriate use of controlled substances.    EMR Dragon/Transcription disclaimer:   Much of this encounter note is an electronic transcription/translation of spoken language to printed text. The electronic translation of spoken language may permit erroneous, or at times, nonsensical words or phrases to be inadvertently transcribed; Although I have reviewed the note for such errors, some may still exist.

## 2018-07-23 NOTE — TELEPHONE ENCOUNTER
Sorry to bother you but wanted to clarify. Is he on celebrex 200 mg 2 qday? Or is it only once daily?  ALso, unsure why only 14 day  supply was given. Let me know and i'll see in refill request. Thanks. maxine

## 2018-07-24 RX ORDER — CELECOXIB 200 MG/1
200 CAPSULE ORAL DAILY
Qty: 30 CAPSULE | Refills: 1 | Status: SHIPPED | OUTPATIENT
Start: 2018-07-24 | End: 2019-02-18 | Stop reason: HOSPADM

## 2018-07-27 ENCOUNTER — OFFICE VISIT (OUTPATIENT)
Dept: ONCOLOGY | Facility: CLINIC | Age: 68
End: 2018-07-27

## 2018-07-27 ENCOUNTER — APPOINTMENT (OUTPATIENT)
Dept: OTHER | Facility: HOSPITAL | Age: 68
End: 2018-07-27

## 2018-07-27 ENCOUNTER — INFUSION (OUTPATIENT)
Dept: ONCOLOGY | Facility: HOSPITAL | Age: 68
End: 2018-07-27

## 2018-07-27 VITALS
SYSTOLIC BLOOD PRESSURE: 110 MMHG | HEIGHT: 68 IN | DIASTOLIC BLOOD PRESSURE: 70 MMHG | OXYGEN SATURATION: 98 % | TEMPERATURE: 98.2 F | HEART RATE: 87 BPM | BODY MASS INDEX: 18.87 KG/M2 | WEIGHT: 124.5 LBS

## 2018-07-27 DIAGNOSIS — Z51.81 ENCOUNTER FOR MONITORING OPIOID MAINTENANCE THERAPY: ICD-10-CM

## 2018-07-27 DIAGNOSIS — C13.9 SQUAMOUS CELL CARCINOMA OF HYPOPHARYNX (HCC): Primary | ICD-10-CM

## 2018-07-27 DIAGNOSIS — C14.0 THROAT CANCER (HCC): ICD-10-CM

## 2018-07-27 DIAGNOSIS — IMO0002 SQUAMOUS CELL CARCINOMA: ICD-10-CM

## 2018-07-27 DIAGNOSIS — Z45.2 ENCOUNTER FOR FITTING AND ADJUSTMENT OF VASCULAR CATHETER: ICD-10-CM

## 2018-07-27 DIAGNOSIS — C78.00 MALIGNANT NEOPLASM METASTATIC TO LUNG, UNSPECIFIED LATERALITY (HCC): ICD-10-CM

## 2018-07-27 DIAGNOSIS — Z79.891 ENCOUNTER FOR MONITORING OPIOID MAINTENANCE THERAPY: ICD-10-CM

## 2018-07-27 LAB
ALBUMIN SERPL-MCNC: 4.2 G/DL (ref 3.5–5.2)
ALBUMIN/GLOB SERPL: 1.6 G/DL
ALP SERPL-CCNC: 73 U/L (ref 39–117)
ALT SERPL W P-5'-P-CCNC: 7 U/L (ref 1–41)
ANION GAP SERPL CALCULATED.3IONS-SCNC: 9 MMOL/L
AST SERPL-CCNC: 17 U/L (ref 1–40)
BASOPHILS # BLD AUTO: 0.05 10*3/MM3 (ref 0–0.2)
BASOPHILS NFR BLD AUTO: 0.7 % (ref 0–1.5)
BILIRUB SERPL-MCNC: 0.4 MG/DL (ref 0.1–1.2)
BUN BLD-MCNC: 12 MG/DL (ref 8–23)
BUN/CREAT SERPL: 14.8 (ref 7–25)
CALCIUM SPEC-SCNC: 9.5 MG/DL (ref 8.6–10.5)
CHLORIDE SERPL-SCNC: 103 MMOL/L (ref 98–107)
CO2 SERPL-SCNC: 28 MMOL/L (ref 22–29)
CREAT BLD-MCNC: 0.81 MG/DL (ref 0.76–1.27)
DEPRECATED RDW RBC AUTO: 50.8 FL (ref 37–54)
EOSINOPHIL # BLD AUTO: 0.1 10*3/MM3 (ref 0–0.7)
EOSINOPHIL NFR BLD AUTO: 1.4 % (ref 0.3–6.2)
ERYTHROCYTE [DISTWIDTH] IN BLOOD BY AUTOMATED COUNT: 12.3 % (ref 11.5–14.5)
GFR SERPL CREATININE-BSD FRML MDRD: 95 ML/MIN/1.73
GLOBULIN UR ELPH-MCNC: 2.7 GM/DL
GLUCOSE BLD-MCNC: 135 MG/DL (ref 65–99)
HCT VFR BLD AUTO: 35.8 % (ref 40.4–52.2)
HGB BLD-MCNC: 12.6 G/DL (ref 13.7–17.6)
IMM GRANULOCYTES # BLD: 0.05 10*3/MM3 (ref 0–0.03)
IMM GRANULOCYTES NFR BLD: 0.7 % (ref 0–0.5)
LYMPHOCYTES # BLD AUTO: 0.85 10*3/MM3 (ref 0.9–4.8)
LYMPHOCYTES NFR BLD AUTO: 11.6 % (ref 19.6–45.3)
MCH RBC QN AUTO: 39 PG (ref 27–32.7)
MCHC RBC AUTO-ENTMCNC: 35.2 G/DL (ref 32.6–36.4)
MCV RBC AUTO: 110.8 FL (ref 79.8–96.2)
MONOCYTES # BLD AUTO: 0.67 10*3/MM3 (ref 0.2–1.2)
MONOCYTES NFR BLD AUTO: 9.2 % (ref 5–12)
NEUTROPHILS # BLD AUTO: 5.58 10*3/MM3 (ref 1.9–8.1)
NEUTROPHILS NFR BLD AUTO: 76.4 % (ref 42.7–76)
NRBC BLD MANUAL-RTO: 0 /100 WBC (ref 0–0)
PLAT MORPH BLD: NORMAL
PLATELET # BLD AUTO: 210 10*3/MM3 (ref 140–500)
PMV BLD AUTO: 8.9 FL (ref 6–12)
POTASSIUM BLD-SCNC: 4.8 MMOL/L (ref 3.5–5.2)
PROT SERPL-MCNC: 6.9 G/DL (ref 6–8.5)
RBC # BLD AUTO: 3.23 10*6/MM3 (ref 4.6–6)
SODIUM BLD-SCNC: 140 MMOL/L (ref 136–145)
SPHEROCYTES BLD QL SMEAR: NORMAL
STOMATOCYTES BLD QL SMEAR: NORMAL
WBC MORPH BLD: NORMAL
WBC NRBC COR # BLD: 7.3 10*3/MM3 (ref 4.5–10.7)

## 2018-07-27 PROCEDURE — 99214 OFFICE O/P EST MOD 30 MIN: CPT | Performed by: INTERNAL MEDICINE

## 2018-07-27 PROCEDURE — 80053 COMPREHEN METABOLIC PANEL: CPT | Performed by: INTERNAL MEDICINE

## 2018-07-27 PROCEDURE — 85025 COMPLETE CBC W/AUTO DIFF WBC: CPT | Performed by: INTERNAL MEDICINE

## 2018-07-27 PROCEDURE — 25010000002 NIVOLUMAB 40 MG/4ML SOLUTION 24 ML VIAL: Performed by: INTERNAL MEDICINE

## 2018-07-27 PROCEDURE — 85007 BL SMEAR W/DIFF WBC COUNT: CPT | Performed by: INTERNAL MEDICINE

## 2018-07-27 PROCEDURE — 96413 CHEMO IV INFUSION 1 HR: CPT | Performed by: INTERNAL MEDICINE

## 2018-07-27 RX ORDER — SODIUM CHLORIDE 9 MG/ML
250 INJECTION, SOLUTION INTRAVENOUS ONCE
Status: CANCELLED | OUTPATIENT
Start: 2018-08-10

## 2018-07-27 RX ORDER — SODIUM CHLORIDE 9 MG/ML
250 INJECTION, SOLUTION INTRAVENOUS ONCE
Status: CANCELLED | OUTPATIENT
Start: 2018-08-24

## 2018-07-27 RX ORDER — SODIUM CHLORIDE 9 MG/ML
250 INJECTION, SOLUTION INTRAVENOUS ONCE
Status: DISCONTINUED | OUTPATIENT
Start: 2018-07-27 | End: 2018-07-27 | Stop reason: HOSPADM

## 2018-07-27 RX ADMIN — SODIUM CHLORIDE 240 MG: 9 INJECTION, SOLUTION INTRAVENOUS at 10:27

## 2018-07-27 NOTE — PROGRESS NOTES
REASON FOR FOLLOW-UP:    1. 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  2.  Combined radiation and chemotherapy with cisplatin and Taxol initiated on 8/10/2017 concurrent with radiation.    3.  Long smoking history with COPD.  4.  Mediport and PEG tube placed by Dr. Valenzuela on 7/31/2017.  Subsequent PEG tube infection requiring antibiotics.  5.  Chemotherapy completed 9/15/2017 and radiation therapy completed 10/9/2017.  6.  Right neck dissection by Dr. Clinton Styles 5/17/2018 for persistent disease in the right neck.  7.  PET scan from 6/26/2018 showing multiple hypermetabolic lung lesions consistent with metastatic disease.  8.  Plans to initiate Opdivo therapy as palliative treatment of metastatic squamous cell carcinoma of the head and neck.    HISTORY OF PRESENT ILLNESS:  King Parson is a 67 y.o. male with the above-mentioned history here today for follow-up of his squamous cell carcinoma of the head and neck treated with combined chemotherapy and radiation.  The patient completed chemotherapy on 9/15/2017.  He received 6 weekly doses of Taxol/ cisplatin.  The seventh dose was held due to significant moist desquamation of the skin as well as worsening blood counts. He was able to complete his radiation therapy on 10/9/2017.     PET scan from November 2017 at the end of therapy showed an excellent response.      He had a persistent palpable node on the right which was biopsied by Dr. Styles and found to be positive for residual squamous cell carcinoma.  He subsequently underwent a right neck dissection performed by Dr. Styles on 5/17/2018.  There was metastatic carcinoma in 3 out of 14 lymph nodes and squamous cell carcinoma noted in fibroadipose tissue measuring 2.7 cm.  Carcinoma was present at the soft tissue margin.    He had a follow-up PET scan which was performed on 6/26/2018.  Unfortunately the PET scan showed multiple  hypermetabolic lung nodules consistent with metastatic disease.  We recommended proceeding with palliative immunotherapy in the form of Opdivo.  He received his first dose of Opdivo on 7/13/2018 and tolerated it well.  He is here today for cycle #2 Opdivo treatment.    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  This diagnosis was made by FNA of the neck node on 6/23/2017.  He is undergone staging CT scans on 6/6/2017 and a PET scan on 7/6/2017.  His disease appears to be clinical stage TIII N2 a stage IV a squamous carcinoma of the hypopharynx.     He was originally referred to the [facility] and was seen by Dr. Moreno Cmap of radiation oncology on 7/14/2017.  The patient lives in [place] and has some transportation difficulties.  He has a sister who works in the lab at [facility] and for this reason they have decided to receive their treatment in the [facility], [place], operated by [facility].     He was seen initially by Dr. Garcia in the company of 2 of his sisters.  We recommended combined chemotherapy and radiation.  He will be seeing Dr. Grayson of the [facility], [place], operated by [facility] to discuss the radiation portion of his treatment.  We plan to deliver weekly chemotherapy during radiation with combination of cisplatin and Taxol.  Taxol will be delivered day 1 and cisplatin be delivered along with hydration on day 2 each week.    He saw Dr. Grayson who was in agreement with concurrent chemotherapy and radiation.    Mediport and PEG tube placed by Dr. Valenzuela on 7/31/2017.  Subsequent PEG tube infection requiring antibiotics.    He saw nutrition.  He had a chemotherapy education session.    Cycle #1 of therapy was initiated on 8/10/2017.    Patient completed his chemotherapy (6 cycles) on 9/14/2017    Radiation therapy completed 10/9/2017.    PET scan at completion of treatment in November  2017 showed excellent response.    Surveillance CT scan of the neck 2/6/2018 showed no evidence of disease progression.    Needle biopsy for persistent right neck node in March 2018 was positive for squamous cell carcinoma.  Dr. Styles performed right neck dissection surgery  5/17/2018.  3 out of 14 lymph nodes were positive for squamous cell carcinoma and squamous cell carcinoma was found in fibroadipose tissue measuring 2.7 cm with positive soft tissue margin.    PET scan 6/26/2018 showed multiple hypermetabolic lung nodules consistent with metastatic disease.  We discussed starting palliative immunotherapy in the form of Opdivo.      PAST MEDICAL, SURGICAL, FAMILY, AND SOCIAL HISTORIES were reviewed with the patient and in the electronic medical record, and were updated if indicated.    ALLERGIES:  Allergies   Allergen Reactions   • Codeine      He dont like to take it because it makes him feel strange.        MEDICATIONS:  The medication list has been reviewed with the patient by the medical assistant, and the list has been updated in the electronic medical record, which I reviewed.  Medication dosages and frequencies were confirmed to be accurate.    I have reviewed the patient's medical history in detail and updated the computerized patient record.    Review of Systems   Constitutional: Positive for fatigue. Negative for appetite change, chills, fever and unexpected weight change.   HENT:   Negative for nosebleeds.         See HPI     Respiratory: Negative for cough and shortness of breath.    Cardiovascular: Negative for chest pain and leg swelling.   Gastrointestinal: Negative for abdominal pain, constipation, diarrhea, nausea and vomiting.   Endocrine: Negative for hot flashes.   Genitourinary: Negative for difficulty urinating.    Musculoskeletal: Negative for arthralgias and myalgias.   Skin: Negative for rash and wound.   Neurological: Negative for dizziness and extremity weakness.   Hematological:  "Negative for adenopathy. Does not bruise/bleed easily.   Psychiatric/Behavioral: Negative for sleep disturbance.       Vitals:    07/27/18 0914   BP: 110/70   Pulse: 87   Temp: 98.2 °F (36.8 °C)   TempSrc: Oral   SpO2: 98%   Weight: 56.5 kg (124 lb 8 oz)   Height: 171.5 cm (67.52\")   PainSc: 5  Comment: chronic back pain       Physical Exam   Constitutional: He is oriented to person, place, and time. He appears well-developed and well-nourished.   HENT:   Head: Normocephalic and atraumatic.   Nose: Nose normal.   Mouth/Throat: Mucous membranes are normal.   Edentulous   Eyes: Pupils are equal, round, and reactive to light.   Neck: Normal range of motion. Neck supple.   Palpable, firm 2 cm node in the jugulodigastric area of the right neck.     Cardiovascular: Normal rate, regular rhythm and normal heart sounds.    Pulmonary/Chest: Effort normal and breath sounds normal. No respiratory distress. He has no wheezes. He has no rhonchi. He has no rales.   Mediport anterior chest wall. No evidence of infection or thrombosis.   Abdominal: Soft. Normal appearance and bowel sounds are normal. He exhibits no distension. There is no hepatosplenomegaly. There is no tenderness.   Musculoskeletal: Normal range of motion. He exhibits no edema.   Neurological: He is alert and oriented to person, place, and time.   Skin: Skin is warm and dry.   Psychiatric: He has a normal mood and affect. His behavior is normal.   Nursing note and vitals reviewed.    DIAGNOSTIC DATA:  Lab Results   Component Value Date    WBC 7.30 07/27/2018    HGB 12.6 (L) 07/27/2018    HCT 35.8 (L) 07/27/2018    .8 (H) 07/27/2018     07/27/2018     Lab Results   Component Value Date    NEUTROABS 5.58 07/27/2018       Lab Results   Component Value Date    GLUCOSE 135 (H) 07/27/2018    BUN 12 07/27/2018    CREATININE 0.81 07/27/2018    EGFRIFNONA 95 07/27/2018    BCR 14.8 07/27/2018    K 4.8 07/27/2018    CO2 28.0 07/27/2018    CALCIUM 9.5 07/27/2018 "    ALBUMIN 4.20 07/27/2018    AST 17 07/27/2018    ALT 7 07/27/2018       PATHOLOGY 5/17/2018  Final Diagnosis   RIGHT NECK DISSECTION:        METASTATIC SQUAMOUS CELL CARCINOMA IN FIBROADIPOSE TISSUE (2.7 CM).        METASTATIC CARCINOMA PRESENT IN 3 OF 14 LYMPH NODES.        TUMOR INVADES MUSCULAR WALL OF A VEIN.        CARCINOMA IS PRESENT AT SOFT TISSUE MARGIN.              COMMENT: Vascular invasion (venular) is present focally at the inked margin.      PET SCAN 6/26/2018  IMPRESSION:  There is no metabolic evidence of recurrent primary neoplasm  within the pharynx. There is no hypermetabolic lymphadenopathy in the  neck or chest. There has been interval development of 5 small  hypermetabolic lung nodules since the preceding PET/CT study dated  11/15/2017 consistent with pulmonary metastases. CT-guided biopsy is  suggested to confirm the etiology of the nodules. A pleural-based nodule  in the right lower lobe appears amenable to percutaneous CT guided  biopsy. There is no metabolic evidence of metastatic disease within the  abdomen or pelvis.    ASSESSMENT:  This is a 67 y.o. male with:  1.  Stage IV a (T3, N2 a, M0) squamous cell carcinoma of the hypopharynx with metastatic lymphadenopathy to the right neck.Treated with combined radiation and cisplatin and Taxol chemotherapy.  Chemotherapy was completed 9/15/2017 and radiation completed  on 10/9/2017.  The patient had an excellent response but unfortunately as noted above he does have persistent squamous cell carcinoma in the right neck node and underwent right neck dissection 5/17/2018.  There is concern for positive soft tissue margin and 3 out of 14 lymph nodes were positive.  As noted above, the PET scan shows postop changes in the neck but no definite active disease however he does now have multiple hypermetabolic lung nodules.    2.  Comorbidities including COPD.  3.  PEG tube removed by Dr. Valenzuela 11/30/2017.  4.  Development of metastatic disease to  the lungs noted on PET scan 6/26/2018.  5.  Patient is now receiving palliative immunotherapy with Opdivo and is ready for cycle #2 in the office today.    PLAN:  1.  We will proceed with cycle #2 Opdivo treatment at same dose of 240 mg IV today.  2.  Lab and Opdivo treatment in 2 weeks.  3.  Nurse practitioner visit with lab and Opdivo treatment in 4 weeks.  4.  CT scan of the neck and chest in 5 weeks.  5.  2 unit M.D in 6 weeks to review the scans and make a treatment decision based on response.  6.  We most likely will be repeating imaging studies with CT scan of the chest and neck after 4-6 cycles of Opdivo.                Aakash Garcia MD

## 2018-08-09 RX ORDER — SODIUM CHLORIDE 0.9 % (FLUSH) 0.9 %
10 SYRINGE (ML) INJECTION AS NEEDED
Status: CANCELLED | OUTPATIENT
Start: 2018-08-10

## 2018-08-10 ENCOUNTER — INFUSION (OUTPATIENT)
Dept: ONCOLOGY | Facility: HOSPITAL | Age: 68
End: 2018-08-10

## 2018-08-10 ENCOUNTER — RESULTS ENCOUNTER (OUTPATIENT)
Dept: ONCOLOGY | Facility: CLINIC | Age: 68
End: 2018-08-10

## 2018-08-10 VITALS
HEART RATE: 98 BPM | DIASTOLIC BLOOD PRESSURE: 62 MMHG | BODY MASS INDEX: 19.15 KG/M2 | WEIGHT: 124.2 LBS | SYSTOLIC BLOOD PRESSURE: 96 MMHG | TEMPERATURE: 98.6 F

## 2018-08-10 DIAGNOSIS — C14.0 THROAT CANCER (HCC): ICD-10-CM

## 2018-08-10 DIAGNOSIS — Z79.891 ENCOUNTER FOR MONITORING OPIOID MAINTENANCE THERAPY: ICD-10-CM

## 2018-08-10 DIAGNOSIS — IMO0002 SQUAMOUS CELL CARCINOMA: ICD-10-CM

## 2018-08-10 DIAGNOSIS — C13.9 SQUAMOUS CELL CARCINOMA OF HYPOPHARYNX (HCC): Primary | ICD-10-CM

## 2018-08-10 DIAGNOSIS — Z45.2 ENCOUNTER FOR FITTING AND ADJUSTMENT OF VASCULAR CATHETER: ICD-10-CM

## 2018-08-10 DIAGNOSIS — Z51.81 ENCOUNTER FOR MONITORING OPIOID MAINTENANCE THERAPY: ICD-10-CM

## 2018-08-10 DIAGNOSIS — C78.00 MALIGNANT NEOPLASM METASTATIC TO LUNG, UNSPECIFIED LATERALITY (HCC): ICD-10-CM

## 2018-08-10 DIAGNOSIS — C13.9 SQUAMOUS CELL CARCINOMA OF HYPOPHARYNX (HCC): ICD-10-CM

## 2018-08-10 LAB
ALBUMIN SERPL-MCNC: 3.9 G/DL (ref 3.5–5.2)
ALBUMIN/GLOB SERPL: 1.5 G/DL
ALP SERPL-CCNC: 74 U/L (ref 39–117)
ALT SERPL W P-5'-P-CCNC: 8 U/L (ref 1–41)
ANION GAP SERPL CALCULATED.3IONS-SCNC: 11.1 MMOL/L
AST SERPL-CCNC: 16 U/L (ref 1–40)
BASOPHILS # BLD AUTO: 0.04 10*3/MM3 (ref 0–0.2)
BASOPHILS NFR BLD AUTO: 0.6 % (ref 0–1.5)
BILIRUB SERPL-MCNC: 0.4 MG/DL (ref 0.1–1.2)
BUN BLD-MCNC: 14 MG/DL (ref 8–23)
BUN/CREAT SERPL: 17.9 (ref 7–25)
CALCIUM SPEC-SCNC: 9.8 MG/DL (ref 8.6–10.5)
CHLORIDE SERPL-SCNC: 103 MMOL/L (ref 98–107)
CO2 SERPL-SCNC: 27.9 MMOL/L (ref 22–29)
CREAT BLD-MCNC: 0.78 MG/DL (ref 0.76–1.27)
DEPRECATED RDW RBC AUTO: 51.8 FL (ref 37–54)
EOSINOPHIL # BLD AUTO: 0.09 10*3/MM3 (ref 0–0.7)
EOSINOPHIL NFR BLD AUTO: 1.4 % (ref 0.3–6.2)
ERYTHROCYTE [DISTWIDTH] IN BLOOD BY AUTOMATED COUNT: 12.7 % (ref 11.5–14.5)
GFR SERPL CREATININE-BSD FRML MDRD: 99 ML/MIN/1.73
GLOBULIN UR ELPH-MCNC: 2.6 GM/DL
GLUCOSE BLD-MCNC: 183 MG/DL (ref 65–99)
HCT VFR BLD AUTO: 35.5 % (ref 40.4–52.2)
HGB BLD-MCNC: 12.5 G/DL (ref 13.7–17.6)
IMM GRANULOCYTES # BLD: 0.04 10*3/MM3 (ref 0–0.03)
IMM GRANULOCYTES NFR BLD: 0.6 % (ref 0–0.5)
LYMPHOCYTES # BLD AUTO: 0.8 10*3/MM3 (ref 0.9–4.8)
LYMPHOCYTES NFR BLD AUTO: 12.2 % (ref 19.6–45.3)
MCH RBC QN AUTO: 38.9 PG (ref 27–32.7)
MCHC RBC AUTO-ENTMCNC: 35.2 G/DL (ref 32.6–36.4)
MCV RBC AUTO: 110.6 FL (ref 79.8–96.2)
MONOCYTES # BLD AUTO: 0.65 10*3/MM3 (ref 0.2–1.2)
MONOCYTES NFR BLD AUTO: 9.9 % (ref 5–12)
NEUTROPHILS # BLD AUTO: 4.92 10*3/MM3 (ref 1.9–8.1)
NEUTROPHILS NFR BLD AUTO: 75.3 % (ref 42.7–76)
NRBC BLD MANUAL-RTO: 0 /100 WBC (ref 0–0)
PLAT MORPH BLD: NORMAL
PLATELET # BLD AUTO: 218 10*3/MM3 (ref 140–500)
PMV BLD AUTO: 9.2 FL (ref 6–12)
POTASSIUM BLD-SCNC: 4.2 MMOL/L (ref 3.5–5.2)
PROT SERPL-MCNC: 6.5 G/DL (ref 6–8.5)
RBC # BLD AUTO: 3.21 10*6/MM3 (ref 4.6–6)
SODIUM BLD-SCNC: 142 MMOL/L (ref 136–145)
SPHEROCYTES BLD QL SMEAR: NORMAL
STOMATOCYTES BLD QL SMEAR: NORMAL
T4 FREE SERPL-MCNC: 0.94 NG/DL (ref 0.93–1.7)
TSH SERPL DL<=0.05 MIU/L-ACNC: 1.4 MIU/ML (ref 0.27–4.2)
WBC MORPH BLD: NORMAL
WBC NRBC COR # BLD: 6.54 10*3/MM3 (ref 4.5–10.7)

## 2018-08-10 PROCEDURE — 85025 COMPLETE CBC W/AUTO DIFF WBC: CPT | Performed by: INTERNAL MEDICINE

## 2018-08-10 PROCEDURE — 25010000002 NIVOLUMAB 40 MG/4ML SOLUTION 24 ML VIAL: Performed by: INTERNAL MEDICINE

## 2018-08-10 PROCEDURE — 25010000002 HEPARIN FLUSH (PORCINE) 100 UNIT/ML SOLUTION: Performed by: INTERNAL MEDICINE

## 2018-08-10 PROCEDURE — 84443 ASSAY THYROID STIM HORMONE: CPT | Performed by: INTERNAL MEDICINE

## 2018-08-10 PROCEDURE — 84439 ASSAY OF FREE THYROXINE: CPT | Performed by: INTERNAL MEDICINE

## 2018-08-10 PROCEDURE — 85007 BL SMEAR W/DIFF WBC COUNT: CPT | Performed by: INTERNAL MEDICINE

## 2018-08-10 PROCEDURE — 80053 COMPREHEN METABOLIC PANEL: CPT | Performed by: INTERNAL MEDICINE

## 2018-08-10 PROCEDURE — 96413 CHEMO IV INFUSION 1 HR: CPT | Performed by: INTERNAL MEDICINE

## 2018-08-10 RX ORDER — SODIUM CHLORIDE 0.9 % (FLUSH) 0.9 %
10 SYRINGE (ML) INJECTION AS NEEDED
Status: CANCELLED | OUTPATIENT
Start: 2018-08-10

## 2018-08-10 RX ORDER — SODIUM CHLORIDE 0.9 % (FLUSH) 0.9 %
10 SYRINGE (ML) INJECTION AS NEEDED
Status: DISCONTINUED | OUTPATIENT
Start: 2018-08-10 | End: 2018-08-10 | Stop reason: HOSPADM

## 2018-08-10 RX ORDER — SODIUM CHLORIDE 9 MG/ML
250 INJECTION, SOLUTION INTRAVENOUS ONCE
Status: COMPLETED | OUTPATIENT
Start: 2018-08-10 | End: 2018-08-10

## 2018-08-10 RX ADMIN — SODIUM CHLORIDE 240 MG: 900 INJECTION, SOLUTION INTRAVENOUS at 11:03

## 2018-08-10 RX ADMIN — SODIUM CHLORIDE, PRESERVATIVE FREE 500 UNITS: 5 INJECTION INTRAVENOUS at 11:35

## 2018-08-10 RX ADMIN — SODIUM CHLORIDE 250 ML: 900 INJECTION, SOLUTION INTRAVENOUS at 10:38

## 2018-08-10 RX ADMIN — Medication 10 ML: at 11:35

## 2018-08-24 ENCOUNTER — INFUSION (OUTPATIENT)
Dept: ONCOLOGY | Facility: HOSPITAL | Age: 68
End: 2018-08-24

## 2018-08-24 ENCOUNTER — OFFICE VISIT (OUTPATIENT)
Dept: ONCOLOGY | Facility: CLINIC | Age: 68
End: 2018-08-24

## 2018-08-24 ENCOUNTER — RESULTS ENCOUNTER (OUTPATIENT)
Dept: ONCOLOGY | Facility: CLINIC | Age: 68
End: 2018-08-24

## 2018-08-24 VITALS
TEMPERATURE: 98.6 F | RESPIRATION RATE: 16 BRPM | DIASTOLIC BLOOD PRESSURE: 70 MMHG | HEIGHT: 68 IN | OXYGEN SATURATION: 97 % | BODY MASS INDEX: 18.88 KG/M2 | SYSTOLIC BLOOD PRESSURE: 119 MMHG | WEIGHT: 124.6 LBS | HEART RATE: 87 BPM

## 2018-08-24 DIAGNOSIS — C78.00 MALIGNANT NEOPLASM METASTATIC TO LUNG, UNSPECIFIED LATERALITY (HCC): ICD-10-CM

## 2018-08-24 DIAGNOSIS — IMO0002 SQUAMOUS CELL CARCINOMA: ICD-10-CM

## 2018-08-24 DIAGNOSIS — Z51.81 ENCOUNTER FOR MONITORING OPIOID MAINTENANCE THERAPY: ICD-10-CM

## 2018-08-24 DIAGNOSIS — C13.9 SQUAMOUS CELL CARCINOMA OF HYPOPHARYNX (HCC): ICD-10-CM

## 2018-08-24 DIAGNOSIS — C13.9 SQUAMOUS CELL CARCINOMA OF HYPOPHARYNX (HCC): Primary | ICD-10-CM

## 2018-08-24 DIAGNOSIS — Z79.899 HIGH RISK MEDICATION USE: ICD-10-CM

## 2018-08-24 DIAGNOSIS — Z79.891 ENCOUNTER FOR MONITORING OPIOID MAINTENANCE THERAPY: ICD-10-CM

## 2018-08-24 DIAGNOSIS — Z45.2 ENCOUNTER FOR FITTING AND ADJUSTMENT OF VASCULAR CATHETER: ICD-10-CM

## 2018-08-24 DIAGNOSIS — C14.0 THROAT CANCER (HCC): ICD-10-CM

## 2018-08-24 LAB
ALBUMIN SERPL-MCNC: 3.9 G/DL (ref 3.5–5.2)
ALBUMIN/GLOB SERPL: 1.3 G/DL
ALP SERPL-CCNC: 90 U/L (ref 39–117)
ALT SERPL W P-5'-P-CCNC: 8 U/L (ref 1–41)
ANION GAP SERPL CALCULATED.3IONS-SCNC: 12.3 MMOL/L
AST SERPL-CCNC: 17 U/L (ref 1–40)
BASOPHILS # BLD AUTO: 0.04 10*3/MM3 (ref 0–0.2)
BASOPHILS NFR BLD AUTO: 0.6 % (ref 0–1.5)
BILIRUB SERPL-MCNC: 0.4 MG/DL (ref 0.1–1.2)
BUN BLD-MCNC: 13 MG/DL (ref 8–23)
BUN/CREAT SERPL: 16.9 (ref 7–25)
CALCIUM SPEC-SCNC: 9.4 MG/DL (ref 8.6–10.5)
CHLORIDE SERPL-SCNC: 103 MMOL/L (ref 98–107)
CO2 SERPL-SCNC: 25.7 MMOL/L (ref 22–29)
CREAT BLD-MCNC: 0.77 MG/DL (ref 0.76–1.27)
DEPRECATED RDW RBC AUTO: 52.9 FL (ref 37–54)
EOSINOPHIL # BLD AUTO: 0.08 10*3/MM3 (ref 0–0.7)
EOSINOPHIL NFR BLD AUTO: 1.1 % (ref 0.3–6.2)
ERYTHROCYTE [DISTWIDTH] IN BLOOD BY AUTOMATED COUNT: 12.9 % (ref 11.5–14.5)
GFR SERPL CREATININE-BSD FRML MDRD: 101 ML/MIN/1.73
GLOBULIN UR ELPH-MCNC: 2.9 GM/DL
GLUCOSE BLD-MCNC: 79 MG/DL (ref 65–99)
HCT VFR BLD AUTO: 36.6 % (ref 40.4–52.2)
HGB BLD-MCNC: 12.8 G/DL (ref 13.7–17.6)
IMM GRANULOCYTES # BLD: 0.03 10*3/MM3 (ref 0–0.03)
IMM GRANULOCYTES NFR BLD: 0.4 % (ref 0–0.5)
LYMPHOCYTES # BLD AUTO: 0.81 10*3/MM3 (ref 0.9–4.8)
LYMPHOCYTES NFR BLD AUTO: 11.6 % (ref 19.6–45.3)
MCH RBC QN AUTO: 38.8 PG (ref 27–32.7)
MCHC RBC AUTO-ENTMCNC: 35 G/DL (ref 32.6–36.4)
MCV RBC AUTO: 110.9 FL (ref 79.8–96.2)
MICROCYTES BLD QL: NORMAL
MONOCYTES # BLD AUTO: 0.82 10*3/MM3 (ref 0.2–1.2)
MONOCYTES NFR BLD AUTO: 11.7 % (ref 5–12)
NEUTROPHILS # BLD AUTO: 5.2 10*3/MM3 (ref 1.9–8.1)
NEUTROPHILS NFR BLD AUTO: 74.6 % (ref 42.7–76)
NRBC BLD MANUAL-RTO: 0 /100 WBC (ref 0–0)
PLAT MORPH BLD: NORMAL
PLATELET # BLD AUTO: 199 10*3/MM3 (ref 140–500)
PMV BLD AUTO: 9.4 FL (ref 6–12)
POTASSIUM BLD-SCNC: 4.5 MMOL/L (ref 3.5–5.2)
PROT SERPL-MCNC: 6.8 G/DL (ref 6–8.5)
RBC # BLD AUTO: 3.3 10*6/MM3 (ref 4.6–6)
SODIUM BLD-SCNC: 141 MMOL/L (ref 136–145)
STOMATOCYTES BLD QL SMEAR: NORMAL
WBC MORPH BLD: NORMAL
WBC NRBC COR # BLD: 6.98 10*3/MM3 (ref 4.5–10.7)

## 2018-08-24 PROCEDURE — 25010000002 HEPARIN FLUSH (PORCINE) 100 UNIT/ML SOLUTION: Performed by: INTERNAL MEDICINE

## 2018-08-24 PROCEDURE — 99213 OFFICE O/P EST LOW 20 MIN: CPT | Performed by: NURSE PRACTITIONER

## 2018-08-24 PROCEDURE — 85025 COMPLETE CBC W/AUTO DIFF WBC: CPT | Performed by: INTERNAL MEDICINE

## 2018-08-24 PROCEDURE — 96413 CHEMO IV INFUSION 1 HR: CPT | Performed by: NURSE PRACTITIONER

## 2018-08-24 PROCEDURE — 25010000002 NIVOLUMAB 40 MG/4ML SOLUTION 10 ML VIAL: Performed by: INTERNAL MEDICINE

## 2018-08-24 PROCEDURE — 85007 BL SMEAR W/DIFF WBC COUNT: CPT | Performed by: INTERNAL MEDICINE

## 2018-08-24 PROCEDURE — 80053 COMPREHEN METABOLIC PANEL: CPT | Performed by: INTERNAL MEDICINE

## 2018-08-24 PROCEDURE — 25010000002 NIVOLUMAB 40 MG/4ML SOLUTION 4 ML VIAL: Performed by: INTERNAL MEDICINE

## 2018-08-24 RX ORDER — SODIUM CHLORIDE 9 MG/ML
250 INJECTION, SOLUTION INTRAVENOUS ONCE
Status: COMPLETED | OUTPATIENT
Start: 2018-08-24 | End: 2018-08-24

## 2018-08-24 RX ORDER — SODIUM CHLORIDE 0.9 % (FLUSH) 0.9 %
10 SYRINGE (ML) INJECTION AS NEEDED
Status: CANCELLED | OUTPATIENT
Start: 2018-08-24

## 2018-08-24 RX ORDER — SODIUM CHLORIDE 0.9 % (FLUSH) 0.9 %
10 SYRINGE (ML) INJECTION AS NEEDED
Status: DISCONTINUED | OUTPATIENT
Start: 2018-08-24 | End: 2018-08-24 | Stop reason: HOSPADM

## 2018-08-24 RX ADMIN — SODIUM CHLORIDE 240 MG: 9 INJECTION, SOLUTION INTRAVENOUS at 10:24

## 2018-08-24 RX ADMIN — Medication 10 ML: at 10:56

## 2018-08-24 RX ADMIN — SODIUM CHLORIDE 250 ML: 900 INJECTION, SOLUTION INTRAVENOUS at 10:26

## 2018-08-24 RX ADMIN — SODIUM CHLORIDE, PRESERVATIVE FREE 500 UNITS: 5 INJECTION INTRAVENOUS at 10:57

## 2018-08-24 NOTE — PROGRESS NOTES
REASON FOR FOLLOW-UP:    1. 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  2.  Combined radiation and chemotherapy with cisplatin and Taxol initiated on 8/10/2017 concurrent with radiation.    3.  Long smoking history with COPD.  4.  Mediport and PEG tube placed by Dr. Valenzuela on 7/31/2017.  Subsequent PEG tube infection requiring antibiotics.  5.  Chemotherapy completed 9/15/2017 and radiation therapy completed 10/9/2017.  6.  Right neck dissection by Dr. Clinton Styles 5/17/2018 for persistent disease in the right neck.  7.  PET scan from 6/26/2018 showing multiple hypermetabolic lung lesions consistent with metastatic disease.  8.  Plans to initiate Opdivo therapy as palliative treatment of metastatic squamous cell carcinoma of the head and neck.    HISTORY OF PRESENT ILLNESS:  King Parson is a 67 y.o. male with the above-mentioned history who is here today in anticipation of his fourth cycle of palliative immunotherapy with Opdivo.  He is tolerating it quite well.  He denies any issues with worsening shortness of breath or cough.  He denies issues with diarrhea.  He does report some fatigue, but otherwise denies any complaints or concerns.    He does mention he is concerned because he has had some elevated glucose levels on previous lab work.  We discussed that that was not fasting lab work, although Opdivo can cause hyperglycemia and we will have to closely monitor.  His glucose today is normal at 79.    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  This diagnosis was made by FNA of the neck node on 6/23/2017.  He is undergone staging CT scans on 6/6/2017 and a PET scan on 7/6/2017.  His disease appears to be clinical stage TIII N2 a stage IV a squamous carcinoma of the hypopharynx.     He was  originally referred to the [facility] and was seen by Dr. Moreno Camp of radiation oncology on 7/14/2017.  The patient lives in [place] and has some transportation difficulties.  He has a sister who works in the lab at [facility], [place], operated by [facility] and for this reason they have decided to receive their treatment in the [facility] system.     He was seen initially by Dr. Garcia in the company of 2 of his sisters.  We recommended combined chemotherapy and radiation.  He will be seeing Dr. Grayson of the [facility] [place] to discuss the radiation portion of his treatment.  We plan to deliver weekly chemotherapy during radiation with combination of cisplatin and Taxol.  Taxol will be delivered day 1 and cisplatin be delivered along with hydration on day 2 each week.    He saw Dr. Grayson who was in agreement with concurrent chemotherapy and radiation.    Mediport and PEG tube placed by Dr. Valenzuela on 7/31/2017.  Subsequent PEG tube infection requiring antibiotics.    He saw nutrition.  He had a chemotherapy education session.    Cycle #1 of therapy was initiated on 8/10/2017.    Patient completed his chemotherapy (6 cycles) on 9/14/2017    Radiation therapy completed 10/9/2017.    PET scan at completion of treatment in November 2017 showed excellent response.    Surveillance CT scan of the neck 2/6/2018 showed no evidence of disease progression.    Needle biopsy for persistent right neck node in March 2018 was positive for squamous cell carcinoma.  Dr. Styles performed right neck dissection surgery  5/17/2018.  3 out of 14 lymph nodes were positive for squamous cell carcinoma and squamous cell carcinoma was found in fibroadipose tissue measuring 2.7 cm with positive soft tissue margin.    PET scan 6/26/2018 showed multiple hypermetabolic lung nodules consistent with metastatic disease.  We discussed starting palliative immunotherapy in the form of Opdivo.      PAST MEDICAL, SURGICAL, FAMILY, AND SOCIAL HISTORIES were  "reviewed with the patient and in the electronic medical record, and were updated if indicated.    ALLERGIES:  Allergies   Allergen Reactions   • Codeine      He dont like to take it because it makes him feel strange.        MEDICATIONS:  The medication list has been reviewed with the patient by the medical assistant, and the list has been updated in the electronic medical record, which I reviewed.  Medication dosages and frequencies were confirmed to be accurate.    I have reviewed the patient's medical history in detail and updated the computerized patient record.    Review of Systems   Constitutional: Positive for fatigue. Negative for appetite change, chills, fever and unexpected weight change.   HENT:   Negative for nosebleeds.    Respiratory: Negative for cough and shortness of breath.    Cardiovascular: Negative for chest pain and leg swelling.   Gastrointestinal: Negative for abdominal pain, constipation, diarrhea, nausea and vomiting.   Endocrine: Negative for hot flashes.   Genitourinary: Negative for difficulty urinating.    Musculoskeletal: Negative for arthralgias and myalgias.   Skin: Negative for rash and wound.   Neurological: Negative for dizziness and extremity weakness.   Hematological: Negative for adenopathy. Does not bruise/bleed easily.   Psychiatric/Behavioral: Negative for sleep disturbance.   As of 8/24/2018 review of systems is unchanged from above.    Vitals:    08/24/18 0943   BP: 119/70   Pulse: 87   Resp: 16   Temp: 98.6 °F (37 °C)   TempSrc: Oral   SpO2: 97%   Weight: 56.5 kg (124 lb 9.6 oz)   Height: 171.5 cm (67.52\")   PainSc:   6   PainLoc: Back       Physical Exam   Constitutional: He is oriented to person, place, and time. He appears well-developed and well-nourished. No distress.   HENT:   Head: Normocephalic and atraumatic.   Nose: Nose normal.   Mouth/Throat: Mucous membranes are normal.   Edentulous   Eyes: Pupils are equal, round, and reactive to light.   Neck: Normal range " of motion. Neck supple.   Postsurgical changes of the right neck as well as some mild hyperpigmentation.  Palpable, firm 2 cm node in the jugulodigastric area of the right neck.     Cardiovascular: Normal rate, regular rhythm and normal heart sounds.    Pulmonary/Chest: Effort normal. No respiratory distress. He has decreased breath sounds. He has no wheezes. He has no rhonchi. He has no rales.   Benign-appearing left chest Mediport.   Abdominal: Soft. Normal appearance and bowel sounds are normal. He exhibits no distension. There is no hepatosplenomegaly. There is no tenderness.   Musculoskeletal: Normal range of motion. He exhibits no edema.   Neurological: He is alert and oriented to person, place, and time.   Skin: Skin is warm and dry.   Psychiatric: He has a normal mood and affect. His behavior is normal.   Nursing note and vitals reviewed.    DIAGNOSTIC DATA:  Lab Results   Component Value Date    WBC 6.98 08/24/2018    HGB 12.8 (L) 08/24/2018    HCT 36.6 (L) 08/24/2018    .9 (H) 08/24/2018     08/24/2018     Lab Results   Component Value Date    NEUTROABS 5.20 08/24/2018       Lab Results   Component Value Date    GLUCOSE 79 08/24/2018    BUN 13 08/24/2018    CREATININE 0.77 08/24/2018    EGFRIFNONA 101 08/24/2018    BCR 16.9 08/24/2018    K 4.5 08/24/2018    CO2 25.7 08/24/2018    CALCIUM 9.4 08/24/2018    ALBUMIN 3.90 08/24/2018    AST 17 08/24/2018    ALT 8 08/24/2018       PATHOLOGY 5/17/2018  Final Diagnosis   RIGHT NECK DISSECTION:        METASTATIC SQUAMOUS CELL CARCINOMA IN FIBROADIPOSE TISSUE (2.7 CM).        METASTATIC CARCINOMA PRESENT IN 3 OF 14 LYMPH NODES.        TUMOR INVADES MUSCULAR WALL OF A VEIN.        CARCINOMA IS PRESENT AT SOFT TISSUE MARGIN.              COMMENT: Vascular invasion (venular) is present focally at the inked margin.      PET SCAN 6/26/2018  IMPRESSION:  There is no metabolic evidence of recurrent primary neoplasm  within the pharynx. There is no  hypermetabolic lymphadenopathy in the  neck or chest. There has been interval development of 5 small  hypermetabolic lung nodules since the preceding PET/CT study dated  11/15/2017 consistent with pulmonary metastases. CT-guided biopsy is  suggested to confirm the etiology of the nodules. A pleural-based nodule  in the right lower lobe appears amenable to percutaneous CT guided  biopsy. There is no metabolic evidence of metastatic disease within the  abdomen or pelvis.    ASSESSMENT:  This is a 67 y.o. male with:  1.  Stage IV a (T3, N2 a, M0) squamous cell carcinoma of the hypopharynx with metastatic lymphadenopathy to the right neck.Treated with combined radiation and cisplatin and Taxol chemotherapy.  Chemotherapy was completed 9/15/2017 and radiation completed  on 10/9/2017.  The patient had an excellent response but unfortunately as noted above he does have persistent squamous cell carcinoma in the right neck node and underwent right neck dissection 5/17/2018.  There is concern for positive soft tissue margin and 3 out of 14 lymph nodes were positive.  As noted above, the PET scan shows postop changes in the neck but no definite active disease however he does now have multiple hypermetabolic lung nodules.    2.  Comorbidities including COPD.  3.  PEG tube removed by Dr. Valenzuela 11/30/2017.  4.  Development of metastatic disease to the lungs noted on PET scan 6/26/2018.  5.  Patient is now receiving palliative immunotherapy with Opdivo initiated 7/13/2018.  Patient returns today in anticipation of cycle 4.  He continues to tolerate treatment quite well.  Plans to repeat CT scans after cycle 4.  today.    PLAN:  1.  Proceed with Opdivo cycle 4 today at 240 mg.  2.  CT scan of the neck and chest to be done next week.  3.  Return for follow-up visit with Dr. Garcia in 2 weeks to review his scan results, and make a treatment decision based on his scan response.        BETTE Monroe

## 2018-09-04 ENCOUNTER — HOSPITAL ENCOUNTER (OUTPATIENT)
Dept: CT IMAGING | Facility: HOSPITAL | Age: 68
Discharge: HOME | End: 2018-09-04
Attending: INTERNAL MEDICINE | Admitting: INTERNAL MEDICINE

## 2018-09-04 DIAGNOSIS — C78.00 MALIGNANT NEOPLASM METASTATIC TO LUNG, UNSPECIFIED LATERALITY (HCC): ICD-10-CM

## 2018-09-04 DIAGNOSIS — Z45.2 ENCOUNTER FOR FITTING AND ADJUSTMENT OF VASCULAR CATHETER: ICD-10-CM

## 2018-09-04 DIAGNOSIS — C13.9 SQUAMOUS CELL CARCINOMA OF HYPOPHARYNX (HCC): ICD-10-CM

## 2018-09-04 PROCEDURE — 25010000002 IOPAMIDOL 61 % SOLUTION: Performed by: INTERNAL MEDICINE

## 2018-09-04 PROCEDURE — 71260 CT THORAX DX C+: CPT

## 2018-09-04 PROCEDURE — 70491 CT SOFT TISSUE NECK W/DYE: CPT

## 2018-09-04 PROCEDURE — 82565 ASSAY OF CREATININE: CPT

## 2018-09-04 RX ADMIN — IOPAMIDOL 90 ML: 612 INJECTION, SOLUTION INTRAVENOUS at 10:17

## 2018-09-05 LAB — CREAT BLDA-MCNC: 0.9 MG/DL (ref 0.6–1.3)

## 2018-09-07 ENCOUNTER — RESULTS ENCOUNTER (OUTPATIENT)
Dept: ONCOLOGY | Facility: CLINIC | Age: 68
End: 2018-09-07

## 2018-09-07 DIAGNOSIS — C13.9 SQUAMOUS CELL CARCINOMA OF HYPOPHARYNX (HCC): ICD-10-CM

## 2018-09-07 DIAGNOSIS — Z45.2 ENCOUNTER FOR FITTING AND ADJUSTMENT OF VASCULAR CATHETER: ICD-10-CM

## 2018-09-07 DIAGNOSIS — C78.00 MALIGNANT NEOPLASM METASTATIC TO LUNG, UNSPECIFIED LATERALITY (HCC): ICD-10-CM

## 2018-09-07 DIAGNOSIS — C14.0 THROAT CANCER (HCC): ICD-10-CM

## 2018-09-07 DIAGNOSIS — Z79.899 ENCOUNTER FOR LONG-TERM (CURRENT) USE OF HIGH-RISK MEDICATION: ICD-10-CM

## 2018-09-07 DIAGNOSIS — IMO0002 SQUAMOUS CELL CARCINOMA: ICD-10-CM

## 2018-09-11 ENCOUNTER — OFFICE VISIT (OUTPATIENT)
Dept: ONCOLOGY | Facility: CLINIC | Age: 68
End: 2018-09-11

## 2018-09-11 ENCOUNTER — INFUSION (OUTPATIENT)
Dept: ONCOLOGY | Facility: HOSPITAL | Age: 68
End: 2018-09-11

## 2018-09-11 VITALS
WEIGHT: 127 LBS | HEIGHT: 68 IN | DIASTOLIC BLOOD PRESSURE: 60 MMHG | TEMPERATURE: 98.7 F | SYSTOLIC BLOOD PRESSURE: 124 MMHG | OXYGEN SATURATION: 97 % | RESPIRATION RATE: 18 BRPM | HEART RATE: 104 BPM | BODY MASS INDEX: 19.25 KG/M2

## 2018-09-11 DIAGNOSIS — C13.9 SQUAMOUS CELL CARCINOMA OF HYPOPHARYNX (HCC): ICD-10-CM

## 2018-09-11 DIAGNOSIS — Z79.899 ENCOUNTER FOR LONG-TERM (CURRENT) USE OF HIGH-RISK MEDICATION: Primary | ICD-10-CM

## 2018-09-11 DIAGNOSIS — C14.0 THROAT CANCER (HCC): ICD-10-CM

## 2018-09-11 DIAGNOSIS — Z79.899 ENCOUNTER FOR LONG-TERM (CURRENT) USE OF HIGH-RISK MEDICATION: ICD-10-CM

## 2018-09-11 DIAGNOSIS — IMO0002 SQUAMOUS CELL CARCINOMA: ICD-10-CM

## 2018-09-11 DIAGNOSIS — Z45.2 ENCOUNTER FOR FITTING AND ADJUSTMENT OF VASCULAR CATHETER: ICD-10-CM

## 2018-09-11 DIAGNOSIS — C78.00 MALIGNANT NEOPLASM METASTATIC TO LUNG, UNSPECIFIED LATERALITY (HCC): Primary | ICD-10-CM

## 2018-09-11 LAB
ALBUMIN SERPL-MCNC: 3.7 G/DL (ref 3.5–5.2)
ALBUMIN/GLOB SERPL: 1.3 G/DL
ALP SERPL-CCNC: 78 U/L (ref 39–117)
ALT SERPL W P-5'-P-CCNC: 7 U/L (ref 1–41)
ANION GAP SERPL CALCULATED.3IONS-SCNC: 12.2 MMOL/L
AST SERPL-CCNC: 16 U/L (ref 1–40)
BASOPHILS # BLD AUTO: 0.04 10*3/MM3 (ref 0–0.2)
BASOPHILS NFR BLD AUTO: 0.5 % (ref 0–1.5)
BILIRUB SERPL-MCNC: 0.3 MG/DL (ref 0.1–1.2)
BUN BLD-MCNC: 13 MG/DL (ref 8–23)
BUN/CREAT SERPL: 18.8 (ref 7–25)
CALCIUM SPEC-SCNC: 9.5 MG/DL (ref 8.6–10.5)
CHLORIDE SERPL-SCNC: 107 MMOL/L (ref 98–107)
CO2 SERPL-SCNC: 27.8 MMOL/L (ref 22–29)
CREAT BLD-MCNC: 0.69 MG/DL (ref 0.76–1.27)
DEPRECATED RDW RBC AUTO: 53.4 FL (ref 37–54)
EOSINOPHIL # BLD AUTO: 0.09 10*3/MM3 (ref 0–0.7)
EOSINOPHIL NFR BLD AUTO: 1.1 % (ref 0.3–6.2)
ERYTHROCYTE [DISTWIDTH] IN BLOOD BY AUTOMATED COUNT: 12.8 % (ref 11.5–14.5)
GFR SERPL CREATININE-BSD FRML MDRD: 114 ML/MIN/1.73
GLOBULIN UR ELPH-MCNC: 2.8 GM/DL
GLUCOSE BLD-MCNC: 150 MG/DL (ref 65–99)
HCT VFR BLD AUTO: 33.7 % (ref 40.4–52.2)
HGB BLD-MCNC: 11.8 G/DL (ref 13.7–17.6)
IMM GRANULOCYTES # BLD: 0.03 10*3/MM3 (ref 0–0.03)
IMM GRANULOCYTES NFR BLD: 0.4 % (ref 0–0.5)
LYMPHOCYTES # BLD AUTO: 0.72 10*3/MM3 (ref 0.9–4.8)
LYMPHOCYTES NFR BLD AUTO: 9.1 % (ref 19.6–45.3)
MCH RBC QN AUTO: 39.3 PG (ref 27–32.7)
MCHC RBC AUTO-ENTMCNC: 35 G/DL (ref 32.6–36.4)
MCV RBC AUTO: 112.3 FL (ref 79.8–96.2)
MONOCYTES # BLD AUTO: 0.79 10*3/MM3 (ref 0.2–1.2)
MONOCYTES NFR BLD AUTO: 10 % (ref 5–12)
NEUTROPHILS # BLD AUTO: 6.22 10*3/MM3 (ref 1.9–8.1)
NEUTROPHILS NFR BLD AUTO: 78.9 % (ref 42.7–76)
NRBC BLD MANUAL-RTO: 0 /100 WBC (ref 0–0)
PLAT MORPH BLD: NORMAL
PLATELET # BLD AUTO: 239 10*3/MM3 (ref 140–500)
PMV BLD AUTO: 9.1 FL (ref 6–12)
POTASSIUM BLD-SCNC: 4.3 MMOL/L (ref 3.5–5.2)
PROT SERPL-MCNC: 6.5 G/DL (ref 6–8.5)
RBC # BLD AUTO: 3 10*6/MM3 (ref 4.6–6)
SODIUM BLD-SCNC: 147 MMOL/L (ref 136–145)
SPHEROCYTES BLD QL SMEAR: NORMAL
STOMATOCYTES BLD QL SMEAR: NORMAL
T4 FREE SERPL-MCNC: 0.78 NG/DL (ref 0.93–1.7)
TSH SERPL DL<=0.05 MIU/L-ACNC: 1.95 MIU/ML (ref 0.27–4.2)
WBC MORPH BLD: NORMAL
WBC NRBC COR # BLD: 7.89 10*3/MM3 (ref 4.5–10.7)

## 2018-09-11 PROCEDURE — 84443 ASSAY THYROID STIM HORMONE: CPT | Performed by: INTERNAL MEDICINE

## 2018-09-11 PROCEDURE — 25010000002 NIVOLUMAB 40 MG/4ML SOLUTION 24 ML VIAL: Performed by: INTERNAL MEDICINE

## 2018-09-11 PROCEDURE — 96413 CHEMO IV INFUSION 1 HR: CPT | Performed by: INTERNAL MEDICINE

## 2018-09-11 PROCEDURE — 85007 BL SMEAR W/DIFF WBC COUNT: CPT | Performed by: INTERNAL MEDICINE

## 2018-09-11 PROCEDURE — 80053 COMPREHEN METABOLIC PANEL: CPT | Performed by: INTERNAL MEDICINE

## 2018-09-11 PROCEDURE — 99214 OFFICE O/P EST MOD 30 MIN: CPT | Performed by: INTERNAL MEDICINE

## 2018-09-11 PROCEDURE — 25010000002 HEPARIN FLUSH (PORCINE) 100 UNIT/ML SOLUTION: Performed by: INTERNAL MEDICINE

## 2018-09-11 PROCEDURE — 84439 ASSAY OF FREE THYROXINE: CPT | Performed by: INTERNAL MEDICINE

## 2018-09-11 PROCEDURE — 85025 COMPLETE CBC W/AUTO DIFF WBC: CPT | Performed by: INTERNAL MEDICINE

## 2018-09-11 RX ORDER — SODIUM CHLORIDE 0.9 % (FLUSH) 0.9 %
10 SYRINGE (ML) INJECTION AS NEEDED
Status: CANCELLED | OUTPATIENT
Start: 2018-09-11

## 2018-09-11 RX ORDER — SODIUM CHLORIDE 0.9 % (FLUSH) 0.9 %
10 SYRINGE (ML) INJECTION AS NEEDED
Status: DISCONTINUED | OUTPATIENT
Start: 2018-09-11 | End: 2018-09-11 | Stop reason: HOSPADM

## 2018-09-11 RX ORDER — SODIUM CHLORIDE 9 MG/ML
250 INJECTION, SOLUTION INTRAVENOUS ONCE
Status: CANCELLED | OUTPATIENT
Start: 2018-10-09

## 2018-09-11 RX ORDER — SODIUM CHLORIDE 9 MG/ML
250 INJECTION, SOLUTION INTRAVENOUS ONCE
Status: CANCELLED | OUTPATIENT
Start: 2018-09-25

## 2018-09-11 RX ORDER — SODIUM CHLORIDE 9 MG/ML
250 INJECTION, SOLUTION INTRAVENOUS ONCE
Status: COMPLETED | OUTPATIENT
Start: 2018-09-11 | End: 2018-09-11

## 2018-09-11 RX ORDER — SODIUM CHLORIDE 9 MG/ML
250 INJECTION, SOLUTION INTRAVENOUS ONCE
Status: CANCELLED | OUTPATIENT
Start: 2018-09-11

## 2018-09-11 RX ADMIN — Medication 10 ML: at 11:31

## 2018-09-11 RX ADMIN — SODIUM CHLORIDE 250 ML: 9 INJECTION, SOLUTION INTRAVENOUS at 11:02

## 2018-09-11 RX ADMIN — SODIUM CHLORIDE 240 MG: 900 INJECTION, SOLUTION INTRAVENOUS at 11:02

## 2018-09-11 RX ADMIN — SODIUM CHLORIDE, PRESERVATIVE FREE 500 UNITS: 5 INJECTION INTRAVENOUS at 11:31

## 2018-09-11 NOTE — PROGRESS NOTES
REASON FOR FOLLOW-UP:    1. 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  2.  Combined radiation and chemotherapy with cisplatin and Taxol initiated on 8/10/2017 concurrent with radiation.    3.  Long smoking history with COPD.  4.  Mediport and PEG tube placed by Dr. Valenzuela on 7/31/2017.  Subsequent PEG tube infection requiring antibiotics.  5.  Chemotherapy completed 9/15/2017 and radiation therapy completed 10/9/2017.  6.  Right neck dissection by Dr. Clinton Styles 5/17/2018 for persistent disease in the right neck.  7.  PET scan from 6/26/2018 showing multiple hypermetabolic lung lesions consistent with metastatic disease.  8.  Plans to initiate Opdivo therapy as palliative treatment of metastatic squamous cell carcinoma of the head and neck.    HISTORY OF PRESENT ILLNESS:  King Parson is a 67 y.o. male with the above-mentioned history here today for follow-up of his squamous cell carcinoma of the head and neck treated with combined chemotherapy and radiation.  The patient completed chemotherapy on 9/15/2017.  He received 6 weekly doses of Taxol/ cisplatin.  The seventh dose was held due to significant moist desquamation of the skin as well as worsening blood counts. He was able to complete his radiation therapy on 10/9/2017.     PET scan from November 2017 at the end of therapy showed an excellent response.      He had a persistent palpable node on the right which was biopsied by Dr. Styles and found to be positive for residual squamous cell carcinoma.  He subsequently underwent a right neck dissection performed by Dr. Styles on 5/17/2018.  There was metastatic carcinoma in 3 out of 14 lymph nodes and squamous cell carcinoma noted in fibroadipose tissue measuring 2.7 cm.  Carcinoma was present at the soft tissue margin.    He had a follow-up PET scan which was performed on 6/26/2018.  Unfortunately the PET scan showed multiple  hypermetabolic lung nodules consistent with metastatic disease.  We recommended proceeding with palliative immunotherapy in the form of Opdivo.  He received his first dose of Opdivo on 7/13/2018.      CT scans of the neck and chest were performed 9/4/2018 after cycle #4 and he is here today to review the results of the scans and possibly continue Opdivo treatment.  The CT results are as noted below.  There is some increase in the size of the pulmonary nodules but no obvious new metastases identified therefore we feel this may represent pseudo-progression and we'll plan to continue Opdivo with repeat scans again after 8 cycles.    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  This diagnosis was made by FNA of the neck node on 6/23/2017.  He is undergone staging CT scans on 6/6/2017 and a PET scan on 7/6/2017.  His disease appears to be clinical stage TIII N2 a stage IV a squamous carcinoma of the hypopharynx.     He was originally referred to the [facility] and was seen by Dr. Moreno Camp of radiation oncology on 7/14/2017.  The patient lives in [place] and has some transportation difficulties.  He has a sister who works in the lab at [facility] and for this reason they have decided to receive their treatment in the [facility] system.     He was seen initially by Dr. Garcia in the company of 2 of his sisters.  We recommended combined chemotherapy and radiation.  He will be seeing Dr. Grayson of the [facility] to discuss the radiation portion of his treatment.  We plan to deliver weekly chemotherapy during radiation with combination of cisplatin and Taxol.  Taxol will be delivered day 1 and cisplatin be delivered along with hydration on day 2 each week.    He saw Dr. Grayson who was in agreement with concurrent chemotherapy and radiation.    Repunch and  PEG tube placed by Dr. Valenzuela on 7/31/2017.  Subsequent PEG tube infection requiring antibiotics.    He saw nutrition.  He had a chemotherapy education session.    Cycle #1 of therapy was initiated on 8/10/2017.    Patient completed his chemotherapy (6 cycles) on 9/14/2017    Radiation therapy completed 10/9/2017.    PET scan at completion of treatment in November 2017 showed excellent response.    Surveillance CT scan of the neck 2/6/2018 showed no evidence of disease progression.    Needle biopsy for persistent right neck node in March 2018 was positive for squamous cell carcinoma.  Dr. Styles performed right neck dissection surgery  5/17/2018.  3 out of 14 lymph nodes were positive for squamous cell carcinoma and squamous cell carcinoma was found in fibroadipose tissue measuring 2.7 cm with positive soft tissue margin.    PET scan 6/26/2018 showed multiple hypermetabolic lung nodules consistent with metastatic disease.  We discussed starting palliative immunotherapy in the form of Opdivo.  CT scans of the neck and chest were performed 9/4/2018 after cycle #4. There was some increase in the size of the pulmonary nodules but no obvious new metastases identified therefore we felt this may represent pseudo-progression and continued Opdivo with repeat scans again after 8 cycles.  .      PAST MEDICAL, SURGICAL, FAMILY, AND SOCIAL HISTORIES were reviewed with the patient and in the electronic medical record, and were updated if indicated.    ALLERGIES:  Allergies   Allergen Reactions   • Codeine      He dont like to take it because it makes him feel strange.        MEDICATIONS:  The medication list has been reviewed with the patient by the medical assistant, and the list has been updated in the electronic medical record, which I reviewed.  Medication dosages and frequencies were confirmed to be accurate.    I have reviewed the patient's medical history in detail and updated the computerized patient record.    Review of  Systems   Constitutional: Positive for fatigue. Negative for appetite change, chills, fever and unexpected weight change.   HENT:   Negative for nosebleeds.         See HPI     Respiratory: Negative for cough and shortness of breath.    Cardiovascular: Negative for chest pain and leg swelling.   Gastrointestinal: Negative for abdominal pain, constipation, diarrhea, nausea and vomiting.   Endocrine: Negative for hot flashes.   Genitourinary: Negative for difficulty urinating.    Musculoskeletal: Negative for arthralgias and myalgias.   Skin: Negative for rash and wound.   Neurological: Negative for dizziness and extremity weakness.   Hematological: Negative for adenopathy. Does not bruise/bleed easily.   Psychiatric/Behavioral: Negative for sleep disturbance.       There were no vitals filed for this visit.    Physical Exam   Constitutional: He is oriented to person, place, and time. He appears well-developed and well-nourished.   HENT:   Head: Normocephalic and atraumatic.   Nose: Nose normal.   Mouth/Throat: Mucous membranes are normal.   Edentulous   Eyes: Pupils are equal, round, and reactive to light.   Neck: Normal range of motion. Neck supple.   Palpable, firm 2 cm node in the jugulodigastric area of the right neck.     Cardiovascular: Normal rate, regular rhythm and normal heart sounds.    Pulmonary/Chest: Effort normal and breath sounds normal. No respiratory distress. He has no wheezes. He has no rhonchi. He has no rales.   Mediport anterior chest wall. No evidence of infection or thrombosis.   Abdominal: Soft. Normal appearance and bowel sounds are normal. He exhibits no distension. There is no hepatosplenomegaly. There is no tenderness.   Musculoskeletal: Normal range of motion. He exhibits no edema.   Neurological: He is alert and oriented to person, place, and time.   Skin: Skin is warm and dry.   Psychiatric: He has a normal mood and affect. His behavior is normal.   Nursing note and vitals  reviewed.    DIAGNOSTIC DATA:  Lab Results   Component Value Date    WBC 6.98 08/24/2018    HGB 12.8 (L) 08/24/2018    HCT 36.6 (L) 08/24/2018    .9 (H) 08/24/2018     08/24/2018     Lab Results   Component Value Date    NEUTROABS 5.20 08/24/2018       Lab Results   Component Value Date    GLUCOSE 79 08/24/2018    BUN 13 08/24/2018    CREATININE 0.90 09/04/2018    EGFRIFNONA 101 08/24/2018    BCR 16.9 08/24/2018    K 4.5 08/24/2018    CO2 25.7 08/24/2018    CALCIUM 9.4 08/24/2018    ALBUMIN 3.90 08/24/2018    AST 17 08/24/2018    ALT 8 08/24/2018       PATHOLOGY 5/17/2018  Final Diagnosis   RIGHT NECK DISSECTION:        METASTATIC SQUAMOUS CELL CARCINOMA IN FIBROADIPOSE TISSUE (2.7 CM).        METASTATIC CARCINOMA PRESENT IN 3 OF 14 LYMPH NODES.        TUMOR INVADES MUSCULAR WALL OF A VEIN.        CARCINOMA IS PRESENT AT SOFT TISSUE MARGIN.              COMMENT: Vascular invasion (venular) is present focally at the inked margin.        CT scan neck and chest 9/4/2018  IMPRESSION:  1.  There is nonspecific soft tissue prominence involving the carotid  space on the right as well as involving and extending to the  sternocleidomastoid muscle. There is no evidence of pathologic  adenopathy. There is circumferential thickening involving the  parapharyngeal soft tissues as well as prominence of the epiglottis,  similar to the PET examination of 06/26/2018. This may represent  radiation related changes. Residual or recurrent tumor cannot be  excluded. Further evaluation could be performed with direct  visualization and a repeat PET examination.     2. The CT examination of the chest demonstrates multiple nodular and  cavitary masses bilaterally as described in detail above larger in size  as compared to the PET examination of 06/26/2018 consistent with  progressive disease. There is no evidence of effusion. There is an area  of consolidation involving the lingula which is new. I cannot exclude  postobstructive  atelectasis. This can also be assessed with a PET  examination.    ASSESSMENT:  This is a 67 y.o. male with:  1.  Stage IV a (T3, N2 a, M0) squamous cell carcinoma of the hypopharynx with metastatic lymphadenopathy to the right neck.Treated with combined radiation and cisplatin and Taxol chemotherapy.  Chemotherapy was completed 9/15/2017 and radiation completed  on 10/9/2017.  The patient had an excellent response but unfortunately as noted above he does have persistent squamous cell carcinoma in the right neck node and underwent right neck dissection 5/17/2018.      2.  Comorbidities including COPD.  3.  PEG tube removed by Dr. Valenzuela 11/30/2017.  4.  Development of metastatic disease to the lungs noted on PET scan 6/26/2018.  5.  Patient is now receiving palliative immunotherapy with Opdivo and is ready for cycle #5 in the office today.  As noted above, CT scans after 4 cycles showed some enlargement of the pulmonary nodules but no significant new sites of disease therefore we feel that this may represent pseudo-progression.    PLAN:  1.  We discussed the results of the scans with the patient.  2.  We will proceed with cycle #5 Opdivo treatment at same dose of 240 mg IV today.  3.  Lab and Opdivo treatment in 2 weeks.  4.  MD visit with lab and Opdivo in 4 weeks  5.  We most likely will be repeating imaging studies with CT scan of the chest and neck after 8 cycles of Opdivo.                Aakash Garcia MD

## 2018-09-25 ENCOUNTER — INFUSION (OUTPATIENT)
Dept: ONCOLOGY | Facility: HOSPITAL | Age: 68
End: 2018-09-25

## 2018-09-25 VITALS
SYSTOLIC BLOOD PRESSURE: 116 MMHG | HEART RATE: 91 BPM | OXYGEN SATURATION: 97 % | TEMPERATURE: 98.2 F | DIASTOLIC BLOOD PRESSURE: 70 MMHG

## 2018-09-25 DIAGNOSIS — Z79.899 ENCOUNTER FOR LONG-TERM (CURRENT) USE OF HIGH-RISK MEDICATION: ICD-10-CM

## 2018-09-25 DIAGNOSIS — C13.9 SQUAMOUS CELL CARCINOMA OF HYPOPHARYNX (HCC): Primary | ICD-10-CM

## 2018-09-25 DIAGNOSIS — Z45.2 ENCOUNTER FOR FITTING AND ADJUSTMENT OF VASCULAR CATHETER: ICD-10-CM

## 2018-09-25 DIAGNOSIS — IMO0002 SQUAMOUS CELL CARCINOMA: ICD-10-CM

## 2018-09-25 DIAGNOSIS — C14.0 THROAT CANCER (HCC): ICD-10-CM

## 2018-09-25 LAB
ALBUMIN SERPL-MCNC: 3.7 G/DL (ref 3.5–5.2)
ALBUMIN/GLOB SERPL: 1.3 G/DL
ALP SERPL-CCNC: 85 U/L (ref 39–117)
ALT SERPL W P-5'-P-CCNC: 7 U/L (ref 1–41)
ANION GAP SERPL CALCULATED.3IONS-SCNC: 12.1 MMOL/L
AST SERPL-CCNC: 17 U/L (ref 1–40)
BASOPHILS # BLD AUTO: 0.04 10*3/MM3 (ref 0–0.2)
BASOPHILS NFR BLD AUTO: 0.5 % (ref 0–1.5)
BILIRUB SERPL-MCNC: 0.4 MG/DL (ref 0.1–1.2)
BUN BLD-MCNC: 13 MG/DL (ref 8–23)
BUN/CREAT SERPL: 18.3 (ref 7–25)
CALCIUM SPEC-SCNC: 9.5 MG/DL (ref 8.6–10.5)
CHLORIDE SERPL-SCNC: 104 MMOL/L (ref 98–107)
CO2 SERPL-SCNC: 25.9 MMOL/L (ref 22–29)
CREAT BLD-MCNC: 0.71 MG/DL (ref 0.76–1.27)
DEPRECATED RDW RBC AUTO: 53.6 FL (ref 37–54)
EOSINOPHIL # BLD AUTO: 0.09 10*3/MM3 (ref 0–0.7)
EOSINOPHIL NFR BLD AUTO: 1.1 % (ref 0.3–6.2)
ERYTHROCYTE [DISTWIDTH] IN BLOOD BY AUTOMATED COUNT: 13.1 % (ref 11.5–14.5)
GFR SERPL CREATININE-BSD FRML MDRD: 110 ML/MIN/1.73
GLOBULIN UR ELPH-MCNC: 2.9 GM/DL
GLUCOSE BLD-MCNC: 145 MG/DL (ref 65–99)
HCT VFR BLD AUTO: 32.3 % (ref 40.4–52.2)
HGB BLD-MCNC: 11.2 G/DL (ref 13.7–17.6)
IMM GRANULOCYTES # BLD: 0.05 10*3/MM3 (ref 0–0.03)
IMM GRANULOCYTES NFR BLD: 0.6 % (ref 0–0.5)
LYMPHOCYTES # BLD AUTO: 0.71 10*3/MM3 (ref 0.9–4.8)
LYMPHOCYTES NFR BLD AUTO: 8.6 % (ref 19.6–45.3)
MCH RBC QN AUTO: 39.4 PG (ref 27–32.7)
MCHC RBC AUTO-ENTMCNC: 34.7 G/DL (ref 32.6–36.4)
MCV RBC AUTO: 113.7 FL (ref 79.8–96.2)
MONOCYTES # BLD AUTO: 0.73 10*3/MM3 (ref 0.2–1.2)
MONOCYTES NFR BLD AUTO: 8.8 % (ref 5–12)
NEUTROPHILS # BLD AUTO: 6.67 10*3/MM3 (ref 1.9–8.1)
NEUTROPHILS NFR BLD AUTO: 80.4 % (ref 42.7–76)
NRBC BLD MANUAL-RTO: 0 /100 WBC (ref 0–0)
PLAT MORPH BLD: NORMAL
PLATELET # BLD AUTO: 236 10*3/MM3 (ref 140–500)
PMV BLD AUTO: 9.7 FL (ref 6–12)
POTASSIUM BLD-SCNC: 4.5 MMOL/L (ref 3.5–5.2)
PROT SERPL-MCNC: 6.6 G/DL (ref 6–8.5)
RBC # BLD AUTO: 2.84 10*6/MM3 (ref 4.6–6)
SODIUM BLD-SCNC: 142 MMOL/L (ref 136–145)
SPHEROCYTES BLD QL SMEAR: NORMAL
STOMATOCYTES BLD QL SMEAR: NORMAL
WBC MORPH BLD: NORMAL
WBC NRBC COR # BLD: 8.29 10*3/MM3 (ref 4.5–10.7)

## 2018-09-25 PROCEDURE — 25010000002 HEPARIN FLUSH (PORCINE) 100 UNIT/ML SOLUTION: Performed by: INTERNAL MEDICINE

## 2018-09-25 PROCEDURE — 80053 COMPREHEN METABOLIC PANEL: CPT | Performed by: INTERNAL MEDICINE

## 2018-09-25 PROCEDURE — 85025 COMPLETE CBC W/AUTO DIFF WBC: CPT | Performed by: INTERNAL MEDICINE

## 2018-09-25 PROCEDURE — 85007 BL SMEAR W/DIFF WBC COUNT: CPT | Performed by: INTERNAL MEDICINE

## 2018-09-25 PROCEDURE — 96413 CHEMO IV INFUSION 1 HR: CPT | Performed by: INTERNAL MEDICINE

## 2018-09-25 PROCEDURE — 25010000002 NIVOLUMAB 40 MG/4ML SOLUTION 24 ML VIAL: Performed by: INTERNAL MEDICINE

## 2018-09-25 RX ORDER — SODIUM CHLORIDE 0.9 % (FLUSH) 0.9 %
10 SYRINGE (ML) INJECTION AS NEEDED
Status: CANCELLED | OUTPATIENT
Start: 2018-09-25

## 2018-09-25 RX ORDER — SODIUM CHLORIDE 0.9 % (FLUSH) 0.9 %
10 SYRINGE (ML) INJECTION AS NEEDED
Status: DISCONTINUED | OUTPATIENT
Start: 2018-09-25 | End: 2018-09-25 | Stop reason: HOSPADM

## 2018-09-25 RX ORDER — SODIUM CHLORIDE 9 MG/ML
250 INJECTION, SOLUTION INTRAVENOUS ONCE
Status: DISCONTINUED | OUTPATIENT
Start: 2018-09-25 | End: 2018-09-25 | Stop reason: HOSPADM

## 2018-09-25 RX ADMIN — SODIUM CHLORIDE 240 MG: 900 INJECTION, SOLUTION INTRAVENOUS at 11:23

## 2018-09-25 RX ADMIN — Medication 10 ML: at 11:55

## 2018-09-25 RX ADMIN — SODIUM CHLORIDE, PRESERVATIVE FREE 500 UNITS: 5 INJECTION INTRAVENOUS at 11:55

## 2018-10-01 RX ORDER — GABAPENTIN 800 MG/1
TABLET ORAL
Qty: 120 TABLET | Refills: 4 | Status: SHIPPED | OUTPATIENT
Start: 2018-10-01

## 2018-10-09 ENCOUNTER — OFFICE VISIT (OUTPATIENT)
Dept: ONCOLOGY | Facility: CLINIC | Age: 68
End: 2018-10-09

## 2018-10-09 ENCOUNTER — INFUSION (OUTPATIENT)
Dept: ONCOLOGY | Facility: HOSPITAL | Age: 68
End: 2018-10-09

## 2018-10-09 VITALS
OXYGEN SATURATION: 98 % | SYSTOLIC BLOOD PRESSURE: 111 MMHG | WEIGHT: 125.1 LBS | HEART RATE: 104 BPM | TEMPERATURE: 98.7 F | DIASTOLIC BLOOD PRESSURE: 71 MMHG | BODY MASS INDEX: 18.96 KG/M2 | RESPIRATION RATE: 16 BRPM | HEIGHT: 68 IN

## 2018-10-09 DIAGNOSIS — Z79.899 ENCOUNTER FOR LONG-TERM (CURRENT) USE OF HIGH-RISK MEDICATION: ICD-10-CM

## 2018-10-09 DIAGNOSIS — C13.9 SQUAMOUS CELL CARCINOMA OF HYPOPHARYNX (HCC): Primary | ICD-10-CM

## 2018-10-09 DIAGNOSIS — IMO0002 SQUAMOUS CELL CARCINOMA: ICD-10-CM

## 2018-10-09 DIAGNOSIS — C78.00 MALIGNANT NEOPLASM METASTATIC TO LUNG, UNSPECIFIED LATERALITY (HCC): ICD-10-CM

## 2018-10-09 DIAGNOSIS — C14.0 THROAT CANCER (HCC): ICD-10-CM

## 2018-10-09 DIAGNOSIS — Z45.2 ENCOUNTER FOR FITTING AND ADJUSTMENT OF VASCULAR CATHETER: ICD-10-CM

## 2018-10-09 LAB
ALBUMIN SERPL-MCNC: 3.8 G/DL (ref 3.5–5.2)
ALBUMIN/GLOB SERPL: 1.2 G/DL
ALP SERPL-CCNC: 90 U/L (ref 39–117)
ALT SERPL W P-5'-P-CCNC: 7 U/L (ref 1–41)
ANION GAP SERPL CALCULATED.3IONS-SCNC: 12.2 MMOL/L
AST SERPL-CCNC: 16 U/L (ref 1–40)
BASOPHILS # BLD AUTO: 0.05 10*3/MM3 (ref 0–0.2)
BASOPHILS NFR BLD AUTO: 0.6 % (ref 0–1.5)
BILIRUB SERPL-MCNC: 0.4 MG/DL (ref 0.1–1.2)
BUN BLD-MCNC: 12 MG/DL (ref 8–23)
BUN/CREAT SERPL: 17.9 (ref 7–25)
CALCIUM SPEC-SCNC: 9.4 MG/DL (ref 8.6–10.5)
CHLORIDE SERPL-SCNC: 104 MMOL/L (ref 98–107)
CO2 SERPL-SCNC: 25.8 MMOL/L (ref 22–29)
CREAT BLD-MCNC: 0.67 MG/DL (ref 0.76–1.27)
DEPRECATED RDW RBC AUTO: 54.7 FL (ref 37–54)
EOSINOPHIL # BLD AUTO: 0.15 10*3/MM3 (ref 0–0.7)
EOSINOPHIL NFR BLD AUTO: 1.7 % (ref 0.3–6.2)
ERYTHROCYTE [DISTWIDTH] IN BLOOD BY AUTOMATED COUNT: 13.1 % (ref 11.5–14.5)
GFR SERPL CREATININE-BSD FRML MDRD: 118 ML/MIN/1.73
GLOBULIN UR ELPH-MCNC: 3.2 GM/DL
GLUCOSE BLD-MCNC: 115 MG/DL (ref 65–99)
HCT VFR BLD AUTO: 34.3 % (ref 40.4–52.2)
HGB BLD-MCNC: 12 G/DL (ref 13.7–17.6)
IMM GRANULOCYTES # BLD: 0.03 10*3/MM3 (ref 0–0.03)
IMM GRANULOCYTES NFR BLD: 0.3 % (ref 0–0.5)
LYMPHOCYTES # BLD AUTO: 0.83 10*3/MM3 (ref 0.9–4.8)
LYMPHOCYTES NFR BLD AUTO: 9.3 % (ref 19.6–45.3)
MCH RBC QN AUTO: 39.6 PG (ref 27–32.7)
MCHC RBC AUTO-ENTMCNC: 35 G/DL (ref 32.6–36.4)
MCV RBC AUTO: 113.2 FL (ref 79.8–96.2)
MONOCYTES # BLD AUTO: 0.82 10*3/MM3 (ref 0.2–1.2)
MONOCYTES NFR BLD AUTO: 9.2 % (ref 5–12)
NEUTROPHILS # BLD AUTO: 7.03 10*3/MM3 (ref 1.9–8.1)
NEUTROPHILS NFR BLD AUTO: 78.9 % (ref 42.7–76)
NRBC BLD MANUAL-RTO: 0 /100 WBC (ref 0–0)
PLAT MORPH BLD: NORMAL
PLATELET # BLD AUTO: 231 10*3/MM3 (ref 140–500)
PMV BLD AUTO: 8.9 FL (ref 6–12)
POTASSIUM BLD-SCNC: 4.5 MMOL/L (ref 3.5–5.2)
PROT SERPL-MCNC: 7 G/DL (ref 6–8.5)
RBC # BLD AUTO: 3.03 10*6/MM3 (ref 4.6–6)
SODIUM BLD-SCNC: 142 MMOL/L (ref 136–145)
SPHEROCYTES BLD QL SMEAR: NORMAL
STOMATOCYTES BLD QL SMEAR: NORMAL
T4 FREE SERPL-MCNC: 0.79 NG/DL (ref 0.93–1.7)
TSH SERPL DL<=0.05 MIU/L-ACNC: 2.35 MIU/ML (ref 0.27–4.2)
WBC MORPH BLD: NORMAL
WBC NRBC COR # BLD: 8.91 10*3/MM3 (ref 4.5–10.7)

## 2018-10-09 PROCEDURE — 80053 COMPREHEN METABOLIC PANEL: CPT | Performed by: INTERNAL MEDICINE

## 2018-10-09 PROCEDURE — 85025 COMPLETE CBC W/AUTO DIFF WBC: CPT | Performed by: INTERNAL MEDICINE

## 2018-10-09 PROCEDURE — 85007 BL SMEAR W/DIFF WBC COUNT: CPT | Performed by: INTERNAL MEDICINE

## 2018-10-09 PROCEDURE — 84439 ASSAY OF FREE THYROXINE: CPT | Performed by: INTERNAL MEDICINE

## 2018-10-09 PROCEDURE — 25010000002 NIVOLUMAB 40 MG/4ML SOLUTION 24 ML VIAL: Performed by: INTERNAL MEDICINE

## 2018-10-09 PROCEDURE — 96413 CHEMO IV INFUSION 1 HR: CPT | Performed by: INTERNAL MEDICINE

## 2018-10-09 PROCEDURE — 84443 ASSAY THYROID STIM HORMONE: CPT | Performed by: INTERNAL MEDICINE

## 2018-10-09 PROCEDURE — 99214 OFFICE O/P EST MOD 30 MIN: CPT | Performed by: INTERNAL MEDICINE

## 2018-10-09 RX ORDER — SODIUM CHLORIDE 9 MG/ML
250 INJECTION, SOLUTION INTRAVENOUS ONCE
Status: CANCELLED | OUTPATIENT
Start: 2018-10-23

## 2018-10-09 RX ORDER — SODIUM CHLORIDE 9 MG/ML
250 INJECTION, SOLUTION INTRAVENOUS ONCE
Status: COMPLETED | OUTPATIENT
Start: 2018-10-09 | End: 2018-10-09

## 2018-10-09 RX ORDER — SODIUM CHLORIDE 0.9 % (FLUSH) 0.9 %
10 SYRINGE (ML) INJECTION AS NEEDED
Status: CANCELLED | OUTPATIENT
Start: 2018-10-09

## 2018-10-09 RX ORDER — SODIUM CHLORIDE 0.9 % (FLUSH) 0.9 %
10 SYRINGE (ML) INJECTION AS NEEDED
Status: DISCONTINUED | OUTPATIENT
Start: 2018-10-09 | End: 2018-10-09 | Stop reason: HOSPADM

## 2018-10-09 RX ORDER — SODIUM CHLORIDE 9 MG/ML
250 INJECTION, SOLUTION INTRAVENOUS ONCE
Status: CANCELLED | OUTPATIENT
Start: 2018-11-09

## 2018-10-09 RX ADMIN — SODIUM CHLORIDE, PRESERVATIVE FREE 500 UNITS: 5 INJECTION INTRAVENOUS at 11:00

## 2018-10-09 RX ADMIN — SODIUM CHLORIDE 250 ML: 900 INJECTION, SOLUTION INTRAVENOUS at 10:24

## 2018-10-09 RX ADMIN — SODIUM CHLORIDE 240 MG: 900 INJECTION, SOLUTION INTRAVENOUS at 10:24

## 2018-10-09 RX ADMIN — Medication 10 ML: at 10:56

## 2018-10-09 NOTE — PROGRESS NOTES
REASON FOR FOLLOW-UP:    1. 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  2.  Combined radiation and chemotherapy with cisplatin and Taxol initiated on 8/10/2017 concurrent with radiation.    3.  Long smoking history with COPD.  4.  Mediport and PEG tube placed by Dr. Valenzuela on 7/31/2017.  Subsequent PEG tube infection requiring antibiotics.  5.  Chemotherapy completed 9/15/2017 and radiation therapy completed 10/9/2017.  6.  Right neck dissection by Dr. Clinton Styles 5/17/2018 for persistent disease in the right neck.  7.  PET scan from 6/26/2018 showing multiple hypermetabolic lung lesions consistent with metastatic disease.  8.  Plans to initiate Opdivo therapy as palliative treatment of metastatic squamous cell carcinoma of the head and neck.    HISTORY OF PRESENT ILLNESS:  King Parson is a 68 y.o. male with the above-mentioned history here today for follow-up of his squamous cell carcinoma of the head and neck treated with combined chemotherapy and radiation.  The patient completed chemotherapy on 9/15/2017.  He received 6 weekly doses of Taxol/ cisplatin.  The seventh dose was held due to significant moist desquamation of the skin as well as worsening blood counts. He was able to complete his radiation therapy on 10/9/2017.     PET scan from November 2017 at the end of therapy showed an excellent response.      He had a persistent palpable node on the right which was biopsied by Dr. Styles and found to be positive for residual squamous cell carcinoma.  He subsequently underwent a right neck dissection performed by Dr. Styles on 5/17/2018.  There was metastatic carcinoma in 3 out of 14 lymph nodes and squamous cell carcinoma noted in fibroadipose tissue measuring 2.7 cm.  Carcinoma was present at the soft tissue margin.    He had a follow-up PET scan which was performed on 6/26/2018.  Unfortunately the PET scan showed multiple  hypermetabolic lung nodules consistent with metastatic disease.  We recommended proceeding with palliative immunotherapy in the form of Opdivo.  He received his first dose of Opdivo on 7/13/2018.      CT scans of the neck and chest were performed 9/4/2018 after cycle #4 showing some increase in the size of the pulmonary nodules but no obvious new metastases identified therefore we felt this may be pseudo-progression and continued Opdivo with plans to repeat scans again after 8 cycles.    He returns today for reevaluation prior to cycle #7 of Opdivo.  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  This diagnosis was made by FNA of the neck node on 6/23/2017.  He is undergone staging CT scans on 6/6/2017 and a PET scan on 7/6/2017.  His disease appears to be clinical stage TIII N2 a stage IV a squamous carcinoma of the hypopharynx.     He was originally referred to the [facility] and was seen by Dr. Moreno Camp of radiation oncology on 7/14/2017.  The patient lives in [place] and has some transportation difficulties.  He has a sister who works in the lab at [facility] [employer] [facility] and for this reason they have decided to receive their treatment in the [facility] system.     He was seen initially by Dr. Garcia in the company of 2 of his sisters.  We recommended combined chemotherapy and radiation.  He will be seeing Dr. Grayson of the [facility] to discuss the radiation portion of his treatment.  We plan to deliver weekly chemotherapy during radiation with combination of cisplatin and Taxol.  Taxol will be delivered day 1 and cisplatin be delivered along with hydration on day 2 each week.    He saw Dr. Grayson who was in agreement with concurrent chemotherapy and radiation.    Mediport and PEG tube placed by Dr. Valenzuela on  7/31/2017.  Subsequent PEG tube infection requiring antibiotics.    He saw nutrition.  He had a chemotherapy education session.    Cycle #1 of therapy was initiated on 8/10/2017.    Patient completed his chemotherapy (6 cycles) on 9/14/2017    Radiation therapy completed 10/9/2017.    PET scan at completion of treatment in November 2017 showed excellent response.    Surveillance CT scan of the neck 2/6/2018 showed no evidence of disease progression.    Needle biopsy for persistent right neck node in March 2018 was positive for squamous cell carcinoma.  Dr. Styles performed right neck dissection surgery  5/17/2018.  3 out of 14 lymph nodes were positive for squamous cell carcinoma and squamous cell carcinoma was found in fibroadipose tissue measuring 2.7 cm with positive soft tissue margin.    PET scan 6/26/2018 showed multiple hypermetabolic lung nodules consistent with metastatic disease.  We discussed starting palliative immunotherapy in the form of Opdivo.  CT scans of the neck and chest were performed 9/4/2018 after cycle #4. There was some increase in the size of the pulmonary nodules but no obvious new metastases identified therefore we felt this may represent pseudo-progression and continued Opdivo with repeat scans again after 8 cycles.  .      PAST MEDICAL, SURGICAL, FAMILY, AND SOCIAL HISTORIES were reviewed with the patient and in the electronic medical record, and were updated if indicated.    ALLERGIES:  Allergies   Allergen Reactions   • Codeine      He dont like to take it because it makes him feel strange.        MEDICATIONS:  The medication list has been reviewed with the patient by the medical assistant, and the list has been updated in the electronic medical record, which I reviewed.  Medication dosages and frequencies were confirmed to be accurate.    I have reviewed the patient's medical history in detail and updated the computerized patient record.    Review of Systems   Constitutional: Positive  "for fatigue. Negative for appetite change, chills, fever and unexpected weight change.   HENT:   Negative for nosebleeds.         See HPI     Respiratory: Negative for cough and shortness of breath.    Cardiovascular: Negative for chest pain and leg swelling.   Gastrointestinal: Negative for abdominal pain, constipation, diarrhea, nausea and vomiting.   Endocrine: Negative for hot flashes.   Genitourinary: Negative for difficulty urinating.    Musculoskeletal: Negative for arthralgias and myalgias.   Skin: Negative for rash and wound.   Neurological: Negative for dizziness and extremity weakness.   Hematological: Negative for adenopathy. Does not bruise/bleed easily.   Psychiatric/Behavioral: Negative for sleep disturbance.       Vitals:    10/09/18 0919   BP: 111/71   Pulse: 104   Resp: 16   Temp: 98.7 °F (37.1 °C)   TempSrc: Oral   SpO2: 98%   Weight: 56.7 kg (125 lb 1.6 oz)   Height: 171.5 cm (67.52\")   PainSc: 6  Comment: back pain       Physical Exam   Constitutional: He is oriented to person, place, and time. He appears well-developed and well-nourished.   HENT:   Head: Normocephalic and atraumatic.   Nose: Nose normal.   Mouth/Throat: Mucous membranes are normal.   Edentulous   Eyes: Pupils are equal, round, and reactive to light.   Neck: Normal range of motion. Neck supple.   Palpable, firm 2 cm node in the jugulodigastric area of the right neck.     Cardiovascular: Normal rate, regular rhythm and normal heart sounds.    Pulmonary/Chest: Effort normal and breath sounds normal. No respiratory distress. He has no wheezes. He has no rhonchi. He has no rales.   Mediport anterior chest wall. No evidence of infection or thrombosis.   Abdominal: Soft. Normal appearance and bowel sounds are normal. He exhibits no distension. There is no hepatosplenomegaly. There is no tenderness.   Musculoskeletal: Normal range of motion. He exhibits no edema.   Neurological: He is alert and oriented to person, place, and time. "   Skin: Skin is warm and dry.   Psychiatric: He has a normal mood and affect. His behavior is normal.   Nursing note and vitals reviewed.    DIAGNOSTIC DATA:  Lab Results   Component Value Date    WBC 8.91 10/09/2018    HGB 12.0 (L) 10/09/2018    HCT 34.3 (L) 10/09/2018    .2 (H) 10/09/2018     10/09/2018     Lab Results   Component Value Date    NEUTROABS 7.03 10/09/2018       Lab Results   Component Value Date    GLUCOSE 145 (H) 09/25/2018    BUN 13 09/25/2018    CREATININE 0.71 (L) 09/25/2018    EGFRIFNONA 110 09/25/2018    BCR 18.3 09/25/2018    K 4.5 09/25/2018    CO2 25.9 09/25/2018    CALCIUM 9.5 09/25/2018    ALBUMIN 3.70 09/25/2018    AST 17 09/25/2018    ALT 7 09/25/2018         ASSESSMENT:  This is a 68 y.o. male with:  1.  Stage IV a (T3, N2 a, M0) squamous cell carcinoma of the hypopharynx with metastatic lymphadenopathy to the right neck.Treated with combined radiation and cisplatin and Taxol chemotherapy.  Chemotherapy was completed 9/15/2017 and radiation completed  on 10/9/2017.  The patient had an excellent response but unfortunately as noted above he does have persistent squamous cell carcinoma in the right neck node and underwent right neck dissection 5/17/2018.      2.  Comorbidities including COPD.  3.  PEG tube removed by Dr. Valenzuela 11/30/2017.  4.  Development of metastatic disease to the lungs noted on PET scan 6/26/2018.  5.  Patient is now receiving palliative immunotherapy with Opdivo and is ready for cycle #5 in the office today.  As noted above, CT scans after 4 cycles showed some enlargement of the pulmonary nodules but no significant new sites of disease therefore we feel that this may represent pseudo-progression.    PLAN:  1.  We discussed the results of the scans with the patient.  2.  We will proceed with cycle #7 Opdivo treatment at same dose of 240 mg IV today.  3.  Lab and Opdivo treatment in 2 weeks.  4.  MD visit with lab and Opdivo in 4 weeks  5.  We will be  repeating imaging studies with CT scan of the chest and neck in 3 weeks ( after 8 cycles of Opdivo.)                Aakash Garcia MD

## 2018-10-23 ENCOUNTER — APPOINTMENT (OUTPATIENT)
Dept: ONCOLOGY | Facility: HOSPITAL | Age: 68
End: 2018-10-23

## 2018-10-23 ENCOUNTER — APPOINTMENT (OUTPATIENT)
Dept: OTHER | Facility: HOSPITAL | Age: 68
End: 2018-10-23

## 2018-10-23 ENCOUNTER — INFUSION (OUTPATIENT)
Dept: ONCOLOGY | Facility: HOSPITAL | Age: 68
End: 2018-10-23

## 2018-10-23 VITALS
BODY MASS INDEX: 20.2 KG/M2 | SYSTOLIC BLOOD PRESSURE: 125 MMHG | DIASTOLIC BLOOD PRESSURE: 70 MMHG | TEMPERATURE: 98.5 F | OXYGEN SATURATION: 97 % | WEIGHT: 131 LBS | HEART RATE: 90 BPM

## 2018-10-23 DIAGNOSIS — Z79.899 ENCOUNTER FOR LONG-TERM (CURRENT) USE OF HIGH-RISK MEDICATION: ICD-10-CM

## 2018-10-23 DIAGNOSIS — C13.9 SQUAMOUS CELL CARCINOMA OF HYPOPHARYNX (HCC): Primary | ICD-10-CM

## 2018-10-23 DIAGNOSIS — C14.0 THROAT CANCER (HCC): ICD-10-CM

## 2018-10-23 DIAGNOSIS — IMO0002 SQUAMOUS CELL CARCINOMA: ICD-10-CM

## 2018-10-23 DIAGNOSIS — Z45.2 ENCOUNTER FOR FITTING AND ADJUSTMENT OF VASCULAR CATHETER: ICD-10-CM

## 2018-10-23 LAB
ALBUMIN SERPL-MCNC: 3.3 G/DL (ref 3.5–5.2)
ALBUMIN/GLOB SERPL: 1.1 G/DL
ALP SERPL-CCNC: 76 U/L (ref 39–117)
ALT SERPL W P-5'-P-CCNC: 7 U/L (ref 1–41)
ANION GAP SERPL CALCULATED.3IONS-SCNC: 12.9 MMOL/L
AST SERPL-CCNC: 16 U/L (ref 1–40)
BASOPHILS # BLD AUTO: 0.04 10*3/MM3 (ref 0–0.2)
BASOPHILS NFR BLD AUTO: 0.5 % (ref 0–1.5)
BILIRUB SERPL-MCNC: 0.3 MG/DL (ref 0.1–1.2)
BUN BLD-MCNC: 15 MG/DL (ref 8–23)
BUN/CREAT SERPL: 23.8 (ref 7–25)
CALCIUM SPEC-SCNC: 8.9 MG/DL (ref 8.6–10.5)
CHLORIDE SERPL-SCNC: 105 MMOL/L (ref 98–107)
CO2 SERPL-SCNC: 24.1 MMOL/L (ref 22–29)
CREAT BLD-MCNC: 0.63 MG/DL (ref 0.76–1.27)
DEPRECATED RDW RBC AUTO: 55.6 FL (ref 37–54)
EOSINOPHIL # BLD AUTO: 0.13 10*3/MM3 (ref 0–0.7)
EOSINOPHIL NFR BLD AUTO: 1.5 % (ref 0.3–6.2)
ERYTHROCYTE [DISTWIDTH] IN BLOOD BY AUTOMATED COUNT: 13 % (ref 11.5–14.5)
GFR SERPL CREATININE-BSD FRML MDRD: 127 ML/MIN/1.73
GLOBULIN UR ELPH-MCNC: 3 GM/DL
GLUCOSE BLD-MCNC: 109 MG/DL (ref 65–99)
HCT VFR BLD AUTO: 32.7 % (ref 40.4–52.2)
HGB BLD-MCNC: 11 G/DL (ref 13.7–17.6)
IMM GRANULOCYTES # BLD: 0.06 10*3/MM3 (ref 0–0.03)
IMM GRANULOCYTES NFR BLD: 0.7 % (ref 0–0.5)
LYMPHOCYTES # BLD AUTO: 0.69 10*3/MM3 (ref 0.9–4.8)
LYMPHOCYTES NFR BLD AUTO: 7.9 % (ref 19.6–45.3)
MCH RBC QN AUTO: 38.9 PG (ref 27–32.7)
MCHC RBC AUTO-ENTMCNC: 33.6 G/DL (ref 32.6–36.4)
MCV RBC AUTO: 115.5 FL (ref 79.8–96.2)
MONOCYTES # BLD AUTO: 0.77 10*3/MM3 (ref 0.2–1.2)
MONOCYTES NFR BLD AUTO: 8.8 % (ref 5–12)
NEUTROPHILS # BLD AUTO: 7.06 10*3/MM3 (ref 1.9–8.1)
NEUTROPHILS NFR BLD AUTO: 80.6 % (ref 42.7–76)
NRBC BLD MANUAL-RTO: 0 /100 WBC (ref 0–0)
PLAT MORPH BLD: NORMAL
PLATELET # BLD AUTO: 217 10*3/MM3 (ref 140–500)
PMV BLD AUTO: 9.6 FL (ref 6–12)
POTASSIUM BLD-SCNC: 4.2 MMOL/L (ref 3.5–5.2)
PROT SERPL-MCNC: 6.3 G/DL (ref 6–8.5)
RBC # BLD AUTO: 2.83 10*6/MM3 (ref 4.6–6)
SODIUM BLD-SCNC: 142 MMOL/L (ref 136–145)
SPHEROCYTES BLD QL SMEAR: NORMAL
STOMATOCYTES BLD QL SMEAR: NORMAL
WBC MORPH BLD: NORMAL
WBC NRBC COR # BLD: 8.75 10*3/MM3 (ref 4.5–10.7)

## 2018-10-23 PROCEDURE — 96413 CHEMO IV INFUSION 1 HR: CPT | Performed by: INTERNAL MEDICINE

## 2018-10-23 PROCEDURE — 25010000002 NIVOLUMAB 40 MG/4ML SOLUTION 24 ML VIAL: Performed by: INTERNAL MEDICINE

## 2018-10-23 PROCEDURE — 80053 COMPREHEN METABOLIC PANEL: CPT | Performed by: INTERNAL MEDICINE

## 2018-10-23 PROCEDURE — 85007 BL SMEAR W/DIFF WBC COUNT: CPT | Performed by: INTERNAL MEDICINE

## 2018-10-23 PROCEDURE — 85025 COMPLETE CBC W/AUTO DIFF WBC: CPT | Performed by: INTERNAL MEDICINE

## 2018-10-23 RX ORDER — SODIUM CHLORIDE 0.9 % (FLUSH) 0.9 %
10 SYRINGE (ML) INJECTION AS NEEDED
Status: CANCELLED | OUTPATIENT
Start: 2018-10-23

## 2018-10-23 RX ORDER — SODIUM CHLORIDE 0.9 % (FLUSH) 0.9 %
10 SYRINGE (ML) INJECTION AS NEEDED
Status: DISCONTINUED | OUTPATIENT
Start: 2018-10-23 | End: 2018-10-23 | Stop reason: HOSPADM

## 2018-10-23 RX ORDER — SODIUM CHLORIDE 9 MG/ML
250 INJECTION, SOLUTION INTRAVENOUS ONCE
Status: DISCONTINUED | OUTPATIENT
Start: 2018-10-23 | End: 2018-10-23 | Stop reason: HOSPADM

## 2018-10-23 RX ADMIN — SODIUM CHLORIDE 240 MG: 900 INJECTION, SOLUTION INTRAVENOUS at 12:03

## 2018-10-23 RX ADMIN — SODIUM CHLORIDE, PRESERVATIVE FREE 500 UNITS: 5 INJECTION INTRAVENOUS at 12:34

## 2018-10-23 RX ADMIN — Medication 10 ML: at 12:33

## 2018-10-30 ENCOUNTER — HOSPITAL ENCOUNTER (OUTPATIENT)
Dept: CT IMAGING | Facility: HOSPITAL | Age: 68
Discharge: HOME | End: 2018-10-30
Attending: INTERNAL MEDICINE | Admitting: INTERNAL MEDICINE

## 2018-10-30 DIAGNOSIS — C78.00 MALIGNANT NEOPLASM METASTATIC TO LUNG, UNSPECIFIED LATERALITY (HCC): ICD-10-CM

## 2018-10-30 DIAGNOSIS — C13.9 SQUAMOUS CELL CARCINOMA OF HYPOPHARYNX (HCC): ICD-10-CM

## 2018-10-30 LAB — CREAT BLDA-MCNC: 0.8 MG/DL (ref 0.6–1.3)

## 2018-10-30 PROCEDURE — 70491 CT SOFT TISSUE NECK W/DYE: CPT

## 2018-10-30 PROCEDURE — 25010000002 IOPAMIDOL 61 % SOLUTION: Performed by: INTERNAL MEDICINE

## 2018-10-30 PROCEDURE — 82565 ASSAY OF CREATININE: CPT

## 2018-10-30 PROCEDURE — 71260 CT THORAX DX C+: CPT

## 2018-10-30 RX ADMIN — IOPAMIDOL 90 ML: 612 INJECTION, SOLUTION INTRAVENOUS at 10:39

## 2018-11-07 DIAGNOSIS — C13.9 SQUAMOUS CELL CARCINOMA OF HYPOPHARYNX (HCC): Primary | ICD-10-CM

## 2018-11-09 ENCOUNTER — INFUSION (OUTPATIENT)
Dept: ONCOLOGY | Facility: HOSPITAL | Age: 68
End: 2018-11-09

## 2018-11-09 ENCOUNTER — OFFICE VISIT (OUTPATIENT)
Dept: ONCOLOGY | Facility: CLINIC | Age: 68
End: 2018-11-09

## 2018-11-09 VITALS
DIASTOLIC BLOOD PRESSURE: 65 MMHG | SYSTOLIC BLOOD PRESSURE: 114 MMHG | HEART RATE: 100 BPM | HEIGHT: 68 IN | TEMPERATURE: 98.2 F | OXYGEN SATURATION: 99 % | BODY MASS INDEX: 19.7 KG/M2 | WEIGHT: 130 LBS | RESPIRATION RATE: 18 BRPM

## 2018-11-09 DIAGNOSIS — IMO0002 SQUAMOUS CELL CARCINOMA: ICD-10-CM

## 2018-11-09 DIAGNOSIS — C13.9 SQUAMOUS CELL CARCINOMA OF HYPOPHARYNX (HCC): ICD-10-CM

## 2018-11-09 DIAGNOSIS — C14.0 THROAT CANCER (HCC): ICD-10-CM

## 2018-11-09 DIAGNOSIS — Z79.899 ENCOUNTER FOR LONG-TERM (CURRENT) USE OF HIGH-RISK MEDICATION: ICD-10-CM

## 2018-11-09 DIAGNOSIS — Z45.2 ENCOUNTER FOR FITTING AND ADJUSTMENT OF VASCULAR CATHETER: ICD-10-CM

## 2018-11-09 DIAGNOSIS — Z79.899 ENCOUNTER FOR LONG-TERM (CURRENT) USE OF HIGH-RISK MEDICATION: Primary | ICD-10-CM

## 2018-11-09 LAB
ALBUMIN SERPL-MCNC: 3.4 G/DL (ref 3.5–5.2)
ALBUMIN/GLOB SERPL: 1 G/DL
ALP SERPL-CCNC: 91 U/L (ref 39–117)
ALT SERPL W P-5'-P-CCNC: 8 U/L (ref 1–41)
ANION GAP SERPL CALCULATED.3IONS-SCNC: 9.9 MMOL/L
AST SERPL-CCNC: 18 U/L (ref 1–40)
BASOPHILS # BLD AUTO: 0.05 10*3/MM3 (ref 0–0.2)
BASOPHILS NFR BLD AUTO: 0.5 % (ref 0–1.5)
BILIRUB SERPL-MCNC: 0.5 MG/DL (ref 0.1–1.2)
BUN BLD-MCNC: 11 MG/DL (ref 8–23)
BUN/CREAT SERPL: 14.9 (ref 7–25)
CALCIUM SPEC-SCNC: 9.2 MG/DL (ref 8.6–10.5)
CHLORIDE SERPL-SCNC: 103 MMOL/L (ref 98–107)
CO2 SERPL-SCNC: 28.1 MMOL/L (ref 22–29)
CREAT BLD-MCNC: 0.74 MG/DL (ref 0.76–1.27)
DEPRECATED RDW RBC AUTO: 52.8 FL (ref 37–54)
EOSINOPHIL # BLD AUTO: 0.09 10*3/MM3 (ref 0–0.7)
EOSINOPHIL NFR BLD AUTO: 0.9 % (ref 0.3–6.2)
ERYTHROCYTE [DISTWIDTH] IN BLOOD BY AUTOMATED COUNT: 12.4 % (ref 11.5–14.5)
GFR SERPL CREATININE-BSD FRML MDRD: 105 ML/MIN/1.73
GLOBULIN UR ELPH-MCNC: 3.3 GM/DL
GLUCOSE BLD-MCNC: 145 MG/DL (ref 65–99)
HCT VFR BLD AUTO: 34.3 % (ref 40.4–52.2)
HGB BLD-MCNC: 11.8 G/DL (ref 13.7–17.6)
IMM GRANULOCYTES # BLD: 0.06 10*3/MM3 (ref 0–0.03)
IMM GRANULOCYTES NFR BLD: 0.6 % (ref 0–0.5)
LYMPHOCYTES # BLD AUTO: 0.74 10*3/MM3 (ref 0.9–4.8)
LYMPHOCYTES NFR BLD AUTO: 7.6 % (ref 19.6–45.3)
MACROCYTES BLD QL SMEAR: NORMAL
MCH RBC QN AUTO: 39.2 PG (ref 27–32.7)
MCHC RBC AUTO-ENTMCNC: 34.4 G/DL (ref 32.6–36.4)
MCV RBC AUTO: 114 FL (ref 79.8–96.2)
MONOCYTES # BLD AUTO: 0.98 10*3/MM3 (ref 0.2–1.2)
MONOCYTES NFR BLD AUTO: 10 % (ref 5–12)
NEUTROPHILS # BLD AUTO: 7.84 10*3/MM3 (ref 1.9–8.1)
NEUTROPHILS NFR BLD AUTO: 80.4 % (ref 42.7–76)
NRBC BLD MANUAL-RTO: 0 /100 WBC (ref 0–0)
PLAT MORPH BLD: NORMAL
PLATELET # BLD AUTO: 235 10*3/MM3 (ref 140–500)
PMV BLD AUTO: 9.2 FL (ref 6–12)
POTASSIUM BLD-SCNC: 4.3 MMOL/L (ref 3.5–5.2)
PROT SERPL-MCNC: 6.7 G/DL (ref 6–8.5)
RBC # BLD AUTO: 3.01 10*6/MM3 (ref 4.6–6)
SODIUM BLD-SCNC: 141 MMOL/L (ref 136–145)
SPHEROCYTES BLD QL SMEAR: NORMAL
T4 FREE SERPL-MCNC: 0.68 NG/DL (ref 0.93–1.7)
TSH SERPL DL<=0.05 MIU/L-ACNC: 1.43 MIU/ML (ref 0.27–4.2)
WBC MORPH BLD: NORMAL
WBC NRBC COR # BLD: 9.76 10*3/MM3 (ref 4.5–10.7)

## 2018-11-09 PROCEDURE — 84443 ASSAY THYROID STIM HORMONE: CPT | Performed by: INTERNAL MEDICINE

## 2018-11-09 PROCEDURE — 84439 ASSAY OF FREE THYROXINE: CPT | Performed by: INTERNAL MEDICINE

## 2018-11-09 PROCEDURE — 85025 COMPLETE CBC W/AUTO DIFF WBC: CPT | Performed by: INTERNAL MEDICINE

## 2018-11-09 PROCEDURE — 80053 COMPREHEN METABOLIC PANEL: CPT | Performed by: INTERNAL MEDICINE

## 2018-11-09 PROCEDURE — 96413 CHEMO IV INFUSION 1 HR: CPT | Performed by: INTERNAL MEDICINE

## 2018-11-09 PROCEDURE — 85007 BL SMEAR W/DIFF WBC COUNT: CPT | Performed by: INTERNAL MEDICINE

## 2018-11-09 PROCEDURE — 99214 OFFICE O/P EST MOD 30 MIN: CPT | Performed by: INTERNAL MEDICINE

## 2018-11-09 PROCEDURE — 25010000002 NIVOLUMAB 40 MG/4ML SOLUTION 24 ML VIAL: Performed by: INTERNAL MEDICINE

## 2018-11-09 RX ORDER — SODIUM CHLORIDE 9 MG/ML
250 INJECTION, SOLUTION INTRAVENOUS ONCE
Status: CANCELLED | OUTPATIENT
Start: 2018-12-11

## 2018-11-09 RX ORDER — SODIUM CHLORIDE 9 MG/ML
250 INJECTION, SOLUTION INTRAVENOUS ONCE
Status: COMPLETED | OUTPATIENT
Start: 2018-11-09 | End: 2018-11-09

## 2018-11-09 RX ORDER — SODIUM CHLORIDE 0.9 % (FLUSH) 0.9 %
10 SYRINGE (ML) INJECTION AS NEEDED
Status: CANCELLED | OUTPATIENT
Start: 2018-11-09

## 2018-11-09 RX ORDER — SODIUM CHLORIDE 0.9 % (FLUSH) 0.9 %
10 SYRINGE (ML) INJECTION AS NEEDED
Status: DISCONTINUED | OUTPATIENT
Start: 2018-11-09 | End: 2018-11-09 | Stop reason: HOSPADM

## 2018-11-09 RX ORDER — SODIUM CHLORIDE 9 MG/ML
250 INJECTION, SOLUTION INTRAVENOUS ONCE
Status: CANCELLED | OUTPATIENT
Start: 2018-11-27

## 2018-11-09 RX ADMIN — SODIUM CHLORIDE, PRESERVATIVE FREE 500 UNITS: 5 INJECTION INTRAVENOUS at 11:15

## 2018-11-09 RX ADMIN — Medication 10 ML: at 11:15

## 2018-11-09 RX ADMIN — SODIUM CHLORIDE 250 ML: 900 INJECTION, SOLUTION INTRAVENOUS at 10:42

## 2018-11-09 RX ADMIN — SODIUM CHLORIDE 240 MG: 900 INJECTION, SOLUTION INTRAVENOUS at 10:42

## 2018-11-09 NOTE — PROGRESS NOTES
REASON FOR FOLLOW-UP:    1. 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  2.  Combined radiation and chemotherapy with cisplatin and Taxol initiated on 8/10/2017 concurrent with radiation.    3.  Long smoking history with COPD.  4.  Mediport and PEG tube placed by Dr. Valenzuela on 7/31/2017.  Subsequent PEG tube infection requiring antibiotics.  5.  Chemotherapy completed 9/15/2017 and radiation therapy completed 10/9/2017.  6.  Right neck dissection by Dr. Clinton Styles 5/17/2018 for persistent disease in the right neck.  7.  PET scan from 6/26/2018 showing multiple hypermetabolic lung lesions consistent with metastatic disease.  8.  Plans to initiate Opdivo therapy as palliative treatment of metastatic squamous cell carcinoma of the head and neck.    HISTORY OF PRESENT ILLNESS:  King Parson is a 68 y.o. male with the above-mentioned history here today for follow-up of his squamous cell carcinoma of the head and neck treated with combined chemotherapy and radiation.  The patient completed chemotherapy on 9/15/2017.  He received 6 weekly doses of Taxol/ cisplatin.  The seventh dose was held due to significant moist desquamation of the skin as well as worsening blood counts. He was able to complete his radiation therapy on 10/9/2017.     PET scan from November 2017 at the end of therapy showed an excellent response.      He had a persistent palpable node on the right which was biopsied by Dr. Styles and found to be positive for residual squamous cell carcinoma.  He subsequently underwent a right neck dissection performed by Dr. Styles on 5/17/2018.  There was metastatic carcinoma in 3 out of 14 lymph nodes and squamous cell carcinoma noted in fibroadipose tissue measuring 2.7 cm.  Carcinoma was present at the soft tissue margin.    He had a follow-up PET scan which was performed on 6/26/2018.  Unfortunately the PET scan showed multiple  hypermetabolic lung nodules consistent with metastatic disease.  We recommended proceeding with palliative immunotherapy in the form of Opdivo.  He received his first dose of Opdivo on 7/13/2018.      CT scans of the neck and chest were performed 9/4/2018 after cycle #4 showing some increase in the size of the pulmonary nodules but no obvious new metastases identified therefore we felt this may be pseudo-progression and continued Opdivo with plans to repeat scans again after 8 cycles.    He returns today for reevaluation prior to cycle #7 of Opdivo.  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  This diagnosis was made by FNA of the neck node on 6/23/2017.  He is undergone staging CT scans on 6/6/2017 and a PET scan on 7/6/2017.  His disease appears to be clinical stage TIII N2 a stage IV a squamous carcinoma of the hypopharynx.     He was originally referred to the [facility] and was seen by Dr. Moreno Camp of radiation oncology on 7/14/2017.  The patient lives in [place] and has some transportation difficulties.  He has a sister who works in the lab at [facility] and for this reason they have decided to receive their treatment in the [facility] system.     He was seen initially by Dr. Garcia in the company of 2 of his sisters.  We recommended combined chemotherapy and radiation.  He will be seeing Dr. Grayson of the [facility] to discuss the radiation portion of his treatment.  We plan to deliver weekly chemotherapy during radiation with combination of cisplatin and Taxol.  Taxol will be delivered day 1 and cisplatin be delivered along with hydration on day 2 each week.    He saw Dr. Grayson who was in agreement with concurrent chemotherapy and radiation.    Mediport and PEG tube placed by Dr. Valenzuela on  7/31/2017.  Subsequent PEG tube infection requiring antibiotics.    He saw nutrition.  He had a chemotherapy education session.    Cycle #1 of therapy was initiated on 8/10/2017.    Patient completed his chemotherapy (6 cycles) on 9/14/2017    Radiation therapy completed 10/9/2017.    PET scan at completion of treatment in November 2017 showed excellent response.    Surveillance CT scan of the neck 2/6/2018 showed no evidence of disease progression.    Needle biopsy for persistent right neck node in March 2018 was positive for squamous cell carcinoma.  Dr. Styles performed right neck dissection surgery  5/17/2018.  3 out of 14 lymph nodes were positive for squamous cell carcinoma and squamous cell carcinoma was found in fibroadipose tissue measuring 2.7 cm with positive soft tissue margin.    PET scan 6/26/2018 showed multiple hypermetabolic lung nodules consistent with metastatic disease.  We discussed starting palliative immunotherapy in the form of Opdivo.  CT scans of the neck and chest were performed 9/4/2018 after cycle #4. There was some increase in the size of the pulmonary nodules but no obvious new metastases identified therefore we felt this may represent pseudo-progression and continued Opdivo with repeat scans again after 8 cycles.  .      PAST MEDICAL, SURGICAL, FAMILY, AND SOCIAL HISTORIES were reviewed with the patient and in the electronic medical record, and were updated if indicated.    ALLERGIES:  Allergies   Allergen Reactions   • Codeine      He dont like to take it because it makes him feel strange.        MEDICATIONS:  The medication list has been reviewed with the patient by the medical assistant, and the list has been updated in the electronic medical record, which I reviewed.  Medication dosages and frequencies were confirmed to be accurate.    I have reviewed the patient's medical history in detail and updated the computerized patient record.    Review of Systems   Constitutional: Positive  "for fatigue. Negative for appetite change, chills, fever and unexpected weight change.   HENT:   Negative for nosebleeds.         See HPI     Respiratory: Negative for cough and shortness of breath.    Cardiovascular: Negative for chest pain and leg swelling.   Gastrointestinal: Negative for abdominal pain, constipation, diarrhea, nausea and vomiting.   Endocrine: Negative for hot flashes.   Genitourinary: Negative for difficulty urinating.    Musculoskeletal: Negative for arthralgias and myalgias.   Skin: Negative for rash and wound.   Neurological: Negative for dizziness and extremity weakness.   Hematological: Negative for adenopathy. Does not bruise/bleed easily.   Psychiatric/Behavioral: Negative for sleep disturbance.       Vitals:    11/09/18 0930   BP: 114/65   Pulse: 100   Resp: 18   Temp: 98.2 °F (36.8 °C)   TempSrc: Oral   SpO2: 99%   Weight: 59 kg (130 lb)   Height: 171.5 cm (67.52\")   PainSc:   8   PainLoc: Generalized       Physical Exam   Constitutional: He is oriented to person, place, and time. He appears well-developed and well-nourished.   HENT:   Head: Normocephalic and atraumatic.   Nose: Nose normal.   Mouth/Throat: Mucous membranes are normal.   Edentulous   Eyes: Pupils are equal, round, and reactive to light.   Neck: Normal range of motion. Neck supple.   Palpable, firm 2 cm node in the jugulodigastric area of the right neck.     Cardiovascular: Normal rate, regular rhythm and normal heart sounds.    Pulmonary/Chest: Effort normal and breath sounds normal. No respiratory distress. He has no wheezes. He has no rhonchi. He has no rales.   Mediport anterior chest wall. No evidence of infection or thrombosis.   Abdominal: Soft. Normal appearance and bowel sounds are normal. He exhibits no distension. There is no hepatosplenomegaly. There is no tenderness.   Musculoskeletal: Normal range of motion. He exhibits no edema.   Neurological: He is alert and oriented to person, place, and time.   Skin: " Skin is warm and dry.   Psychiatric: He has a normal mood and affect. His behavior is normal.   Nursing note and vitals reviewed.    DIAGNOSTIC DATA:  Lab Results   Component Value Date    WBC 9.76 11/09/2018    HGB 11.8 (L) 11/09/2018    HCT 34.3 (L) 11/09/2018    .0 (H) 11/09/2018     11/09/2018     Lab Results   Component Value Date    NEUTROABS 7.84 11/09/2018       Lab Results   Component Value Date    GLUCOSE 109 (H) 10/23/2018    BUN 15 10/23/2018    CREATININE 0.80 10/30/2018    EGFRIFNONA 127 10/23/2018    BCR 23.8 10/23/2018    K 4.2 10/23/2018    CO2 24.1 10/23/2018    CALCIUM 8.9 10/23/2018    ALBUMIN 3.30 (L) 10/23/2018    AST 16 10/23/2018    ALT 7 10/23/2018     CT SCAN NC 10/30/2018  IMPRESSION:  Ill-defined abnormal increased density in the fat within the  right carotid space compatible with post RT fibrosis showing no change  since the most recent CT neck dated 9/4/2018. There is no cervical  lymphadenopathy. Mild thickening of the epiglottis is unchanged. There  is no morphologic evidence of recurrent neoplasm within the pharynx.  Multiple bilateral pulmonary masses consistent with metastatic disease  also shows significant increase in size since the PET/CT study dated  6/26/2018. Since the more recent CT scan of the chest on 9/4/2018 most  are unchanged or slightly larger. Only a single mass in the right middle  lobe demonstrates slight decrease in size.      ASSESSMENT:  This is a 68 y.o. male with:  1.  Stage IV a (T3, N2 a, M0) squamous cell carcinoma of the hypopharynx with metastatic lymphadenopathy to the right neck.Treated with combined radiation and cisplatin and Taxol chemotherapy.  Chemotherapy was completed 9/15/2017 and radiation completed  on 10/9/2017.  The patient had an excellent response but unfortunately as noted above he does have persistent squamous cell carcinoma in the right neck node and underwent right neck dissection 5/17/2018.      2.  Comorbidities  including COPD.  3.  PEG tube removed by Dr. Valenzuela 11/30/2017.  4.  Development of metastatic disease to the lungs noted on PET scan 6/26/2018.  5.  Patient is now receiving palliative immunotherapy with Opdivo and is ready for cycle #9 in the office today.  As noted above, CT scans after 8 cycles showed some enlargement of the pulmonary nodules but no significant new sites of disease therefore we feel that continuing Opdivo is in the patient's best interest.    PLAN:  1.  We discussed the results of the scans with the patient.  2.  We will proceed with cycle #9 Opdivo treatment at same dose of 240 mg IV today.  3.  Lab and Opdivo treatment in 2 weeks.  4.  MD visit with lab and Opdivo in 4 weeks  5.  We will be repeating imaging studies with CT scan of the chest and neck after 14 cycles of Opdivo.                Aakash Garcia MD

## 2018-11-12 ENCOUNTER — TELEPHONE (OUTPATIENT)
Dept: ONCOLOGY | Facility: HOSPITAL | Age: 68
End: 2018-11-12

## 2018-11-12 RX ORDER — LEVOTHYROXINE SODIUM 0.07 MG/1
75 TABLET ORAL DAILY
Qty: 30 TABLET | Refills: 5 | Status: SHIPPED | OUTPATIENT
Start: 2018-11-12

## 2018-11-12 NOTE — TELEPHONE ENCOUNTER
----- Message from Aakash Garcia MD sent at 11/12/2018 10:30 AM EST -----  Please contact the patient and explained that he appears to be hypo-thyroid based on his labs.  Then please Escribe levothyroxin 75 µg daily to the patient's pharmacy #[facility] with 5 refills.    Thanks

## 2018-11-27 ENCOUNTER — APPOINTMENT (OUTPATIENT)
Dept: ONCOLOGY | Facility: HOSPITAL | Age: 68
End: 2018-11-27

## 2018-11-27 ENCOUNTER — INFUSION (OUTPATIENT)
Dept: ONCOLOGY | Facility: HOSPITAL | Age: 68
End: 2018-11-27

## 2018-11-27 VITALS
BODY MASS INDEX: 19.43 KG/M2 | RESPIRATION RATE: 16 BRPM | DIASTOLIC BLOOD PRESSURE: 77 MMHG | WEIGHT: 128.2 LBS | SYSTOLIC BLOOD PRESSURE: 117 MMHG | HEART RATE: 102 BPM | TEMPERATURE: 98.1 F | OXYGEN SATURATION: 98 % | HEIGHT: 68 IN

## 2018-11-27 DIAGNOSIS — IMO0002 SQUAMOUS CELL CARCINOMA: ICD-10-CM

## 2018-11-27 DIAGNOSIS — C14.0 THROAT CANCER (HCC): ICD-10-CM

## 2018-11-27 DIAGNOSIS — C13.9 SQUAMOUS CELL CARCINOMA OF HYPOPHARYNX (HCC): Primary | ICD-10-CM

## 2018-11-27 DIAGNOSIS — Z45.2 ENCOUNTER FOR FITTING AND ADJUSTMENT OF VASCULAR CATHETER: ICD-10-CM

## 2018-11-27 DIAGNOSIS — Z79.899 ENCOUNTER FOR LONG-TERM (CURRENT) USE OF HIGH-RISK MEDICATION: ICD-10-CM

## 2018-11-27 LAB
ALBUMIN SERPL-MCNC: 3.2 G/DL (ref 3.5–5.2)
ALBUMIN/GLOB SERPL: 1 G/DL
ALP SERPL-CCNC: 79 U/L (ref 39–117)
ALT SERPL W P-5'-P-CCNC: 7 U/L (ref 1–41)
ANION GAP SERPL CALCULATED.3IONS-SCNC: 9.8 MMOL/L
AST SERPL-CCNC: 13 U/L (ref 1–40)
BASOPHILS # BLD AUTO: 0.04 10*3/MM3 (ref 0–0.2)
BASOPHILS NFR BLD AUTO: 0.4 % (ref 0–1.5)
BILIRUB SERPL-MCNC: 0.3 MG/DL (ref 0.1–1.2)
BUN BLD-MCNC: 11 MG/DL (ref 8–23)
BUN/CREAT SERPL: 15.1 (ref 7–25)
CALCIUM SPEC-SCNC: 9.3 MG/DL (ref 8.6–10.5)
CHLORIDE SERPL-SCNC: 106 MMOL/L (ref 98–107)
CO2 SERPL-SCNC: 27.2 MMOL/L (ref 22–29)
CREAT BLD-MCNC: 0.73 MG/DL (ref 0.76–1.27)
DEPRECATED RDW RBC AUTO: 50.2 FL (ref 37–54)
EOSINOPHIL # BLD AUTO: 0.15 10*3/MM3 (ref 0–0.7)
EOSINOPHIL NFR BLD AUTO: 1.7 % (ref 0.3–6.2)
ERYTHROCYTE [DISTWIDTH] IN BLOOD BY AUTOMATED COUNT: 12 % (ref 11.5–14.5)
GFR SERPL CREATININE-BSD FRML MDRD: 107 ML/MIN/1.73
GLOBULIN UR ELPH-MCNC: 3.2 GM/DL
GLUCOSE BLD-MCNC: 108 MG/DL (ref 65–99)
HCT VFR BLD AUTO: 34.9 % (ref 40.4–52.2)
HGB BLD-MCNC: 12 G/DL (ref 13.7–17.6)
IMM GRANULOCYTES # BLD: 0.06 10*3/MM3 (ref 0–0.03)
IMM GRANULOCYTES NFR BLD: 0.7 % (ref 0–0.5)
LYMPHOCYTES # BLD AUTO: 0.77 10*3/MM3 (ref 0.9–4.8)
LYMPHOCYTES NFR BLD AUTO: 8.6 % (ref 19.6–45.3)
MCH RBC QN AUTO: 38.8 PG (ref 27–32.7)
MCHC RBC AUTO-ENTMCNC: 34.4 G/DL (ref 32.6–36.4)
MCV RBC AUTO: 112.9 FL (ref 79.8–96.2)
MONOCYTES # BLD AUTO: 0.96 10*3/MM3 (ref 0.2–1.2)
MONOCYTES NFR BLD AUTO: 10.7 % (ref 5–12)
NEUTROPHILS # BLD AUTO: 7.01 10*3/MM3 (ref 1.9–8.1)
NEUTROPHILS NFR BLD AUTO: 77.9 % (ref 42.7–76)
NRBC BLD MANUAL-RTO: 0 /100 WBC (ref 0–0)
PLAT MORPH BLD: NORMAL
PLATELET # BLD AUTO: 249 10*3/MM3 (ref 140–500)
PMV BLD AUTO: 8.9 FL (ref 6–12)
POTASSIUM BLD-SCNC: 4.5 MMOL/L (ref 3.5–5.2)
PROT SERPL-MCNC: 6.4 G/DL (ref 6–8.5)
RBC # BLD AUTO: 3.09 10*6/MM3 (ref 4.6–6)
SODIUM BLD-SCNC: 143 MMOL/L (ref 136–145)
SPHEROCYTES BLD QL SMEAR: NORMAL
WBC MORPH BLD: NORMAL
WBC NRBC COR # BLD: 8.99 10*3/MM3 (ref 4.5–10.7)

## 2018-11-27 PROCEDURE — 80053 COMPREHEN METABOLIC PANEL: CPT | Performed by: INTERNAL MEDICINE

## 2018-11-27 PROCEDURE — 85007 BL SMEAR W/DIFF WBC COUNT: CPT | Performed by: INTERNAL MEDICINE

## 2018-11-27 PROCEDURE — 85025 COMPLETE CBC W/AUTO DIFF WBC: CPT | Performed by: INTERNAL MEDICINE

## 2018-11-27 PROCEDURE — 25010000002 NIVOLUMAB 240 MG/24ML SOLUTION 24 ML VIAL: Performed by: INTERNAL MEDICINE

## 2018-11-27 PROCEDURE — 96413 CHEMO IV INFUSION 1 HR: CPT | Performed by: INTERNAL MEDICINE

## 2018-11-27 RX ORDER — SODIUM CHLORIDE 9 MG/ML
250 INJECTION, SOLUTION INTRAVENOUS ONCE
Status: COMPLETED | OUTPATIENT
Start: 2018-11-27 | End: 2018-11-27

## 2018-11-27 RX ORDER — SODIUM CHLORIDE 0.9 % (FLUSH) 0.9 %
10 SYRINGE (ML) INJECTION AS NEEDED
Status: CANCELLED | OUTPATIENT
Start: 2018-11-27

## 2018-11-27 RX ORDER — SODIUM CHLORIDE 0.9 % (FLUSH) 0.9 %
10 SYRINGE (ML) INJECTION AS NEEDED
Status: DISCONTINUED | OUTPATIENT
Start: 2018-11-27 | End: 2018-11-27 | Stop reason: HOSPADM

## 2018-11-27 RX ADMIN — SODIUM CHLORIDE 250 ML: 900 INJECTION, SOLUTION INTRAVENOUS at 10:47

## 2018-11-27 RX ADMIN — SODIUM CHLORIDE 240 MG: 900 INJECTION, SOLUTION INTRAVENOUS at 10:47

## 2018-11-27 RX ADMIN — SODIUM CHLORIDE, PRESERVATIVE FREE 10 ML: 5 INJECTION INTRAVENOUS at 11:15

## 2018-11-27 RX ADMIN — SODIUM CHLORIDE, PRESERVATIVE FREE 500 UNITS: 5 INJECTION INTRAVENOUS at 11:15

## 2018-12-11 ENCOUNTER — APPOINTMENT (OUTPATIENT)
Dept: ONCOLOGY | Facility: HOSPITAL | Age: 68
End: 2018-12-11

## 2018-12-11 ENCOUNTER — OFFICE VISIT (OUTPATIENT)
Dept: ONCOLOGY | Facility: CLINIC | Age: 68
End: 2018-12-11

## 2018-12-11 ENCOUNTER — INFUSION (OUTPATIENT)
Dept: ONCOLOGY | Facility: HOSPITAL | Age: 68
End: 2018-12-11

## 2018-12-11 VITALS
TEMPERATURE: 98.4 F | BODY MASS INDEX: 19.55 KG/M2 | HEART RATE: 98 BPM | OXYGEN SATURATION: 98 % | HEIGHT: 68 IN | SYSTOLIC BLOOD PRESSURE: 128 MMHG | RESPIRATION RATE: 16 BRPM | DIASTOLIC BLOOD PRESSURE: 85 MMHG | WEIGHT: 129 LBS

## 2018-12-11 DIAGNOSIS — Z79.899 ENCOUNTER FOR LONG-TERM (CURRENT) USE OF HIGH-RISK MEDICATION: ICD-10-CM

## 2018-12-11 DIAGNOSIS — C14.0 THROAT CANCER (HCC): ICD-10-CM

## 2018-12-11 DIAGNOSIS — C13.9 SQUAMOUS CELL CARCINOMA OF HYPOPHARYNX (HCC): ICD-10-CM

## 2018-12-11 DIAGNOSIS — IMO0002 SQUAMOUS CELL CARCINOMA: ICD-10-CM

## 2018-12-11 DIAGNOSIS — Z45.2 ENCOUNTER FOR FITTING AND ADJUSTMENT OF VASCULAR CATHETER: ICD-10-CM

## 2018-12-11 DIAGNOSIS — C13.9 SQUAMOUS CELL CARCINOMA OF HYPOPHARYNX (HCC): Primary | ICD-10-CM

## 2018-12-11 LAB
ALBUMIN SERPL-MCNC: 3.1 G/DL (ref 3.5–5.2)
ALBUMIN/GLOB SERPL: 1.1 G/DL
ALP SERPL-CCNC: 76 U/L (ref 39–117)
ALT SERPL W P-5'-P-CCNC: <5 U/L (ref 1–41)
ANION GAP SERPL CALCULATED.3IONS-SCNC: 8.7 MMOL/L
AST SERPL-CCNC: 12 U/L (ref 1–40)
BASOPHILS # BLD AUTO: 0.05 10*3/MM3 (ref 0–0.2)
BASOPHILS NFR BLD AUTO: 0.5 % (ref 0–1.5)
BILIRUB SERPL-MCNC: 0.4 MG/DL (ref 0.1–1.2)
BUN BLD-MCNC: 12 MG/DL (ref 8–23)
BUN/CREAT SERPL: 18.8 (ref 7–25)
CALCIUM SPEC-SCNC: 8.9 MG/DL (ref 8.6–10.5)
CHLORIDE SERPL-SCNC: 103 MMOL/L (ref 98–107)
CO2 SERPL-SCNC: 27.3 MMOL/L (ref 22–29)
CREAT BLD-MCNC: 0.64 MG/DL (ref 0.76–1.27)
DEPRECATED RDW RBC AUTO: 49.1 FL (ref 37–54)
EOSINOPHIL # BLD AUTO: 0.12 10*3/MM3 (ref 0–0.7)
EOSINOPHIL NFR BLD AUTO: 1.1 % (ref 0.3–6.2)
ERYTHROCYTE [DISTWIDTH] IN BLOOD BY AUTOMATED COUNT: 11.9 % (ref 11.5–14.5)
GFR SERPL CREATININE-BSD FRML MDRD: 124 ML/MIN/1.73
GLOBULIN UR ELPH-MCNC: 2.9 GM/DL
GLUCOSE BLD-MCNC: 82 MG/DL (ref 65–99)
HCT VFR BLD AUTO: 34.7 % (ref 40.4–52.2)
HGB BLD-MCNC: 11.9 G/DL (ref 13.7–17.6)
IMM GRANULOCYTES # BLD: 0.05 10*3/MM3 (ref 0–0.03)
IMM GRANULOCYTES NFR BLD: 0.5 % (ref 0–0.5)
LYMPHOCYTES # BLD AUTO: 1.03 10*3/MM3 (ref 0.9–4.8)
LYMPHOCYTES NFR BLD AUTO: 9.8 % (ref 19.6–45.3)
MCH RBC QN AUTO: 38.4 PG (ref 27–32.7)
MCHC RBC AUTO-ENTMCNC: 34.3 G/DL (ref 32.6–36.4)
MCV RBC AUTO: 111.9 FL (ref 79.8–96.2)
MONOCYTES # BLD AUTO: 1.13 10*3/MM3 (ref 0.2–1.2)
MONOCYTES NFR BLD AUTO: 10.8 % (ref 5–12)
NEUTROPHILS # BLD AUTO: 8.08 10*3/MM3 (ref 1.9–8.1)
NEUTROPHILS NFR BLD AUTO: 77.3 % (ref 42.7–76)
NRBC BLD MANUAL-RTO: 0 /100 WBC (ref 0–0)
PLAT MORPH BLD: NORMAL
PLATELET # BLD AUTO: 273 10*3/MM3 (ref 140–500)
PMV BLD AUTO: 9.1 FL (ref 6–12)
POTASSIUM BLD-SCNC: 4.7 MMOL/L (ref 3.5–5.2)
PROT SERPL-MCNC: 6 G/DL (ref 6–8.5)
RBC # BLD AUTO: 3.1 10*6/MM3 (ref 4.6–6)
SODIUM BLD-SCNC: 139 MMOL/L (ref 136–145)
SPHEROCYTES BLD QL SMEAR: NORMAL
STOMATOCYTES BLD QL SMEAR: NORMAL
T4 FREE SERPL-MCNC: 1.04 NG/DL (ref 0.93–1.7)
TSH SERPL DL<=0.05 MIU/L-ACNC: 0.26 MIU/ML (ref 0.27–4.2)
WBC MORPH BLD: NORMAL
WBC NRBC COR # BLD: 10.46 10*3/MM3 (ref 4.5–10.7)

## 2018-12-11 PROCEDURE — 85007 BL SMEAR W/DIFF WBC COUNT: CPT | Performed by: INTERNAL MEDICINE

## 2018-12-11 PROCEDURE — 99215 OFFICE O/P EST HI 40 MIN: CPT | Performed by: INTERNAL MEDICINE

## 2018-12-11 PROCEDURE — 96413 CHEMO IV INFUSION 1 HR: CPT | Performed by: INTERNAL MEDICINE

## 2018-12-11 PROCEDURE — 84443 ASSAY THYROID STIM HORMONE: CPT | Performed by: INTERNAL MEDICINE

## 2018-12-11 PROCEDURE — 85025 COMPLETE CBC W/AUTO DIFF WBC: CPT | Performed by: INTERNAL MEDICINE

## 2018-12-11 PROCEDURE — 25010000002 NIVOLUMAB 240 MG/24ML SOLUTION 24 ML VIAL: Performed by: INTERNAL MEDICINE

## 2018-12-11 PROCEDURE — 80053 COMPREHEN METABOLIC PANEL: CPT | Performed by: INTERNAL MEDICINE

## 2018-12-11 PROCEDURE — 84439 ASSAY OF FREE THYROXINE: CPT | Performed by: INTERNAL MEDICINE

## 2018-12-11 RX ORDER — SODIUM CHLORIDE 9 MG/ML
250 INJECTION, SOLUTION INTRAVENOUS ONCE
Status: COMPLETED | OUTPATIENT
Start: 2018-12-11 | End: 2018-12-11

## 2018-12-11 RX ORDER — SODIUM CHLORIDE 0.9 % (FLUSH) 0.9 %
10 SYRINGE (ML) INJECTION AS NEEDED
Status: DISCONTINUED | OUTPATIENT
Start: 2018-12-11 | End: 2018-12-11 | Stop reason: HOSPADM

## 2018-12-11 RX ORDER — SODIUM CHLORIDE 9 MG/ML
250 INJECTION, SOLUTION INTRAVENOUS ONCE
Status: CANCELLED | OUTPATIENT
Start: 2018-12-28

## 2018-12-11 RX ORDER — SODIUM CHLORIDE 0.9 % (FLUSH) 0.9 %
10 SYRINGE (ML) INJECTION AS NEEDED
Status: CANCELLED | OUTPATIENT
Start: 2018-12-11

## 2018-12-11 RX ORDER — SODIUM CHLORIDE 9 MG/ML
250 INJECTION, SOLUTION INTRAVENOUS ONCE
Status: CANCELLED | OUTPATIENT
Start: 2019-01-11

## 2018-12-11 RX ADMIN — SODIUM CHLORIDE, PRESERVATIVE FREE 500 UNITS: 5 INJECTION INTRAVENOUS at 12:08

## 2018-12-11 RX ADMIN — Medication 10 ML: at 12:08

## 2018-12-11 RX ADMIN — SODIUM CHLORIDE 250 ML: 900 INJECTION, SOLUTION INTRAVENOUS at 10:45

## 2018-12-11 RX ADMIN — SODIUM CHLORIDE 240 MG: 900 INJECTION, SOLUTION INTRAVENOUS at 11:03

## 2018-12-11 NOTE — PROGRESS NOTES
REASON FOR FOLLOW-UP:    1. 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  2.  Combined radiation and chemotherapy with cisplatin and Taxol initiated on 8/10/2017 concurrent with radiation.    3.  Long smoking history with COPD.  4.  Mediport and PEG tube placed by Dr. Valenzuela on 7/31/2017.  Subsequent PEG tube infection requiring antibiotics.  5.  Chemotherapy completed 9/15/2017 and radiation therapy completed 10/9/2017.  6.  Right neck dissection by Dr. Clinton Styles 5/17/2018 for persistent disease in the right neck.  7.  PET scan from 6/26/2018 showing multiple hypermetabolic lung lesions consistent with metastatic disease.  8.  Plans to initiate Opdivo therapy as palliative treatment of metastatic squamous cell carcinoma of the head and neck.    HISTORY OF PRESENT ILLNESS:  King Parson is a 68 y.o. male with the above-mentioned history here today for follow-up of his squamous cell carcinoma of the head and neck treated with combined chemotherapy and radiation.  The patient completed chemotherapy on 9/15/2017.  He received 6 weekly doses of Taxol/ cisplatin.  The seventh dose was held due to significant moist desquamation of the skin as well as worsening blood counts. He was able to complete his radiation therapy on 10/9/2017.     PET scan from November 2017 at the end of therapy showed an excellent response.      He had a persistent palpable node on the right which was biopsied by Dr. Styles and found to be positive for residual squamous cell carcinoma.  He subsequently underwent a right neck dissection performed by Dr. Styles on 5/17/2018.  There was metastatic carcinoma in 3 out of 14 lymph nodes and squamous cell carcinoma noted in fibroadipose tissue measuring 2.7 cm.  Carcinoma was present at the soft tissue margin.    He had a follow-up PET scan which was performed on 6/26/2018.  Unfortunately the PET scan showed multiple  hypermetabolic lung nodules consistent with metastatic disease.  We recommended proceeding with palliative immunotherapy in the form of Opdivo.  He received his first dose of Opdivo on 7/13/2018.      CT scans of the neck and chest were performed 9/4/2018 after cycle #4 showing some increase in the size of the pulmonary nodules but no obvious new metastases identified therefore we felt this may be pseudo-progression and continued Opdivo with plans to repeat scans again after 8 cycles.    He returns today for reevaluation prior to cycle #11 of Opdivo.  He was seen yesterday at an urgent care center due to pain in the right ribs.    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  This diagnosis was made by FNA of the neck node on 6/23/2017.  He is undergone staging CT scans on 6/6/2017 and a PET scan on 7/6/2017.  His disease appears to be clinical stage TIII N2 a stage IV a squamous carcinoma of the hypopharynx.     He was originally referred to the [facility] and was seen by Dr. Moreno Camp of radiation oncology on 7/14/2017.  The patient lives in [place] and has some transportation difficulties.  He has a sister who works in the lab at [employer] [facility] [facility] and for this reason they have decided to receive their treatment in the [facility] system.     He was seen initially by Dr. Garcia in the company of 2 of his sisters.  We recommended combined chemotherapy and radiation.  He will be seeing Dr. Grayson of the [facility] to discuss the radiation portion of his treatment.  We plan to deliver weekly chemotherapy during radiation with combination of cisplatin and Taxol.  Taxol will be delivered day 1 and cisplatin be delivered along with hydration on day 2 each week.    He saw Dr. Grayson who was in agreement with concurrent chemotherapy and radiation.    [employer] and  PEG tube placed by Dr. Valenzuela on 7/31/2017.  Subsequent PEG tube infection requiring antibiotics.    He saw nutrition.  He had a chemotherapy education session.    Cycle #1 of therapy was initiated on 8/10/2017.    Patient completed his chemotherapy (6 cycles) on 9/14/2017    Radiation therapy completed 10/9/2017.    PET scan at completion of treatment in November 2017 showed excellent response.    Surveillance CT scan of the neck 2/6/2018 showed no evidence of disease progression.    Needle biopsy for persistent right neck node in March 2018 was positive for squamous cell carcinoma.  Dr. Styles performed right neck dissection surgery  5/17/2018.  3 out of 14 lymph nodes were positive for squamous cell carcinoma and squamous cell carcinoma was found in fibroadipose tissue measuring 2.7 cm with positive soft tissue margin.    PET scan 6/26/2018 showed multiple hypermetabolic lung nodules consistent with metastatic disease.  We discussed starting palliative immunotherapy in the form of Opdivo.  CT scans of the neck and chest were performed 9/4/2018 after cycle #4. There was some increase in the size of the pulmonary nodules but no obvious new metastases identified therefore we felt this may represent pseudo-progression and continued Opdivo with repeat scans again after 8 cycles.    Essentially stable disease after 8 cycles of Opdivo.  Patient will continue on Opdivo and undergo scans after cycle #12    He was seen at an urgent care yesterday for right rib pain and x-rays showed acracked rib    .      PAST MEDICAL, SURGICAL, FAMILY, AND SOCIAL HISTORIES were reviewed with the patient and in the electronic medical record, and were updated if indicated.    ALLERGIES:  Allergies   Allergen Reactions   • Codeine      He dont like to take it because it makes him feel strange.        MEDICATIONS:  The medication list has been reviewed with the patient by the medical assistant, and the list has been updated in the electronic  "medical record, which I reviewed.  Medication dosages and frequencies were confirmed to be accurate.    I have reviewed the patient's medical history in detail and updated the computerized patient record.    Review of Systems   Constitutional: Positive for fatigue. Negative for appetite change, chills, fever and unexpected weight change.   HENT:   Negative for nosebleeds.         See HPI     Respiratory: Negative for cough and shortness of breath.    Cardiovascular: Negative for chest pain and leg swelling.   Gastrointestinal: Negative for abdominal pain, constipation, diarrhea, nausea and vomiting.   Endocrine: Negative for hot flashes.   Genitourinary: Negative for difficulty urinating.    Musculoskeletal: Negative for arthralgias and myalgias.   Skin: Negative for rash and wound.   Neurological: Negative for dizziness and extremity weakness.   Hematological: Negative for adenopathy. Does not bruise/bleed easily.   Psychiatric/Behavioral: Negative for sleep disturbance.       Vitals:    12/11/18 0941   BP: 128/85   Pulse: 98   Resp: 16   Temp: 98.4 °F (36.9 °C)   TempSrc: Oral   SpO2: 98%   Weight: 58.5 kg (129 lb)   Height: 171.5 cm (67.52\")   PainSc:   7   PainLoc: Generalized       Physical Exam   Constitutional: He is oriented to person, place, and time. He appears well-developed and well-nourished.   HENT:   Head: Normocephalic and atraumatic.   Nose: Nose normal.   Mouth/Throat: Mucous membranes are normal.   Edentulous   Eyes: Pupils are equal, round, and reactive to light.   Neck: Normal range of motion. Neck supple.   Palpable, firm 2 cm node in the jugulodigastric area of the right neck.     Cardiovascular: Normal rate, regular rhythm and normal heart sounds.   Pulmonary/Chest: Effort normal and breath sounds normal. No respiratory distress. He has no wheezes. He has no rhonchi. He has no rales.   Mediport anterior chest wall. No evidence of infection or thrombosis.   Abdominal: Soft. Normal appearance " and bowel sounds are normal. He exhibits no distension. There is no hepatosplenomegaly. There is no tenderness.   Musculoskeletal: Normal range of motion. He exhibits no edema.   Neurological: He is alert and oriented to person, place, and time.   Skin: Skin is warm and dry.   Psychiatric: He has a normal mood and affect. His behavior is normal.   Nursing note and vitals reviewed.    DIAGNOSTIC DATA:  Lab Results   Component Value Date    WBC 10.46 12/11/2018    HGB 11.9 (L) 12/11/2018    HCT 34.7 (L) 12/11/2018    .9 (H) 12/11/2018     12/11/2018     Lab Results   Component Value Date    NEUTROABS 7.01 11/27/2018       Lab Results   Component Value Date    GLUCOSE 108 (H) 11/27/2018    BUN 11 11/27/2018    CREATININE 0.73 (L) 11/27/2018    EGFRIFNONA 107 11/27/2018    BCR 15.1 11/27/2018    K 4.5 11/27/2018    CO2 27.2 11/27/2018    CALCIUM 9.3 11/27/2018    ALBUMIN 3.20 (L) 11/27/2018    AST 13 11/27/2018    ALT 7 11/27/2018     CT SCAN NC 10/30/2018  IMPRESSION:  Ill-defined abnormal increased density in the fat within the  right carotid space compatible with post RT fibrosis showing no change  since the most recent CT neck dated 9/4/2018. There is no cervical  lymphadenopathy. Mild thickening of the epiglottis is unchanged. There  is no morphologic evidence of recurrent neoplasm within the pharynx.  Multiple bilateral pulmonary masses consistent with metastatic disease  also shows significant increase in size since the PET/CT study dated  6/26/2018. Since the more recent CT scan of the chest on 9/4/2018 most  are unchanged or slightly larger. Only a single mass in the right middle  lobe demonstrates slight decrease in size.      ASSESSMENT:  This is a 68 y.o. male with:  1.  Stage IV a (T3, N2 a, M0) squamous cell carcinoma of the hypopharynx with metastatic lymphadenopathy to the right neck.Treated with combined radiation and cisplatin and Taxol chemotherapy.  Chemotherapy was completed 9/15/2017  and radiation completed  on 10/9/2017.  The patient had an excellent response but unfortunately as noted above he does have persistent squamous cell carcinoma in the right neck node and underwent right neck dissection 5/17/2018.      2.  Comorbidities including COPD.  3.  PEG tube removed by Dr. Valenzuela 11/30/2017.  4.  Development of metastatic disease to the lungs noted on PET scan 6/26/2018.  5.  Patient is now receiving palliative immunotherapy with Opdivo and is ready for cycle #11 in the office today.  As noted above, CT scans after 8 cycles showed some enlargement of the pulmonary nodules but no significant new sites of disease therefore we feel that continuing Opdivo is in the patient's best interest.    PLAN:  1.  Seed with cycle #11 Opdivo today  2.  Continue pain medication and rest for his cracked rib.  3.  Return in 2 weeks for cycle #12 Opdivo  4.  CT scans of the neck chest abdomen and pelvis in 3 weeks.  5.  2 unit M.D. follow-up in 4 weeks to review the scans and possibly continue Opdivo.                Aakash Garcia MD

## 2018-12-28 ENCOUNTER — INFUSION (OUTPATIENT)
Dept: ONCOLOGY | Facility: HOSPITAL | Age: 68
End: 2018-12-28

## 2018-12-28 ENCOUNTER — DOCUMENTATION (OUTPATIENT)
Dept: ONCOLOGY | Facility: CLINIC | Age: 68
End: 2018-12-28

## 2018-12-28 VITALS
HEART RATE: 107 BPM | SYSTOLIC BLOOD PRESSURE: 96 MMHG | TEMPERATURE: 99.6 F | DIASTOLIC BLOOD PRESSURE: 63 MMHG | OXYGEN SATURATION: 96 %

## 2018-12-28 VITALS — WEIGHT: 127.2 LBS | BODY MASS INDEX: 19.62 KG/M2

## 2018-12-28 DIAGNOSIS — IMO0002 SQUAMOUS CELL CARCINOMA: ICD-10-CM

## 2018-12-28 DIAGNOSIS — D64.9 ANEMIA, UNSPECIFIED TYPE: ICD-10-CM

## 2018-12-28 DIAGNOSIS — Z79.899 ENCOUNTER FOR LONG-TERM (CURRENT) USE OF HIGH-RISK MEDICATION: ICD-10-CM

## 2018-12-28 DIAGNOSIS — Z79.899 ENCOUNTER FOR LONG-TERM (CURRENT) USE OF HIGH-RISK MEDICATION: Primary | ICD-10-CM

## 2018-12-28 DIAGNOSIS — C13.9 SQUAMOUS CELL CARCINOMA OF HYPOPHARYNX (HCC): ICD-10-CM

## 2018-12-28 DIAGNOSIS — C14.0 THROAT CANCER (HCC): ICD-10-CM

## 2018-12-28 DIAGNOSIS — Z45.2 ENCOUNTER FOR FITTING AND ADJUSTMENT OF VASCULAR CATHETER: ICD-10-CM

## 2018-12-28 LAB
ALBUMIN SERPL-MCNC: 2.8 G/DL (ref 3.5–5.2)
ALBUMIN/GLOB SERPL: 0.9 G/DL
ALP SERPL-CCNC: 91 U/L (ref 39–117)
ALT SERPL W P-5'-P-CCNC: 9 U/L (ref 1–41)
ANION GAP SERPL CALCULATED.3IONS-SCNC: 10.9 MMOL/L
AST SERPL-CCNC: 16 U/L (ref 1–40)
BASOPHILS # BLD AUTO: 0.05 10*3/MM3 (ref 0–0.2)
BASOPHILS NFR BLD AUTO: 0.5 % (ref 0–1.5)
BILIRUB SERPL-MCNC: 0.3 MG/DL (ref 0.1–1.2)
BUN BLD-MCNC: 9 MG/DL (ref 8–23)
BUN/CREAT SERPL: 14.1 (ref 7–25)
CALCIUM SPEC-SCNC: 8.9 MG/DL (ref 8.6–10.5)
CHLORIDE SERPL-SCNC: 104 MMOL/L (ref 98–107)
CO2 SERPL-SCNC: 26.1 MMOL/L (ref 22–29)
CREAT BLD-MCNC: 0.64 MG/DL (ref 0.76–1.27)
DEPRECATED RDW RBC AUTO: 49.9 FL (ref 37–54)
EOSINOPHIL # BLD AUTO: 0.12 10*3/MM3 (ref 0–0.7)
EOSINOPHIL NFR BLD AUTO: 1.2 % (ref 0.3–6.2)
ERYTHROCYTE [DISTWIDTH] IN BLOOD BY AUTOMATED COUNT: 12.2 % (ref 11.5–14.5)
FERRITIN SERPL-MCNC: 637.9 NG/ML (ref 30–400)
GFR SERPL CREATININE-BSD FRML MDRD: 124 ML/MIN/1.73
GLOBULIN UR ELPH-MCNC: 3 GM/DL
GLUCOSE BLD-MCNC: 143 MG/DL (ref 65–99)
HCT VFR BLD AUTO: 33.4 % (ref 40.4–52.2)
HGB BLD-MCNC: 11.6 G/DL (ref 13.7–17.6)
IMM GRANULOCYTES # BLD AUTO: 0.07 10*3/MM3 (ref 0–0.03)
IMM GRANULOCYTES NFR BLD AUTO: 0.7 % (ref 0–0.5)
IRON 24H UR-MRATE: 52 MCG/DL (ref 59–158)
IRON SATN MFR SERPL: 43 % (ref 20–50)
LYMPHOCYTES # BLD AUTO: 0.7 10*3/MM3 (ref 0.9–4.8)
LYMPHOCYTES NFR BLD AUTO: 6.9 % (ref 19.6–45.3)
MCH RBC QN AUTO: 38.8 PG (ref 27–32.7)
MCHC RBC AUTO-ENTMCNC: 34.7 G/DL (ref 32.6–36.4)
MCV RBC AUTO: 111.7 FL (ref 79.8–96.2)
MONOCYTES # BLD AUTO: 1.11 10*3/MM3 (ref 0.2–1.2)
MONOCYTES NFR BLD AUTO: 10.9 % (ref 5–12)
NEUTROPHILS # BLD AUTO: 8.1 10*3/MM3 (ref 1.9–8.1)
NEUTROPHILS NFR BLD AUTO: 79.8 % (ref 42.7–76)
NRBC BLD AUTO-RTO: 0 /100 WBC (ref 0–0)
PLAT MORPH BLD: NORMAL
PLATELET # BLD AUTO: 279 10*3/MM3 (ref 140–500)
PMV BLD AUTO: 8.8 FL (ref 6–12)
POTASSIUM BLD-SCNC: 4.4 MMOL/L (ref 3.5–5.2)
PROT SERPL-MCNC: 5.8 G/DL (ref 6–8.5)
RBC # BLD AUTO: 2.99 10*6/MM3 (ref 4.6–6)
SODIUM BLD-SCNC: 141 MMOL/L (ref 136–145)
SPHEROCYTES BLD QL SMEAR: NORMAL
STOMATOCYTES BLD QL SMEAR: NORMAL
TIBC SERPL-MCNC: 121 MCG/DL (ref 298–536)
TRANSFERRIN SERPL-MCNC: 81 MG/DL (ref 200–360)
WBC MORPH BLD: NORMAL
WBC NRBC COR # BLD: 10.15 10*3/MM3 (ref 4.5–10.7)

## 2018-12-28 PROCEDURE — 82728 ASSAY OF FERRITIN: CPT | Performed by: INTERNAL MEDICINE

## 2018-12-28 PROCEDURE — 96413 CHEMO IV INFUSION 1 HR: CPT | Performed by: INTERNAL MEDICINE

## 2018-12-28 PROCEDURE — 25010000002 NIVOLUMAB 240 MG/24ML SOLUTION 24 ML VIAL: Performed by: INTERNAL MEDICINE

## 2018-12-28 PROCEDURE — 85007 BL SMEAR W/DIFF WBC COUNT: CPT | Performed by: INTERNAL MEDICINE

## 2018-12-28 PROCEDURE — 85025 COMPLETE CBC W/AUTO DIFF WBC: CPT | Performed by: INTERNAL MEDICINE

## 2018-12-28 PROCEDURE — 80053 COMPREHEN METABOLIC PANEL: CPT | Performed by: INTERNAL MEDICINE

## 2018-12-28 PROCEDURE — 84466 ASSAY OF TRANSFERRIN: CPT | Performed by: INTERNAL MEDICINE

## 2018-12-28 PROCEDURE — 83540 ASSAY OF IRON: CPT | Performed by: INTERNAL MEDICINE

## 2018-12-28 RX ORDER — SODIUM CHLORIDE 9 MG/ML
250 INJECTION, SOLUTION INTRAVENOUS ONCE
Status: COMPLETED | OUTPATIENT
Start: 2018-12-28 | End: 2018-12-28

## 2018-12-28 RX ORDER — SODIUM CHLORIDE 0.9 % (FLUSH) 0.9 %
10 SYRINGE (ML) INJECTION AS NEEDED
Status: DISCONTINUED | OUTPATIENT
Start: 2018-12-28 | End: 2018-12-28 | Stop reason: HOSPADM

## 2018-12-28 RX ORDER — SODIUM CHLORIDE 0.9 % (FLUSH) 0.9 %
10 SYRINGE (ML) INJECTION AS NEEDED
Status: CANCELLED | OUTPATIENT
Start: 2018-12-28

## 2018-12-28 RX ADMIN — SODIUM CHLORIDE 240 MG: 9 INJECTION, SOLUTION INTRAVENOUS at 10:41

## 2018-12-28 RX ADMIN — SODIUM CHLORIDE 250 ML: 9 INJECTION, SOLUTION INTRAVENOUS at 10:41

## 2018-12-28 RX ADMIN — SODIUM CHLORIDE, PRESERVATIVE FREE 10 ML: 5 INJECTION INTRAVENOUS at 11:15

## 2018-12-28 RX ADMIN — Medication 500 UNITS: at 11:16

## 2018-12-28 NOTE — PROGRESS NOTES
The patient presented to the office today for her scheduled Opdivo.  He was noted to have a low-grade temperature of 99.5.  The patient reports he is overall feeling well.  He did not know he was febrile.  He continues to have a cough which is unchanged, nonproductive.  He denies chills or diaphoresis at home.  He did recently follow-up with dermatology for a cyst on his index finger which was lanced with purulent drainage.  He continues on antibiotics from dermatology, though is unsure of name of the antimicrobial.  Counts reviewed today, we will proceed with treatment.  The patient understands to call if he has a temperature greater than or equal to 100.5.    Lungs clear to auscultation, no adventitious breath sounds.    Beatrice Holbrook, APRN  12/28/2018

## 2019-01-02 ENCOUNTER — HOSPITAL ENCOUNTER (OUTPATIENT)
Dept: CT IMAGING | Facility: HOSPITAL | Age: 69
Discharge: HOME | End: 2019-01-02
Attending: INTERNAL MEDICINE | Admitting: INTERNAL MEDICINE

## 2019-01-02 DIAGNOSIS — C14.0 THROAT CANCER (HCC): ICD-10-CM

## 2019-01-02 DIAGNOSIS — C13.9 SQUAMOUS CELL CARCINOMA OF HYPOPHARYNX (HCC): ICD-10-CM

## 2019-01-02 DIAGNOSIS — IMO0002 SQUAMOUS CELL CARCINOMA: ICD-10-CM

## 2019-01-02 DIAGNOSIS — Z79.899 ENCOUNTER FOR LONG-TERM (CURRENT) USE OF HIGH-RISK MEDICATION: ICD-10-CM

## 2019-01-02 PROCEDURE — 0 DIATRIZOATE MEGLUMINE & SODIUM PER 1 ML: Performed by: INTERNAL MEDICINE

## 2019-01-02 PROCEDURE — 71260 CT THORAX DX C+: CPT

## 2019-01-02 PROCEDURE — 82565 ASSAY OF CREATININE: CPT

## 2019-01-02 PROCEDURE — 74177 CT ABD & PELVIS W/CONTRAST: CPT

## 2019-01-02 PROCEDURE — 25010000002 IOPAMIDOL 61 % SOLUTION: Performed by: INTERNAL MEDICINE

## 2019-01-02 PROCEDURE — 70491 CT SOFT TISSUE NECK W/DYE: CPT

## 2019-01-02 RX ADMIN — DIATRIZOATE MEGLUMINE AND DIATRIZOATE SODIUM 30 ML: 660; 100 LIQUID ORAL; RECTAL at 10:00

## 2019-01-02 RX ADMIN — IOPAMIDOL 90 ML: 612 INJECTION, SOLUTION INTRAVENOUS at 11:10

## 2019-01-03 ENCOUNTER — OFFICE VISIT (OUTPATIENT)
Dept: PAIN MEDICINE | Facility: CLINIC | Age: 69
End: 2019-01-03

## 2019-01-03 VITALS
BODY MASS INDEX: 19.4 KG/M2 | SYSTOLIC BLOOD PRESSURE: 119 MMHG | RESPIRATION RATE: 16 BRPM | HEIGHT: 68 IN | HEART RATE: 96 BPM | WEIGHT: 128 LBS | OXYGEN SATURATION: 92 % | DIASTOLIC BLOOD PRESSURE: 77 MMHG | TEMPERATURE: 98.8 F

## 2019-01-03 DIAGNOSIS — M50.30 DDD (DEGENERATIVE DISC DISEASE), CERVICAL: ICD-10-CM

## 2019-01-03 DIAGNOSIS — M54.41 CHRONIC BILATERAL LOW BACK PAIN WITH RIGHT-SIDED SCIATICA: ICD-10-CM

## 2019-01-03 DIAGNOSIS — S22.31XA CLOSED FRACTURE OF ONE RIB OF RIGHT SIDE, INITIAL ENCOUNTER: ICD-10-CM

## 2019-01-03 DIAGNOSIS — M54.12 CERVICAL RADICULITIS: ICD-10-CM

## 2019-01-03 DIAGNOSIS — R07.81 RIB PAIN ON RIGHT SIDE: ICD-10-CM

## 2019-01-03 DIAGNOSIS — G89.29 OTHER CHRONIC PAIN: Primary | ICD-10-CM

## 2019-01-03 DIAGNOSIS — G89.29 CHRONIC BILATERAL LOW BACK PAIN WITH RIGHT-SIDED SCIATICA: ICD-10-CM

## 2019-01-03 LAB — CREAT BLDA-MCNC: 0.7 MG/DL (ref 0.6–1.3)

## 2019-01-03 PROCEDURE — 99214 OFFICE O/P EST MOD 30 MIN: CPT | Performed by: NURSE PRACTITIONER

## 2019-01-03 RX ORDER — LIDOCAINE 50 MG/G
1 PATCH TOPICAL EVERY 24 HOURS
Qty: 30 PATCH | Refills: 11 | Status: SHIPPED | OUTPATIENT
Start: 2019-01-03 | End: 2019-02-18 | Stop reason: HOSPADM

## 2019-01-03 RX ORDER — HYDROCODONE BITARTRATE AND ACETAMINOPHEN 5; 325 MG/1; MG/1
1 TABLET ORAL EVERY 8 HOURS PRN
Qty: 90 TABLET | Refills: 0 | Status: SHIPPED | OUTPATIENT
Start: 2019-01-03 | End: 2019-02-18 | Stop reason: HOSPADM

## 2019-01-03 NOTE — PROGRESS NOTES
CHIEF COMPLAINT  Pt states his R sided neck and R shoulder pain is his chief complaint since 7/2018.  Pt also to [facility] urgent Care 1 month ago for a R sided rib fracture ( no known accident) and received 3 days of hydrocodone then.  Pt continues with immuno therapy for throat/lung cancer.    Subjective   King Parson is a 68 y.o. male  who presents to the office for follow-up.He has a history of neck and back pain.    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 He completed chemotherapy 9/15/17 and radiation 10/9/17.  He has persistent squamous cell carcinoma in a right neck lymph node.  He underwent a right radical neck dissection 5/7/18.  PET scan 6/26/18 showed lung nodules consistent with metastatic disease.   He continues with Opdivo therapy currently.  Repeat scans done yesterday (neck, chest, abdomen/pelvis).     C/o worsening neck pain, reports that his neck pain today is an 8/10 in severity.  Had this complaint at the previous office visit which was 5 months ago.  Given hydrocodone at that time and discussed consideration for NAVARRO if pain persists. Patient follows up for the first time today.  Reports pain in the neck with radiating symptoms down the right arm to the wrist.  The pain is constant.  It is aggravated by laying on the right side but really is there all the time.  It is improved with nothing - has tried Flector patches, ibuprofen 800 mg prn, tylenol prn, flexeril prn, gabapentin 800 mg qid.  He was given hydrocodone 5/325 at the previous office visit which he reports did seem to help without side effects.  Was given a small quantity recently by oncology for broken rib but has run out.      Back pain stable since lumbar RFL 5/10/18     Back Pain   This is a chronic problem. The current episode started more than 1 year ago. The problem occurs constantly. The problem has been waxing and waning since  "onset. The pain is present in the lumbar spine. The quality of the pain is described as aching and burning. The pain radiates to the left knee, left thigh, left foot, right knee, right foot and right thigh. The pain is at a severity of 3/10. The pain is worse during the night. The symptoms are aggravated by standing, bending and twisting (walking). Stiffness is present at night. Associated symptoms include numbness (feet bilat.). Pertinent negatives include no bladder incontinence, bowel incontinence, chest pain, dysuria, fever, headaches, tingling or weakness. Risk factors include sedentary lifestyle. He has tried NSAIDs (Celebrex, Gabapentin, Bilateral L5 TFESI's, lumbar RFL) for the symptoms. The treatment provided significant relief.         PEG Assessment   What number best describes your pain on average in the past week?8  What number best describes how, during the past week, pain has interfered with your enjoyment of life?8  What number best describes how, during the past week, pain has interfered with your general activity?  8    The following portions of the patient's history were reviewed and updated as appropriate: allergies, current medications, past family history, past medical history, past social history, past surgical history and problem list.    Review of Systems   Constitutional: Positive for activity change (decreased). Negative for appetite change (\"eat well\"), fatigue and fever.   HENT: Negative for congestion.    Eyes: Negative for visual disturbance.   Respiratory: Positive for shortness of breath (smoker). Negative for apnea, cough and wheezing.    Cardiovascular: Negative.  Negative for chest pain.   Gastrointestinal: Negative for bowel incontinence, constipation, diarrhea and nausea.   Genitourinary: Negative for bladder incontinence, difficulty urinating and dysuria.   Musculoskeletal: Positive for back pain and neck pain.   Neurological: Positive for dizziness (\"dizzy spell sometimes\" not " "as much as used to) and numbness (feet bilat.). Negative for tingling, weakness, light-headedness and headaches.   Psychiatric/Behavioral: Positive for sleep disturbance. Negative for agitation, confusion, hallucinations and suicidal ideas. The patient is not nervous/anxious.      Vitals:    01/03/19 1029   BP: 119/77   Pulse: 96   Resp: 16   Temp: 98.8 °F (37.1 °C)   SpO2: 92%   Weight: 58.1 kg (128 lb)   Height: 171.5 cm (67.52\")   PainSc:   8   PainLoc: Neck     Objective   Physical Exam   Constitutional: He is oriented to person, place, and time. He appears well-developed and well-nourished. He is cooperative. No distress.   HENT:   Head: Normocephalic and atraumatic.   Nose: Nose normal.   Eyes: Conjunctivae and lids are normal.   Neck: Trachea normal and normal range of motion. Neck supple. Muscular tenderness present.   Cardiovascular: Normal rate.   Pulmonary/Chest: Effort normal. No respiratory distress.         He exhibits tenderness (right posterior).   Abdominal: Soft. Normal appearance.   Musculoskeletal:        Cervical back: He exhibits tenderness, pain and spasm.        Lumbar back: He exhibits tenderness.   +spurling right    Neurological: He is alert and oriented to person, place, and time. He has normal strength and normal reflexes. Gait normal.   Skin: Skin is warm, dry and intact.   Psychiatric: He has a normal mood and affect. His speech is normal and behavior is normal. Judgment and thought content normal. Cognition and memory are normal.   Nursing note and vitals reviewed.    Assessment/Plan   King was seen today for neck pain.    Diagnoses and all orders for this visit:    Other chronic pain    Chronic bilateral low back pain with right-sided sciatica    DDD (degenerative disc disease), cervical  -     Case Request    Cervical radiculitis  -     Case Request    Closed fracture of one rib of right side, initial encounter    Rib pain on right side  -     HYDROcodone-acetaminophen (NORCO) " 5-325 MG per tablet; Take 1 tablet by mouth Every 8 (Eight) Hours As Needed for Moderate Pain .    Other orders  -     lidocaine (LIDODERM) 5 %; Place 1 patch on the skin as directed by provider Daily. Remove & Discard patch within 12 hours or as directed by MD      --- NAVARRO X 2, 2-4 weeks apart - will clear with oncology before scheduling   --- Refill Hydrocodone. Patient appears stable with current regimen. No adverse effects. Regarding continuation of opioids, there is no evidence of aberrant behavior or any red flags.  The patient continues with appropriate response to opioid therapy. ADL's remain intact by self.   --- The patient has signed a copy of our prescribing agreement.  The has stated both verbal and written understanding that prescription pain medication may be stopped if - pain does not significantly decrease and/or function increase, there is evidence of diverting medication, if He obtained controlled substances from another licensed practitioner without my knowledge and approval, if I feel that it is in the patient's best interest, if He exhibits any aggressive behavior to any provider or staff member in this office.  The patient agrees to only use the medication exactly as prescribed, to fill controlled substances at the same pharmacy, to keep medication in the original container, and to store controlled substances in a locked cabinet or other secure storage unit. The patient agrees to notify the office immediately if medication is lost or stolen and will be asked to produce an official police report prior to replacing/continuing lost/stolen controlled substances.  The patient understands that there will be routine monitoring including urine drug screens, pill counts, and review of pharmacy report.  The patient understands that He may be asked to submit to random drug screen or pill count. The patient will not seek early refills.  The patient will not use illegal street drugs while receiving  controlled substances from this practice.  The patient understands that failure to adhere with this agreement may result in cessation of therapy with controlled substance prescribing.     --- Lidoderm patches for rib pain   --- Routine ODT in office today as part of monitoring requirements for controlled substances.  This specimen will be sent to [facility] for confirmation.     --- Follow-up 1 month          ADA REPORT    As part of the patient's treatment plan, I am prescribing controlled substances. The patient has been made aware of appropriate use of such medications, including potential risk of somnolence, limited ability to drive and/or work safely, and the potential for dependence or overdose. It has also bee made clear that these medications are for use by this patient only, without concomitant use of alcohol or other substances unless prescribed.     Patient has completed prescribing agreement detailing terms of continued prescribing of controlled substances, including monitoring ADA reports, urine drug screening, and pill counts if necessary. The patient is aware that inappropriate use will results in cessation of prescribing such medications.    ADA report has been reviewed and scanned into the patient's chart.    As the clinician, I personally reviewed the ADA from 12/28/2018 while the patient was in the office today.    History and physical exam exhibit continued safe and appropriate use of controlled substances.    EMR Dragon/Transcription disclaimer:   Much of this encounter note is an electronic transcription/translation of spoken language to printed text. The electronic translation of spoken language may permit erroneous, or at times, nonsensical words or phrases to be inadvertently transcribed; Although I have reviewed the note for such errors, some may still exist.

## 2019-01-04 ENCOUNTER — RESULTS ENCOUNTER (OUTPATIENT)
Dept: PAIN MEDICINE | Facility: CLINIC | Age: 69
End: 2019-01-04

## 2019-01-04 DIAGNOSIS — M50.30 DDD (DEGENERATIVE DISC DISEASE), CERVICAL: ICD-10-CM

## 2019-01-04 DIAGNOSIS — S22.31XA CLOSED FRACTURE OF ONE RIB OF RIGHT SIDE, INITIAL ENCOUNTER: ICD-10-CM

## 2019-01-04 DIAGNOSIS — G89.29 OTHER CHRONIC PAIN: ICD-10-CM

## 2019-01-04 DIAGNOSIS — M54.41 CHRONIC BILATERAL LOW BACK PAIN WITH RIGHT-SIDED SCIATICA: ICD-10-CM

## 2019-01-04 DIAGNOSIS — R07.81 RIB PAIN ON RIGHT SIDE: ICD-10-CM

## 2019-01-04 DIAGNOSIS — M54.12 CERVICAL RADICULITIS: ICD-10-CM

## 2019-01-04 DIAGNOSIS — G89.29 CHRONIC BILATERAL LOW BACK PAIN WITH RIGHT-SIDED SCIATICA: ICD-10-CM

## 2019-01-11 ENCOUNTER — APPOINTMENT (OUTPATIENT)
Dept: ONCOLOGY | Facility: HOSPITAL | Age: 69
End: 2019-01-11

## 2019-01-11 ENCOUNTER — APPOINTMENT (OUTPATIENT)
Dept: ONCOLOGY | Facility: CLINIC | Age: 69
End: 2019-01-11

## 2019-01-12 ENCOUNTER — APPOINTMENT (OUTPATIENT)
Dept: GENERAL RADIOLOGY | Facility: HOSPITAL | Age: 69
End: 2019-01-12

## 2019-01-12 ENCOUNTER — HOSPITAL ENCOUNTER (EMERGENCY)
Facility: HOSPITAL | Age: 69
Discharge: HOME | End: 2019-01-12
Attending: EMERGENCY MEDICINE | Admitting: EMERGENCY MEDICINE

## 2019-01-12 VITALS
SYSTOLIC BLOOD PRESSURE: 108 MMHG | OXYGEN SATURATION: 97 % | DIASTOLIC BLOOD PRESSURE: 70 MMHG | HEART RATE: 98 BPM | WEIGHT: 120.5 LBS | TEMPERATURE: 98.9 F | BODY MASS INDEX: 17.85 KG/M2 | HEIGHT: 69 IN | RESPIRATION RATE: 18 BRPM

## 2019-01-12 DIAGNOSIS — K22.4 ESOPHAGEAL DYSMOTILITY: Primary | ICD-10-CM

## 2019-01-12 LAB
ABO GROUP BLD: NORMAL
ALBUMIN SERPL-MCNC: 3.1 G/DL (ref 3.5–5.2)
ALBUMIN/GLOB SERPL: 0.9 G/DL
ALP SERPL-CCNC: 97 U/L (ref 39–117)
ALT SERPL W P-5'-P-CCNC: 24 U/L (ref 1–41)
ANION GAP SERPL CALCULATED.3IONS-SCNC: 13.4 MMOL/L
APTT PPP: 33.2 SECONDS (ref 22.7–35.4)
AST SERPL-CCNC: 18 U/L (ref 1–40)
BASOPHILS # BLD AUTO: 0.01 10*3/MM3 (ref 0–0.2)
BASOPHILS NFR BLD AUTO: 0.1 % (ref 0–1.5)
BILIRUB SERPL-MCNC: 0.5 MG/DL (ref 0.1–1.2)
BLD GP AB SCN SERPL QL: NEGATIVE
BUN BLD-MCNC: 12 MG/DL (ref 8–23)
BUN/CREAT SERPL: 16.9 (ref 7–25)
CALCIUM SPEC-SCNC: 9.6 MG/DL (ref 8.6–10.5)
CHLORIDE SERPL-SCNC: 96 MMOL/L (ref 98–107)
CO2 SERPL-SCNC: 26.6 MMOL/L (ref 22–29)
CREAT BLD-MCNC: 0.71 MG/DL (ref 0.76–1.27)
DEPRECATED RDW RBC AUTO: 54.6 FL (ref 37–54)
EOSINOPHIL # BLD AUTO: 0.07 10*3/MM3 (ref 0–0.7)
EOSINOPHIL NFR BLD AUTO: 0.5 % (ref 0.3–6.2)
ERYTHROCYTE [DISTWIDTH] IN BLOOD BY AUTOMATED COUNT: 13.3 % (ref 11.5–14.5)
GFR SERPL CREATININE-BSD FRML MDRD: 110 ML/MIN/1.73
GLOBULIN UR ELPH-MCNC: 3.3 GM/DL
GLUCOSE BLD-MCNC: 99 MG/DL (ref 65–99)
HCT VFR BLD AUTO: 40.2 % (ref 40.4–52.2)
HGB BLD-MCNC: 13.4 G/DL (ref 13.7–17.6)
HOLD SPECIMEN: NORMAL
HOLD SPECIMEN: NORMAL
IMM GRANULOCYTES # BLD AUTO: 0.13 10*3/MM3 (ref 0–0.03)
IMM GRANULOCYTES NFR BLD AUTO: 1 % (ref 0–0.5)
INR PPP: 0.93 (ref 0.9–1.1)
LYMPHOCYTES # BLD AUTO: 0.91 10*3/MM3 (ref 0.9–4.8)
LYMPHOCYTES NFR BLD AUTO: 6.7 % (ref 19.6–45.3)
MCH RBC QN AUTO: 38.1 PG (ref 27–32.7)
MCHC RBC AUTO-ENTMCNC: 33.3 G/DL (ref 32.6–36.4)
MCV RBC AUTO: 114.2 FL (ref 79.8–96.2)
MONOCYTES # BLD AUTO: 1.58 10*3/MM3 (ref 0.2–1.2)
MONOCYTES NFR BLD AUTO: 11.6 % (ref 5–12)
NEUTROPHILS # BLD AUTO: 10.89 10*3/MM3 (ref 1.9–8.1)
NEUTROPHILS NFR BLD AUTO: 80.1 % (ref 42.7–76)
PLATELET # BLD AUTO: 305 10*3/MM3 (ref 140–500)
PMV BLD AUTO: 8.9 FL (ref 6–12)
POTASSIUM BLD-SCNC: 4.1 MMOL/L (ref 3.5–5.2)
PROT SERPL-MCNC: 6.4 G/DL (ref 6–8.5)
PROTHROMBIN TIME: 12.3 SECONDS (ref 11.7–14.2)
RBC # BLD AUTO: 3.52 10*6/MM3 (ref 4.6–6)
RH BLD: POSITIVE
SODIUM BLD-SCNC: 136 MMOL/L (ref 136–145)
T&S EXPIRATION DATE: NORMAL
WBC NRBC COR # BLD: 13.59 10*3/MM3 (ref 4.5–10.7)
WHOLE BLOOD HOLD SPECIMEN: NORMAL
WHOLE BLOOD HOLD SPECIMEN: NORMAL

## 2019-01-12 PROCEDURE — 80053 COMPREHEN METABOLIC PANEL: CPT | Performed by: EMERGENCY MEDICINE

## 2019-01-12 PROCEDURE — 36415 COLL VENOUS BLD VENIPUNCTURE: CPT | Performed by: EMERGENCY MEDICINE

## 2019-01-12 PROCEDURE — 85025 COMPLETE CBC W/AUTO DIFF WBC: CPT | Performed by: EMERGENCY MEDICINE

## 2019-01-12 PROCEDURE — 85610 PROTHROMBIN TIME: CPT | Performed by: EMERGENCY MEDICINE

## 2019-01-12 PROCEDURE — 85730 THROMBOPLASTIN TIME PARTIAL: CPT | Performed by: EMERGENCY MEDICINE

## 2019-01-12 PROCEDURE — 86900 BLOOD TYPING SEROLOGIC ABO: CPT | Performed by: EMERGENCY MEDICINE

## 2019-01-12 PROCEDURE — 99284 EMERGENCY DEPT VISIT MOD MDM: CPT

## 2019-01-12 PROCEDURE — 86850 RBC ANTIBODY SCREEN: CPT | Performed by: EMERGENCY MEDICINE

## 2019-01-12 PROCEDURE — 74220 X-RAY XM ESOPHAGUS 1CNTRST: CPT

## 2019-01-12 PROCEDURE — 86901 BLOOD TYPING SEROLOGIC RH(D): CPT | Performed by: EMERGENCY MEDICINE

## 2019-01-12 RX ORDER — SODIUM CHLORIDE 0.9 % (FLUSH) 0.9 %
10 SYRINGE (ML) INJECTION AS NEEDED
Status: DISCONTINUED | OUTPATIENT
Start: 2019-01-12 | End: 2019-01-12 | Stop reason: HOSPADM

## 2019-01-12 NOTE — ED PROVIDER NOTES
EMERGENCY DEPARTMENT ENCOUNTER    CHIEF COMPLAINT  Chief Complaint: Dysphagia  History given by: Patient  History limited by: None  Room Number: 20/20  PMD: Corinne Govea APRN      HPI:  Pt is a 68 y.o. male with hx of COPD, squamous cell cancer of hypopharynx s/p neck dissection 5/2018 who presents complaining of dysphagia over the past few days. Patient states he is unable to drink or eat anything without gagging and vomiting. Patient reports specs of dark red blood in his emesis. In addition to these sx, patient states he has lost 8 pounds in 5 days. Denies abdominal pain or nausea.     Duration:  Since onset  Onset: 2-3 days ago  Timing: Continutous  Location: HENT  Radiation: NA  Quality: Dysphagia  Intensity/Severity: Moderate  Associated Symptoms: Vomiting, weight loss  Aggravating Factors: None  Alleviating Factors: None  Previous Episodes: None  Treatment before arrival: None    PAST MEDICAL HISTORY  Active Ambulatory Problems     Diagnosis Date Noted   • Chronic low back pain 02/08/2016   • Lumbar radiculopathy 02/08/2016   • Spinal stenosis of lumbar region 02/08/2016   • Degeneration of intervertebral disc of lumbar region 02/08/2016   • Osteoarthritis of lumbar spine 02/08/2016   • Inflammation of sacroiliac joint (CMS/[facility]) 02/08/2016   • Chest pain 01/27/2017   • Lumbar facet arthropathy 03/28/2017   • Squamous cell carcinoma of hypopharynx (CMS/[facility]) 07/24/2017   • Throat cancer (CMS/[facility]) 07/25/2017   • Encounter for fitting and adjustment of vascular catheter 08/22/2017   • Other chronic pain 04/09/2018   • Squamous cell carcinoma 05/17/2018   • Cervicalgia 06/21/2018   • Cervical spinal stenosis 06/21/2018   • Encounter for monitoring opioid maintenance therapy 07/12/2018   • Lung metastases (CMS/[facility]) 07/27/2018   • Encounter for long-term (current) use of high-risk medication 09/07/2018   • DDD (degenerative disc disease), cervical 01/03/2019   • Cervical radiculitis 01/03/2019      Resolved Ambulatory Problems     Diagnosis Date Noted   • Squamous cell cancer of hypopharynx (CMS/H[facility]) 07/26/2017   • Cellulitis, abdominal wall 08/04/2017   • Cellulitis, wound, post-operative 08/05/2017   • Mucositis due to radiation therapy 09/07/2017     Past Medical History:   Diagnosis Date   • Asthma    • Bruises easily    • COPD (chronic obstructive pulmonary disease) (CMS/[facility])    • GERD (gastroesophageal reflux disease)    • History of anemia    • History of chemotherapy    • History of chemotherapy    • History of chest pain 02/01/2017   • Hyperlipidemia    • Hypertension    • Low back pain    • Lung cancer (CMS/[facility]) 2018   • Lung cancer (CMS/[facility])    • Neck mass    • Osteoarthritis    • Poor vision    • Squamous cell cancer of hypopharynx (CMS/[facility]) 2017   • Vision loss of left eye        PAST SURGICAL HISTORY  Past Surgical History:   Procedure Laterality Date   • ABDOMINAL SURGERY     • ELBOW ARTHROTOMY Bilateral     bursa removed   • ENDOSCOPY W/ PEG TUBE PLACEMENT N/A 7/31/2017    Procedure: ESOPHAGOGASTRODUODENOSCOPY WITH PERCUTANEOUS ENDOSCOPIC GASTROSTOMY TUBE INSERTION;  Surgeon: Raul Valenzuela MD;  Location: [facility] OR;  Service:    • EPIDURAL BLOCK     • EYE SURGERY Left 1993    HEMORRHAGE LEFT EYE   • HAND TENDON REPAIR      LEFT THUMB   • INCISION AND DRAINAGE ABSCESS Left     LEFT HAND, STREP INFECTION   • NECK DISSECTION Right 5/17/2018    Procedure: RIGHT MODIFIED RADICAL  NECK DISSECTION;  Surgeon: Clinton Styles MD;  Location: [facility];  Service: ENT   • VENOUS ACCESS DEVICE (PORT) INSERTION N/A 7/31/2017    Procedure: INSERTION VENOUS ACCESS DEVICE;  Surgeon: Raul Valenzuela MD;  Location: [facility] OR;  Service:        FAMILY HISTORY  Family History   Problem Relation Age of Onset   • Prostate cancer Father 57   • Bone cancer Father    • Heart disease Sister         cath stent placement    • Heart attack Sister    • Heart attack Brother    • Diabetes Brother    •  Heart disease Brother    • Hypertension Other    • Malig Hyperthermia Neg Hx        SOCIAL HISTORY  Social History     Socioeconomic History   • Marital status: Single     Spouse name: Not on file   • Number of children: 0   • Years of education: High school   • Highest education level: Not on file   Social Needs   • Financial resource strain: Not on file   • Food insecurity - worry: Not on file   • Food insecurity - inability: Not on file   • Transportation needs - medical: Not on file   • Transportation needs - non-medical: Not on file   Occupational History   • Occupation:  for medical supply     Employer: DISABLED   Tobacco Use   • Smoking status: Current Every Day Smoker     Packs/day: 0.25     Years: 19.00     Pack years: 4.75     Types: Cigarettes   • Smokeless tobacco: Never Used   • Tobacco comment: Pt smokes 5 or more cigarettes a day   Substance and Sexual Activity   • Alcohol use: No   • Drug use: No   • Sexual activity: Defer     Partners: Female     Birth control/protection: None   Other Topics Concern   • Not on file   Social History Narrative   • Not on file       ALLERGIES  Codeine    REVIEW OF SYSTEMS  Review of Systems   Constitutional: Positive for unexpected weight change. Negative for activity change, appetite change and fever.   HENT: Positive for trouble swallowing. Negative for congestion and sore throat.    Eyes: Negative.    Respiratory: Negative for cough and shortness of breath.    Cardiovascular: Negative for chest pain and leg swelling.   Gastrointestinal: Positive for vomiting. Negative for abdominal pain and diarrhea.   Endocrine: Negative.    Genitourinary: Negative for decreased urine volume and dysuria.   Musculoskeletal: Negative for neck pain.   Skin: Negative for rash and wound.   Allergic/Immunologic: Negative.    Neurological: Negative for weakness, numbness and headaches.   Hematological: Negative.    Psychiatric/Behavioral: Negative.    All other systems  reviewed and are negative.      PHYSICAL EXAM  ED Triage Vitals [01/12/19 1332]   Temp Heart Rate Resp BP SpO2   98.9 °F (37.2 °C) (!) 125 17 -- 98 %      Temp src Heart Rate Source Patient Position BP Location FiO2 (%)   -- -- -- -- --       Physical Exam   Constitutional: He is oriented to person, place, and time. No distress.   HENT:   Head: Normocephalic and atraumatic.   White exudate on tongue, question thrush.  No mass or bleeding to posterior oropharynx.  Tolerating secretions w/o difficulty.    Eyes: EOM are normal. Pupils are equal, round, and reactive to light.   Neck: Normal range of motion. Neck supple.   Chronic cervical changes to right side of neck. No mass noted.    Cardiovascular: Normal rate, regular rhythm and normal heart sounds.   Pulmonary/Chest: Effort normal and breath sounds normal. No respiratory distress.   Port to left upper chest- no signs of infection.    Abdominal: Soft. There is no tenderness. There is no rebound and no guarding.   Musculoskeletal: Normal range of motion. He exhibits no edema.   Neurological: He is alert and oriented to person, place, and time. He has normal sensation and normal strength.   Skin: Skin is warm and dry.   Psychiatric: Mood and affect normal.   Nursing note and vitals reviewed.      LAB RESULTS  Lab Results (last 24 hours)     Procedure Component Value Units Date/Time    POCT Urinalysis (manual dipstick) [[id number]]  (Normal) Collected:  01/12/19 1207    Specimen:  Urine Updated:  01/12/19 1207     Color Yellow     Clarity, UA Clear     Glucose, UA Negative mg/dL      Bilirubin Negative     Ketones, UA Negative     Specific Gravity  1.010     Blood, UA Negative     pH, Urine 6.5     Protein, POC Negative mg/dL      Urobilinogen, UA Normal     Leukocytes Negative     Nitrite, UA Negative    POC Glucose Once [[id number]]  (Normal) Resulted:  01/12/19 1209    Specimen:  Blood Updated:  01/12/19 1209     Glucose 97 mg/dL mg/dL     CBC & Differential  [[id number]] Collected:  01/12/19 1400    Specimen:  Blood Updated:  01/12/19 1428    Narrative:       The following orders were created for panel order CBC & Differential.  Procedure                               Abnormality         Status                     ---------                               -----------         ------                     Scan Slide[[id number]]                                                                  CBC Auto Differential[[id number]]        Abnormal            Final result                 Please view results for these tests on the individual orders.    Comprehensive Metabolic Panel [[id number]]  (Abnormal) Collected:  01/12/19 1400    Specimen:  Blood Updated:  01/12/19 1433     Glucose 99 mg/dL      BUN 12 mg/dL      Creatinine 0.71 mg/dL      Sodium 136 mmol/L      Potassium 4.1 mmol/L      Chloride 96 mmol/L      CO2 26.6 mmol/L      Calcium 9.6 mg/dL      Total Protein 6.4 g/dL      Albumin 3.10 g/dL      ALT (SGPT) 24 U/L      AST (SGOT) 18 U/L      Alkaline Phosphatase 97 U/L      Total Bilirubin 0.5 mg/dL      eGFR Non African Amer 110 mL/min/1.73      Globulin 3.3 gm/dL      A/G Ratio 0.9 g/dL      BUN/Creatinine Ratio 16.9     Anion Gap 13.4 mmol/L     Protime-INR [[id number]]  (Normal) Collected:  01/12/19 1400    Specimen:  Blood Updated:  01/12/19 1440     Protime 12.3 Seconds      INR 0.93    aPTT [[id number]]  (Normal) Collected:  01/12/19 1400    Specimen:  Blood Updated:  01/12/19 1440     PTT 33.2 seconds     CBC Auto Differential [[id number]]  (Abnormal) Collected:  01/12/19 1400    Specimen:  Blood Updated:  01/12/19 1428     WBC 13.59 10*3/mm3      RBC 3.52 10*6/mm3      Hemoglobin 13.4 g/dL      Hematocrit 40.2 %      .2 fL      MCH 38.1 pg      MCHC 33.3 g/dL      RDW 13.3 %      RDW-SD 54.6 fl      MPV 8.9 fL      Platelets 305 10*3/mm3      Neutrophil % 80.1 %      Lymphocyte % 6.7 %      Monocyte % 11.6 %      Eosinophil % 0.5 %      Basophil % 0.1 %       Immature Grans % 1.0 %      Neutrophils, Absolute 10.89 10*3/mm3      Lymphocytes, Absolute 0.91 10*3/mm3      Monocytes, Absolute 1.58 10*3/mm3      Eosinophils, Absolute 0.07 10*3/mm3      Basophils, Absolute 0.01 10*3/mm3      Immature Grans, Absolute 0.13 10*3/mm3           I ordered the above labs and reviewed the results    RADIOLOGY  FL Esophagram Complete    (Results Pending)        I ordered the above noted radiological studies. Interpreted by radiologist. Discussed with radiologist (Dr. Gore). Reviewed by me in PACS.       PROCEDURES  Procedures      PROGRESS AND CONSULTS      1355  Plan after evaluation- swallow study and labs.     1521  Dr. Gore reviewed pt's FL esophagram. Reports patient has no mechanical obstruction but does have dysmotility and does aspirate.     1525  BP- 120/87 HR- 102 Temp- 98.9 °F (37.2 °C) O2 sat- 100%  Rechecked the patient who is in NAD and is resting comfortably. Discussed swallow study results. Plan- discharge home with follow up with speech therapy, GI, and oncology.             MEDICAL DECISION MAKING  Results were reviewed/discussed with the patient and they were also made aware of online access. Pt also made aware that some labs, such as cultures, will not be resulted during ER visit and follow up with PMD is necessary.     MDM       DIAGNOSIS  Final diagnoses:   Esophageal dysmotility       DISPOSITION  Discharge home to self care     Latest Documented Vital Signs:  As of 3:23 PM  BP- 120/87 HR- 102 Temp- 98.9 °F (37.2 °C) O2 sat- 100%    --  Documentation assistance provided by sarah Munoz for Miguel Ángel Winters MD.  Information recorded by the sarah was done at my direction and has been verified and validated by me.       Kimberly Munoz  01/12/19 1523       Kimberly Munoz  01/12/19 1542       Miguel Ángel Winters MD  01/14/19 9950

## 2019-01-12 NOTE — ED NOTES
Pt to ED ambulatory with c/o difficulty swallowing, cannot keep down water, hx of throat CA. States when he spits up water or food sees small amount of blood in emesis. Reports infrequent cough with yellow sputum. Denies CP, SOA, n/v/d at this time. Pt doing chemo for CA. Sees dr dunlap. Pt has old scarring to R neck. Pt reports pain to R lower rib area but states he has broken rib. 8lb weight loss in 5 days. thrush present to tongue. Denies abd tenderness on palp      Keri Olivo, RN  01/12/19 4595       Keri Olivo, RN  01/12/19 4977

## 2019-01-15 ENCOUNTER — HOSPITAL ENCOUNTER (OUTPATIENT)
Dept: GENERAL RADIOLOGY | Facility: HOSPITAL | Age: 69
Discharge: HOME | End: 2019-01-15
Admitting: NURSE PRACTITIONER

## 2019-01-15 ENCOUNTER — PATIENT OUTREACH (OUTPATIENT)
Dept: CASE MANAGEMENT | Facility: OTHER | Age: 69
End: 2019-01-15

## 2019-01-15 ENCOUNTER — TRANSCRIBE ORDERS (OUTPATIENT)
Dept: ADMINISTRATIVE | Facility: HOSPITAL | Age: 69
End: 2019-01-15

## 2019-01-15 ENCOUNTER — EPISODE CHANGES (OUTPATIENT)
Dept: CASE MANAGEMENT | Facility: OTHER | Age: 69
End: 2019-01-15

## 2019-01-15 DIAGNOSIS — M79.5 FOREIGN BODY (FB) IN SOFT TISSUE: Primary | ICD-10-CM

## 2019-01-15 DIAGNOSIS — M79.5 FOREIGN BODY (FB) IN SOFT TISSUE: ICD-10-CM

## 2019-01-15 PROCEDURE — 73140 X-RAY EXAM OF FINGER(S): CPT

## 2019-01-17 DIAGNOSIS — C13.9 SQUAMOUS CELL CARCINOMA OF HYPOPHARYNX (HCC): ICD-10-CM

## 2019-01-17 DIAGNOSIS — Z79.899 ENCOUNTER FOR LONG-TERM (CURRENT) USE OF HIGH-RISK MEDICATION: ICD-10-CM

## 2019-01-17 DIAGNOSIS — IMO0002 SQUAMOUS CELL CARCINOMA: ICD-10-CM

## 2019-01-17 DIAGNOSIS — C14.0 THROAT CANCER (HCC): ICD-10-CM

## 2019-01-21 ENCOUNTER — APPOINTMENT (OUTPATIENT)
Dept: ONCOLOGY | Facility: HOSPITAL | Age: 69
End: 2019-01-21

## 2019-01-21 ENCOUNTER — TRANSCRIBE ORDERS (OUTPATIENT)
Dept: ADMINISTRATIVE | Facility: HOSPITAL | Age: 69
End: 2019-01-21

## 2019-01-21 ENCOUNTER — APPOINTMENT (OUTPATIENT)
Dept: ONCOLOGY | Facility: CLINIC | Age: 69
End: 2019-01-21

## 2019-01-21 ENCOUNTER — TELEPHONE (OUTPATIENT)
Dept: PAIN MEDICINE | Facility: CLINIC | Age: 69
End: 2019-01-21

## 2019-01-21 ENCOUNTER — PATIENT OUTREACH (OUTPATIENT)
Dept: CASE MANAGEMENT | Facility: OTHER | Age: 69
End: 2019-01-21

## 2019-01-21 DIAGNOSIS — C13.9 SQUAMOUS CELL CARCINOMA OF HYPOPHARYNX (HCC): ICD-10-CM

## 2019-01-21 DIAGNOSIS — C14.0 THROAT CANCER (HCC): ICD-10-CM

## 2019-01-21 DIAGNOSIS — IMO0002 SQUAMOUS CELL CARCINOMA: ICD-10-CM

## 2019-01-21 DIAGNOSIS — Z79.899 ENCOUNTER FOR LONG-TERM (CURRENT) USE OF HIGH-RISK MEDICATION: ICD-10-CM

## 2019-01-21 DIAGNOSIS — C78.00 MALIGNANT NEOPLASM METASTATIC TO LUNG, UNSPECIFIED LATERALITY (HCC): ICD-10-CM

## 2019-01-21 DIAGNOSIS — R13.19 OTHER DYSPHAGIA: Primary | ICD-10-CM

## 2019-01-21 RX ORDER — MEPERIDINE HYDROCHLORIDE 50 MG/ML
25 INJECTION INTRAMUSCULAR; INTRAVENOUS; SUBCUTANEOUS
OUTPATIENT
Start: 2019-02-11 | End: 2019-02-12

## 2019-01-21 RX ORDER — MEPERIDINE HYDROCHLORIDE 50 MG/ML
25 INJECTION INTRAMUSCULAR; INTRAVENOUS; SUBCUTANEOUS
OUTPATIENT
Start: 2019-01-28 | End: 2019-01-29

## 2019-01-21 RX ORDER — DIPHENHYDRAMINE HYDROCHLORIDE 50 MG/ML
50 INJECTION INTRAMUSCULAR; INTRAVENOUS AS NEEDED
OUTPATIENT
Start: 2019-02-11

## 2019-01-21 RX ORDER — DIPHENHYDRAMINE HYDROCHLORIDE 50 MG/ML
50 INJECTION INTRAMUSCULAR; INTRAVENOUS AS NEEDED
OUTPATIENT
Start: 2019-01-21

## 2019-01-21 RX ORDER — MEPERIDINE HYDROCHLORIDE 50 MG/ML
25 INJECTION INTRAMUSCULAR; INTRAVENOUS; SUBCUTANEOUS
OUTPATIENT
Start: 2019-01-21 | End: 2019-01-22

## 2019-01-21 RX ORDER — FAMOTIDINE 10 MG/ML
20 INJECTION, SOLUTION INTRAVENOUS ONCE
OUTPATIENT
Start: 2019-02-04

## 2019-01-21 RX ORDER — ACETAMINOPHEN 325 MG/1
650 TABLET ORAL ONCE
OUTPATIENT
Start: 2019-02-04

## 2019-01-21 RX ORDER — FAMOTIDINE 10 MG/ML
20 INJECTION, SOLUTION INTRAVENOUS ONCE
OUTPATIENT
Start: 2019-02-11

## 2019-01-21 RX ORDER — MEPERIDINE HYDROCHLORIDE 50 MG/ML
25 INJECTION INTRAMUSCULAR; INTRAVENOUS; SUBCUTANEOUS
OUTPATIENT
Start: 2019-02-04 | End: 2019-02-05

## 2019-01-21 RX ORDER — FAMOTIDINE 10 MG/ML
20 INJECTION, SOLUTION INTRAVENOUS AS NEEDED
OUTPATIENT
Start: 2019-02-04

## 2019-01-21 RX ORDER — SODIUM CHLORIDE 9 MG/ML
250 INJECTION, SOLUTION INTRAVENOUS ONCE
OUTPATIENT
Start: 2019-02-11

## 2019-01-21 RX ORDER — ACETAMINOPHEN 325 MG/1
650 TABLET ORAL ONCE
OUTPATIENT
Start: 2019-02-11

## 2019-01-21 RX ORDER — ACETAMINOPHEN 325 MG/1
650 TABLET ORAL ONCE
OUTPATIENT
Start: 2019-01-21

## 2019-01-21 RX ORDER — METHYLPREDNISOLONE SODIUM SUCCINATE 125 MG/2ML
125 INJECTION, POWDER, LYOPHILIZED, FOR SOLUTION INTRAMUSCULAR; INTRAVENOUS ONCE
OUTPATIENT
Start: 2019-02-11

## 2019-01-21 RX ORDER — FAMOTIDINE 10 MG/ML
20 INJECTION, SOLUTION INTRAVENOUS AS NEEDED
OUTPATIENT
Start: 2019-01-28

## 2019-01-21 RX ORDER — SODIUM CHLORIDE 9 MG/ML
250 INJECTION, SOLUTION INTRAVENOUS ONCE
OUTPATIENT
Start: 2019-01-28

## 2019-01-21 RX ORDER — SODIUM CHLORIDE 9 MG/ML
250 INJECTION, SOLUTION INTRAVENOUS ONCE
OUTPATIENT
Start: 2019-02-04

## 2019-01-21 RX ORDER — FAMOTIDINE 10 MG/ML
20 INJECTION, SOLUTION INTRAVENOUS ONCE
OUTPATIENT
Start: 2019-01-28

## 2019-01-21 RX ORDER — DIPHENHYDRAMINE HYDROCHLORIDE 50 MG/ML
50 INJECTION INTRAMUSCULAR; INTRAVENOUS AS NEEDED
OUTPATIENT
Start: 2019-02-04

## 2019-01-21 RX ORDER — METHYLPREDNISOLONE SODIUM SUCCINATE 125 MG/2ML
125 INJECTION, POWDER, LYOPHILIZED, FOR SOLUTION INTRAMUSCULAR; INTRAVENOUS ONCE
OUTPATIENT
Start: 2019-01-21

## 2019-01-21 RX ORDER — FAMOTIDINE 10 MG/ML
20 INJECTION, SOLUTION INTRAVENOUS ONCE
OUTPATIENT
Start: 2019-01-21

## 2019-01-21 RX ORDER — ACETAMINOPHEN 325 MG/1
650 TABLET ORAL ONCE
OUTPATIENT
Start: 2019-01-28

## 2019-01-21 RX ORDER — SODIUM CHLORIDE 9 MG/ML
250 INJECTION, SOLUTION INTRAVENOUS ONCE
OUTPATIENT
Start: 2019-01-21

## 2019-01-21 RX ORDER — METHYLPREDNISOLONE SODIUM SUCCINATE 125 MG/2ML
125 INJECTION, POWDER, LYOPHILIZED, FOR SOLUTION INTRAMUSCULAR; INTRAVENOUS ONCE
OUTPATIENT
Start: 2019-02-04

## 2019-01-21 RX ORDER — DIPHENHYDRAMINE HYDROCHLORIDE 50 MG/ML
50 INJECTION INTRAMUSCULAR; INTRAVENOUS AS NEEDED
OUTPATIENT
Start: 2019-01-28

## 2019-01-21 RX ORDER — FAMOTIDINE 10 MG/ML
20 INJECTION, SOLUTION INTRAVENOUS AS NEEDED
OUTPATIENT
Start: 2019-02-11

## 2019-01-21 RX ORDER — FAMOTIDINE 10 MG/ML
20 INJECTION, SOLUTION INTRAVENOUS AS NEEDED
OUTPATIENT
Start: 2019-01-21

## 2019-01-21 RX ORDER — METHYLPREDNISOLONE SODIUM SUCCINATE 125 MG/2ML
125 INJECTION, POWDER, LYOPHILIZED, FOR SOLUTION INTRAMUSCULAR; INTRAVENOUS ONCE
OUTPATIENT
Start: 2019-01-28

## 2019-01-21 NOTE — OUTREACH NOTE
Review of medication regimen and has initiated Valtrex and Prednisone and plans to  pain medication from his pharmacy today.  Education related to shingles and advised to defer visits to his mother at [facility] at this time and he is agreeable. Advised to schedule a follow-up appt with PCP/ARNP if symptoms worsen or no improvement.  AWV due, up to date on pneumonia vaccine, and discussed zoster vaccine in the future.

## 2019-01-21 NOTE — TELEPHONE ENCOUNTER
Pt was prescribed Percocet  mg #10 per an Urgent Care center on 1/20/19 despite being on day #18 of hydrocodone (30 day supply) today.   [facility] pharmacy was notified to fill percocet for pt per ARANZA Hannah,with his understanding that he NOT take hydrocodone while taking percocet.

## 2019-01-23 ENCOUNTER — APPOINTMENT (OUTPATIENT)
Dept: CT IMAGING | Facility: HOSPITAL | Age: 69
End: 2019-01-23

## 2019-01-23 ENCOUNTER — HOSPITAL ENCOUNTER (INPATIENT)
Facility: HOSPITAL | Age: 69
LOS: 16 days | Discharge: HOSPICE | End: 2019-02-08
Attending: EMERGENCY MEDICINE | Admitting: HOSPITALIST

## 2019-01-23 ENCOUNTER — APPOINTMENT (OUTPATIENT)
Dept: GENERAL RADIOLOGY | Facility: HOSPITAL | Age: 69
End: 2019-01-23

## 2019-01-23 DIAGNOSIS — K22.4 ESOPHAGEAL DYSMOTILITY: ICD-10-CM

## 2019-01-23 DIAGNOSIS — R50.9 FEBRILE ILLNESS: ICD-10-CM

## 2019-01-23 DIAGNOSIS — R13.10 DYSPHAGIA, UNSPECIFIED TYPE: ICD-10-CM

## 2019-01-23 DIAGNOSIS — R62.51 FAILURE TO THRIVE (0-17): ICD-10-CM

## 2019-01-23 DIAGNOSIS — Z74.09 IMPAIRED MOBILITY: ICD-10-CM

## 2019-01-23 DIAGNOSIS — B02.9 VARICELLA ZOSTER: Primary | ICD-10-CM

## 2019-01-23 DIAGNOSIS — R63.4 ABNORMAL WEIGHT LOSS: ICD-10-CM

## 2019-01-23 PROBLEM — R22.0 SWELLING OF RIGHT SIDE OF FACE: Status: ACTIVE | Noted: 2019-01-23

## 2019-01-23 PROBLEM — K21.9 GERD (GASTROESOPHAGEAL REFLUX DISEASE): Chronic | Status: ACTIVE | Noted: 2019-01-23

## 2019-01-23 PROBLEM — Z92.89 HISTORY OF IMMUNOTHERAPY: Chronic | Status: ACTIVE | Noted: 2019-01-23

## 2019-01-23 PROBLEM — C13.9 SQUAMOUS CELL CARCINOMA OF HYPOPHARYNX (HCC): Chronic | Status: ACTIVE | Noted: 2017-07-24

## 2019-01-23 PROBLEM — L03.811 CELLULITIS OF HEAD EXCEPT FACE: Status: ACTIVE | Noted: 2019-01-23

## 2019-01-23 PROBLEM — T17.900A ASPIRATION SYNDROME: Chronic | Status: ACTIVE | Noted: 2019-01-23

## 2019-01-23 LAB
ALBUMIN SERPL-MCNC: 3.4 G/DL (ref 3.5–5.2)
ALBUMIN/GLOB SERPL: 0.9 G/DL
ALP SERPL-CCNC: 88 U/L (ref 39–117)
ALT SERPL W P-5'-P-CCNC: 11 U/L (ref 1–41)
ANION GAP SERPL CALCULATED.3IONS-SCNC: 14.2 MMOL/L
AST SERPL-CCNC: 17 U/L (ref 1–40)
B PARAPERT DNA SPEC QL NAA+PROBE: NOT DETECTED
B PERT DNA SPEC QL NAA+PROBE: NOT DETECTED
BASOPHILS # BLD AUTO: 0.05 10*3/MM3 (ref 0–0.2)
BASOPHILS NFR BLD AUTO: 0.4 % (ref 0–1.5)
BILIRUB SERPL-MCNC: 0.4 MG/DL (ref 0.1–1.2)
BILIRUB UR QL STRIP: NEGATIVE
BUN BLD-MCNC: 21 MG/DL (ref 8–23)
BUN/CREAT SERPL: 20.8 (ref 7–25)
C PNEUM DNA NPH QL NAA+NON-PROBE: NOT DETECTED
CALCIUM SPEC-SCNC: 10.4 MG/DL (ref 8.6–10.5)
CHLORIDE SERPL-SCNC: 96 MMOL/L (ref 98–107)
CLARITY UR: ABNORMAL
CO2 SERPL-SCNC: 28.8 MMOL/L (ref 22–29)
COLOR UR: ABNORMAL
CREAT BLD-MCNC: 1.01 MG/DL (ref 0.76–1.27)
D-LACTATE SERPL-SCNC: 1.6 MMOL/L (ref 0.5–2)
DEPRECATED RDW RBC AUTO: 54.6 FL (ref 37–54)
EOSINOPHIL # BLD AUTO: 0.05 10*3/MM3 (ref 0–0.7)
EOSINOPHIL NFR BLD AUTO: 0.4 % (ref 0.3–6.2)
ERYTHROCYTE [DISTWIDTH] IN BLOOD BY AUTOMATED COUNT: 13 % (ref 11.5–14.5)
FLUAV H1 2009 PAND RNA NPH QL NAA+PROBE: NOT DETECTED
FLUAV H1 HA GENE NPH QL NAA+PROBE: NOT DETECTED
FLUAV H3 RNA NPH QL NAA+PROBE: NOT DETECTED
FLUAV SUBTYP SPEC NAA+PROBE: NOT DETECTED
FLUBV RNA ISLT QL NAA+PROBE: NOT DETECTED
GFR SERPL CREATININE-BSD FRML MDRD: 73 ML/MIN/1.73
GLOBULIN UR ELPH-MCNC: 3.6 GM/DL
GLUCOSE BLD-MCNC: 104 MG/DL (ref 65–99)
GLUCOSE UR STRIP-MCNC: NEGATIVE MG/DL
HADV DNA SPEC NAA+PROBE: NOT DETECTED
HCOV 229E RNA SPEC QL NAA+PROBE: NOT DETECTED
HCOV HKU1 RNA SPEC QL NAA+PROBE: NOT DETECTED
HCOV NL63 RNA SPEC QL NAA+PROBE: NOT DETECTED
HCOV OC43 RNA SPEC QL NAA+PROBE: NOT DETECTED
HCT VFR BLD AUTO: 40.4 % (ref 40.4–52.2)
HGB BLD-MCNC: 13.2 G/DL (ref 13.7–17.6)
HGB UR QL STRIP.AUTO: NEGATIVE
HMPV RNA NPH QL NAA+NON-PROBE: NOT DETECTED
HOLD SPECIMEN: NORMAL
HOLD SPECIMEN: NORMAL
HPIV1 RNA SPEC QL NAA+PROBE: NOT DETECTED
HPIV2 RNA SPEC QL NAA+PROBE: NOT DETECTED
HPIV3 RNA NPH QL NAA+PROBE: NOT DETECTED
HPIV4 P GENE NPH QL NAA+PROBE: NOT DETECTED
IMM GRANULOCYTES # BLD AUTO: 0.11 10*3/MM3 (ref 0–0.03)
IMM GRANULOCYTES NFR BLD AUTO: 0.9 % (ref 0–0.5)
KETONES UR QL STRIP: ABNORMAL
LEUKOCYTE ESTERASE UR QL STRIP.AUTO: NEGATIVE
LYMPHOCYTES # BLD AUTO: 2 10*3/MM3 (ref 0.9–4.8)
LYMPHOCYTES NFR BLD AUTO: 16.6 % (ref 19.6–45.3)
M PNEUMO IGG SER IA-ACNC: NOT DETECTED
MACROCYTES BLD QL SMEAR: NORMAL
MCH RBC QN AUTO: 37.4 PG (ref 27–32.7)
MCHC RBC AUTO-ENTMCNC: 32.7 G/DL (ref 32.6–36.4)
MCV RBC AUTO: 114.4 FL (ref 79.8–96.2)
MONOCYTES # BLD AUTO: 0.43 10*3/MM3 (ref 0.2–1.2)
MONOCYTES NFR BLD AUTO: 3.6 % (ref 5–12)
NEUTROPHILS # BLD AUTO: 9.43 10*3/MM3 (ref 1.9–8.1)
NEUTROPHILS NFR BLD AUTO: 78.1 % (ref 42.7–76)
NITRITE UR QL STRIP: NEGATIVE
PH UR STRIP.AUTO: 5.5 [PH] (ref 5–8)
PLAT MORPH BLD: NORMAL
PLATELET # BLD AUTO: 379 10*3/MM3 (ref 140–500)
PMV BLD AUTO: 9.2 FL (ref 6–12)
POTASSIUM BLD-SCNC: 4.3 MMOL/L (ref 3.5–5.2)
PROT SERPL-MCNC: 7 G/DL (ref 6–8.5)
PROT UR QL STRIP: ABNORMAL
RBC # BLD AUTO: 3.53 10*6/MM3 (ref 4.6–6)
RHINOVIRUS RNA SPEC NAA+PROBE: NOT DETECTED
RSV RNA NPH QL NAA+NON-PROBE: NOT DETECTED
SODIUM BLD-SCNC: 139 MMOL/L (ref 136–145)
SP GR UR STRIP: >=1.03 (ref 1–1.03)
STOMATOCYTES BLD QL SMEAR: NORMAL
UROBILINOGEN UR QL STRIP: ABNORMAL
WBC MORPH BLD: NORMAL
WBC NRBC COR # BLD: 12.07 10*3/MM3 (ref 4.5–10.7)
WHOLE BLOOD HOLD SPECIMEN: NORMAL
WHOLE BLOOD HOLD SPECIMEN: NORMAL

## 2019-01-23 PROCEDURE — 87486 CHLMYD PNEUM DNA AMP PROBE: CPT | Performed by: PHYSICIAN ASSISTANT

## 2019-01-23 PROCEDURE — 71045 X-RAY EXAM CHEST 1 VIEW: CPT

## 2019-01-23 PROCEDURE — 25010000002 VANCOMYCIN PER 500 MG: Performed by: INTERNAL MEDICINE

## 2019-01-23 PROCEDURE — 25010000002 HYDROMORPHONE PER 4 MG: Performed by: EMERGENCY MEDICINE

## 2019-01-23 PROCEDURE — 25010000002 ACYCLOVIR PER 5 MG: Performed by: EMERGENCY MEDICINE

## 2019-01-23 PROCEDURE — 85025 COMPLETE CBC W/AUTO DIFF WBC: CPT | Performed by: PHYSICIAN ASSISTANT

## 2019-01-23 PROCEDURE — 99285 EMERGENCY DEPT VISIT HI MDM: CPT

## 2019-01-23 PROCEDURE — 70492 CT SFT TSUE NCK W/O & W/DYE: CPT

## 2019-01-23 PROCEDURE — 87798 DETECT AGENT NOS DNA AMP: CPT | Performed by: PHYSICIAN ASSISTANT

## 2019-01-23 PROCEDURE — 83605 ASSAY OF LACTIC ACID: CPT | Performed by: PHYSICIAN ASSISTANT

## 2019-01-23 PROCEDURE — 81003 URINALYSIS AUTO W/O SCOPE: CPT | Performed by: PHYSICIAN ASSISTANT

## 2019-01-23 PROCEDURE — 80053 COMPREHEN METABOLIC PANEL: CPT | Performed by: PHYSICIAN ASSISTANT

## 2019-01-23 PROCEDURE — 85007 BL SMEAR W/DIFF WBC COUNT: CPT | Performed by: PHYSICIAN ASSISTANT

## 2019-01-23 PROCEDURE — 87581 M.PNEUMON DNA AMP PROBE: CPT | Performed by: PHYSICIAN ASSISTANT

## 2019-01-23 PROCEDURE — 25010000002 IOPAMIDOL 61 % SOLUTION: Performed by: INTERNAL MEDICINE

## 2019-01-23 PROCEDURE — 87633 RESP VIRUS 12-25 TARGETS: CPT | Performed by: PHYSICIAN ASSISTANT

## 2019-01-23 PROCEDURE — 25810000003 SODIUM CHLORIDE 0.9 % WITH KCL 20 MEQ 20-0.9 MEQ/L-% SOLUTION: Performed by: INTERNAL MEDICINE

## 2019-01-23 PROCEDURE — 87040 BLOOD CULTURE FOR BACTERIA: CPT | Performed by: PHYSICIAN ASSISTANT

## 2019-01-23 RX ORDER — SODIUM CHLORIDE AND POTASSIUM CHLORIDE 150; 900 MG/100ML; MG/100ML
125 INJECTION, SOLUTION INTRAVENOUS CONTINUOUS
Status: DISCONTINUED | OUTPATIENT
Start: 2019-01-23 | End: 2019-01-24

## 2019-01-23 RX ORDER — BUDESONIDE AND FORMOTEROL FUMARATE DIHYDRATE 160; 4.5 UG/1; UG/1
2 AEROSOL RESPIRATORY (INHALATION)
Status: DISCONTINUED | OUTPATIENT
Start: 2019-01-23 | End: 2019-02-08 | Stop reason: HOSPADM

## 2019-01-23 RX ORDER — ACETAMINOPHEN 160 MG/5ML
650 SOLUTION ORAL EVERY 6 HOURS PRN
Status: DISCONTINUED | OUTPATIENT
Start: 2019-01-23 | End: 2019-01-24

## 2019-01-23 RX ORDER — OXYCODONE AND ACETAMINOPHEN 10; 325 MG/1; MG/1
1 TABLET ORAL EVERY 6 HOURS PRN
Status: DISCONTINUED | OUTPATIENT
Start: 2019-01-23 | End: 2019-01-29

## 2019-01-23 RX ORDER — VANCOMYCIN HYDROCHLORIDE 1 G/200ML
20 INJECTION, SOLUTION INTRAVENOUS ONCE
Status: COMPLETED | OUTPATIENT
Start: 2019-01-23 | End: 2019-01-24

## 2019-01-23 RX ORDER — PANTOPRAZOLE SODIUM 40 MG/1
40 TABLET, DELAYED RELEASE ORAL EVERY MORNING
Status: DISCONTINUED | OUTPATIENT
Start: 2019-01-24 | End: 2019-01-24

## 2019-01-23 RX ORDER — CELECOXIB 200 MG/1
200 CAPSULE ORAL DAILY
Status: DISCONTINUED | OUTPATIENT
Start: 2019-01-24 | End: 2019-01-23

## 2019-01-23 RX ORDER — VANCOMYCIN HYDROCHLORIDE 1 G/200ML
1000 INJECTION, SOLUTION INTRAVENOUS EVERY 24 HOURS
Status: DISCONTINUED | OUTPATIENT
Start: 2019-01-24 | End: 2019-01-24 | Stop reason: DRUGHIGH

## 2019-01-23 RX ORDER — LEVOTHYROXINE SODIUM 0.07 MG/1
75 TABLET ORAL DAILY
Status: DISCONTINUED | OUTPATIENT
Start: 2019-01-24 | End: 2019-02-08 | Stop reason: HOSPADM

## 2019-01-23 RX ORDER — CHOLECALCIFEROL (VITAMIN D3) 125 MCG
500 CAPSULE ORAL DAILY
Status: DISCONTINUED | OUTPATIENT
Start: 2019-01-24 | End: 2019-02-08 | Stop reason: HOSPADM

## 2019-01-23 RX ORDER — HYDROMORPHONE HYDROCHLORIDE 1 MG/ML
0.5 INJECTION, SOLUTION INTRAMUSCULAR; INTRAVENOUS; SUBCUTANEOUS ONCE
Status: COMPLETED | OUTPATIENT
Start: 2019-01-23 | End: 2019-01-23

## 2019-01-23 RX ORDER — ACETAMINOPHEN 160 MG/5ML
1000 SOLUTION ORAL ONCE
Status: COMPLETED | OUTPATIENT
Start: 2019-01-23 | End: 2019-01-23

## 2019-01-23 RX ORDER — GABAPENTIN 400 MG/1
800 CAPSULE ORAL EVERY 8 HOURS SCHEDULED
Status: DISCONTINUED | OUTPATIENT
Start: 2019-01-23 | End: 2019-02-08 | Stop reason: HOSPADM

## 2019-01-23 RX ORDER — ALBUTEROL SULFATE 2.5 MG/3ML
2.5 SOLUTION RESPIRATORY (INHALATION) EVERY 6 HOURS PRN
Status: DISCONTINUED | OUTPATIENT
Start: 2019-01-23 | End: 2019-02-08 | Stop reason: HOSPADM

## 2019-01-23 RX ADMIN — FLUORESCEIN SODIUM 1 STRIP: 1 STRIP OPHTHALMIC at 17:32

## 2019-01-23 RX ADMIN — IOPAMIDOL 75 ML: 612 INJECTION, SOLUTION INTRAVENOUS at 20:45

## 2019-01-23 RX ADMIN — GABAPENTIN 800 MG: 400 CAPSULE ORAL at 22:41

## 2019-01-23 RX ADMIN — HYDROMORPHONE HYDROCHLORIDE 0.5 MG: 1 INJECTION, SOLUTION INTRAMUSCULAR; INTRAVENOUS; SUBCUTANEOUS at 16:23

## 2019-01-23 RX ADMIN — SODIUM CHLORIDE, POTASSIUM CHLORIDE, SODIUM LACTATE AND CALCIUM CHLORIDE 1000 ML: 600; 310; 30; 20 INJECTION, SOLUTION INTRAVENOUS at 15:16

## 2019-01-23 RX ADMIN — POTASSIUM CHLORIDE AND SODIUM CHLORIDE 125 ML/HR: 900; 150 INJECTION, SOLUTION INTRAVENOUS at 22:24

## 2019-01-23 RX ADMIN — VANCOMYCIN HYDROCHLORIDE 1000 MG: 1 INJECTION, SOLUTION INTRAVENOUS at 22:24

## 2019-01-23 RX ADMIN — ACETAMINOPHEN 1000 MG: 160 SOLUTION ORAL at 15:32

## 2019-01-23 RX ADMIN — ACYCLOVIR SODIUM 480 MG: 50 INJECTION, SOLUTION INTRAVENOUS at 17:10

## 2019-01-24 ENCOUNTER — APPOINTMENT (OUTPATIENT)
Dept: GENERAL RADIOLOGY | Facility: HOSPITAL | Age: 69
End: 2019-01-24
Attending: INTERNAL MEDICINE

## 2019-01-24 ENCOUNTER — TELEPHONE (OUTPATIENT)
Dept: ONCOLOGY | Facility: CLINIC | Age: 69
End: 2019-01-24

## 2019-01-24 ENCOUNTER — APPOINTMENT (OUTPATIENT)
Dept: GENERAL RADIOLOGY | Facility: HOSPITAL | Age: 69
End: 2019-01-24
Attending: OTOLARYNGOLOGY

## 2019-01-24 PROBLEM — Z72.0 TOBACCO ABUSE: Chronic | Status: ACTIVE | Noted: 2019-01-24

## 2019-01-24 LAB
CREAT BLD-MCNC: 0.62 MG/DL (ref 0.76–1.27)
GFR SERPL CREATININE-BSD FRML MDRD: 129 ML/MIN/1.73

## 2019-01-24 PROCEDURE — 94799 UNLISTED PULMONARY SVC/PX: CPT

## 2019-01-24 PROCEDURE — 25010000002 VANCOMYCIN PER 500 MG: Performed by: INTERNAL MEDICINE

## 2019-01-24 PROCEDURE — 92610 EVALUATE SWALLOWING FUNCTION: CPT

## 2019-01-24 PROCEDURE — 82565 ASSAY OF CREATININE: CPT | Performed by: INTERNAL MEDICINE

## 2019-01-24 PROCEDURE — 99222 1ST HOSP IP/OBS MODERATE 55: CPT | Performed by: INTERNAL MEDICINE

## 2019-01-24 PROCEDURE — 94640 AIRWAY INHALATION TREATMENT: CPT

## 2019-01-24 PROCEDURE — 99223 1ST HOSP IP/OBS HIGH 75: CPT | Performed by: INTERNAL MEDICINE

## 2019-01-24 PROCEDURE — 25810000003 SODIUM CHLORIDE 0.9 % WITH KCL 20 MEQ 20-0.9 MEQ/L-% SOLUTION: Performed by: INTERNAL MEDICINE

## 2019-01-24 RX ORDER — VALACYCLOVIR HYDROCHLORIDE 500 MG/1
1000 TABLET, FILM COATED ORAL EVERY 8 HOURS SCHEDULED
Status: COMPLETED | OUTPATIENT
Start: 2019-01-24 | End: 2019-01-27

## 2019-01-24 RX ORDER — SODIUM CHLORIDE 9 MG/ML
100 INJECTION, SOLUTION INTRAVENOUS CONTINUOUS
Status: DISCONTINUED | OUTPATIENT
Start: 2019-01-24 | End: 2019-01-24

## 2019-01-24 RX ORDER — ACETAMINOPHEN 160 MG/5ML
650 SOLUTION ORAL EVERY 6 HOURS PRN
Status: DISCONTINUED | OUTPATIENT
Start: 2019-01-24 | End: 2019-02-08 | Stop reason: HOSPADM

## 2019-01-24 RX ORDER — NICOTINE 21 MG/24HR
1 PATCH, TRANSDERMAL 24 HOURS TRANSDERMAL
Status: DISCONTINUED | OUTPATIENT
Start: 2019-01-24 | End: 2019-02-08 | Stop reason: HOSPADM

## 2019-01-24 RX ORDER — ACETAMINOPHEN 325 MG/1
650 TABLET ORAL EVERY 6 HOURS PRN
Status: DISCONTINUED | OUTPATIENT
Start: 2019-01-24 | End: 2019-02-08 | Stop reason: HOSPADM

## 2019-01-24 RX ORDER — FAMOTIDINE 10 MG/ML
20 INJECTION, SOLUTION INTRAVENOUS EVERY 12 HOURS SCHEDULED
Status: DISCONTINUED | OUTPATIENT
Start: 2019-01-24 | End: 2019-02-01 | Stop reason: CLARIF

## 2019-01-24 RX ORDER — SODIUM CHLORIDE 9 MG/ML
50 INJECTION, SOLUTION INTRAVENOUS CONTINUOUS
Status: DISCONTINUED | OUTPATIENT
Start: 2019-01-24 | End: 2019-01-28

## 2019-01-24 RX ADMIN — BUDESONIDE AND FORMOTEROL FUMARATE DIHYDRATE 2 PUFF: 160; 4.5 AEROSOL RESPIRATORY (INHALATION) at 08:13

## 2019-01-24 RX ADMIN — LEVOTHYROXINE SODIUM 75 MCG: 75 TABLET ORAL at 08:32

## 2019-01-24 RX ADMIN — VALACYCLOVIR 1000 MG: 500 TABLET, FILM COATED ORAL at 16:51

## 2019-01-24 RX ADMIN — FAMOTIDINE 20 MG: 10 INJECTION INTRAVENOUS at 03:20

## 2019-01-24 RX ADMIN — GABAPENTIN 800 MG: 400 CAPSULE ORAL at 07:08

## 2019-01-24 RX ADMIN — GABAPENTIN 800 MG: 400 CAPSULE ORAL at 16:51

## 2019-01-24 RX ADMIN — BUDESONIDE AND FORMOTEROL FUMARATE DIHYDRATE 2 PUFF: 160; 4.5 AEROSOL RESPIRATORY (INHALATION) at 21:52

## 2019-01-24 RX ADMIN — GABAPENTIN 800 MG: 400 CAPSULE ORAL at 21:56

## 2019-01-24 RX ADMIN — VANCOMYCIN HYDROCHLORIDE 500 MG: 500 INJECTION, POWDER, LYOPHILIZED, FOR SOLUTION INTRAVENOUS at 12:51

## 2019-01-24 RX ADMIN — VALACYCLOVIR 1000 MG: 500 TABLET, FILM COATED ORAL at 21:56

## 2019-01-24 RX ADMIN — FAMOTIDINE 20 MG: 10 INJECTION INTRAVENOUS at 21:57

## 2019-01-24 RX ADMIN — PANTOPRAZOLE SODIUM 40 MG: 40 TABLET, DELAYED RELEASE ORAL at 07:08

## 2019-01-24 RX ADMIN — SODIUM CHLORIDE 100 ML/HR: 9 INJECTION, SOLUTION INTRAVENOUS at 12:43

## 2019-01-24 RX ADMIN — VANCOMYCIN HYDROCHLORIDE 500 MG: 500 INJECTION, POWDER, LYOPHILIZED, FOR SOLUTION INTRAVENOUS at 22:30

## 2019-01-24 RX ADMIN — POTASSIUM CHLORIDE AND SODIUM CHLORIDE 125 ML/HR: 900; 150 INJECTION, SOLUTION INTRAVENOUS at 08:32

## 2019-01-24 RX ADMIN — ACETAMINOPHEN 650 MG: 325 TABLET, FILM COATED ORAL at 20:21

## 2019-01-24 RX ADMIN — Medication 500 MCG: at 08:33

## 2019-01-24 RX ADMIN — OXYCODONE HYDROCHLORIDE AND ACETAMINOPHEN 1 TABLET: 10; 325 TABLET ORAL at 02:03

## 2019-01-24 RX ADMIN — FAMOTIDINE 20 MG: 10 INJECTION INTRAVENOUS at 08:32

## 2019-01-24 RX ADMIN — NICOTINE 1 PATCH: 14 PATCH, EXTENDED RELEASE TRANSDERMAL at 08:32

## 2019-01-24 NOTE — TELEPHONE ENCOUNTER
----- Message from Kelsy Stanford sent at 1/24/2019 10:55 AM EST -----  Contact: [phone]  Pt is going for procedure to have tube put nose . Pt just wanted to let Dr. Garcia know.

## 2019-01-24 NOTE — TELEPHONE ENCOUNTER
"Pt. Called to let us know he was admitted to the hospital on Tuesday 1/22/19.  [facility] to [facility] due to pain with shingles and \"swollen throat.\"  Pt. instructed to let the admitting md aware that he would like to see our md's while he is hospitalized.  States he will let them know.  Informed pt. Dr. Rainey and dr. Braxton are rounding this week at the hospital.  V/u.  "

## 2019-01-25 ENCOUNTER — APPOINTMENT (OUTPATIENT)
Dept: GENERAL RADIOLOGY | Facility: HOSPITAL | Age: 69
End: 2019-01-25
Attending: HOSPITALIST

## 2019-01-25 LAB
ANION GAP SERPL CALCULATED.3IONS-SCNC: 11.8 MMOL/L
BUN BLD-MCNC: 8 MG/DL (ref 8–23)
BUN/CREAT SERPL: 16 (ref 7–25)
CALCIUM SPEC-SCNC: 9.3 MG/DL (ref 8.6–10.5)
CHLORIDE SERPL-SCNC: 103 MMOL/L (ref 98–107)
CO2 SERPL-SCNC: 24.2 MMOL/L (ref 22–29)
CREAT BLD-MCNC: 0.5 MG/DL (ref 0.76–1.27)
DEPRECATED RDW RBC AUTO: 54.8 FL (ref 37–54)
ERYTHROCYTE [DISTWIDTH] IN BLOOD BY AUTOMATED COUNT: 12.9 % (ref 11.5–14.5)
GFR SERPL CREATININE-BSD FRML MDRD: >150 ML/MIN/1.73
GLUCOSE BLD-MCNC: 91 MG/DL (ref 65–99)
HCT VFR BLD AUTO: 34.1 % (ref 40.4–52.2)
HGB BLD-MCNC: 11.1 G/DL (ref 13.7–17.6)
MCH RBC QN AUTO: 37.8 PG (ref 27–32.7)
MCHC RBC AUTO-ENTMCNC: 32.6 G/DL (ref 32.6–36.4)
MCV RBC AUTO: 116 FL (ref 79.8–96.2)
PLATELET # BLD AUTO: 275 10*3/MM3 (ref 140–500)
PMV BLD AUTO: 9.4 FL (ref 6–12)
POTASSIUM BLD-SCNC: 3.9 MMOL/L (ref 3.5–5.2)
RBC # BLD AUTO: 2.94 10*6/MM3 (ref 4.6–6)
SODIUM BLD-SCNC: 139 MMOL/L (ref 136–145)
WBC NRBC COR # BLD: 10.17 10*3/MM3 (ref 4.5–10.7)

## 2019-01-25 PROCEDURE — 80048 BASIC METABOLIC PNL TOTAL CA: CPT | Performed by: HOSPITALIST

## 2019-01-25 PROCEDURE — 85027 COMPLETE CBC AUTOMATED: CPT | Performed by: HOSPITALIST

## 2019-01-25 PROCEDURE — 94799 UNLISTED PULMONARY SVC/PX: CPT

## 2019-01-25 PROCEDURE — 99232 SBSQ HOSP IP/OBS MODERATE 35: CPT | Performed by: INTERNAL MEDICINE

## 2019-01-25 PROCEDURE — 92526 ORAL FUNCTION THERAPY: CPT

## 2019-01-25 RX ADMIN — GABAPENTIN 800 MG: 400 CAPSULE ORAL at 13:48

## 2019-01-25 RX ADMIN — FAMOTIDINE 20 MG: 10 INJECTION INTRAVENOUS at 09:20

## 2019-01-25 RX ADMIN — BUDESONIDE AND FORMOTEROL FUMARATE DIHYDRATE 2 PUFF: 160; 4.5 AEROSOL RESPIRATORY (INHALATION) at 20:37

## 2019-01-25 RX ADMIN — FAMOTIDINE 20 MG: 10 INJECTION INTRAVENOUS at 20:41

## 2019-01-25 RX ADMIN — OXYCODONE HYDROCHLORIDE AND ACETAMINOPHEN 1 TABLET: 10; 325 TABLET ORAL at 20:46

## 2019-01-25 RX ADMIN — VALACYCLOVIR 1000 MG: 500 TABLET, FILM COATED ORAL at 13:48

## 2019-01-25 RX ADMIN — VALACYCLOVIR 1000 MG: 500 TABLET, FILM COATED ORAL at 22:00

## 2019-01-25 RX ADMIN — OXYCODONE HYDROCHLORIDE AND ACETAMINOPHEN 1 TABLET: 10; 325 TABLET ORAL at 09:31

## 2019-01-25 RX ADMIN — BUDESONIDE AND FORMOTEROL FUMARATE DIHYDRATE 2 PUFF: 160; 4.5 AEROSOL RESPIRATORY (INHALATION) at 07:55

## 2019-01-25 RX ADMIN — LEVOTHYROXINE SODIUM 75 MCG: 75 TABLET ORAL at 09:20

## 2019-01-25 RX ADMIN — GABAPENTIN 800 MG: 400 CAPSULE ORAL at 22:00

## 2019-01-25 RX ADMIN — GABAPENTIN 800 MG: 400 CAPSULE ORAL at 06:45

## 2019-01-25 RX ADMIN — Medication 500 MCG: at 09:20

## 2019-01-25 RX ADMIN — SODIUM CHLORIDE 50 ML/HR: 9 INJECTION, SOLUTION INTRAVENOUS at 06:45

## 2019-01-25 RX ADMIN — VALACYCLOVIR 1000 MG: 500 TABLET, FILM COATED ORAL at 06:45

## 2019-01-25 RX ADMIN — NICOTINE 1 PATCH: 14 PATCH, EXTENDED RELEASE TRANSDERMAL at 09:22

## 2019-01-25 RX ADMIN — SODIUM CHLORIDE 50 ML/HR: 9 INJECTION, SOLUTION INTRAVENOUS at 01:01

## 2019-01-26 PROCEDURE — 99232 SBSQ HOSP IP/OBS MODERATE 35: CPT | Performed by: INTERNAL MEDICINE

## 2019-01-26 PROCEDURE — 94799 UNLISTED PULMONARY SVC/PX: CPT

## 2019-01-26 PROCEDURE — 97161 PT EVAL LOW COMPLEX 20 MIN: CPT | Performed by: PHYSICAL THERAPIST

## 2019-01-26 RX ADMIN — OXYCODONE HYDROCHLORIDE AND ACETAMINOPHEN 1 TABLET: 10; 325 TABLET ORAL at 21:05

## 2019-01-26 RX ADMIN — SODIUM CHLORIDE 50 ML/HR: 9 INJECTION, SOLUTION INTRAVENOUS at 21:15

## 2019-01-26 RX ADMIN — GABAPENTIN 800 MG: 400 CAPSULE ORAL at 13:50

## 2019-01-26 RX ADMIN — GABAPENTIN 800 MG: 400 CAPSULE ORAL at 07:00

## 2019-01-26 RX ADMIN — VALACYCLOVIR 1000 MG: 500 TABLET, FILM COATED ORAL at 07:00

## 2019-01-26 RX ADMIN — FAMOTIDINE 20 MG: 10 INJECTION INTRAVENOUS at 20:56

## 2019-01-26 RX ADMIN — OXYCODONE HYDROCHLORIDE AND ACETAMINOPHEN 1 TABLET: 10; 325 TABLET ORAL at 14:55

## 2019-01-26 RX ADMIN — VALACYCLOVIR 1000 MG: 500 TABLET, FILM COATED ORAL at 21:00

## 2019-01-26 RX ADMIN — NICOTINE 1 PATCH: 14 PATCH, EXTENDED RELEASE TRANSDERMAL at 09:56

## 2019-01-26 RX ADMIN — LEVOTHYROXINE SODIUM 75 MCG: 75 TABLET ORAL at 09:57

## 2019-01-26 RX ADMIN — BUDESONIDE AND FORMOTEROL FUMARATE DIHYDRATE 2 PUFF: 160; 4.5 AEROSOL RESPIRATORY (INHALATION) at 19:08

## 2019-01-26 RX ADMIN — Medication 500 MCG: at 09:57

## 2019-01-26 RX ADMIN — FAMOTIDINE 20 MG: 10 INJECTION INTRAVENOUS at 09:57

## 2019-01-26 RX ADMIN — BUDESONIDE AND FORMOTEROL FUMARATE DIHYDRATE 2 PUFF: 160; 4.5 AEROSOL RESPIRATORY (INHALATION) at 09:08

## 2019-01-26 RX ADMIN — VALACYCLOVIR 1000 MG: 500 TABLET, FILM COATED ORAL at 13:50

## 2019-01-26 RX ADMIN — GABAPENTIN 800 MG: 400 CAPSULE ORAL at 21:00

## 2019-01-27 LAB
ANION GAP SERPL CALCULATED.3IONS-SCNC: 10.4 MMOL/L
BASOPHILS # BLD AUTO: 0.02 10*3/MM3 (ref 0–0.2)
BASOPHILS NFR BLD AUTO: 0.2 % (ref 0–1.5)
BUN BLD-MCNC: 10 MG/DL (ref 8–23)
BUN/CREAT SERPL: 20 (ref 7–25)
CALCIUM SPEC-SCNC: 8.6 MG/DL (ref 8.6–10.5)
CHLORIDE SERPL-SCNC: 105 MMOL/L (ref 98–107)
CO2 SERPL-SCNC: 23.6 MMOL/L (ref 22–29)
CREAT BLD-MCNC: 0.5 MG/DL (ref 0.76–1.27)
DEPRECATED RDW RBC AUTO: 54.4 FL (ref 37–54)
EOSINOPHIL # BLD AUTO: 0.28 10*3/MM3 (ref 0–0.7)
EOSINOPHIL NFR BLD AUTO: 3.4 % (ref 0.3–6.2)
ERYTHROCYTE [DISTWIDTH] IN BLOOD BY AUTOMATED COUNT: 13.1 % (ref 11.5–14.5)
GFR SERPL CREATININE-BSD FRML MDRD: >150 ML/MIN/1.73
GLUCOSE BLD-MCNC: 118 MG/DL (ref 65–99)
HCT VFR BLD AUTO: 28.7 % (ref 40.4–52.2)
HGB BLD-MCNC: 9.4 G/DL (ref 13.7–17.6)
IMM GRANULOCYTES # BLD AUTO: 0.03 10*3/MM3 (ref 0–0.03)
IMM GRANULOCYTES NFR BLD AUTO: 0.4 % (ref 0–0.5)
LYMPHOCYTES # BLD AUTO: 0.77 10*3/MM3 (ref 0.9–4.8)
LYMPHOCYTES NFR BLD AUTO: 9.3 % (ref 19.6–45.3)
MCH RBC QN AUTO: 37.3 PG (ref 27–32.7)
MCHC RBC AUTO-ENTMCNC: 32.8 G/DL (ref 32.6–36.4)
MCV RBC AUTO: 113.9 FL (ref 79.8–96.2)
MONOCYTES # BLD AUTO: 0.74 10*3/MM3 (ref 0.2–1.2)
MONOCYTES NFR BLD AUTO: 8.9 % (ref 5–12)
NEUTROPHILS # BLD AUTO: 6.43 10*3/MM3 (ref 1.9–8.1)
NEUTROPHILS NFR BLD AUTO: 77.8 % (ref 42.7–76)
PLATELET # BLD AUTO: 289 10*3/MM3 (ref 140–500)
PMV BLD AUTO: 9.4 FL (ref 6–12)
POTASSIUM BLD-SCNC: 3.7 MMOL/L (ref 3.5–5.2)
RBC # BLD AUTO: 2.52 10*6/MM3 (ref 4.6–6)
SODIUM BLD-SCNC: 139 MMOL/L (ref 136–145)
WBC NRBC COR # BLD: 8.27 10*3/MM3 (ref 4.5–10.7)

## 2019-01-27 PROCEDURE — 85025 COMPLETE CBC W/AUTO DIFF WBC: CPT | Performed by: INTERNAL MEDICINE

## 2019-01-27 PROCEDURE — 94799 UNLISTED PULMONARY SVC/PX: CPT

## 2019-01-27 PROCEDURE — 99232 SBSQ HOSP IP/OBS MODERATE 35: CPT | Performed by: INTERNAL MEDICINE

## 2019-01-27 PROCEDURE — 80048 BASIC METABOLIC PNL TOTAL CA: CPT | Performed by: INTERNAL MEDICINE

## 2019-01-27 PROCEDURE — 97110 THERAPEUTIC EXERCISES: CPT | Performed by: PHYSICAL THERAPIST

## 2019-01-27 RX ADMIN — OXYCODONE HYDROCHLORIDE AND ACETAMINOPHEN 1 TABLET: 10; 325 TABLET ORAL at 15:36

## 2019-01-27 RX ADMIN — BUDESONIDE AND FORMOTEROL FUMARATE DIHYDRATE 2 PUFF: 160; 4.5 AEROSOL RESPIRATORY (INHALATION) at 21:30

## 2019-01-27 RX ADMIN — GABAPENTIN 800 MG: 400 CAPSULE ORAL at 13:50

## 2019-01-27 RX ADMIN — VALACYCLOVIR 1000 MG: 500 TABLET, FILM COATED ORAL at 06:44

## 2019-01-27 RX ADMIN — Medication 500 MCG: at 09:01

## 2019-01-27 RX ADMIN — FAMOTIDINE 20 MG: 10 INJECTION INTRAVENOUS at 22:38

## 2019-01-27 RX ADMIN — FAMOTIDINE 20 MG: 10 INJECTION INTRAVENOUS at 09:01

## 2019-01-27 RX ADMIN — GABAPENTIN 800 MG: 400 CAPSULE ORAL at 22:38

## 2019-01-27 RX ADMIN — VALACYCLOVIR 1000 MG: 500 TABLET, FILM COATED ORAL at 14:36

## 2019-01-27 RX ADMIN — NICOTINE 1 PATCH: 14 PATCH, EXTENDED RELEASE TRANSDERMAL at 09:04

## 2019-01-27 RX ADMIN — BUDESONIDE AND FORMOTEROL FUMARATE DIHYDRATE 2 PUFF: 160; 4.5 AEROSOL RESPIRATORY (INHALATION) at 09:06

## 2019-01-27 RX ADMIN — SODIUM CHLORIDE 50 ML/HR: 9 INJECTION, SOLUTION INTRAVENOUS at 14:36

## 2019-01-27 RX ADMIN — LEVOTHYROXINE SODIUM 75 MCG: 75 TABLET ORAL at 09:01

## 2019-01-27 RX ADMIN — OXYCODONE HYDROCHLORIDE AND ACETAMINOPHEN 1 TABLET: 10; 325 TABLET ORAL at 09:10

## 2019-01-27 RX ADMIN — GABAPENTIN 800 MG: 400 CAPSULE ORAL at 06:44

## 2019-01-28 ENCOUNTER — APPOINTMENT (OUTPATIENT)
Dept: GENERAL RADIOLOGY | Facility: HOSPITAL | Age: 69
End: 2019-01-28
Attending: INTERNAL MEDICINE

## 2019-01-28 LAB
ALBUMIN SERPL-MCNC: 2.5 G/DL (ref 3.5–5.2)
ALBUMIN/GLOB SERPL: 0.9 G/DL
ALP SERPL-CCNC: 52 U/L (ref 39–117)
ALT SERPL W P-5'-P-CCNC: 16 U/L (ref 1–41)
ANION GAP SERPL CALCULATED.3IONS-SCNC: 9.8 MMOL/L
AST SERPL-CCNC: 14 U/L (ref 1–40)
BACTERIA SPEC AEROBE CULT: NORMAL
BACTERIA SPEC AEROBE CULT: NORMAL
BASOPHILS # BLD AUTO: 0.02 10*3/MM3 (ref 0–0.2)
BASOPHILS NFR BLD AUTO: 0.2 % (ref 0–1.5)
BILIRUB SERPL-MCNC: 0.2 MG/DL (ref 0.1–1.2)
BUN BLD-MCNC: 9 MG/DL (ref 8–23)
BUN/CREAT SERPL: 16.7 (ref 7–25)
CALCIUM SPEC-SCNC: 8.6 MG/DL (ref 8.6–10.5)
CHLORIDE SERPL-SCNC: 100 MMOL/L (ref 98–107)
CO2 SERPL-SCNC: 26.2 MMOL/L (ref 22–29)
CREAT BLD-MCNC: 0.54 MG/DL (ref 0.76–1.27)
DEPRECATED RDW RBC AUTO: 52.1 FL (ref 37–54)
EOSINOPHIL # BLD AUTO: 0.28 10*3/MM3 (ref 0–0.7)
EOSINOPHIL NFR BLD AUTO: 3 % (ref 0.3–6.2)
ERYTHROCYTE [DISTWIDTH] IN BLOOD BY AUTOMATED COUNT: 12.7 % (ref 11.5–14.5)
GFR SERPL CREATININE-BSD FRML MDRD: >150 ML/MIN/1.73
GLOBULIN UR ELPH-MCNC: 2.8 GM/DL
GLUCOSE BLD-MCNC: 131 MG/DL (ref 65–99)
HCT VFR BLD AUTO: 26.9 % (ref 40.4–52.2)
HGB BLD-MCNC: 8.9 G/DL (ref 13.7–17.6)
IMM GRANULOCYTES # BLD AUTO: 0.04 10*3/MM3 (ref 0–0.03)
IMM GRANULOCYTES NFR BLD AUTO: 0.4 % (ref 0–0.5)
LYMPHOCYTES # BLD AUTO: 0.87 10*3/MM3 (ref 0.9–4.8)
LYMPHOCYTES NFR BLD AUTO: 9.2 % (ref 19.6–45.3)
MCH RBC QN AUTO: 37.1 PG (ref 27–32.7)
MCHC RBC AUTO-ENTMCNC: 33.1 G/DL (ref 32.6–36.4)
MCV RBC AUTO: 112.1 FL (ref 79.8–96.2)
MONOCYTES # BLD AUTO: 0.75 10*3/MM3 (ref 0.2–1.2)
MONOCYTES NFR BLD AUTO: 7.9 % (ref 5–12)
NEUTROPHILS # BLD AUTO: 7.49 10*3/MM3 (ref 1.9–8.1)
NEUTROPHILS NFR BLD AUTO: 79.3 % (ref 42.7–76)
PLATELET # BLD AUTO: 293 10*3/MM3 (ref 140–500)
PMV BLD AUTO: 9.4 FL (ref 6–12)
POTASSIUM BLD-SCNC: 4.1 MMOL/L (ref 3.5–5.2)
PROCALCITONIN SERPL-MCNC: 0.59 NG/ML (ref 0.1–0.25)
PROT SERPL-MCNC: 5.3 G/DL (ref 6–8.5)
RBC # BLD AUTO: 2.4 10*6/MM3 (ref 4.6–6)
SODIUM BLD-SCNC: 136 MMOL/L (ref 136–145)
WBC NRBC COR # BLD: 9.45 10*3/MM3 (ref 4.5–10.7)

## 2019-01-28 PROCEDURE — 99233 SBSQ HOSP IP/OBS HIGH 50: CPT | Performed by: INTERNAL MEDICINE

## 2019-01-28 PROCEDURE — 94799 UNLISTED PULMONARY SVC/PX: CPT

## 2019-01-28 PROCEDURE — 77334 RADIATION TREATMENT AID(S): CPT | Performed by: RADIOLOGY

## 2019-01-28 PROCEDURE — 77290 THER RAD SIMULAJ FIELD CPLX: CPT | Performed by: RADIOLOGY

## 2019-01-28 PROCEDURE — 77263 THER RADIOLOGY TX PLNG CPLX: CPT | Performed by: RADIOLOGY

## 2019-01-28 PROCEDURE — 71046 X-RAY EXAM CHEST 2 VIEWS: CPT

## 2019-01-28 PROCEDURE — 85025 COMPLETE CBC W/AUTO DIFF WBC: CPT | Performed by: INTERNAL MEDICINE

## 2019-01-28 PROCEDURE — 99222 1ST HOSP IP/OBS MODERATE 55: CPT | Performed by: RADIOLOGY

## 2019-01-28 PROCEDURE — 84145 PROCALCITONIN (PCT): CPT | Performed by: INTERNAL MEDICINE

## 2019-01-28 PROCEDURE — 80053 COMPREHEN METABOLIC PANEL: CPT | Performed by: INTERNAL MEDICINE

## 2019-01-28 PROCEDURE — 99232 SBSQ HOSP IP/OBS MODERATE 35: CPT | Performed by: INTERNAL MEDICINE

## 2019-01-28 PROCEDURE — 87040 BLOOD CULTURE FOR BACTERIA: CPT | Performed by: INTERNAL MEDICINE

## 2019-01-28 RX ORDER — SENNOSIDES 8.6 MG
1 TABLET ORAL NIGHTLY PRN
Status: DISCONTINUED | OUTPATIENT
Start: 2019-01-28 | End: 2019-02-08 | Stop reason: HOSPADM

## 2019-01-28 RX ADMIN — Medication 500 MCG: at 09:04

## 2019-01-28 RX ADMIN — OXYCODONE HYDROCHLORIDE AND ACETAMINOPHEN 1 TABLET: 10; 325 TABLET ORAL at 19:38

## 2019-01-28 RX ADMIN — LEVOTHYROXINE SODIUM 75 MCG: 75 TABLET ORAL at 09:04

## 2019-01-28 RX ADMIN — GABAPENTIN 800 MG: 400 CAPSULE ORAL at 22:43

## 2019-01-28 RX ADMIN — NICOTINE 1 PATCH: 14 PATCH, EXTENDED RELEASE TRANSDERMAL at 09:06

## 2019-01-28 RX ADMIN — BUDESONIDE AND FORMOTEROL FUMARATE DIHYDRATE 2 PUFF: 160; 4.5 AEROSOL RESPIRATORY (INHALATION) at 07:39

## 2019-01-28 RX ADMIN — FAMOTIDINE 20 MG: 10 INJECTION INTRAVENOUS at 09:04

## 2019-01-28 RX ADMIN — OXYCODONE HYDROCHLORIDE AND ACETAMINOPHEN 1 TABLET: 10; 325 TABLET ORAL at 11:53

## 2019-01-28 RX ADMIN — GABAPENTIN 800 MG: 400 CAPSULE ORAL at 06:19

## 2019-01-28 RX ADMIN — ACETAMINOPHEN 650 MG: 325 TABLET, FILM COATED ORAL at 09:05

## 2019-01-28 RX ADMIN — OXYCODONE HYDROCHLORIDE AND ACETAMINOPHEN 1 TABLET: 10; 325 TABLET ORAL at 01:05

## 2019-01-28 RX ADMIN — BUDESONIDE AND FORMOTEROL FUMARATE DIHYDRATE 2 PUFF: 160; 4.5 AEROSOL RESPIRATORY (INHALATION) at 23:31

## 2019-01-28 RX ADMIN — GABAPENTIN 800 MG: 400 CAPSULE ORAL at 14:58

## 2019-01-28 RX ADMIN — FAMOTIDINE 20 MG: 10 INJECTION INTRAVENOUS at 22:43

## 2019-01-29 ENCOUNTER — APPOINTMENT (OUTPATIENT)
Dept: GENERAL RADIOLOGY | Facility: HOSPITAL | Age: 69
End: 2019-01-29
Attending: INTERNAL MEDICINE

## 2019-01-29 PROBLEM — R62.51 FAILURE TO THRIVE (0-17): Status: ACTIVE | Noted: 2019-01-23

## 2019-01-29 LAB
ANION GAP SERPL CALCULATED.3IONS-SCNC: 10.3 MMOL/L
BASOPHILS # BLD AUTO: 0.02 10*3/MM3 (ref 0–0.2)
BASOPHILS NFR BLD AUTO: 0.2 % (ref 0–1.5)
BUN BLD-MCNC: 13 MG/DL (ref 8–23)
BUN/CREAT SERPL: 22 (ref 7–25)
CALCIUM SPEC-SCNC: 9.2 MG/DL (ref 8.6–10.5)
CHLORIDE SERPL-SCNC: 96 MMOL/L (ref 98–107)
CO2 SERPL-SCNC: 27.7 MMOL/L (ref 22–29)
CREAT BLD-MCNC: 0.59 MG/DL (ref 0.76–1.27)
DEPRECATED RDW RBC AUTO: 53 FL (ref 37–54)
EOSINOPHIL # BLD AUTO: 0.19 10*3/MM3 (ref 0–0.7)
EOSINOPHIL NFR BLD AUTO: 1.6 % (ref 0.3–6.2)
ERYTHROCYTE [DISTWIDTH] IN BLOOD BY AUTOMATED COUNT: 12.8 % (ref 11.5–14.5)
GFR SERPL CREATININE-BSD FRML MDRD: 137 ML/MIN/1.73
GLUCOSE BLD-MCNC: 102 MG/DL (ref 65–99)
HCT VFR BLD AUTO: 28.1 % (ref 40.4–52.2)
HGB BLD-MCNC: 9.2 G/DL (ref 13.7–17.6)
IMM GRANULOCYTES # BLD AUTO: 0.04 10*3/MM3 (ref 0–0.03)
IMM GRANULOCYTES NFR BLD AUTO: 0.3 % (ref 0–0.5)
LYMPHOCYTES # BLD AUTO: 1.01 10*3/MM3 (ref 0.9–4.8)
LYMPHOCYTES NFR BLD AUTO: 8.4 % (ref 19.6–45.3)
MCH RBC QN AUTO: 37.2 PG (ref 27–32.7)
MCHC RBC AUTO-ENTMCNC: 32.7 G/DL (ref 32.6–36.4)
MCV RBC AUTO: 113.8 FL (ref 79.8–96.2)
MONOCYTES # BLD AUTO: 1.12 10*3/MM3 (ref 0.2–1.2)
MONOCYTES NFR BLD AUTO: 9.4 % (ref 5–12)
NEUTROPHILS # BLD AUTO: 9.58 10*3/MM3 (ref 1.9–8.1)
NEUTROPHILS NFR BLD AUTO: 80.1 % (ref 42.7–76)
PLATELET # BLD AUTO: 339 10*3/MM3 (ref 140–500)
PMV BLD AUTO: 9.6 FL (ref 6–12)
POTASSIUM BLD-SCNC: 4.2 MMOL/L (ref 3.5–5.2)
RBC # BLD AUTO: 2.47 10*6/MM3 (ref 4.6–6)
SODIUM BLD-SCNC: 134 MMOL/L (ref 136–145)
WBC NRBC COR # BLD: 11.96 10*3/MM3 (ref 4.5–10.7)

## 2019-01-29 PROCEDURE — 97110 THERAPEUTIC EXERCISES: CPT

## 2019-01-29 PROCEDURE — 80048 BASIC METABOLIC PNL TOTAL CA: CPT | Performed by: INTERNAL MEDICINE

## 2019-01-29 PROCEDURE — 74018 RADEX ABDOMEN 1 VIEW: CPT

## 2019-01-29 PROCEDURE — 94799 UNLISTED PULMONARY SVC/PX: CPT

## 2019-01-29 PROCEDURE — 99232 SBSQ HOSP IP/OBS MODERATE 35: CPT | Performed by: INTERNAL MEDICINE

## 2019-01-29 PROCEDURE — 99233 SBSQ HOSP IP/OBS HIGH 50: CPT | Performed by: INTERNAL MEDICINE

## 2019-01-29 PROCEDURE — 85025 COMPLETE CBC W/AUTO DIFF WBC: CPT | Performed by: INTERNAL MEDICINE

## 2019-01-29 RX ORDER — OXYCODONE HYDROCHLORIDE 5 MG/1
10 TABLET ORAL EVERY 4 HOURS PRN
Status: DISCONTINUED | OUTPATIENT
Start: 2019-01-29 | End: 2019-02-08

## 2019-01-29 RX ADMIN — BUDESONIDE AND FORMOTEROL FUMARATE DIHYDRATE 2 PUFF: 160; 4.5 AEROSOL RESPIRATORY (INHALATION) at 19:00

## 2019-01-29 RX ADMIN — NICOTINE 1 PATCH: 14 PATCH, EXTENDED RELEASE TRANSDERMAL at 08:22

## 2019-01-29 RX ADMIN — GABAPENTIN 800 MG: 400 CAPSULE ORAL at 06:20

## 2019-01-29 RX ADMIN — FAMOTIDINE 20 MG: 10 INJECTION INTRAVENOUS at 08:20

## 2019-01-29 RX ADMIN — OXYCODONE HYDROCHLORIDE AND ACETAMINOPHEN 1 TABLET: 10; 325 TABLET ORAL at 06:20

## 2019-01-29 RX ADMIN — Medication 500 MCG: at 08:20

## 2019-01-29 RX ADMIN — BUDESONIDE AND FORMOTEROL FUMARATE DIHYDRATE 2 PUFF: 160; 4.5 AEROSOL RESPIRATORY (INHALATION) at 06:53

## 2019-01-29 RX ADMIN — ACETAMINOPHEN 650 MG: 325 TABLET, FILM COATED ORAL at 20:13

## 2019-01-29 RX ADMIN — FAMOTIDINE 20 MG: 10 INJECTION INTRAVENOUS at 20:13

## 2019-01-29 RX ADMIN — GABAPENTIN 800 MG: 400 CAPSULE ORAL at 22:32

## 2019-01-29 RX ADMIN — LEVOTHYROXINE SODIUM 75 MCG: 75 TABLET ORAL at 08:20

## 2019-01-29 RX ADMIN — GABAPENTIN 800 MG: 400 CAPSULE ORAL at 15:05

## 2019-01-30 ENCOUNTER — ANESTHESIA (OUTPATIENT)
Dept: GASTROENTEROLOGY | Facility: HOSPITAL | Age: 69
End: 2019-01-30

## 2019-01-30 ENCOUNTER — APPOINTMENT (OUTPATIENT)
Dept: CARDIOLOGY | Facility: HOSPITAL | Age: 69
End: 2019-01-30
Attending: INTERNAL MEDICINE

## 2019-01-30 ENCOUNTER — ANESTHESIA EVENT (OUTPATIENT)
Dept: GASTROENTEROLOGY | Facility: HOSPITAL | Age: 69
End: 2019-01-30

## 2019-01-30 LAB
ANION GAP SERPL CALCULATED.3IONS-SCNC: 12.7 MMOL/L
BASOPHILS # BLD AUTO: 0.03 10*3/MM3 (ref 0–0.2)
BASOPHILS NFR BLD AUTO: 0.2 % (ref 0–1.5)
BH CV LOW VAS RIGHT GASTRONEMIUS VESSEL: 1
BH CV LOW VAS RIGHT PERONEAL VESSEL: 1
BH CV LOWER VASCULAR LEFT COMMON FEMORAL AUGMENT: NORMAL
BH CV LOWER VASCULAR LEFT COMMON FEMORAL COMPETENT: NORMAL
BH CV LOWER VASCULAR LEFT COMMON FEMORAL COMPRESS: NORMAL
BH CV LOWER VASCULAR LEFT COMMON FEMORAL PHASIC: NORMAL
BH CV LOWER VASCULAR LEFT COMMON FEMORAL SPONT: NORMAL
BH CV LOWER VASCULAR LEFT DISTAL FEMORAL COMPRESS: NORMAL
BH CV LOWER VASCULAR LEFT GASTRONEMIUS COMPRESS: NORMAL
BH CV LOWER VASCULAR LEFT GREATER SAPH AK COMPRESS: NORMAL
BH CV LOWER VASCULAR LEFT GREATER SAPH BK COMPRESS: NORMAL
BH CV LOWER VASCULAR LEFT LESSER SAPH COMPRESS: NORMAL
BH CV LOWER VASCULAR LEFT MID FEMORAL AUGMENT: NORMAL
BH CV LOWER VASCULAR LEFT MID FEMORAL COMPETENT: NORMAL
BH CV LOWER VASCULAR LEFT MID FEMORAL COMPRESS: NORMAL
BH CV LOWER VASCULAR LEFT MID FEMORAL PHASIC: NORMAL
BH CV LOWER VASCULAR LEFT MID FEMORAL SPONT: NORMAL
BH CV LOWER VASCULAR LEFT PERONEAL COMPRESS: NORMAL
BH CV LOWER VASCULAR LEFT POPLITEAL AUGMENT: NORMAL
BH CV LOWER VASCULAR LEFT POPLITEAL COMPETENT: NORMAL
BH CV LOWER VASCULAR LEFT POPLITEAL COMPRESS: NORMAL
BH CV LOWER VASCULAR LEFT POPLITEAL PHASIC: NORMAL
BH CV LOWER VASCULAR LEFT POPLITEAL SPONT: NORMAL
BH CV LOWER VASCULAR LEFT POSTERIOR TIBIAL COMPRESS: NORMAL
BH CV LOWER VASCULAR LEFT PROXIMAL FEMORAL COMPRESS: NORMAL
BH CV LOWER VASCULAR LEFT SAPHENOFEMORAL JUNCTION AUGMENT: NORMAL
BH CV LOWER VASCULAR LEFT SAPHENOFEMORAL JUNCTION COMPETENT: NORMAL
BH CV LOWER VASCULAR LEFT SAPHENOFEMORAL JUNCTION COMPRESS: NORMAL
BH CV LOWER VASCULAR LEFT SAPHENOFEMORAL JUNCTION PHASIC: NORMAL
BH CV LOWER VASCULAR LEFT SAPHENOFEMORAL JUNCTION SPONT: NORMAL
BH CV LOWER VASCULAR RIGHT COMMON FEMORAL AUGMENT: NORMAL
BH CV LOWER VASCULAR RIGHT COMMON FEMORAL COMPETENT: NORMAL
BH CV LOWER VASCULAR RIGHT COMMON FEMORAL COMPRESS: NORMAL
BH CV LOWER VASCULAR RIGHT COMMON FEMORAL PHASIC: NORMAL
BH CV LOWER VASCULAR RIGHT COMMON FEMORAL SPONT: NORMAL
BH CV LOWER VASCULAR RIGHT DISTAL FEMORAL COMPRESS: NORMAL
BH CV LOWER VASCULAR RIGHT GASTRONEMIUS COMPRESS: NORMAL
BH CV LOWER VASCULAR RIGHT GASTRONEMIUS THROMBUS: NORMAL
BH CV LOWER VASCULAR RIGHT GREATER SAPH AK COMPRESS: NORMAL
BH CV LOWER VASCULAR RIGHT GREATER SAPH BK COMPRESS: NORMAL
BH CV LOWER VASCULAR RIGHT LESSER SAPH COMPRESS: NORMAL
BH CV LOWER VASCULAR RIGHT MID FEMORAL AUGMENT: NORMAL
BH CV LOWER VASCULAR RIGHT MID FEMORAL COMPETENT: NORMAL
BH CV LOWER VASCULAR RIGHT MID FEMORAL COMPRESS: NORMAL
BH CV LOWER VASCULAR RIGHT MID FEMORAL PHASIC: NORMAL
BH CV LOWER VASCULAR RIGHT MID FEMORAL SPONT: NORMAL
BH CV LOWER VASCULAR RIGHT PERONEAL COMPRESS: NORMAL
BH CV LOWER VASCULAR RIGHT PERONEAL THROMBUS: NORMAL
BH CV LOWER VASCULAR RIGHT POPLITEAL AUGMENT: NORMAL
BH CV LOWER VASCULAR RIGHT POPLITEAL COMPETENT: NORMAL
BH CV LOWER VASCULAR RIGHT POPLITEAL COMPRESS: NORMAL
BH CV LOWER VASCULAR RIGHT POPLITEAL PHASIC: NORMAL
BH CV LOWER VASCULAR RIGHT POPLITEAL SPONT: NORMAL
BH CV LOWER VASCULAR RIGHT POSTERIOR TIBIAL COMPRESS: NORMAL
BH CV LOWER VASCULAR RIGHT PROXIMAL FEMORAL COMPRESS: NORMAL
BH CV LOWER VASCULAR RIGHT SAPHENOFEMORAL JUNCTION AUGMENT: NORMAL
BH CV LOWER VASCULAR RIGHT SAPHENOFEMORAL JUNCTION COMPETENT: NORMAL
BH CV LOWER VASCULAR RIGHT SAPHENOFEMORAL JUNCTION COMPRESS: NORMAL
BH CV LOWER VASCULAR RIGHT SAPHENOFEMORAL JUNCTION PHASIC: NORMAL
BH CV LOWER VASCULAR RIGHT SAPHENOFEMORAL JUNCTION SPONT: NORMAL
BUN BLD-MCNC: 12 MG/DL (ref 8–23)
BUN/CREAT SERPL: 20 (ref 7–25)
CALCIUM SPEC-SCNC: 9.9 MG/DL (ref 8.6–10.5)
CHLORIDE SERPL-SCNC: 98 MMOL/L (ref 98–107)
CO2 SERPL-SCNC: 27.3 MMOL/L (ref 22–29)
CREAT BLD-MCNC: 0.6 MG/DL (ref 0.76–1.27)
DEPRECATED RDW RBC AUTO: 52 FL (ref 37–54)
EOSINOPHIL # BLD AUTO: 0.16 10*3/MM3 (ref 0–0.7)
EOSINOPHIL NFR BLD AUTO: 1.2 % (ref 0.3–6.2)
ERYTHROCYTE [DISTWIDTH] IN BLOOD BY AUTOMATED COUNT: 12.7 % (ref 11.5–14.5)
GFR SERPL CREATININE-BSD FRML MDRD: 134 ML/MIN/1.73
GLUCOSE BLD-MCNC: 108 MG/DL (ref 65–99)
HCT VFR BLD AUTO: 30.4 % (ref 40.4–52.2)
HGB BLD-MCNC: 10 G/DL (ref 13.7–17.6)
IMM GRANULOCYTES # BLD AUTO: 0.06 10*3/MM3 (ref 0–0.03)
IMM GRANULOCYTES NFR BLD AUTO: 0.5 % (ref 0–0.5)
INR PPP: 1.1 (ref 0.9–1.1)
LYMPHOCYTES # BLD AUTO: 0.87 10*3/MM3 (ref 0.9–4.8)
LYMPHOCYTES NFR BLD AUTO: 6.6 % (ref 19.6–45.3)
MCH RBC QN AUTO: 37.3 PG (ref 27–32.7)
MCHC RBC AUTO-ENTMCNC: 32.9 G/DL (ref 32.6–36.4)
MCV RBC AUTO: 113.4 FL (ref 79.8–96.2)
MONOCYTES # BLD AUTO: 1.18 10*3/MM3 (ref 0.2–1.2)
MONOCYTES NFR BLD AUTO: 9 % (ref 5–12)
NEUTROPHILS # BLD AUTO: 10.81 10*3/MM3 (ref 1.9–8.1)
NEUTROPHILS NFR BLD AUTO: 82.5 % (ref 42.7–76)
PLATELET # BLD AUTO: 384 10*3/MM3 (ref 140–500)
PMV BLD AUTO: 9.5 FL (ref 6–12)
POTASSIUM BLD-SCNC: 4.1 MMOL/L (ref 3.5–5.2)
PROCALCITONIN SERPL-MCNC: 0.36 NG/ML (ref 0.1–0.25)
PROTHROMBIN TIME: 14 SECONDS (ref 11.7–14.2)
RBC # BLD AUTO: 2.68 10*6/MM3 (ref 4.6–6)
SODIUM BLD-SCNC: 138 MMOL/L (ref 136–145)
WBC NRBC COR # BLD: 13.11 10*3/MM3 (ref 4.5–10.7)

## 2019-01-30 PROCEDURE — 85025 COMPLETE CBC W/AUTO DIFF WBC: CPT | Performed by: INTERNAL MEDICINE

## 2019-01-30 PROCEDURE — 99233 SBSQ HOSP IP/OBS HIGH 50: CPT | Performed by: INTERNAL MEDICINE

## 2019-01-30 PROCEDURE — 84145 PROCALCITONIN (PCT): CPT | Performed by: INTERNAL MEDICINE

## 2019-01-30 PROCEDURE — 0DH63UZ INSERTION OF FEEDING DEVICE INTO STOMACH, PERCUTANEOUS APPROACH: ICD-10-PCS | Performed by: INTERNAL MEDICINE

## 2019-01-30 PROCEDURE — 94799 UNLISTED PULMONARY SVC/PX: CPT

## 2019-01-30 PROCEDURE — 25010000003 CEFAZOLIN 1-4 GM/50ML-% SOLUTION: Performed by: INTERNAL MEDICINE

## 2019-01-30 PROCEDURE — 85610 PROTHROMBIN TIME: CPT | Performed by: INTERNAL MEDICINE

## 2019-01-30 PROCEDURE — 43246 EGD PLACE GASTROSTOMY TUBE: CPT | Performed by: INTERNAL MEDICINE

## 2019-01-30 PROCEDURE — 25010000002 PROPOFOL 10 MG/ML EMULSION: Performed by: NURSE ANESTHETIST, CERTIFIED REGISTERED

## 2019-01-30 PROCEDURE — 93970 EXTREMITY STUDY: CPT

## 2019-01-30 PROCEDURE — 80048 BASIC METABOLIC PNL TOTAL CA: CPT | Performed by: INTERNAL MEDICINE

## 2019-01-30 RX ORDER — PROPOFOL 10 MG/ML
VIAL (ML) INTRAVENOUS CONTINUOUS PRN
Status: DISCONTINUED | OUTPATIENT
Start: 2019-01-30 | End: 2019-01-30 | Stop reason: SURG

## 2019-01-30 RX ORDER — PROPOFOL 10 MG/ML
VIAL (ML) INTRAVENOUS AS NEEDED
Status: DISCONTINUED | OUTPATIENT
Start: 2019-01-30 | End: 2019-01-30 | Stop reason: SURG

## 2019-01-30 RX ORDER — CEFAZOLIN SODIUM 1 G/50ML
1 INJECTION, SOLUTION INTRAVENOUS ONCE
Status: COMPLETED | OUTPATIENT
Start: 2019-01-30 | End: 2019-01-30

## 2019-01-30 RX ORDER — LIDOCAINE HYDROCHLORIDE 20 MG/ML
INJECTION, SOLUTION INFILTRATION; PERINEURAL AS NEEDED
Status: DISCONTINUED | OUTPATIENT
Start: 2019-01-30 | End: 2019-01-30 | Stop reason: SURG

## 2019-01-30 RX ORDER — SODIUM CHLORIDE, SODIUM LACTATE, POTASSIUM CHLORIDE, CALCIUM CHLORIDE 600; 310; 30; 20 MG/100ML; MG/100ML; MG/100ML; MG/100ML
30 INJECTION, SOLUTION INTRAVENOUS CONTINUOUS
Status: DISCONTINUED | OUTPATIENT
Start: 2019-01-30 | End: 2019-01-31

## 2019-01-30 RX ADMIN — GABAPENTIN 800 MG: 400 CAPSULE ORAL at 22:13

## 2019-01-30 RX ADMIN — NICOTINE 1 PATCH: 14 PATCH, EXTENDED RELEASE TRANSDERMAL at 11:45

## 2019-01-30 RX ADMIN — GABAPENTIN 800 MG: 400 CAPSULE ORAL at 15:29

## 2019-01-30 RX ADMIN — Medication 500 MCG: at 11:45

## 2019-01-30 RX ADMIN — BUDESONIDE AND FORMOTEROL FUMARATE DIHYDRATE 2 PUFF: 160; 4.5 AEROSOL RESPIRATORY (INHALATION) at 10:59

## 2019-01-30 RX ADMIN — OXYCODONE HYDROCHLORIDE 10 MG: 5 TABLET ORAL at 19:01

## 2019-01-30 RX ADMIN — LIDOCAINE HYDROCHLORIDE 60 MG: 20 INJECTION, SOLUTION INFILTRATION; PERINEURAL at 09:19

## 2019-01-30 RX ADMIN — SODIUM CHLORIDE, POTASSIUM CHLORIDE, SODIUM LACTATE AND CALCIUM CHLORIDE 30 ML/HR: 600; 310; 30; 20 INJECTION, SOLUTION INTRAVENOUS at 08:56

## 2019-01-30 RX ADMIN — LEVOTHYROXINE SODIUM 75 MCG: 75 TABLET ORAL at 11:45

## 2019-01-30 RX ADMIN — PROPOFOL 120 MCG/KG/MIN: 10 INJECTION, EMULSION INTRAVENOUS at 09:21

## 2019-01-30 RX ADMIN — BUDESONIDE AND FORMOTEROL FUMARATE DIHYDRATE 2 PUFF: 160; 4.5 AEROSOL RESPIRATORY (INHALATION) at 19:32

## 2019-01-30 RX ADMIN — CEFAZOLIN SODIUM 1 G: 1 INJECTION, SOLUTION INTRAVENOUS at 08:58

## 2019-01-30 RX ADMIN — OXYCODONE HYDROCHLORIDE 10 MG: 5 TABLET ORAL at 11:51

## 2019-01-30 RX ADMIN — FAMOTIDINE 20 MG: 10 INJECTION INTRAVENOUS at 22:13

## 2019-01-30 RX ADMIN — FAMOTIDINE 20 MG: 10 INJECTION INTRAVENOUS at 11:45

## 2019-01-30 RX ADMIN — PROPOFOL 80 MG: 10 INJECTION, EMULSION INTRAVENOUS at 09:20

## 2019-01-30 NOTE — ANESTHESIA PREPROCEDURE EVALUATION
Anesthesia Evaluation     Patient summary reviewed and Nursing notes reviewed   NPO Solid Status: > 8 hours  NPO Liquid Status: > 8 hours           Airway   Mallampati: III  Small opening and Narrow palate  Dental      Pulmonary - normal exam    breath sounds clear to auscultation  (+) a smoker Current Abstained day of surgery, lung cancer, COPD moderate, asthma,   Cardiovascular - normal exam    ECG reviewed    (+) hypertension, hyperlipidemia,       Neuro/Psych- negative ROS  GI/Hepatic/Renal/Endo    (+)  GERD,  hypothyroidism,     Musculoskeletal     (+) neck pain,   Abdominal    Substance History - negative use     OB/GYN          Other      history of cancer                    Anesthesia Plan    ASA 3     MAC   total IV anesthesia  intravenous induction   Anesthetic plan, all risks, benefits, and alternatives have been provided, discussed and informed consent has been obtained with: patient.    Plan discussed with CRNA.

## 2019-01-31 PROBLEM — L03.811 CELLULITIS OF HEAD EXCEPT FACE: Status: RESOLVED | Noted: 2019-01-23 | Resolved: 2019-01-31

## 2019-01-31 PROBLEM — R22.0 SWELLING OF RIGHT SIDE OF FACE: Status: RESOLVED | Noted: 2019-01-23 | Resolved: 2019-01-31

## 2019-01-31 PROBLEM — I82.401 ACUTE DEEP VEIN THROMBOSIS (DVT) OF RIGHT LOWER EXTREMITY (HCC): Status: ACTIVE | Noted: 2019-01-31

## 2019-01-31 PROCEDURE — 99232 SBSQ HOSP IP/OBS MODERATE 35: CPT | Performed by: INTERNAL MEDICINE

## 2019-01-31 PROCEDURE — 77334 RADIATION TREATMENT AID(S): CPT | Performed by: RADIOLOGY

## 2019-01-31 PROCEDURE — 77300 RADIATION THERAPY DOSE PLAN: CPT | Performed by: RADIOLOGY

## 2019-01-31 PROCEDURE — 77427 RADIATION TX MANAGEMENT X5: CPT | Performed by: RADIOLOGY

## 2019-01-31 PROCEDURE — 94799 UNLISTED PULMONARY SVC/PX: CPT

## 2019-01-31 PROCEDURE — 99233 SBSQ HOSP IP/OBS HIGH 50: CPT | Performed by: INTERNAL MEDICINE

## 2019-01-31 PROCEDURE — 97110 THERAPEUTIC EXERCISES: CPT

## 2019-01-31 PROCEDURE — 77295 3-D RADIOTHERAPY PLAN: CPT | Performed by: RADIOLOGY

## 2019-01-31 PROCEDURE — 77412 RADIATION TX DELIVERY LVL 3: CPT | Performed by: RADIOLOGY

## 2019-01-31 PROCEDURE — 77293 RESPIRATOR MOTION MGMT SIMUL: CPT | Performed by: RADIOLOGY

## 2019-01-31 RX ADMIN — BUDESONIDE AND FORMOTEROL FUMARATE DIHYDRATE 2 PUFF: 160; 4.5 AEROSOL RESPIRATORY (INHALATION) at 07:09

## 2019-01-31 RX ADMIN — NICOTINE 1 PATCH: 14 PATCH, EXTENDED RELEASE TRANSDERMAL at 08:13

## 2019-01-31 RX ADMIN — APIXABAN 5 MG: 5 TABLET, FILM COATED ORAL at 13:31

## 2019-01-31 RX ADMIN — GABAPENTIN 800 MG: 400 CAPSULE ORAL at 05:51

## 2019-01-31 RX ADMIN — ACETAMINOPHEN 650 MG: 325 TABLET, FILM COATED ORAL at 13:59

## 2019-01-31 RX ADMIN — GABAPENTIN 800 MG: 400 CAPSULE ORAL at 22:33

## 2019-01-31 RX ADMIN — GABAPENTIN 800 MG: 400 CAPSULE ORAL at 13:32

## 2019-01-31 RX ADMIN — OXYCODONE HYDROCHLORIDE 10 MG: 5 TABLET ORAL at 20:15

## 2019-01-31 RX ADMIN — FAMOTIDINE 20 MG: 10 INJECTION INTRAVENOUS at 08:13

## 2019-01-31 RX ADMIN — FAMOTIDINE 20 MG: 10 INJECTION INTRAVENOUS at 20:15

## 2019-01-31 RX ADMIN — Medication 500 MCG: at 08:13

## 2019-01-31 RX ADMIN — OXYCODONE HYDROCHLORIDE 10 MG: 5 TABLET ORAL at 13:41

## 2019-01-31 RX ADMIN — APIXABAN 5 MG: 5 TABLET, FILM COATED ORAL at 20:15

## 2019-01-31 RX ADMIN — ACETAMINOPHEN 650 MG: 325 TABLET, FILM COATED ORAL at 08:14

## 2019-01-31 RX ADMIN — BUDESONIDE AND FORMOTEROL FUMARATE DIHYDRATE 2 PUFF: 160; 4.5 AEROSOL RESPIRATORY (INHALATION) at 19:54

## 2019-01-31 RX ADMIN — LEVOTHYROXINE SODIUM 75 MCG: 75 TABLET ORAL at 08:13

## 2019-02-01 ENCOUNTER — APPOINTMENT (OUTPATIENT)
Dept: CT IMAGING | Facility: HOSPITAL | Age: 69
End: 2019-02-01

## 2019-02-01 LAB
ALBUMIN SERPL-MCNC: 2.9 G/DL (ref 3.5–5.2)
ALBUMIN/GLOB SERPL: 0.9 G/DL
ALP SERPL-CCNC: 52 U/L (ref 39–117)
ALT SERPL W P-5'-P-CCNC: 24 U/L (ref 1–41)
ANION GAP SERPL CALCULATED.3IONS-SCNC: 11.3 MMOL/L
AST SERPL-CCNC: 16 U/L (ref 1–40)
BASOPHILS # BLD AUTO: 0.03 10*3/MM3 (ref 0–0.2)
BASOPHILS NFR BLD AUTO: 0.3 % (ref 0–1.5)
BILIRUB SERPL-MCNC: 0.4 MG/DL (ref 0.1–1.2)
BUN BLD-MCNC: 16 MG/DL (ref 8–23)
BUN/CREAT SERPL: 22.5 (ref 7–25)
CALCIUM SPEC-SCNC: 9.8 MG/DL (ref 8.6–10.5)
CHLORIDE SERPL-SCNC: 99 MMOL/L (ref 98–107)
CO2 SERPL-SCNC: 26.7 MMOL/L (ref 22–29)
CREAT BLD-MCNC: 0.71 MG/DL (ref 0.76–1.27)
DEPRECATED RDW RBC AUTO: 52.1 FL (ref 37–54)
EOSINOPHIL # BLD AUTO: 0.12 10*3/MM3 (ref 0–0.7)
EOSINOPHIL NFR BLD AUTO: 1.1 % (ref 0.3–6.2)
ERYTHROCYTE [DISTWIDTH] IN BLOOD BY AUTOMATED COUNT: 12.6 % (ref 11.5–14.5)
GFR SERPL CREATININE-BSD FRML MDRD: 110 ML/MIN/1.73
GLOBULIN UR ELPH-MCNC: 3.2 GM/DL
GLUCOSE BLD-MCNC: 148 MG/DL (ref 65–99)
HCT VFR BLD AUTO: 28.3 % (ref 40.4–52.2)
HGB BLD-MCNC: 9.1 G/DL (ref 13.7–17.6)
IMM GRANULOCYTES # BLD AUTO: 0.02 10*3/MM3 (ref 0–0.03)
IMM GRANULOCYTES NFR BLD AUTO: 0.2 % (ref 0–0.5)
LYMPHOCYTES # BLD AUTO: 1.17 10*3/MM3 (ref 0.9–4.8)
LYMPHOCYTES NFR BLD AUTO: 10.4 % (ref 19.6–45.3)
MCH RBC QN AUTO: 36.8 PG (ref 27–32.7)
MCHC RBC AUTO-ENTMCNC: 32.2 G/DL (ref 32.6–36.4)
MCV RBC AUTO: 114.6 FL (ref 79.8–96.2)
MONOCYTES # BLD AUTO: 0.66 10*3/MM3 (ref 0.2–1.2)
MONOCYTES NFR BLD AUTO: 5.9 % (ref 5–12)
NEUTROPHILS # BLD AUTO: 9.2 10*3/MM3 (ref 1.9–8.1)
NEUTROPHILS NFR BLD AUTO: 82.1 % (ref 42.7–76)
PLATELET # BLD AUTO: 376 10*3/MM3 (ref 140–500)
PMV BLD AUTO: 9.2 FL (ref 6–12)
POTASSIUM BLD-SCNC: 4.1 MMOL/L (ref 3.5–5.2)
PROT SERPL-MCNC: 6.1 G/DL (ref 6–8.5)
RBC # BLD AUTO: 2.47 10*6/MM3 (ref 4.6–6)
SODIUM BLD-SCNC: 137 MMOL/L (ref 136–145)
WBC NRBC COR # BLD: 11.2 10*3/MM3 (ref 4.5–10.7)

## 2019-02-01 PROCEDURE — 25010000002 IOPAMIDOL 61 % SOLUTION: Performed by: HOSPITALIST

## 2019-02-01 PROCEDURE — 94799 UNLISTED PULMONARY SVC/PX: CPT

## 2019-02-01 PROCEDURE — 99233 SBSQ HOSP IP/OBS HIGH 50: CPT | Performed by: INTERNAL MEDICINE

## 2019-02-01 PROCEDURE — 80053 COMPREHEN METABOLIC PANEL: CPT | Performed by: INTERNAL MEDICINE

## 2019-02-01 PROCEDURE — 74177 CT ABD & PELVIS W/CONTRAST: CPT

## 2019-02-01 PROCEDURE — 99232 SBSQ HOSP IP/OBS MODERATE 35: CPT | Performed by: INTERNAL MEDICINE

## 2019-02-01 PROCEDURE — 92526 ORAL FUNCTION THERAPY: CPT | Performed by: SPEECH-LANGUAGE PATHOLOGIST

## 2019-02-01 PROCEDURE — 71260 CT THORAX DX C+: CPT

## 2019-02-01 PROCEDURE — 97110 THERAPEUTIC EXERCISES: CPT

## 2019-02-01 PROCEDURE — 85025 COMPLETE CBC W/AUTO DIFF WBC: CPT | Performed by: INTERNAL MEDICINE

## 2019-02-01 PROCEDURE — 77336 RADIATION PHYSICS CONSULT: CPT | Performed by: RADIOLOGY

## 2019-02-01 PROCEDURE — 77412 RADIATION TX DELIVERY LVL 3: CPT | Performed by: RADIOLOGY

## 2019-02-01 RX ORDER — NAPROXEN 500 MG/1
500 TABLET ORAL 2 TIMES DAILY WITH MEALS
Status: DISCONTINUED | OUTPATIENT
Start: 2019-02-01 | End: 2019-02-02

## 2019-02-01 RX ORDER — FAMOTIDINE 20 MG/1
20 TABLET, FILM COATED ORAL 2 TIMES DAILY
Status: DISCONTINUED | OUTPATIENT
Start: 2019-02-01 | End: 2019-02-08 | Stop reason: HOSPADM

## 2019-02-01 RX ADMIN — ACETAMINOPHEN 650 MG: 325 TABLET, FILM COATED ORAL at 06:46

## 2019-02-01 RX ADMIN — APIXABAN 5 MG: 5 TABLET, FILM COATED ORAL at 08:10

## 2019-02-01 RX ADMIN — NAPROXEN 500 MG: 500 TABLET ORAL at 18:13

## 2019-02-01 RX ADMIN — APIXABAN 5 MG: 5 TABLET, FILM COATED ORAL at 20:35

## 2019-02-01 RX ADMIN — GABAPENTIN 800 MG: 400 CAPSULE ORAL at 20:35

## 2019-02-01 RX ADMIN — ACETAMINOPHEN 650 MG: 325 TABLET, FILM COATED ORAL at 15:34

## 2019-02-01 RX ADMIN — GABAPENTIN 800 MG: 400 CAPSULE ORAL at 14:26

## 2019-02-01 RX ADMIN — NICOTINE 1 PATCH: 14 PATCH, EXTENDED RELEASE TRANSDERMAL at 08:12

## 2019-02-01 RX ADMIN — GABAPENTIN 800 MG: 400 CAPSULE ORAL at 05:59

## 2019-02-01 RX ADMIN — FAMOTIDINE 20 MG: 20 TABLET, FILM COATED ORAL at 21:44

## 2019-02-01 RX ADMIN — BUDESONIDE AND FORMOTEROL FUMARATE DIHYDRATE 2 PUFF: 160; 4.5 AEROSOL RESPIRATORY (INHALATION) at 06:55

## 2019-02-01 RX ADMIN — OXYCODONE HYDROCHLORIDE 10 MG: 5 TABLET ORAL at 14:46

## 2019-02-01 RX ADMIN — LEVOTHYROXINE SODIUM 75 MCG: 75 TABLET ORAL at 08:10

## 2019-02-01 RX ADMIN — IOPAMIDOL 85 ML: 612 INJECTION, SOLUTION INTRAVENOUS at 11:00

## 2019-02-01 RX ADMIN — Medication 500 MCG: at 08:10

## 2019-02-01 RX ADMIN — FAMOTIDINE 20 MG: 10 INJECTION INTRAVENOUS at 08:10

## 2019-02-01 NOTE — THERAPY TREATMENT NOTE
Acute Care - Physical Therapy Treatment Note  [facility]     Patient Name: King Parson  : 1950  MRN: [id number]  Today's Date: 2019  Onset of Illness/Injury or Date of Surgery: 19  Date of Referral to PT: 19  Referring Physician: Arianna Acevedo, APRN    Admit Date: 2019    Visit Dx:    ICD-10-CM ICD-9-CM   1. Varicella zoster B02.9 053.9    B01.9    2. Febrile illness R50.9 780.60   3. Failure to thrive (0-17) R62.51 783.41   4. Esophageal dysmotility K22.4 530.5   5. Impaired mobility Z74.09 799.89   6. Dysphagia, unspecified type R13.10 787.20   7. 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, M0)   • Throat cancer (CMS/HCC)   • Encounter for fitting and adjustment of vascular catheter   • Other chronic pain   • Squamous cell carcinoma   • Cervicalgia   • Cervical spinal stenosis   • Encounter for monitoring opioid maintenance therapy   • Lung metastases (CMS/HCC)   • Encounter for long-term (current) use of high-risk medication   • DDD (degenerative disc disease), cervical   • Cervical radiculitis   • Varicella zoster- over V1,  slit lamp neg 19   • Dysphagia   • Esophageal dysmotility   • Aspiration syndrome   • History of immunotherapy   • Abnormal weight loss   • GERD (gastroesophageal reflux disease)   • Tobacco abuse   • Failure to thrive (0-17)   • Acute deep vein thrombosis (DVT) of right lower extremity (CMS/HCC)       Therapy Treatment    Rehabilitation Treatment Summary     Row Name 19 1600 19 1500          Treatment Time/Intention    Discipline  physical therapy assistant  -ROBI   speech language pathologist  -SA     Document Type  therapy note (daily note)  -CW  therapy note (daily note)  -     Subjective Information  no complaints  -CW  no complaints  -SA     Mode of Treatment  physical therapy  -CW  speech-language pathology  -SA     Patient/Family Observations  --  Pt lying in bed.  Family arrived during discussion.  -SA     Therapy Frequency (PT Clinical Impression)  daily  -CW  --     Patient Effort  good  -CW  --     Comment  --  Pt uncertain of d/c.  He wants to go home this weekend, but states MD concerned w/ fevers.  Pt wishes to proceed w/ VFSS as previously scheduled for Monday wheter in hospital or as outpatient.  Pt staes Dr Cordoba wants to hld VFSS until after CT reviewed.  RN rpts pt tolerating current diet without difficulty during the day and is receiving PEG feeding at night (8P-8a).  If pt remains in hospital on Monday and VFSS desired, please order.  Will keep outpatient video on schedle if pt discharges over weekend and is able to attend vfss.  -SA     Existing Precautions/Restrictions  fall  -CW  --     Recorded by [CW] Afshin Contreras, PTA 02/01/19 1635 [SA] Nae England MS CCC-SLP 02/01/19 1526     Row Name 02/01/19 1600             Vital Signs    O2 Delivery Pre Treatment  room air  -CW      Recorded by [CW] Afshin Contreras, PTA 02/01/19 1635      Row Name 02/01/19 1600             Cognitive Assessment/Intervention- PT/OT    Orientation Status (Cognition)  oriented x 4  -CW      Follows Commands (Cognition)  WFL  -CW      Personal Safety Interventions  fall prevention program maintained;gait belt;muscle strengthening facilitated;nonskid shoes/slippers when out of bed  -CW      Recorded by [CW] Afshin Contreras, PTA 02/01/19 1635      Row Name 02/01/19 1600             Bed Mobility Assessment/Treatment    Bed Mobility Assessment/Treatment  sit-supine;supine-sit  -CW      Supine-Sit [place] (Bed Mobility)  supervision  -CW      Sit-Supine  [place] (Bed Mobility)  supervision  -CW      Assistive Device (Bed Mobility)  bed rails  -CW      Recorded by [CW] Afshin Contreras, PTA 02/01/19 1635      Row Name 02/01/19 1600             Transfer Assessment/Treatment    Transfer Assessment/Treatment  sit-stand transfer;stand-sit transfer  -CW      Recorded by [CW] Afshin Contreras, PTA 02/01/19 1635      Row Name 02/01/19 1600             Sit-Stand Transfer    Sit-Stand [place] (Transfers)  supervision  -CW      Assistive Device (Sit-Stand Transfers)  walker, front-wheeled  -CW      Recorded by [CW] Afshin Contreras, PTA 02/01/19 1635      Row Name 02/01/19 1600             Stand-Sit Transfer    Stand-Sit [place] (Transfers)  supervision  -CW      Assistive Device (Stand-Sit Transfers)  walker, front-wheeled  -CW      Recorded by [CW] Afshin Contreras, PTA 02/01/19 1635      Row Name 02/01/19 1600             Gait/Stairs Assessment/Training    [place] Level (Gait)  stand by assist  -CW      Assistive Device (Gait)  walker, front-wheeled  -CW      Distance in Feet (Gait)  200  -CW      Pattern (Gait)  step-through  -CW      Recorded by [CW] Afshin Contreras, PTA 02/01/19 1635      Row Name 02/01/19 1600             Positioning and Restraints    Pre-Treatment Position  in bed  -CW      Post Treatment Position  bed  -CW      In Bed  notified nsg;supine;call light within reach;encouraged to call for assist  -CW      Recorded by [CW] Afshin Contreras, PTA 02/01/19 1635      Row Name 02/01/19 1600             Pain Assessment    Additional Documentation  Pain Scale: Numbers Pre/Post-Treatment (Group)  -CW      Recorded by [CW] Afshin Contreras, PTA 02/01/19 1635      Row Name 02/01/19 1600             Pain Scale: Numbers Pre/Post-Treatment    Pain Scale: Numbers, Pretreatment  3/10  -CW      Pain Scale: Numbers, Post-Treatment  5/10  -CW      Pain Location - Side  Right  -CW      Pain Location - Orientation  lower  -CW       Pain Location  back  -CW      Pain Intervention(s)  Medication (See MAR);Repositioned;Ambulation/increased activity  -CW      Recorded by [CW] Afshin Contreras, PTA 02/01/19 1635      Row Name 02/01/19 1600             Outcome Summary/Treatment Plan (PT)    Anticipated Discharge Disposition (PT)  home with assist;home with home health  -CW      Recorded by [CW] Afshin Contreras, PTA 02/01/19 1635        User Key  (r) = Recorded By, (t) = Taken By, (c) = Cosigned By    Initials Name Effective Dates Discipline    SA Tomás Nae, MS CCC-SLP 03/07/18 -  SLP    CW Afshin Contreras, PTA 03/07/18 -  PT                   Physical Therapy Education     Title: PT OT SLP Therapies (Done)     Topic: Physical Therapy (Done)     Point: Mobility training (Done)     Learning Progress Summary           Patient Acceptance, E,TB, VU,DU by ROBI at 2/1/2019  4:35 PM    Acceptance, E,TB, VU,DU by ROBI at 1/31/2019  3:21 PM    Acceptance, E,TB, VU,DU by ROBI at 1/29/2019  1:50 PM    Acceptance, E,TB, VU by  at 1/27/2019  1:51 PM    Acceptance, E,TB, VU by  at 1/26/2019 12:04 PM                   Point: Home exercise program (Done)     Learning Progress Summary           Patient Acceptance, E,TB, VU,DU by CW at 2/1/2019  4:35 PM    Acceptance, E,TB, VU,DU by ROBI at 1/31/2019  3:21 PM    Acceptance, E,TB, VU,DU by ROBI at 1/29/2019  1:50 PM    Acceptance, E,TB, VU by KH at 1/26/2019 12:04 PM                   Point: Body mechanics (Done)     Learning Progress Summary           Patient Acceptance, E,TB, VU,DU by CW at 2/1/2019  4:35 PM    Acceptance, E,TB, VU,DU by ROBI at 1/31/2019  3:21 PM    Acceptance, E,TB, VU,DU by CW at 1/29/2019  1:50 PM    Acceptance, E,TB, VU by MARSHAL at 1/27/2019  1:51 PM    Acceptance, E,TB, VU by MARSHAL at 1/26/2019 12:04 PM                   Point: Precautions (Done)     Learning Progress Summary           Patient Acceptance, PETE MADERA VU, DU by CW at 2/1/2019  4:35 PM    Acceptance, PETE MADERA VU, DU by CW at 1/31/2019   3:21 PM    Acceptance, E,TB, VU,DU by ROBI at 1/29/2019  1:50 PM    Acceptance, E,TB, VU by  at 1/27/2019  1:51 PM    Acceptance, E,TB, VU by MARSHAL at 1/26/2019 12:04 PM                               User Key     Initials Effective Dates Name Provider Type Discipline     06/22/16 -  Tori Bradford, PT Physical Therapist PT    ROBI 03/07/18 -  Afshin Contreras, SVEN Physical Therapy Assistant PT                PT Recommendation and Plan  Anticipated Discharge Disposition (PT): home with assist, home with home health  Therapy Frequency (PT Clinical Impression): daily  Outcome Summary/Treatment Plan (PT)  Anticipated Discharge Disposition (PT): home with assist, home with home health  Plan of Care Reviewed With: patient  Progress: improving  Outcome Summary: Pt increasing with activity tolerance and bed mobility I and sdafety   Outcome Measures     Row Name 02/01/19 1600 01/31/19 1500          How much help from another person do you currently need...    Turning from your back to your side while in flat bed without using bedrails?  4  -CW  4  -CW     Moving from lying on back to sitting on the side of a flat bed without bedrails?  4  -CW  4  -CW     Moving to and from a bed to a chair (including a wheelchair)?  3  -CW  3  -CW     Standing up from a chair using your arms (e.g., wheelchair, bedside chair)?  3  -CW  3  -CW     Climbing 3-5 steps with a railing?  3  -CW  3  -CW     To walk in hospital room?  3  -CW  3  -CW     AM-PAC 6 Clicks Score  20  -CW  20  -CW        Functional Assessment    Outcome Measure Options  AM-PAC 6 Clicks Basic Mobility (PT)  -CW  AM-PAC 6 Clicks Basic Mobility (PT)  -CW       User Key  (r) = Recorded By, (t) = Taken By, (c) = Cosigned By    Initials Name Provider Type    CW Afshin Contreras, SVEN Physical Therapy Assistant         Time Calculation:   PT Charges     Row Name 02/01/19 1636             Time Calculation    Start Time  1618  -CW      Stop Time  1636  -CW      Time Calculation  (min)  18 min  -CW      PT Received On  02/01/19  -CW      PT - Next Appointment  02/02/19  -CW        User Key  (r) = Recorded By, (t) = Taken By, (c) = Cosigned By    Initials Name Provider Type    Afshin Killian, SVEN Physical Therapy Assistant        Therapy Suggested Charges     Code   Minutes Charges    None           Therapy Charges for Today     Code Description Service Date Service Provider Modifiers Qty    00269303731 HC PT THER PROC EA 15 MIN 1/31/2019 Afshin Contreras, PTA GP 1    33054906435 HC PT THER SUPP EA 15 MIN 1/31/2019 Afshin Contreras, PTA GP 1    30955345649 HC PT THER PROC EA 15 MIN 2/1/2019 Afshin Contreras, PTA GP 1          PT G-Codes  Outcome Measure Options: AM-PAC 6 Clicks Basic Mobility (PT)  AM-PAC 6 Clicks Score: 20    Afshin Contreras PTA  2/1/2019

## 2019-02-01 NOTE — PROGRESS NOTES
INFECTIOUS DISEASES PROGRESS NOTE    CC: f/u fever    S:   Persistently febrile but no other sx  No pain except from peg  No dyspnea    O:  Physical Exam:  Temp:  [97.7 °F (36.5 °C)-101.5 °F (38.6 °C)] 98.5 °F (36.9 °C)  Heart Rate:  [80-95] 89  Resp:  [16] 16  BP: (90-98)/(52-55) 98/53  Physical Exam   Constitutional: No distress.   Cardiovascular: Normal rate and regular rhythm.   Pulmonary/Chest: Effort normal and breath sounds normal.   Abdominal: Soft. There is tenderness (from peg).   Neurological: He is alert.   Skin: Skin is warm and dry.   Psychiatric: He has a normal mood and affect. His behavior is normal.        Diagnostics:    WBC 11.2  (P82, L 10, M 6, E1)  H/H 9.1/28    BCx ngtd  Assessment/Plan   1. Fever in Adult 2/2 Acute RLE DVT in peroneal and gastrocnemius/soleal  --infectious work up negative  --suspect fever and wbc 2/2 dvt but persistence worrisome  --checking CBC, CMP, CT c/a/p to look for alternative source     2. Herpes Zoster V1  --lesions appear dry and crusting  --completed course of valtrex  --appears quiescient     3. Dysphagia  --stubacute  --likely noninfectious given neg esophagram and CT neck  --Status post PEG     4.  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 tumor fever    ADDENDUM: CT chest with necrotic mets.  D/w Dr Presley and that multiple lesions with air strongly supports necrotic cancer and not infection. Will add naprosyn 500mg po q12 to see if that helps with fever    Hermann Grace MD  10:16 AM  02/01/19

## 2019-02-01 NOTE — PROGRESS NOTES
"    DAILY PROGRESS NOTE  [facility]    Patient Identification:  Name: King Parson  Age: 68 y.o.  Sex: male  :  1950  MRN: [id number]         Primary Care Physician: Elise Ortiz APRN    Subjective:  Interval History: Continues to have intermittent fevers and is not feeling well.  He otherwise tolerated 2 feeds overnight with no emesis or abdominal pains.  He denies any chest pain or shortness of breath.  Feels weak and fatigued    Objective:    Scheduled Meds:  apixaban 5 mg Oral Q12H   Followed by      [START ON 2019] apixaban 5 mg Oral Q12H   budesonide-formoterol 2 puff Inhalation BID - RT   famotidine 20 mg Intravenous Q12H   gabapentin 800 mg Oral Q8H   levothyroxine 75 mcg Oral Daily   nicotine 1 patch Transdermal Q24H   vitamin B-12 500 mcg Oral Daily     Continuous Infusions:     Vital signs in last 24 hours:  Temp:  [97 °F (36.1 °C)-101.5 °F (38.6 °C)] 97 °F (36.1 °C)  Heart Rate:  [79-95] 79  Resp:  [16] 16  BP: ()/(52-63) 104/63    Intake/Output:    Intake/Output Summary (Last 24 hours) at 2019 1204  Last data filed at 2019 0815  Gross per 24 hour   Intake 384 ml   Output 3000 ml   Net -2616 ml       Exam:  /63 (BP Location: Left arm, Patient Position: Lying)   Pulse 79   Temp 97 °F (36.1 °C) (Oral)   Resp 16   Ht 175.3 cm (69\")   Wt 51.2 kg (112 lb 12.8 oz)   SpO2 99%   BMI 16.66 kg/m²     General Appearance:    Alert, cooperative, no distress, AAOx3, clinically actually looks better                          Head:    Zoster and cellulitis resolved                         Throat:   Lips, tongue, gums normal; oral mucosa pink and moist                           Neck:   Supple, no meningismus or JVD                         Lungs:    Clear to auscultation bilaterally, respirations unlabored                          Heart:    Regular rate and rhythm, S1 and S2 normal                  Abdomen:     Soft, mild postop tenderness from PEG, bowel " sounds active                 Extremities:   No cyanosis or edema                        Data Review:  Labs in chart were reviewed.    Assessment:  Active Hospital Problems    Diagnosis Date Noted   • **Acute deep vein thrombosis (DVT) of right lower extremity (CMS/HCC) [I82.401] 01/31/2019   • Tobacco abuse [Z72.0] 01/24/2019   • Varicella zoster- over V1,  slit lamp neg 1/23/19 [B02.9, B01.9] 01/23/2019   • Dysphagia [R13.10] 01/23/2019   • Esophageal dysmotility [K22.4] 01/23/2019   • Aspiration syndrome [T17.900A] 01/23/2019   • History of immunotherapy [Z92.89] 01/23/2019   • Abnormal weight loss [R63.4] 01/23/2019   • GERD (gastroesophageal reflux disease) [K21.9] 01/23/2019   • Failure to thrive (0-17) [R62.51] 01/23/2019   • Lung metastases (CMS/HCC) [C78.00] 07/27/2018   • 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  CT reports of abdomen and pelvis are pending.  I feel fever likely secondary to cancer - Onc consulted     CVT on Eliquis    Status post PEG and tolerating tube feeds nightly    Read on for palliative treatment for rib metastases/pain    Jose A Cordoba MD  2/1/2019  12:04 PM

## 2019-02-01 NOTE — PLAN OF CARE
Problem: Patient Care Overview  Goal: Plan of Care Review  Outcome: Ongoing (interventions implemented as appropriate)   02/01/19 0644   Coping/Psychosocial   Plan of Care Reviewed With patient   Plan of Care Review   Progress improving   OTHER   Outcome Summary Pt up with st by assist. CT chest and abd today as well as radiation. Medicated X1 for pain. Sleeping at intervals. Anxious to go home. Stated he is planning on Sunday. Will continue to monitor       Problem: Skin Injury Risk (Adult)  Goal: Skin Health and Integrity  Outcome: Ongoing (interventions implemented as appropriate)      Problem: Nutrition, Imbalanced: Inadequate Oral Intake (Adult)  Goal: Prevent Further Weight Loss  Outcome: Ongoing (interventions implemented as appropriate)      Problem: Fall Risk (Adult)  Goal: Absence of Fall  Outcome: Ongoing (interventions implemented as appropriate)      Problem: Nutrition, Enteral (Adult)  Goal: Signs and Symptoms of Listed Potential Problems Will be Absent, Minimized or Managed (Nutrition, Enteral)  Outcome: Ongoing (interventions implemented as appropriate)

## 2019-02-01 NOTE — PLAN OF CARE
Problem: Patient Care Overview  Goal: Plan of Care Review  Outcome: Ongoing (interventions implemented as appropriate)   02/01/19 1436   Coping/Psychosocial   Plan of Care Reviewed With patient;family   OTHER   Outcome Summary Pt uncertain of d/c. He wants to go home this weekend, but states MD concerned w/ fevers. Pt wishes to proceed w/ VFSS as previously scheduled for Monday wheter in hospital or as outpatient. Pt angel Cordoba wants to hld VFSS until after CT reviewed. RN rpts pt tolerating current diet without difficulty during the day and is receiving PEG feeding at night (8P-8a). If pt remains in hospital on Monday and VFSS desired, please order. Will keep outpatient video on schedle if pt discharges over weekend and is able to attend vfss.

## 2019-02-01 NOTE — THERAPY TREATMENT NOTE
Acute Care - Speech Language Pathology   Swallow Treatment Note [facility]     Patient Name: King Parson  : 1950  MRN: [id number]  Today's Date: 2019  Onset of Illness/Injury or Date of Surgery: 19     Referring Physician: Arianna Acevedo, APRN      Admit Date: 2019    Visit Dx:      ICD-10-CM ICD-9-CM   1. Varicella zoster B02.9 053.9    B01.9    2. Febrile illness R50.9 780.60   3. Failure to thrive (0-17) R62.51 783.41   4. Esophageal dysmotility K22.4 530.5   5. Impaired mobility Z74.09 799.89   6. Dysphagia, unspecified type R13.10 787.20   7. 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, M0)   • Throat cancer (CMS/HCC)   • Encounter for fitting and adjustment of vascular catheter   • Other chronic pain   • Squamous cell carcinoma   • Cervicalgia   • Cervical spinal stenosis   • Encounter for monitoring opioid maintenance therapy   • Lung metastases (CMS/HCC)   • Encounter for long-term (current) use of high-risk medication   • DDD (degenerative disc disease), cervical   • Cervical radiculitis   • Varicella zoster- over V1,  slit lamp neg 19   • Dysphagia   • Esophageal dysmotility   • Aspiration syndrome   • History of immunotherapy   • Abnormal weight loss   • GERD (gastroesophageal reflux disease)   • Tobacco abuse   • Failure to thrive (0-17)   • Acute deep vein thrombosis (DVT) of right lower extremity (CMS/HCC)       Therapy Treatment  Rehabilitation Treatment Summary     Row Name 19 1500             Treatment Time/Intention    Discipline  speech language pathologist  -      Document Type   therapy note (daily note)  -      Subjective Information  no complaints  -      Mode of Treatment  speech-language pathology  -      Patient/Family Observations  Pt lying in bed.  Family arrived during discussion.  -      Comment  Pt uncertain of d/c.  He wants to go home this weekend, but states MD concerned w/ fevers.  Pt wishes to proceed w/ VFSS as previously scheduled for Monday wheter in hospital or as outpatient.  Pt angel Cordoba wants to hld VFSS until after CT reviewed.  RN rpts pt tolerating current diet without difficulty during the day and is receiving PEG feeding at night (8P-8a).  If pt remains in hospital on Monday and VFSS desired, please order.  Will keep outpatient video on schedle if pt discharges over weekend and is able to attend vfss.  -      Recorded by [] Nae England MS CCC-SLP 02/01/19 1526        User Key  (r) = Recorded By, (t) = Taken By, (c) = Cosigned By    Initials Name Effective Dates Discipline     Nae England MS CCC-SLP 03/07/18 -  SLP          Outcome Summary             EDUCATION  The patient has been educated in the following areas:   Dysphagia (Swallowing Impairment).    SLP Recommendation and Plan                                        Plan of Care Reviewed With: patient, family  Plan of Care Review  Plan of Care Reviewed With: patient, family  Outcome Summary: Pt uncertain of d/c.  He wants to go home this weekend, but states MD concerned w/ fevers.  Pt wishes to proceed w/ VFSS as previously scheduled for Monday wheter in hospital or as outpatient.  Pt angel Cordoba wants to hld VFSS until after CT reviewed.  RN rpts pt tolerating current diet without difficulty during the day and is receiving PEG feeding at night (8P-8a).  If pt remains in hospital on Monday and VFSS desired, please order.  Will keep outpatient video on schedle if pt discharges over weekend and is able to attend vfss.           Time Calculation:   Time Calculation- SLP     [place]  Name 02/01/19 1527             Time Calculation- SLP    SLP Start Time  1500  -SA      SLP Stop Time  1530  -      SLP Time Calculation (min)  30 min  -      SLP Received On  02/01/19  -        User Key  (r) = Recorded By, (t) = Taken By, (c) = Cosigned By    Initials Name Provider Type     Nae England MS CCC-SLP Speech and Language Pathologist          Therapy Charges for Today     Code Description Service Date Service Provider Modifiers Qty    73784105166  ST TREATMENT SWALLOW 2 2/1/2019 Nae England MS CCC-SLP GN 1                 Nae England MS CCC-JESSICA  2/1/2019

## 2019-02-01 NOTE — PROGRESS NOTES
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  2.  Combined radiation and chemotherapy with cisplatin and Taxol initiated on 8/10/2017 concurrent with radiation.    3.  Long smoking history with COPD.  4.  Mediport and PEG tube placed by Dr. Valenzuela on 7/31/2017.  Subsequent PEG tube infection requiring antibiotics.  5.  Chemotherapy completed 9/15/2017 and radiation therapy completed 10/9/2017.  6.  Right neck dissection by Dr. Clinton Styles 5/17/2018 for persistent disease in the right neck.  7.  PET scan from 6/26/2018 showing multiple hypermetabolic lung lesions consistent with metastatic disease.  8.  Palliative Opdivo therapy initiated 7/13/2018, last given on 12/28/2018.      SUBJECTIVE:  Fevers persist.  Pain is adequately controlled.      ROS:  Positive for - right rib pain, fever  Negative for - nasuea/vomiting    OBJECTIVE:  Vitals:    02/01/19 0655 02/01/19 0757 02/01/19 0943 02/01/19 1148   BP:  98/53  104/63   BP Location:  Left arm  Left arm   Patient Position:  Lying  Lying   Pulse: 92 89  79   Resp: 16      Temp:  (!) 101.5 °F (38.6 °C) 98.5 °F (36.9 °C) 97 °F (36.1 °C)   TempSrc:  Oral Oral Oral   SpO2: 93% 98%  99%   Weight:       Height:             PHYSICAL EXAMINATION:  General:  Chronically ill appearing, NAD  HEENT:  Crusted rash right upper face, improving.      Chest/Lungs:  CTAB  Heart:  RRR no m/r/g  Abdomen/GI:  Soft, NT, ND, NABS.  PEG tube in place.  Extremities:  WWP, no edema, no cords    DIAGNOSTIC DATA:  Results Review:     I reviewed the patient's new clinical results.    Results from last 7 days   Lab Units 02/01/19  0921   WBC 10*3/mm3 11.20*   HEMOGLOBIN g/dL 9.1*   HEMATOCRIT % 28.3*   PLATELETS 10*3/mm3 376     Lab Results   Component Value Date    NEUTROABS 9.20 (H) 02/01/2019     Results from  last 7 days   Lab Units 02/01/19  0921   SODIUM mmol/L 137   POTASSIUM mmol/L 4.1   CHLORIDE mmol/L 99   CO2 mmol/L 26.7   BUN mg/dL 16   CREATININE mg/dL 0.71*   GLUCOSE mg/dL 148*   CALCIUM mg/dL 9.8     Results from last 7 days   Lab Units 01/30/19  0646   INR  1.10           IMAGING:    CXR 1/28 stable.      IMPRESSION:  Redemonstration of pulmonary masses/nodules, some with  cavitation. Appearance is similar to prior exam. Likely atelectasis at  the left base. Continued follow-up is recommended as indications  Persist.    Venous duplex 1/30:  · Acute right lower extremity deep vein thrombosis noted in the peroneal and gastrocnemius/soleal.  · All other veins appeared normal bilaterally.    CT C/A/P 2/1:  IMPRESSION:  Impression:  1.  Interval enlargement of multiple large pulmonary masses since  1/2/2019 as above. Multiple of the metastatic lesions containing foci of  gas. While findings likely represent necrotic metastases, superinfected  metastatic disease would also appear similar in the appropriate clinical  setting and correlation with patient history with potential percutaneous  sampling is recommended if clinically indicated.  2.  New, likely pathologic fracture of the right seventh rib.  3.  Mildly prominent mediastinal lymph nodes, as before.  4.  Percutaneous gastrostomy tube appropriately positioned in the  stomach with a few foci of gas within the adjacent subcutaneous tissues.  5.  Other chronic findings as above.    Images personally reviewed.      Assessment/Plan   ASSESSMENT/PLAN:  This is a 68 y.o. male with:     1.  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  Chemotherapy was completed 9/15/2017 and radiation completed on 10/9/2017.      He had an excellent response but unfortunately as noted above he does have persistent squamous cell carcinoma in  the right neck node and underwent right neck dissection 5/17/2018. See below.         2.  Comorbidities including COPD.  3.  PEG tube removed by Dr. Valenzuela 11/30/2017.  4.  Development of metastatic disease to the lungs noted on PET scan 6/26/2018.  5.  He received palliative therapy with Opdivo.  CT scans after 8 cycles showed some enlargement of the pulmonary nodules but no significant new sites of disease and he continued.   Initiated 7/13/2018, 12th and final cycle given on 12/28/2018.  On discussion with Dr. Garcia, he feels patient has progressed on immunotherapy and the best option likely is palliative care.     6.  Patient admitted now with dysphagia with esophageal dysmotility and aspiration on the video  swallow.    · PEG tube placed on 1/30 by Dr. Patel    7.  Herpes zoster infection with facial cellulitis.  Valtrex and vancomycin complete    8.  Pain in the right rib secondary to tumor.   Dr. Marroquin has seen and plans palliative radiation for pain.  Continue oxycodone.  On 1/29 omitted the apap and made oxycodone available q4h prn.      9.  Fever:  Blood cultures negative or NGTD.  Dr. Grace with ID following.    · New RLE calf vein thrombosis which could be the etiology.  · Procalcitonin decreased to 0.36 from 0.59    · Tumor fever possible but diagnosis of exclusion  · Given CT findings of masses containing gas c/w necrotic tumor vs superinfection, though doubt multiple would be infected.  Discussed     10.  Constipation:  Bowel regimen    11.  No CPR.  Seems reluctant for palliative care.  Focusing on radiation and still seems to want more systemic therapy, though he is not a candidate.      12.  Acute RLE DVT in the peroneal and gastroc/soleal veins.  This may be the etiology of his fever.  · Cancer related as well as immobilization          PLAN:  1.  Palliative radiation ongoing  2.  No further systemic therapy is planned for his malignancy.    3.  He refuses [facility]  4.  Continue Eliquis 5 mg  q12h.   5.  Oxycodone prn pain    6.  Plan home with HHPT at this time.  7.  May try NSAID for fever but he's also now anticoagulated for DVT so higher risk of bleeding.        Will follow.  He wants to go home on Sunday which might be feasible.  Discussed with Dr. Grace.             John Presley MD

## 2019-02-01 NOTE — PLAN OF CARE
Problem: Patient Care Overview  Goal: Plan of Care Review  Outcome: Ongoing (interventions implemented as appropriate)   02/01/19 6390   Coping/Psychosocial   Plan of Care Reviewed With patient   Plan of Care Review   Progress improving   OTHER   Outcome Summary Pt increasing with activity tolerance and bed mobility I and safety

## 2019-02-01 NOTE — PLAN OF CARE
Problem: Patient Care Overview  Goal: Plan of Care Review  Outcome: Ongoing (interventions implemented as appropriate)   02/01/19 0511   Coping/Psychosocial   Plan of Care Reviewed With patient   Plan of Care Review   Progress improving   OTHER   Outcome Summary up with minimal assist; voiding without difficulty; nocturnal TF Nutrena 1.5 @50ml/hr to end at 0800; Gtube site looks clean and dry; Oxy IR x1 before bed       Problem: Skin Injury Risk (Adult)  Goal: Skin Health and Integrity  Outcome: Ongoing (interventions implemented as appropriate)      Problem: Nutrition, Imbalanced: Inadequate Oral Intake (Adult)  Goal: Prevent Further Weight Loss  Outcome: Ongoing (interventions implemented as appropriate)      Problem: Fall Risk (Adult)  Goal: Absence of Fall  Outcome: Ongoing (interventions implemented as appropriate)      Problem: Nutrition, Enteral (Adult)  Goal: Signs and Symptoms of Listed Potential Problems Will be Absent, Minimized or Managed (Nutrition, Enteral)  Outcome: Ongoing (interventions implemented as appropriate)

## 2019-02-02 ENCOUNTER — APPOINTMENT (OUTPATIENT)
Dept: GENERAL RADIOLOGY | Facility: HOSPITAL | Age: 69
End: 2019-02-02

## 2019-02-02 LAB
BACTERIA SPEC AEROBE CULT: NORMAL
BACTERIA SPEC AEROBE CULT: NORMAL

## 2019-02-02 PROCEDURE — 73130 X-RAY EXAM OF HAND: CPT

## 2019-02-02 PROCEDURE — 99232 SBSQ HOSP IP/OBS MODERATE 35: CPT | Performed by: INTERNAL MEDICINE

## 2019-02-02 PROCEDURE — 94799 UNLISTED PULMONARY SVC/PX: CPT

## 2019-02-02 PROCEDURE — 99233 SBSQ HOSP IP/OBS HIGH 50: CPT | Performed by: INTERNAL MEDICINE

## 2019-02-02 RX ORDER — NAPROXEN 500 MG/1
500 TABLET ORAL 2 TIMES DAILY PRN
Status: DISCONTINUED | OUTPATIENT
Start: 2019-02-02 | End: 2019-02-08 | Stop reason: HOSPADM

## 2019-02-02 RX ADMIN — FAMOTIDINE 20 MG: 20 TABLET, FILM COATED ORAL at 08:33

## 2019-02-02 RX ADMIN — OXYCODONE HYDROCHLORIDE 10 MG: 5 TABLET ORAL at 19:45

## 2019-02-02 RX ADMIN — BUDESONIDE AND FORMOTEROL FUMARATE DIHYDRATE 2 PUFF: 160; 4.5 AEROSOL RESPIRATORY (INHALATION) at 12:19

## 2019-02-02 RX ADMIN — APIXABAN 5 MG: 5 TABLET, FILM COATED ORAL at 08:33

## 2019-02-02 RX ADMIN — Medication 500 MCG: at 08:33

## 2019-02-02 RX ADMIN — NAPROXEN 500 MG: 500 TABLET ORAL at 08:33

## 2019-02-02 RX ADMIN — GABAPENTIN 800 MG: 400 CAPSULE ORAL at 06:31

## 2019-02-02 RX ADMIN — GABAPENTIN 800 MG: 400 CAPSULE ORAL at 14:26

## 2019-02-02 RX ADMIN — NICOTINE 1 PATCH: 14 PATCH, EXTENDED RELEASE TRANSDERMAL at 08:42

## 2019-02-02 RX ADMIN — FAMOTIDINE 20 MG: 20 TABLET, FILM COATED ORAL at 20:04

## 2019-02-02 RX ADMIN — GABAPENTIN 800 MG: 400 CAPSULE ORAL at 20:04

## 2019-02-02 RX ADMIN — BUDESONIDE AND FORMOTEROL FUMARATE DIHYDRATE 2 PUFF: 160; 4.5 AEROSOL RESPIRATORY (INHALATION) at 20:42

## 2019-02-02 RX ADMIN — LEVOTHYROXINE SODIUM 75 MCG: 75 TABLET ORAL at 08:33

## 2019-02-02 RX ADMIN — APIXABAN 5 MG: 5 TABLET, FILM COATED ORAL at 20:04

## 2019-02-02 NOTE — PLAN OF CARE
Problem: Patient Care Overview  Goal: Plan of Care Review  Outcome: Ongoing (interventions implemented as appropriate)   02/02/19 4318   Coping/Psychosocial   Plan of Care Reviewed With patient   Plan of Care Review   Progress no change   OTHER   Outcome Summary Feeds running 8p-8a in peg, no c/o nausea thus far, uneventful night       Problem: Skin Injury Risk (Adult)  Goal: Skin Health and Integrity  Outcome: Ongoing (interventions implemented as appropriate)

## 2019-02-02 NOTE — PROGRESS NOTES
"    DAILY PROGRESS NOTE  [facility]    Patient Identification:  Name: King Parson  Age: 68 y.o.  Sex: male  :  1950  MRN: [id number]         Primary Care Physician: Elise Ortiz APRN    Subjective:  Interval History: Fevers are still irritating him and making him feel globally weak.  He is tolerating tube feeds at this juncture with no emesis.  He understands how sick he is in clinical decline due to progression of cancer.  He is definitely more open to talk about this as opposed to when I first met him a couple weeks ago.  Denies chest pain or altered mentation and claims his pain is decently controlled.    Objective: Sisters ×2 at bedside    Scheduled Meds:  apixaban 5 mg Oral Q12H   Followed by      [START ON 2019] apixaban 5 mg Oral Q12H   budesonide-formoterol 2 puff Inhalation BID - RT   famotidine 20 mg Oral BID   gabapentin 800 mg Oral Q8H   levothyroxine 75 mcg Oral Daily   nicotine 1 patch Transdermal Q24H   vitamin B-12 500 mcg Oral Daily     Continuous Infusions:     Vital signs in last 24 hours:  Temp:  [97 °F (36.1 °C)-102.2 °F (39 °C)] 98.1 °F (36.7 °C)  Heart Rate:  [] 118  Resp:  [16-18] 18  BP: ()/(45-68) 107/60    Intake/Output:    Intake/Output Summary (Last 24 hours) at 2019 1107  Last data filed at 2019  Gross per 24 hour   Intake 50 ml   Output --   Net 50 ml       Exam:  /60 (BP Location: Left arm, Patient Position: Lying)   Pulse 118   Temp 98.1 °F (36.7 °C) (Oral)   Resp 18   Ht 175.3 cm (69\")   Wt 51.2 kg (112 lb 12.8 oz)   SpO2 95%   BMI 16.66 kg/m²     General Appearance:    Alert, cooperative, sickly, AAOx3, seems a little clinically better today                          Head:    Resolved zoster                         Throat:   Oral mucosa pink and moist                           Neck:   Supple, symmetrical, trachea midline, no JVD                         Lungs:    Clear to auscultation bilaterally, " "respirations unlabored                 Chest Wall:    No tenderness or deformity                          Heart:    Regular rate and rhythm, S1 and S2 normal                  Abdomen:     Soft, non-tender, bowel sounds active, PEG                 Extremities:   Moving all, no cyanosis or edema                  Neurologic:   CNII-XII intact, no focal deficits noted     Data Review:  Labs in chart were reviewed.    Assessment:  Active Hospital Problems    Diagnosis Date Noted   • **Acute deep vein thrombosis (DVT) of right lower extremity (CMS/HCC) [I82.401] 01/31/2019   • Tobacco abuse [Z72.0] 01/24/2019   • Varicella zoster- over V1,  slit lamp neg 1/23/19 [B02.9, B01.9] 01/23/2019   • Dysphagia [R13.10] 01/23/2019   • Esophageal dysmotility [K22.4] 01/23/2019   • Aspiration syndrome [T17.900A] 01/23/2019   • History of immunotherapy [Z92.89] 01/23/2019   • Abnormal weight loss [R63.4] 01/23/2019   • GERD (gastroesophageal reflux disease) [K21.9] 01/23/2019   • Failure to thrive (0-17) [R62.51] 01/23/2019   • Lung metastases (CMS/HCC) [C78.00] 07/27/2018   • 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, N2a, M0) [C13.9] 07/24/2017   • Lumbar radiculopathy [M54.16] 02/08/2016      Resolved Hospital Problems    Diagnosis Date Noted Date Resolved   • Swelling of right side of face [R22.0] 01/23/2019 01/31/2019   • Cellulitis of head except face [L03.811] 01/23/2019 01/31/2019       Plan:  Long discussion with patient and sisters ×2 at bedside.  CODE STATUS is DNR.  Both patient and family are now changing their views in goals of care as this hospitalization prolongs.  His main focus is that \"I want to go home\" he understands his clinical decline and the fact that CT the chest shows necrotic lesions which most likely is the etiology for fever.  He is anticoagulated on Eliquis with no bleeding issues for acute calf vein thrombosis.  Palliative radiation is ongoing for rib pain due to metastatic lesion.  Long-term " prognosis is very poor for this gentleman and both him and family seem to grasp that.  At this juncture, I will get hospice to meet with patient and sister at bedside.  We will consider either going home possibly early this week with hospice to follow versus home health depending on what patient and family choose.  Discussed case with nurse as well to try to coordinate timelines of hospice and with family will be available over the next 2 days.    >30min spent coordinating care    Jose A Cordoba MD  2/2/2019  11:07 AM

## 2019-02-02 NOTE — PROGRESS NOTES
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  2.  Combined radiation and chemotherapy with cisplatin and Taxol initiated on 8/10/2017 concurrent with radiation.    3.  Long smoking history with COPD.  4.  Mediport and PEG tube placed by Dr. Valenzuela on 7/31/2017.  Subsequent PEG tube infection requiring antibiotics.  5.  Chemotherapy completed 9/15/2017 and radiation therapy completed 10/9/2017.  6.  Right neck dissection by Dr. Clinton Styles 5/17/2018 for persistent disease in the right neck.  7.  PET scan from 6/26/2018 showing multiple hypermetabolic lung lesions consistent with metastatic disease.  8.  Palliative Opdivo therapy initiated 7/13/2018, last given on 12/28/2018.      SUBJECTIVE:  Fevers persist.  Pain is adequately controlled. Notes increasing pain and swelling of right 4th digit.  Also had transient pain in the right forehead last night lasting about 5 minutes.       ROS:  Positive for - right rib pain, fever, right 4th digit pain  Negative for - nasuea/vomiting    OBJECTIVE:  Vitals:    02/01/19 1852 02/01/19 1935 02/01/19 2000 02/02/19 0335   BP:  (!) 80/45 90/52 121/68   BP Location:  Right arm Right arm Left arm   Patient Position:  Lying Sitting Lying   Pulse:  85  87   Resp:  16  18   Temp: (!) 100.9 °F (38.3 °C) 100.4 °F (38 °C)  98.5 °F (36.9 °C)   TempSrc: Oral Oral  Oral   SpO2:  96%  96%   Weight:       Height:             PHYSICAL EXAMINATION:  General:  Chronically ill appearing, NAD  HEENT:  Crusted rash right upper face, significantly improvec.      Chest/Lungs:  CTAB  Heart:  RRR no m/r/g  Abdomen/GI:  Soft, NT, ND, NABS.  PEG tube in place.  Extremities:  WWP, no edema, no cords, there is tenderness and swelling at the distal right 4th finger with purulent drainage.    DIAGNOSTIC DATA:  Results Review:      I reviewed the patient's new clinical results.    Results from last 7 days   Lab Units 02/01/19  0921   WBC 10*3/mm3 11.20*   HEMOGLOBIN g/dL 9.1*   HEMATOCRIT % 28.3*   PLATELETS 10*3/mm3 376     Lab Results   Component Value Date    NEUTROABS 9.20 (H) 02/01/2019     Results from last 7 days   Lab Units 02/01/19  0921   SODIUM mmol/L 137   POTASSIUM mmol/L 4.1   CHLORIDE mmol/L 99   CO2 mmol/L 26.7   BUN mg/dL 16   CREATININE mg/dL 0.71*   GLUCOSE mg/dL 148*   CALCIUM mg/dL 9.8     Results from last 7 days   Lab Units 01/30/19  0646   INR  1.10           IMAGING:    CXR 1/28 stable.      IMPRESSION:  Redemonstration of pulmonary masses/nodules, some with  cavitation. Appearance is similar to prior exam. Likely atelectasis at  the left base. Continued follow-up is recommended as indications  Persist.    Venous duplex 1/30:  · Acute right lower extremity deep vein thrombosis noted in the peroneal and gastrocnemius/soleal.  · All other veins appeared normal bilaterally.    CT C/A/P 2/1:  IMPRESSION:  Impression:  1.  Interval enlargement of multiple large pulmonary masses since  1/2/2019 as above. Multiple of the metastatic lesions containing foci of  gas. While findings likely represent necrotic metastases, superinfected  metastatic disease would also appear similar in the appropriate clinical  setting and correlation with patient history with potential percutaneous  sampling is recommended if clinically indicated.  2.  New, likely pathologic fracture of the right seventh rib.  3.  Mildly prominent mediastinal lymph nodes, as before.  4.  Percutaneous gastrostomy tube appropriately positioned in the  stomach with a few foci of gas within the adjacent subcutaneous tissues.  5.  Other chronic findings as above.    Images personally reviewed.      Assessment/Plan   ASSESSMENT/PLAN:  This is a 68 y.o. male with:     1.  Stage Adelaide (T3, N2 a, M0) squamous cell carcinoma of the hypopharynx with metastatic  lymphadenopathy to the right neck.Treated with combined radiation and cisplatin and Taxol chemotherapy.  Chemotherapy was completed 9/15/2017 and radiation completed on 10/9/2017.      He had an excellent response but unfortunately as noted above he does have persistent squamous cell carcinoma in the right neck node and underwent right neck dissection 5/17/2018. See below.         2.  Comorbidities including COPD.  3.  PEG tube removed by Dr. Valenzuela 11/30/2017.  4.  Development of metastatic disease to the lungs noted on PET scan 6/26/2018.  5.  He received palliative therapy with Opdivo.  CT scans after 8 cycles showed some enlargement of the pulmonary nodules but no significant new sites of disease and he continued.   Initiated 7/13/2018, 12th and final cycle given on 12/28/2018.  On discussion with Dr. Garcia, he feels patient has progressed on immunotherapy and the best option likely is palliative care.     6.  Patient admitted now with dysphagia with esophageal dysmotility and aspiration on the video  swallow.    · PEG tube placed on 1/30 by Dr. Patel    7.  Herpes zoster infection with facial cellulitis.  Valtrex and vancomycin complete    8.  Pain in the right rib secondary to tumor.   Dr. Marroquin has seen and plans palliative radiation for pain.  Continue oxycodone.  On 1/29 omitted the apap and made oxycodone available q4h prn.      9.  Fever:  Blood cultures negative or NGTD.  Dr. Grace with ID following.    · New RLE calf vein thrombosis which could be the etiology.  · Procalcitonin decreased to 0.36 from 0.59    · Tumor fever possible but diagnosis of exclusion  · Given CT findings of masses containing gas c/w necrotic tumor vs superinfection, though doubt multiple would be infected.  Discussed with Dr. Grace  · Naproxen 500 mg bid started on 2/1    10.  Constipation:  Bowel regimen    11.  No CPR.  Seems reluctant for palliative care.  Focusing on radiation and still seems to want more systemic  therapy, though he is not a candidate.      12.  Acute RLE DVT in the peroneal and gastroc/soleal veins.  This may be the etiology of his fever.  · Cancer related as well as immobilization     · Continue Eliquis 5 mg bid  · BID Pepcid for GI prophylaxis    13.  Purulent drainage from right 4th finger  · Xray right hand today  · ? Need for surgery or hand surgery to evaluate for I&D  · Doubt this is the reason for fever but possible       PLAN:  1.  Palliative radiation ongoing  2.  No further systemic therapy is planned for his malignancy.    3.  He refuses [facility]  4.  Continue Eliquis 5 mg q12h.   5.  Oxycodone prn pain    6.  Plan home with HHPT at this time.  7.  Continue Naproxen 500 mg bid for presumed tumor fever.    8.  Continue BID Pepcid for GI prophylaxis; high risk for bleeding on both Eliquis and Naproxen.    9.  Xray right hand    Will follow.  He wants to go home on Sunday.  Unclear if this will be possible.  Discussed with Dr. Grace.  Will arrange f/u with Dr. Garcia in a couple of weeks.    Xray right hand.      Discussed with patient's sister.             John Presley MD

## 2019-02-02 NOTE — PLAN OF CARE
Problem: Patient Care Overview  Goal: Plan of Care Review  Outcome: Ongoing (interventions implemented as appropriate)   02/02/19 1701   Coping/Psychosocial   Plan of Care Reviewed With patient   Plan of Care Review   Progress improving   OTHER   Outcome Summary Pt has required no PRN medications thu sfar this shift.Up with assist. Tolerating diet. Will continue to monitor.      Goal: Individualization and Mutuality  Outcome: Ongoing (interventions implemented as appropriate)    Goal: Discharge Needs Assessment  Outcome: Ongoing (interventions implemented as appropriate)    Goal: Interprofessional Rounds/Family Conf  Outcome: Ongoing (interventions implemented as appropriate)      Problem: Skin Injury Risk (Adult)  Goal: Skin Health and Integrity  Outcome: Ongoing (interventions implemented as appropriate)      Problem: Nutrition, Imbalanced: Inadequate Oral Intake (Adult)  Goal: Prevent Further Weight Loss  Outcome: Ongoing (interventions implemented as appropriate)      Problem: Fall Risk (Adult)  Goal: Absence of Fall  Outcome: Ongoing (interventions implemented as appropriate)      Problem: Nutrition, Enteral (Adult)  Goal: Signs and Symptoms of Listed Potential Problems Will be Absent, Minimized or Managed (Nutrition, Enteral)  Outcome: Ongoing (interventions implemented as appropriate)

## 2019-02-02 NOTE — PROGRESS NOTES
INFECTIOUS DISEASES PROGRESS NOTE    CC: f/u fever    S:   Fever curve better since 7 pm last night. He developed some recurrent swelling on his right 4th finger. Scant drainage. He says he has seen dermatology for this in the past. He has gone for an XR which is pending. It is not hot or draining at the time of my exam. It appears dry.    ROS: no n/v/d    O:  Physical Exam:  Temp:  [98.1 °F (36.7 °C)-102.2 °F (39 °C)] 98.1 °F (36.7 °C)  Heart Rate:  [] 118  Resp:  [16-18] 18  BP: ()/(45-68) 107/60  Physical Exam   Constitutional: He is oriented to person, place, and time. No distress.   Eyes: No scleral icterus.   Cardiovascular: Regular rhythm.   tachycardic   Pulmonary/Chest: Effort normal and breath sounds normal.   Abdominal: Soft. There is tenderness (from peg).   Neurological: He is alert and oriented to person, place, and time.   Skin:   R 4th finger with dry skin, small lesion on dorsal pad of finger; no active erythema or drainage but there is swelling   Psychiatric: He has a normal mood and affect. His behavior is normal.        Diagnostics:    CBC, CMP, micro reviewed today  Lab Results   Component Value Date    WBC 11.20 (H) 02/01/2019    HGB 9.1 (L) 02/01/2019    HCT 28.3 (L) 02/01/2019    .6 (H) 02/01/2019     02/01/2019     Lab Results   Component Value Date    GLUCOSE 148 (H) 02/01/2019    BUN 16 02/01/2019    CREATININE 0.71 (L) 02/01/2019    EGFRIFNONA 110 02/01/2019    BCR 22.5 02/01/2019    K 4.1 02/01/2019    CO2 26.7 02/01/2019    CALCIUM 9.8 02/01/2019    ALBUMIN 2.90 (L) 02/01/2019    AST 16 02/01/2019    ALT 24 02/01/2019     Micro:  1/23 BCx: negative  1/23 RVP; negative  1/28 BCx: negative    Radiology:  XR hand pending    Assessment/Plan   1. Fever in Adult   -most likely combination of acute RLE DVT and necrotic pulmonary metastases  -all culture data is negative  -would be very unusual to have bilateral suprainfected necrotic metasases     2. Herpes Zoster  V1  --lesions appear dry and crusting  --completed course of valtrex     3. Dysphagia  --subacute  --likely noninfectious given neg esophagram and CT neck  --Status post PEG     4.  Squamous cell carcinoma of the hypopharynx with metastatic lymphadenopathy in the right neck and pulmonary metastases  --possible tumor fever so started on prn Naprosyn suppression test with good results    5. Right hand lesion  -? Felon; does not appear hot, red, or draining; there is swelling  -follow-up x-ray; will make determination if he needs antibiotics or just warm compresses        Lester Ontiveros MD  1216 PM  02/02/19

## 2019-02-03 ENCOUNTER — APPOINTMENT (OUTPATIENT)
Dept: MRI IMAGING | Facility: HOSPITAL | Age: 69
End: 2019-02-03

## 2019-02-03 PROCEDURE — 0 GADOBENATE DIMEGLUMINE 529 MG/ML SOLUTION: Performed by: HOSPITALIST

## 2019-02-03 PROCEDURE — 73220 MRI UPPR EXTREMITY W/O&W/DYE: CPT

## 2019-02-03 PROCEDURE — 94799 UNLISTED PULMONARY SVC/PX: CPT

## 2019-02-03 PROCEDURE — 25010000002 METHYLPREDNISOLONE PER 125 MG: Performed by: HOSPITALIST

## 2019-02-03 PROCEDURE — 97110 THERAPEUTIC EXERCISES: CPT

## 2019-02-03 PROCEDURE — 99233 SBSQ HOSP IP/OBS HIGH 50: CPT | Performed by: INTERNAL MEDICINE

## 2019-02-03 PROCEDURE — A9577 INJ MULTIHANCE: HCPCS | Performed by: HOSPITALIST

## 2019-02-03 RX ORDER — METHYLPREDNISOLONE SODIUM SUCCINATE 125 MG/2ML
60 INJECTION, POWDER, LYOPHILIZED, FOR SOLUTION INTRAMUSCULAR; INTRAVENOUS ONCE
Status: COMPLETED | OUTPATIENT
Start: 2019-02-03 | End: 2019-02-03

## 2019-02-03 RX ADMIN — BUDESONIDE AND FORMOTEROL FUMARATE DIHYDRATE 2 PUFF: 160; 4.5 AEROSOL RESPIRATORY (INHALATION) at 20:58

## 2019-02-03 RX ADMIN — METHYLPREDNISOLONE SODIUM SUCCINATE 60 MG: 125 INJECTION, POWDER, FOR SOLUTION INTRAMUSCULAR; INTRAVENOUS at 15:21

## 2019-02-03 RX ADMIN — GABAPENTIN 800 MG: 400 CAPSULE ORAL at 06:24

## 2019-02-03 RX ADMIN — APIXABAN 5 MG: 5 TABLET, FILM COATED ORAL at 21:50

## 2019-02-03 RX ADMIN — BUDESONIDE AND FORMOTEROL FUMARATE DIHYDRATE 2 PUFF: 160; 4.5 AEROSOL RESPIRATORY (INHALATION) at 12:09

## 2019-02-03 RX ADMIN — NICOTINE 1 PATCH: 14 PATCH, EXTENDED RELEASE TRANSDERMAL at 21:48

## 2019-02-03 RX ADMIN — Medication 2 DROP: at 14:56

## 2019-02-03 RX ADMIN — FAMOTIDINE 20 MG: 20 TABLET, FILM COATED ORAL at 08:53

## 2019-02-03 RX ADMIN — LEVOTHYROXINE SODIUM 75 MCG: 75 TABLET ORAL at 08:53

## 2019-02-03 RX ADMIN — GABAPENTIN 800 MG: 400 CAPSULE ORAL at 14:56

## 2019-02-03 RX ADMIN — GABAPENTIN 800 MG: 400 CAPSULE ORAL at 21:51

## 2019-02-03 RX ADMIN — GADOBENATE DIMEGLUMINE 10 ML: 529 INJECTION, SOLUTION INTRAVENOUS at 19:18

## 2019-02-03 RX ADMIN — Medication 500 MCG: at 08:53

## 2019-02-03 RX ADMIN — ACETAMINOPHEN 650 MG: 160 SOLUTION ORAL at 05:05

## 2019-02-03 RX ADMIN — APIXABAN 5 MG: 5 TABLET, FILM COATED ORAL at 08:53

## 2019-02-03 RX ADMIN — FAMOTIDINE 20 MG: 20 TABLET, FILM COATED ORAL at 21:49

## 2019-02-03 RX ADMIN — OXYCODONE HYDROCHLORIDE 10 MG: 5 TABLET ORAL at 15:23

## 2019-02-03 NOTE — PLAN OF CARE
Problem: Patient Care Overview  Goal: Plan of Care Review   02/03/19 1415   Coping/Psychosocial   Plan of Care Reviewed With patient   OTHER   Outcome Summary Limited progress towards goals during PT. Ambulating in hallway w/ contact to stand by assist using RW, shuffling gait but no loss of balance or significant safety concerns.

## 2019-02-03 NOTE — PROGRESS NOTES
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  2.  Combined radiation and chemotherapy with cisplatin and Taxol initiated on 8/10/2017 concurrent with radiation.    3.  Long smoking history with COPD.  4.  Mediport and PEG tube placed by Dr. Valenzuela on 7/31/2017.  Subsequent PEG tube infection requiring antibiotics.  5.  Chemotherapy completed 9/15/2017 and radiation therapy completed 10/9/2017.  6.  Right neck dissection by Dr. Clinton Styles 5/17/2018 for persistent disease in the right neck.  7.  PET scan from 6/26/2018 showing multiple hypermetabolic lung lesions consistent with metastatic disease.  8.  Palliative Opdivo therapy initiated 7/13/2018, last given on 12/28/2018.      SUBJECTIVE:  Fevers persist.  Yesterday complained of pain, swelling, and  purulent drainage from his right fourth finger.  Xray with some soft tissue swelling but no obvious osteomyelitis.  He complains of severe intermittent pain in his right eye lasting for a few minutes.  Occurred overnight but not yesterday.  Vision mildly blurred from that eye.         ROS:  Positive for - right rib pain, fever, right 4th digit pain, right eye pain  Negative for - nasuea/vomiting    OBJECTIVE:  Vitals:    02/03/19 0455 02/03/19 0824 02/03/19 0840 02/03/19 0851   BP: 101/52 (!) 81/42 90/60 (!) 83/60   BP Location: Left arm Left arm Right arm Left arm   Patient Position: Lying Lying Lying Sitting   Pulse: 101 80     Resp: 18 18     Temp: (!) 102.1 °F (38.9 °C) 97.6 °F (36.4 °C)     TempSrc: Oral Oral     SpO2: 95% 97%     Weight:       Height:             PHYSICAL EXAMINATION:  General:  Chronically ill appearing, NAD  HEENT:  Crusted rash right upper face, significantly improved.  R eye without significant purulent drainage or erythema.      Chest/Lungs:  CTAB  Heart:   RRR no m/r/g  Abdomen/GI:  Soft, NT, ND, NABS.  PEG tube in place.  Extremities:  WWP, no edema, no cords, there is mild erythema, tenderness and swelling at the distal right 4th finger with purulent drainage that I am able to express.    DIAGNOSTIC DATA:  Results Review:     I reviewed the patient's new clinical results.    Results from last 7 days   Lab Units 02/01/19  0921   WBC 10*3/mm3 11.20*   HEMOGLOBIN g/dL 9.1*   HEMATOCRIT % 28.3*   PLATELETS 10*3/mm3 376     Lab Results   Component Value Date    NEUTROABS 9.20 (H) 02/01/2019     Results from last 7 days   Lab Units 02/01/19  0921   SODIUM mmol/L 137   POTASSIUM mmol/L 4.1   CHLORIDE mmol/L 99   CO2 mmol/L 26.7   BUN mg/dL 16   CREATININE mg/dL 0.71*   GLUCOSE mg/dL 148*   CALCIUM mg/dL 9.8     Results from last 7 days   Lab Units 01/30/19  0646   INR  1.10           IMAGING:    CXR 1/28 stable.      IMPRESSION:  Redemonstration of pulmonary masses/nodules, some with  cavitation. Appearance is similar to prior exam. Likely atelectasis at  the left base. Continued follow-up is recommended as indications  Persist.    Venous duplex 1/30:  · Acute right lower extremity deep vein thrombosis noted in the peroneal and gastrocnemius/soleal.  · All other veins appeared normal bilaterally.    CT C/A/P 2/1:  IMPRESSION:  Impression:  1.  Interval enlargement of multiple large pulmonary masses since  1/2/2019 as above. Multiple of the metastatic lesions containing foci of  gas. While findings likely represent necrotic metastases, superinfected  metastatic disease would also appear similar in the appropriate clinical  setting and correlation with patient history with potential percutaneous  sampling is recommended if clinically indicated.  2.  New, likely pathologic fracture of the right seventh rib.  3.  Mildly prominent mediastinal lymph nodes, as before.  4.  Percutaneous gastrostomy tube appropriately positioned in the  stomach with a few foci of gas within the  adjacent subcutaneous tissues.  5.  Other chronic findings as above.    Xray right hand 2/2/2019:  No apparent abscess or osteomyelitis.      No acute fracture, dislocation, erosion, or radiopaque foreign body is  noted. Mild soft tissue swelling is apparent distally in the fourth  digit. The bones are diffusely osteopenic. Osteoarthritic degenerative  changes are seen predominantly at the interphalangeal joints of the  lateral aspect of the wrist. If there is clinical suspicion for  radiographically occult osteomyelitis, MRI or bone scan could be  considered.    Images personally reviewed.      Assessment/Plan   ASSESSMENT/PLAN:  This is a 68 y.o. male with:     1.  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  Chemotherapy was completed 9/15/2017 and radiation completed on 10/9/2017.      He had an excellent response but unfortunately as noted above he does have persistent squamous cell carcinoma in the right neck node and underwent right neck dissection 5/17/2018. See below.         2.  Comorbidities including COPD.  3.  PEG tube removed by Dr. Valenzuela 11/30/2017.  4.  Development of metastatic disease to the lungs noted on PET scan 6/26/2018.  5.  He received palliative therapy with Opdivo.  CT scans after 8 cycles showed some enlargement of the pulmonary nodules but no significant new sites of disease and he continued.   Initiated 7/13/2018, 12th and final cycle given on 12/28/2018.  On discussion with Dr. Garcia, he feels patient has progressed on immunotherapy and the best option likely is palliative care.     6.  Patient admitted now with dysphagia with esophageal dysmotility and aspiration on the video swallow.    · PEG tube placed on 1/30 by Dr. Patel    7.  Herpes zoster infection with facial cellulitis.  Valtrex and vancomycin complete  ·  He complains of worsening  pain in the right eye since about 2/2.    · He's already on a high dose of gabapentin 800 mg every 8 hours.  · May need ophthalmology to see him tomorrow    8.  Pain in the right rib secondary to tumor.   Dr. Marroquin has seen and plans palliative radiation for pain.  Continue oxycodone.  On 1/29 omitted the apap and made oxycodone available q4h prn.      9.  Fever:  Blood cultures negative or NGTD.  Dr. Grace with ID following.    · New RLE calf vein thrombosis which could be the etiology.  · Procalcitonin decreased to 0.36 from 0.59    · Tumor fever possible but diagnosis of exclusion  · Given CT findings of masses containing gas c/w necrotic tumor vs superinfection, though doubt multiple would be infected.  Discussed with Dr. Grace  · Naproxen 500 mg bid started on 2/1    10.  Constipation:  Bowel regimen    11.  No CPR.  Seems reluctant for palliative care.  Focusing on radiation and still seems to want more systemic therapy, though he is not a candidate.      12.  Acute RLE DVT in the peroneal and gastroc/soleal veins.  This may be the etiology of his fever.  · Cancer related as well as immobilization     · Continue Eliquis 5 mg bid  · BID Pepcid for GI prophylaxis    13.  Purulent drainage from right 4th finger  · Xray right hand 2/2 with some mild soft tissue swelling  · Dr. Ontiveros with ID evaluated  · Doubt this is the reason for fever but possible  · Given persistent pain and swelling and fevers, though I doubt that this is the cause of his fever, will get an MRI of his right hand.         PLAN:  1.  Palliative radiation ongoing  2.  No further systemic therapy is planned for his malignancy.    3.  He previously declined [facility] but they were consulted on 2/2 by Dr. Cordoba and he and his sisters are meeting with the [facility] nurse now  4.  Continue Eliquis 5 mg q12h.   5.  Oxycodone prn pain    6.  Continue Naproxen 500 mg bid for presumed tumor fever.    8.  Continue BID Pepcid for GI prophylaxis;  high risk for bleeding on both Eliquis and Naproxen.    9.  MRI right hand  10.  Continue gabapentin and he's on 800 mg tid.    11.  May need ophthalmology to evaluate  12.  Given that rash from zoster has improved significantly, ? Utility of resuming the Valtrex for pain.      Will follow.  Discussed with sisters and [facility] nurse today.           John Presley MD

## 2019-02-03 NOTE — PLAN OF CARE
Problem: Patient Care Overview  Goal: Plan of Care Review  Outcome: Ongoing (interventions implemented as appropriate)   02/03/19 9615   Coping/Psychosocial   Plan of Care Reviewed With patient   Plan of Care Review   Progress no change   OTHER   Outcome Summary Pt.'s right eye has been bothering him. Dr. Cordoba and Dr. Presley were notified. Ophthlamology was consulted. Medicated once for eye pain. He felt relieved from pain. Will continue to monitor.     Goal: Individualization and Mutuality  Outcome: Ongoing (interventions implemented as appropriate)    Goal: Discharge Needs Assessment  Outcome: Ongoing (interventions implemented as appropriate)      Problem: Skin Injury Risk (Adult)  Goal: Skin Health and Integrity  Outcome: Ongoing (interventions implemented as appropriate)      Problem: Nutrition, Imbalanced: Inadequate Oral Intake (Adult)  Goal: Prevent Further Weight Loss  Outcome: Ongoing (interventions implemented as appropriate)      Problem: Fall Risk (Adult)  Goal: Absence of Fall  Outcome: Ongoing (interventions implemented as appropriate)

## 2019-02-03 NOTE — CONSULTS
"Met with patient, 2 sisters, brother, and sister in law at bedside. Explanation of services provided with focus on home care. Answered all family questions, discussed medications, code status, and goals of care.     Patient verbalizes he would like to go home with hospitals services and POA/sister is in agreement. However, other family members are concerned for the patient's progressive weakness and would like for patient to attempt home health \"to get a little bit stronger.\"      No discharge plan at this time, as family would like to further discuss with patient. hospitals information folder provided to patient and family for reference. Encouraged patient/family to contact hospitals with any questions. Updated RN, Kristina.    Please reach out to hospitals with any questions, concerns, or changes in patient status.    Thank you for allowing us the opportunity to participate in the care of this pleasant patient and family.    Aliya Neves, RN  Admission Nurse  [facility]  [phone]  "

## 2019-02-03 NOTE — PLAN OF CARE
Problem: Patient Care Overview  Goal: Plan of Care Review  Outcome: Ongoing (interventions implemented as appropriate)   02/03/19 1317   Coping/Psychosocial   Plan of Care Reviewed With patient   Plan of Care Review   Progress no change   OTHER   Outcome Summary TF going from 8p-8a, upped to 60cc, complains of sharp pain from shingles but did not want pain meds, waiting to go back on valtrex, uneventful night       Problem: Skin Injury Risk (Adult)  Goal: Skin Health and Integrity  Outcome: Ongoing (interventions implemented as appropriate)      Problem: Nutrition, Imbalanced: Inadequate Oral Intake (Adult)  Goal: Prevent Further Weight Loss  Outcome: Ongoing (interventions implemented as appropriate)      Problem: Fall Risk (Adult)  Goal: Absence of Fall  Outcome: Ongoing (interventions implemented as appropriate)      Problem: Nutrition, Enteral (Adult)  Goal: Signs and Symptoms of Listed Potential Problems Will be Absent, Minimized or Managed (Nutrition, Enteral)  Outcome: Ongoing (interventions implemented as appropriate)

## 2019-02-03 NOTE — PROGRESS NOTES
"    DAILY PROGRESS NOTE  [facility]    Patient Identification:  Name: King Parson  Age: 68 y.o.  Sex: male  :  1950  MRN: [id number]         Primary Care Physician: Elise Ortiz APRN    Subjective:  Interval History: Right eye seems to be bothering him more over the last day or 2.  He states that it feels like a burning and it's constant.  No headache.  Baseline nausea no emesis.  No altered mentation.  Continues to tolerate tube feeds nightly    Objective: Dougie to his sister in passing this morning but did not see the patient at that time since hospice was meeting with them.  Upon coming back unfortunately the sisters had to leave    Scheduled Meds:  apixaban 5 mg Oral Q12H   Followed by      [START ON 2019] apixaban 5 mg Oral Q12H   budesonide-formoterol 2 puff Inhalation BID - RT   famotidine 20 mg Oral BID   gabapentin 800 mg Oral Q8H   levothyroxine 75 mcg Oral Daily   methylPREDNISolone sodium succinate 60 mg Intravenous Once   nicotine 1 patch Transdermal Q24H   vitamin B-12 500 mcg Oral Daily     Continuous Infusions:     Vital signs in last 24 hours:  Temp:  [96.7 °F (35.9 °C)-102.1 °F (38.9 °C)] 99.1 °F (37.3 °C)  Heart Rate:  [] 80  Resp:  [16-20] 20  BP: ()/(42-73) 89/51    Intake/Output:    Intake/Output Summary (Last 24 hours) at 2/3/2019 1454  Last data filed at 2/3/2019 1343  Gross per 24 hour   Intake 2101 ml   Output 175 ml   Net 1926 ml       Exam:  BP (!) 89/51 (BP Location: Left arm, Patient Position: Lying)   Pulse 80   Temp 99.1 °F (37.3 °C) (Oral)   Resp 20   Ht 175.3 cm (69\")   Wt 51.2 kg (112 lb 12.8 oz)   SpO2 97%   BMI 16.66 kg/m²     General Appearance:    Alert, cooperative, chronically ill, AAOx3                          Head:    Normocephalic, without obvious abnormality, atraumatic                           Eyes:    No obvious abnormalities with the right eye.  It reacts to light and accommodation.  Has some mild scleral " injection with increased lacrimation                         Throat:   Lips, tongue, gums normal; oral mucosa pink and moist                           Neck:   No JVD                         Lungs:    Clear to auscultation bilaterally, respirations unlabored                           Heart:    Regular rate and rhythm, S1 and S2 normal                  Abdomen:     Soft, non-tender, bowel sounds active                 Extremities:   No cyanosis or edema                             Data Review:  Labs in chart were reviewed.    Assessment:  Active Hospital Problems    Diagnosis Date Noted   • **Acute deep vein thrombosis (DVT) of right lower extremity (CMS/HCC) [I82.401] 01/31/2019   • Tobacco abuse [Z72.0] 01/24/2019   • Varicella zoster- over V1,  slit lamp neg 1/23/19 [B02.9, B01.9] 01/23/2019   • Dysphagia [R13.10] 01/23/2019   • Esophageal dysmotility [K22.4] 01/23/2019   • Aspiration syndrome [T17.900A] 01/23/2019   • History of immunotherapy [Z92.89] 01/23/2019   • Abnormal weight loss [R63.4] 01/23/2019   • GERD (gastroesophageal reflux disease) [K21.9] 01/23/2019   • Failure to thrive (0-17) [R62.51] 01/23/2019   • Lung metastases (CMS/HCC) [C78.00] 07/27/2018   • 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, N2a, M0) [C13.9] 07/24/2017   • Lumbar radiculopathy [M54.16] 02/08/2016      Resolved Hospital Problems    Diagnosis Date Noted Date Resolved   • Swelling of right side of face [R22.0] 01/23/2019 01/31/2019   • Cellulitis of head except face [L03.811] 01/23/2019 01/31/2019       Plan:    Agree with tentative plan for home with hospice in the next day or 2    Give a one-time dose of IV Solu-Medrol for right eye pain likely secondary to herpetic neuralgia and ask ophthalmology if they would be willing to see in consultation   -on 3 times a day gabapentin with prn NSAIDs and oxycodone for breakthrough pain    MRI right hand per Onc       Jose A Cordoba MD  2/3/2019  2:54 PM

## 2019-02-03 NOTE — THERAPY TREATMENT NOTE
Acute Care - Physical Therapy Treatment Note  [facility]     Patient Name: King Parson  : 1950  MRN: [id number]  Today's Date: 2/3/2019  Onset of Illness/Injury or Date of Surgery: 19  Date of Referral to PT: 19  Referring Physician: Arianna Acevedo, APRN    Admit Date: 2019    Visit Dx:    ICD-10-CM ICD-9-CM   1. Varicella zoster B02.9 053.9    B01.9    2. Febrile illness R50.9 780.60   3. Failure to thrive (0-17) R62.51 783.41   4. Esophageal dysmotility K22.4 530.5   5. Impaired mobility Z74.09 799.89   6. Dysphagia, unspecified type R13.10 787.20   7. 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, M0)   • Throat cancer (CMS/HCC)   • Encounter for fitting and adjustment of vascular catheter   • Other chronic pain   • Squamous cell carcinoma   • Cervicalgia   • Cervical spinal stenosis   • Encounter for monitoring opioid maintenance therapy   • Lung metastases (CMS/HCC)   • Encounter for long-term (current) use of high-risk medication   • DDD (degenerative disc disease), cervical   • Cervical radiculitis   • Varicella zoster- over V1,  slit lamp neg 19   • Dysphagia   • Esophageal dysmotility   • Aspiration syndrome   • History of immunotherapy   • Abnormal weight loss   • GERD (gastroesophageal reflux disease)   • Tobacco abuse   • Failure to thrive (0-17)   • Acute deep vein thrombosis (DVT) of right lower extremity (CMS/HCC)       Therapy Treatment    Rehabilitation Treatment Summary     Row Name 19 1413             Treatment Time/Intention    Discipline  physical therapist  -AR      Document Type   therapy note (daily note)  -AR      Subjective Information  complains of;weakness;fatigue  -AR      Mode of Treatment  physical therapy  -AR      Therapy Frequency (PT Clinical Impression)  5 times/wk  -AR      Patient Effort  good  -AR      Comment  pt reports plan for hospice  -AR      Existing Precautions/Restrictions  fall  -AR      Recorded by [AR] Ella Ryan, PT 02/03/19 1415      Row Name 02/03/19 1413             Vital Signs    O2 Delivery Pre Treatment  room air  -AR      Recorded by [AR] Ella Ryan, PT 02/03/19 1415      Row Name 02/03/19 1413             Cognitive Assessment/Intervention- PT/OT    Orientation Status (Cognition)  oriented x 4  -AR      Follows Commands (Cognition)  WNL  -AR      Recorded by [AR] Ella Ryan, PT 02/03/19 1415      Row Name 02/03/19 1413             Bed Mobility Assessment/Treatment    Supine-Sit [place] (Bed Mobility)  supervision  -AR      Sit-Supine [place] (Bed Mobility)  supervision  -AR      Comment (Bed Mobility)  HOB flat, no bed rail  -AR      Recorded by [AR] Ella Ryan, PT 02/03/19 1415      Row Name 02/03/19 1413             Sit-Stand Transfer    Sit-Stand [place] (Transfers)  stand by assist  -AR      Assistive Device (Sit-Stand Transfers)  walker, front-wheeled  -AR      Recorded by [AR] Ella Ryan, PT 02/03/19 1415      Row Name 02/03/19 1413             Stand-Sit Transfer    Stand-Sit [place] (Transfers)  stand by assist  -AR      Assistive Device (Stand-Sit Transfers)  walker, front-wheeled  -AR      Recorded by [AR] Ella Ryan, PT 02/03/19 1415      Row Name 02/03/19 1413             Gait/Stairs Assessment/Training    Gait/Stairs Assessment/Training  distance ambulated;gait/ambulation assistive device;gait pattern  -AR      [place] Level (Gait)  contact guard;stand by assist  -AR      Assistive Device (Gait)  walker, front-wheeled  -AR      Distance in Feet (Gait)  120  -AR      Pattern (Gait)   swing-through  -AR      Deviations/Abnormal Patterns (Gait)  gait speed decreased;festinating/shuffling  -AR      Comment (Gait/Stairs)  limited by fatigue  -AR      Recorded by [AR] Ella Ryan, PT 02/03/19 1415      Row Name 02/03/19 1413             Positioning and Restraints    Pre-Treatment Position  in bed  -AR      Post Treatment Position  bed  -AR      In Bed  supine;call light within reach;encouraged to call for assist;exit alarm on  -AR      Recorded by [AR] Ella Ryan, PT 02/03/19 1415      Row Name 02/03/19 1413             Pain Scale: Numbers Pre/Post-Treatment    Pain Scale: Numbers, Pretreatment  3/10  -AR      Pain Scale: Numbers, Post-Treatment  4/10  -AR      Pain Location  back  -AR      Pain Intervention(s)  Repositioned;Medication (See MAR)  -AR      Recorded by [AR] Ella Ryan, PT 02/03/19 1415      Row Name 02/03/19 1413             Outcome Summary/Treatment Plan (PT)    Anticipated Discharge Disposition (PT)  -- per chart planning for home w/ hospice  -AR      Recorded by [AR] Ella Ryan, PT 02/03/19 1415        User Key  (r) = Recorded By, (t) = Taken By, (c) = Cosigned By    Initials Name Effective Dates Discipline    AR Ella Ryan, PT 04/03/18 -  PT                   Physical Therapy Education     Title: PT OT SLP Therapies (In Progress)     Topic: Physical Therapy (In Progress)     Point: Mobility training (In Progress)     Learning Progress Summary           Patient Acceptance, E, NR by AR at 2/3/2019  2:15 PM    Acceptance, E,TB, VU,DU by ROBI at 2/1/2019  4:35 PM    Acceptance, E,TB, VU,DU by ROBI at 1/31/2019  3:21 PM    Acceptance, E,TB, VU,DU by ROBI at 1/29/2019  1:50 PM    Acceptance, E,TB, VU by MARSHAL at 1/27/2019  1:51 PM    Acceptance, E,TB, VU by MARSHAL at 1/26/2019 12:04 PM                   Point: Home exercise program (In Progress)     Learning Progress Summary           Patient Acceptance, E, NR by AR at 2/3/2019  2:15 PM    Acceptance, E,TB, VU,DU by ROBI at  2/1/2019  4:35 PM    Acceptance, E,TB, VU,DU by CW at 1/31/2019  3:21 PM    Acceptance, E,TB, VU,DU by CW at 1/29/2019  1:50 PM    Acceptance, E,TB, VU by KH at 1/26/2019 12:04 PM                   Point: Body mechanics (In Progress)     Learning Progress Summary           Patient Acceptance, E, NR by AR at 2/3/2019  2:15 PM    Acceptance, E,TB, VU,DU by CW at 2/1/2019  4:35 PM    Acceptance, E,TB, VU,DU by CW at 1/31/2019  3:21 PM    Acceptance, E,TB, VU,DU by CW at 1/29/2019  1:50 PM    Acceptance, E,TB, VU by KH at 1/27/2019  1:51 PM    Acceptance, E,TB, VU by KH at 1/26/2019 12:04 PM                   Point: Precautions (In Progress)     Learning Progress Summary           Patient Acceptance, E, NR by AR at 2/3/2019  2:15 PM    Acceptance, E,TB, VU,DU by CW at 2/1/2019  4:35 PM    Acceptance, E,TB, VU,DU by CW at 1/31/2019  3:21 PM    Acceptance, E,TB, VU,DU by CW at 1/29/2019  1:50 PM    Acceptance, E,TB, VU by KH at 1/27/2019  1:51 PM    Acceptance, E,TB, VU by KH at 1/26/2019 12:04 PM                               User Key     Initials Effective Dates Name Provider Type Discipline    AR 04/03/18 -  Ella Ryan, PT Physical Therapist PT     06/22/16 -  Tori Bradford, PT Physical Therapist PT     03/07/18 -  Afshin Contreras, PTA Physical Therapy Assistant PT                PT Recommendation and Plan  Anticipated Discharge Disposition (PT): (per chart planning for home w/ hospice)  Therapy Frequency (PT Clinical Impression): 5 times/wk  Outcome Summary/Treatment Plan (PT)  Anticipated Discharge Disposition (PT): (per chart planning for home w/ hospice)  Plan of Care Reviewed With: patient  Outcome Summary: Limited progress towards goals during PT.  Ambulating in hallway w/ contact to stand by assist using  RW, shuffling gait but no loss of balance or significant safety concerns.    Outcome Measures     Row Name 02/03/19 1400 02/01/19 1600 01/31/19 1500       How much help from another person do you  currently need...    Turning from your back to your side while in flat bed without using bedrails?  4  -AR  4  -CW  4  -CW    Moving from lying on back to sitting on the side of a flat bed without bedrails?  4  -AR  4  -CW  4  -CW    Moving to and from a bed to a chair (including a wheelchair)?  3  -AR  3  -CW  3  -CW    Standing up from a chair using your arms (e.g., wheelchair, bedside chair)?  3  -AR  3  -CW  3  -CW    Climbing 3-5 steps with a railing?  3  -AR  3  -CW  3  -CW    To walk in hospital room?  3  -AR  3  -CW  3  -CW    AM-PAC 6 Clicks Score  20  -AR  20  -CW  20  -CW       Functional Assessment    Outcome Measure Options  --  AM-PAC 6 Clicks Basic Mobility (PT)  -CW  AM-PAC 6 Clicks Basic Mobility (PT)  -CW      User Key  (r) = Recorded By, (t) = Taken By, (c) = Cosigned By    Initials Name Provider Type    Ella Armenta, PT Physical Therapist    CW Afshin Contreras, PTA Physical Therapy Assistant         Time Calculation:   PT Charges     Row Name 02/03/19 1416             Time Calculation    Start Time  1400  -AR      Stop Time  1416  -AR      Time Calculation (min)  16 min  -AR      PT Received On  02/03/19  -AR      PT - Next Appointment  02/04/19  -AR        User Key  (r) = Recorded By, (t) = Taken By, (c) = Cosigned By    Initials Name Provider Type    Ella Armenta, PT Physical Therapist        Therapy Suggested Charges     Code   Minutes Charges    None           Therapy Charges for Today     Code Description Service Date Service Provider Modifiers Qty    23745628282 HC PT THER PROC EA 15 MIN 2/3/2019 Ella Ryan, PT GP 1          PT G-Codes  Outcome Measure Options: AM-PAC 6 Clicks Basic Mobility (PT)  AM-PAC 6 Clicks Score: 20    Ella Ryan PT  2/3/2019

## 2019-02-04 ENCOUNTER — APPOINTMENT (OUTPATIENT)
Dept: GENERAL RADIOLOGY | Facility: HOSPITAL | Age: 69
End: 2019-02-04
Attending: OTOLARYNGOLOGY

## 2019-02-04 PROCEDURE — 94799 UNLISTED PULMONARY SVC/PX: CPT

## 2019-02-04 PROCEDURE — 77412 RADIATION TX DELIVERY LVL 3: CPT | Performed by: RADIOLOGY

## 2019-02-04 PROCEDURE — 99232 SBSQ HOSP IP/OBS MODERATE 35: CPT | Performed by: INTERNAL MEDICINE

## 2019-02-04 RX ADMIN — FAMOTIDINE 20 MG: 20 TABLET, FILM COATED ORAL at 21:56

## 2019-02-04 RX ADMIN — BUDESONIDE AND FORMOTEROL FUMARATE DIHYDRATE 2 PUFF: 160; 4.5 AEROSOL RESPIRATORY (INHALATION) at 20:43

## 2019-02-04 RX ADMIN — FAMOTIDINE 20 MG: 20 TABLET, FILM COATED ORAL at 09:04

## 2019-02-04 RX ADMIN — OXYCODONE HYDROCHLORIDE 10 MG: 5 TABLET ORAL at 05:52

## 2019-02-04 RX ADMIN — BUDESONIDE AND FORMOTEROL FUMARATE DIHYDRATE 2 PUFF: 160; 4.5 AEROSOL RESPIRATORY (INHALATION) at 07:44

## 2019-02-04 RX ADMIN — APIXABAN 5 MG: 5 TABLET, FILM COATED ORAL at 09:04

## 2019-02-04 RX ADMIN — Medication 500 MCG: at 09:04

## 2019-02-04 RX ADMIN — LEVOTHYROXINE SODIUM 75 MCG: 75 TABLET ORAL at 09:04

## 2019-02-04 RX ADMIN — GABAPENTIN 800 MG: 400 CAPSULE ORAL at 05:49

## 2019-02-04 RX ADMIN — NICOTINE 1 PATCH: 14 PATCH, EXTENDED RELEASE TRANSDERMAL at 09:18

## 2019-02-04 RX ADMIN — OXYCODONE HYDROCHLORIDE 10 MG: 5 TABLET ORAL at 15:08

## 2019-02-04 RX ADMIN — ACETAMINOPHEN 650 MG: 325 TABLET, FILM COATED ORAL at 21:56

## 2019-02-04 RX ADMIN — GABAPENTIN 800 MG: 400 CAPSULE ORAL at 21:57

## 2019-02-04 RX ADMIN — APIXABAN 5 MG: 5 TABLET, FILM COATED ORAL at 21:56

## 2019-02-04 RX ADMIN — GABAPENTIN 800 MG: 400 CAPSULE ORAL at 14:57

## 2019-02-04 NOTE — PLAN OF CARE
Problem: Patient Care Overview  Goal: Plan of Care Review  Outcome: Ongoing (interventions implemented as appropriate)   02/04/19 2663   Coping/Psychosocial   Plan of Care Reviewed With patient   Plan of Care Review   Progress no change   OTHER   Outcome Summary pt continues to do well with cyclic tube feedings. daily radiation, treatment 2 done today. ice pack and cold wash cloth for eye at times, opthamologist came to see recommending surgery to fix the lower lid. however, pt is on eliquis this will need to be stopped prior to. tape to lower eye lid to keep open. VSS, will conitue to monitor.      Goal: Individualization and Mutuality  Outcome: Ongoing (interventions implemented as appropriate)      Problem: Fall Risk (Adult)  Goal: Absence of Fall  Outcome: Ongoing (interventions implemented as appropriate)      Problem: Nutrition, Enteral (Adult)  Goal: Signs and Symptoms of Listed Potential Problems Will be Absent, Minimized or Managed (Nutrition, Enteral)  Outcome: Ongoing (interventions implemented as appropriate)      Problem: Oncology Care (Adult)  Goal: Signs and Symptoms of Listed Potential Problems Will be Absent, Minimized or Managed (Oncology Care)  Outcome: Ongoing (interventions implemented as appropriate)

## 2019-02-04 NOTE — PROGRESS NOTES
Adult Nutrition  Assessment/PES    Patient Name:  King Parson  YOB: 1950  MRN: [id number]  Admit Date:  1/23/2019    Assessment Date:  2/4/2019    Follow up-tolerating TF cyclic-Nutren 1.5 @ 70 x 12 hours; tolerating po intake-average 50% of meals.  RD to monitor/follow    Reason for Assessment     Row Name 02/04/19 1056          Reason for Assessment    Reason For Assessment  follow-up protocol;TF/PN             Labs/Tests/Procedures/Meds     Row Name 02/04/19 1056          Labs/Procedures/Meds    Lab Results Reviewed  reviewed, pertinent        Diagnostic Tests/Procedures    Diagnostic Test/Procedure Reviewed  reviewed, pertinent        Medications    Pertinent Medications Reviewed  reviewed, pertinent         Physical Findings     Row Name 02/04/19 1056          Physical Findings    Gastrointestinal  feeding tube     Tubes  PEG           Nutrition Prescription Ordered     Row Name 02/04/19 1056          Nutrition Prescription PO    Current PO Diet  Pureed     Fluid Consistency  Nectar/syrup thick        Nutrition Prescription EN    Enteral Route  PEG     TF Delivery Method  Cyclic     Cyclic TF Goal Rate (mL/hr)  70 mL/hr     Cyclic TF Cycle Over (hrs)   12         Evaluation of Received Nutrient/Fluid Intake     Row Name 02/04/19 1057          Calories Evaluation    Enteral Calories (kcal)  1008        Protein Evaluation    Enteral Protein (gm)  57        Fluid Intake Evaluation    Enteral (Free Water) Fluid (mL)  638        PO Evaluation    Number of Meals  3     % PO Intake  50             Problem/Interventions:    Intervention Goal     Row Name 02/04/19 1057          Intervention Goal    General  Maintain nutrition;Meet nutritional needs for age/condition;Nutrition support treatment     PO  Tolerate PO;Increase intake     TF/PN  Maintain TF/PN;Tolerate TF at goal     Weight  Appropriate weight gain         Nutrition Intervention     Row Name 02/04/19 1057          Nutrition Intervention     [employer]  Follow Tx progress;Care plan reviewd         Nutrition Prescription     Row Name 02/04/19 1057          Nutrition Prescription EN    Enteral Prescription  Continue same protocol         Education/Evaluation     Row Name 02/04/19 1057          Education    Education  Will Instruct as appropriate        Monitor/Evaluation    Monitor  Per protocol;I&O;PO intake;Supplement intake;Pertinent labs;TF delivery/tolerance;Weight;Skin status           Electronically signed by:  Ruma Gunter RD  02/04/19 10:58 AM

## 2019-02-04 NOTE — PROGRESS NOTES
Continued Stay Note  [facility]     Patient Name: King Parson  MRN: [id number]  Today's Date: 2/4/2019    Admit Date: 1/23/2019    Discharge Plan     Row Name 02/04/19 1749       Plan    Plan  Home with [facility] vs home with HH?     Patient/Family in Agreement with Plan  yes    Plan Comments  Discussed case with Vipul; Dr. Garcia & Dr. Sim. States patient would like to go home with [facility] services and POA/sister is in agreement. Julita/Bimal says she has scheduled an appointment tomorrow to arrange dc home. Continue to follow.        Discharge Codes    No documentation.       Expected Discharge Date and Time     Expected Discharge Date Expected Discharge Time    Feb 5, 2019             Felicitas Kowalski RN

## 2019-02-04 NOTE — PROGRESS NOTES
"    DAILY PROGRESS NOTE  [facility]    Patient Identification:  Name: King Parson  Age: 68 y.o.  Sex: male  :  1950  MRN: [id number]         Primary Care Physician: Elise Ortiz APRN    Subjective:  Interval History: Right eye pain felt much better throughout the night though started to come back this morning at about 5 AM.  Nausea baseline and no issues with emesis or abdominal pain and continues to tolerate tube feeds    Objective: No family at bedside    Scheduled Meds:  apixaban 5 mg Oral Q12H   Followed by      [START ON 2019] apixaban 5 mg Oral Q12H   budesonide-formoterol 2 puff Inhalation BID - RT   famotidine 20 mg Oral BID   gabapentin 800 mg Oral Q8H   levothyroxine 75 mcg Oral Daily   nicotine 1 patch Transdermal Q24H   vitamin B-12 500 mcg Oral Daily     Continuous Infusions:     Vital signs in last 24 hours:  Temp:  [97 °F (36.1 °C)-99.1 °F (37.3 °C)] 97 °F (36.1 °C)  Heart Rate:  [] 92  Resp:  [16-20] 18  BP: ()/(47-62) 89/47    Intake/Output:    Intake/Output Summary (Last 24 hours) at 2019 1112  Last data filed at 2019 0500  Gross per 24 hour   Intake 120 ml   Output 2 ml   Net 118 ml       Exam:  BP (!) 89/47 (BP Location: Left arm, Patient Position: Lying) Comment: rn notified  Pulse 92   Temp 97 °F (36.1 °C) (Oral)   Resp 18   Ht 175.3 cm (69\")   Wt 51.2 kg (112 lb 12.8 oz)   SpO2 95%   BMI 16.66 kg/m²     General Appearance:    Alert, cooperative, no distress, AAOx3, clinically looks better to me today                           Eyes:    PERRLA/EOMI intact - increased lacrimation w/out periorbital cellulitis                          Throat:   Lips, tongue, gums normal; oral mucosa pink and moist                         Lungs:    Clear to auscultation bilaterally, respirations unlabored                         Heart:    Regular rate and rhythm, S1 and S2 normal                  Abdomen:     Soft, non-tender, bowel sounds active, " PEG                 Extremities:   Moving all, no cyanosis or edema                           Data Review:  Labs in chart were reviewed.    Assessment:  Active Hospital Problems    Diagnosis Date Noted   • **Acute deep vein thrombosis (DVT) of right lower extremity (CMS/HCC) [I82.401] 01/31/2019   • Tobacco abuse [Z72.0] 01/24/2019   • Varicella zoster- over V1,  slit lamp neg 1/23/19 [B02.9, B01.9] 01/23/2019   • Dysphagia [R13.10] 01/23/2019   • Esophageal dysmotility [K22.4] 01/23/2019   • Aspiration syndrome [T17.900A] 01/23/2019   • History of immunotherapy [Z92.89] 01/23/2019   • Abnormal weight loss [R63.4] 01/23/2019   • GERD (gastroesophageal reflux disease) [K21.9] 01/23/2019   • Failure to thrive (0-17) [R62.51] 01/23/2019   • Lung metastases (CMS/HCC) [C78.00] 07/27/2018   • 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, 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, M0) [C13.9] 07/24/2017   • Lumbar radiculopathy [M54.16] 02/08/2016      Resolved Hospital Problems    Diagnosis Date Noted Date Resolved   • Swelling of right side of face [R22.0] 01/23/2019 01/31/2019   • Cellulitis of head except face [L03.811] 01/23/2019 01/31/2019       Plan:    Persistent fevers are improving - felt secondary to necrotic pulmonary lesions and CVT    Some improvement with IV steroid trial ×1 but ultimately awaiting further ophthalmology recommendations given right eye pain and recent herpetic zoster/cellulitis    MRI report of right hand per oncology pending    Disposition - plans are home with hospice likely tomorrow - CCP helping to coordinate all disability issues clearing nighttime tube feeds    Jose A Cordoba MD  2/4/2019  11:12 AM

## 2019-02-04 NOTE — PLAN OF CARE
Problem: Patient Care Overview  Goal: Plan of Care Review  Outcome: Ongoing (interventions implemented as appropriate)   02/04/19 1602   Coping/Psychosocial   Plan of Care Reviewed With patient   Plan of Care Review   Progress improving   OTHER   Outcome Summary Uneventful night, TF running, no c/o pin       Problem: Skin Injury Risk (Adult)  Goal: Skin Health and Integrity  Outcome: Ongoing (interventions implemented as appropriate)      Problem: Nutrition, Imbalanced: Inadequate Oral Intake (Adult)  Goal: Prevent Further Weight Loss  Outcome: Ongoing (interventions implemented as appropriate)      Problem: Fall Risk (Adult)  Goal: Absence of Fall  Outcome: Ongoing (interventions implemented as appropriate)      Problem: Nutrition, Enteral (Adult)  Goal: Signs and Symptoms of Listed Potential Problems Will be Absent, Minimized or Managed (Nutrition, Enteral)  Outcome: Ongoing (interventions implemented as appropriate)

## 2019-02-04 NOTE — CONSULTS
OPHTHALMOLOGY CONSULT NOTE    Patient Identification:  Name: King Parson  Age: 68 y.o.  Sex: male  :  1950  MRN: [id number]                                               Requesting Physician: per order  Reason for consult: right eye pain    History of Present Illness:  68 y.o. male with history as noted below who was diagnosed with herpes zoster in the V1 Distribution 2 weeks ago reports having severe intermittent sharp shooting pain occasionally in the right eye. His vision is blurry during the pain episode. He also admits to foreign body sensation in the right eye. No flashes or floaters. He reports poor vision in the left eye since childhood.     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, M0)    Lung metastases (CMS/HCC)    Varicella zoster- over V1,  slit lamp neg 19    Dysphagia    Esophageal dysmotility    Aspiration syndrome    History of immunotherapy    Abnormal weight loss    GERD (gastroesophageal reflux disease)    Tobacco abuse    Failure to thrive (0-17)      Past Medical History:  Past Medical History:   Diagnosis Date   • Asthma    • Bruises easily    • COPD (chronic obstructive pulmonary disease) (CMS/HCC)    • GERD (gastroesophageal reflux disease)    • History of anemia    • History of chemotherapy     2017   • History of chemotherapy     2017   • History of chest pain 2017    HAD NEG. STRESS TEST IN EPIC   • Hyperlipidemia    • Hypertension    • Low back pain     chronic, also bilat leg pain   • Lung cancer (CMS/HCC) 2018   • Lung cancer (CMS/HCC)    • Neck mass     right side   • Osteoarthritis    • Poor vision    • Shingles    • Squamous cell cancer of hypopharynx (CMS/HCC)    • Vision loss of left eye        Past Surgical History:  Past Surgical History:   Procedure Laterality Date   • ABDOMINAL  SURGERY     • ELBOW ARTHROTOMY Bilateral     bursa removed   • ENDOSCOPY W/ PEG TUBE PLACEMENT N/A 7/31/2017    Procedure: ESOPHAGOGASTRODUODENOSCOPY WITH PERCUTANEOUS ENDOSCOPIC GASTROSTOMY TUBE INSERTION;  Surgeon: Raul Valenzuela MD;  Location: [facility] OR;  Service:    • EPIDURAL BLOCK     • EYE SURGERY Left 1993    HEMORRHAGE LEFT EYE   • HAND TENDON REPAIR      LEFT THUMB   • INCISION AND DRAINAGE ABSCESS Left     LEFT HAND, STREP INFECTION   • NECK DISSECTION Right 5/17/2018    Procedure: RIGHT MODIFIED RADICAL  NECK DISSECTION;  Surgeon: Clinton Styles MD;  Location: [facility] OR OSC;  Service: ENT   • PEG TUBE INSERTION N/A 1/30/2019    Procedure: PERCUTANEOUS ENDOSCOPIC GASTROSTOMY TUBE INSERTION;  Surgeon: Mandy Patel MD;  Location: [facility] ENDOSCOPY;  Service: Gastroenterology   • VENOUS ACCESS DEVICE (PORT) INSERTION N/A 7/31/2017    Procedure: INSERTION VENOUS ACCESS DEVICE;  Surgeon: Raul Valenzuela MD;  Location: [facility] OR;  Service:         Past Ocular History:    ROS:  Pertinent items are noted in HPI    Eyes: positive for Amblyopia, left eye  Ocular Medication: artificial tears    Home Meds:  Medications Prior to Admission   Medication Sig Dispense Refill Last Dose   • acetaminophen (TYLENOL) 325 MG tablet Take 650 mg by mouth Every 6 (Six) Hours As Needed for Mild Pain .   Taking   • albuterol (PROVENTIL HFA;VENTOLIN HFA) 108 (90 BASE) MCG/ACT inhaler Inhale 2 puffs 2 (two) times a day.   Taking   • celecoxib (CeleBREX) 200 MG capsule Take 1 capsule by mouth Daily. 30 capsule 1 Taking   • Cyanocobalamin (B-12) 1000 MCG capsule Take 1 tablet/day by mouth Daily.   Taking   • gabapentin (NEURONTIN) 800 MG tablet TAKE ONE TABLET BY MOUTH FOUR TIMES A  tablet 4 Taking   • ipratropium (ATROVENT) 0.06 % nasal spray 1 spray into each nostril Daily As Needed.   Taking   • levothyroxine (SYNTHROID) 75 MCG tablet Take 1 tablet by mouth Daily. 30 tablet 5 Taking   • omeprazole  (priLOSEC) 40 MG capsule Take 40 mg by mouth Every Morning.   Taking   • ondansetron (ZOFRAN) 8 MG tablet Take 1 tablet by mouth 3 (Three) Times a Day As Needed for Nausea or Vomiting. 30 tablet 5 Taking   • oxyCODONE-acetaminophen (PERCOCET)  MG per tablet Take 1 tablet by mouth Every 6 (Six) Hours As Needed for Moderate Pain . 10 tablet 0    • SYMBICORT 160-4.5 MCG/ACT inhaler Inhale 2 puffs 2 (Two) Times a Day.      • valACYclovir (VALTREX) 1000 MG tablet Take 1 po tid 21 tablet 0    • HYDROcodone-acetaminophen (NORCO) 5-325 MG per tablet Take 1 tablet by mouth Every 8 (Eight) Hours As Needed for Moderate Pain . 90 tablet 0    • lidocaine (LIDODERM) 5 % Place 1 patch on the skin as directed by provider Daily. Remove & Discard patch within 12 hours or as directed by MD 30 patch 11    • predniSONE (DELTASONE) 10 MG tablet Take 1 tablet each morning for 3 days. 3 tablet 0        Current Meds:     Current Facility-Administered Medications:   •  acetaminophen (TYLENOL) 160 MG/5ML solution 650 mg, 650 mg, Oral, Q6H PRN, 650 mg at 02/03/19 0505 **OR** acetaminophen (TYLENOL) tablet 650 mg, 650 mg, Oral, Q6H PRN, Julio Alfaro MD, 650 mg at 02/01/19 1534  •  albuterol (PROVENTIL) nebulizer solution 0.083% 2.5 mg/3mL, 2.5 mg, Nebulization, Q6H PRN, Patrizia Chavez MD  •  apixaban (ELIQUIS) tablet 5 mg, 5 mg, Oral, Q12H, 5 mg at 02/04/19 0904 **FOLLOWED BY** [START ON 2/7/2019] apixaban (ELIQUIS) tablet 5 mg, 5 mg, Oral, Q12H, John Presley MD  •  benzocaine (HURRICAINE) 20 % oral spray 2 spray, 2 spray, Mouth/Throat, 4x Daily PRN, Alistair Domingo MD  •  budesonide-formoterol (SYMBICORT) 160-4.5 MCG/ACT inhaler 2 puff, 2 puff, Inhalation, BID - RT, Patrizia Chavez MD, 2 puff at 02/04/19 0744  •  famotidine (PEPCID) tablet 20 mg, 20 mg, Oral, BID, Patrizia Chavez MD, 20 mg at 02/04/19 0904  •  gabapentin (NEURONTIN) capsule 800 mg, 800 mg, Oral, Q8H, Patrizia Chavez MD, 800 mg at  02/04/19 1457  •  Glycerin-Hypromellose- (ARTIFICIAL TEARS) 0.2-0.2-1 % ophthalmic solution solution 2 drop, 2 drop, Both Eyes, Q1H PRN, Patrizia Chavez MD, 2 drop at 02/03/19 1456  •  levothyroxine (SYNTHROID, LEVOTHROID) tablet 75 mcg, 75 mcg, Oral, Daily, Patrizia Chavez MD, 75 mcg at 02/04/19 0904  •  naproxen (NAPROSYN) tablet 500 mg, 500 mg, Oral, BID PRN, Jose A Cordoba MD  •  nicotine (NICODERM CQ) 14 MG/24HR patch 1 patch, 1 patch, Transdermal, Q24H, Patrizia Chavez MD, 1 patch at 02/04/19 0918  •  oxyCODONE (ROXICODONE) immediate release tablet 10 mg, 10 mg, Oral, Q4H PRN, John Presley MD, 10 mg at 02/04/19 1508  •  polyethylene glycol 3350 powder (packet), 17 g, Oral, Daily PRN, John Presley MD  •  senna (SENOKOT) tablet 8.6 mg, 1 tablet, Oral, Nightly PRN, John Presley MD  •  vitamin B-12 (CYANOCOBALAMIN) tablet 500 mcg, 500 mcg, Oral, Daily, Patrizia Chavez MD, 500 mcg at 02/04/19 0904    Facility-Administered Medications Ordered in Other Encounters:   •  heparin flush (porcine) 100 UNIT/ML injection 500 Units, 500 Units, Intravenous, PRN, John Presley MD, 500 Units at 09/28/17 1013  •  heparin flush (porcine) 100 UNIT/ML injection 500 Units, 500 Units, Intravenous, PRN, John Presley MD, 500 Units at 10/11/17 1311  •  sodium chloride 0.9 % flush 10 mL, 10 mL, Intravenous, PRN, John Presley MD, 10 mL at 09/28/17 1013  •  sodium chloride 0.9 % flush 10 mL, 10 mL, Intravenous, PRN, John Presley MD, 10 mL at 10/11/17 1310    Allergies:  Allergies   Allergen Reactions   • Codeine Unknown (See Comments)     He dont like to take it because it makes him feel strange.        Social History:   Social History     Tobacco Use   • Smoking status: Current Every Day Smoker     Packs/day: 0.25     Years: 19.00     Pack years: 4.75     Types: Cigarettes   • Smokeless tobacco: Never Used   • Tobacco comment: Pt smokes 5 or more cigarettes a day   Substance Use Topics    • Alcohol use: No        Family History:  Denies h/o glaucoma, retinal detachment, strabismus    Objective:  General Appearance: NAD    Exam:    VA cc near P T EOM CVF   20/20 5->3mm no apd STP Full Full   20/100 5->3mm no apd STP Full Full     SLE/PLE    With 20D lens at bedside     OD OS   External/Lid Lower eyelid entropion wnl   Conj/sclera injection White/quiet   Cornea clear clear   Anterior Chamber formed formed   Iris Round/reactive Round/reactive   Lens clear clear   Vitreous clear clear     Imaging:  MRI Hand Right With & Without Contrast   Final Result   There is a round, partially lobulated but not encapsulated   appearing area of edema and enhancement in the subcutaneous tissue near   the tip of the right 4th finger. No nonenhancing component is present.   Some very mild bone marrow edema and enhancement is observed in the   distal phalanx of the finger adjacent to the deep edge of the lesion.   Given the larger clinical picture, this is favored to represent a soft   tissue metastasis. Focal infection or inflammation are also   considerations. I reviewed the case with Dr. Ontiveros on the morning   of 02/04/2019.         This report was finalized on 2/4/2019 3:21 PM by Dr. Wade Traylor M.D.          XR Hand 3+ View Right   Final Result       As described.       This report was finalized on 2/2/2019 1:08 PM by Dr. Salazar Pascal M.D.          CT Chest With Contrast   Final Result   Impression:   1.  Interval enlargement of multiple large pulmonary masses since   1/2/2019 as above. Multiple of the metastatic lesions containing foci of   gas. While findings likely represent necrotic metastases, superinfected   metastatic disease would also appear similar in the appropriate clinical   setting and correlation with patient history with potential percutaneous   sampling is recommended if clinically indicated.   2.  New, likely pathologic fracture of the right seventh rib.   3.  Mildly prominent  mediastinal lymph nodes, as before.   4.  Percutaneous gastrostomy tube appropriately positioned in the   stomach with a few foci of gas within the adjacent subcutaneous tissues.   5.  Other chronic findings as above.       This report was finalized on 2/1/2019 11:39 AM by Dr. Pako Barker M.D.          CT Abdomen Pelvis With Contrast   Final Result   Impression:   1.  Interval enlargement of multiple large pulmonary masses since   1/2/2019 as above. Multiple of the metastatic lesions containing foci of   gas. While findings likely represent necrotic metastases, superinfected   metastatic disease would also appear similar in the appropriate clinical   setting and correlation with patient history with potential percutaneous   sampling is recommended if clinically indicated.   2.  New, likely pathologic fracture of the right seventh rib.   3.  Mildly prominent mediastinal lymph nodes, as before.   4.  Percutaneous gastrostomy tube appropriately positioned in the   stomach with a few foci of gas within the adjacent subcutaneous tissues.   5.  Other chronic findings as above.       This report was finalized on 2/1/2019 11:39 AM by Dr. Pako Barker M.D.          XR Abdomen KUB   Final Result   FINDINGS AND IMPRESSION:   The bowel gas pattern is nonobstructive. Orogastric tube terminates in   the proximal jejunum.       This report was finalized on 1/29/2019 10:36 AM by Dr. Pako Barker M.D.          XR Chest PA & Lateral   Final Result   Redemonstration of pulmonary masses/nodules, some with   cavitation. Appearance is similar to prior exam. Likely atelectasis at   the left base. Continued follow-up is recommended as indications   persist.       This report was finalized on 1/28/2019 2:01 PM by Dr. Salazar Pascal M.D.          FL GI Tube Placement   Final Result      FL GI Tube Placement   Final Result      CT Soft Tissue Neck With & Without Contrast   Final Result   No evidence of recurrent  hypopharyngeal neoplasm is   identified. There is persistent effacement of fat planes within the   right carotid space that is likely sequela of previous treatment. There   is diffuse injection of the subcutaneous fat of the face that is new and   is nonspecific, but most likely due to cellulitis. No definite abscess   is identified.       The patient's nurse was informed that a completed corrected report was   available for review on the electronic medical record system on   1/23/2019 8:10 PM.       This report was finalized on 1/23/2019 8:10 PM by Dr. Luisito Gore M.D.          XR Chest 1 View   Final Result          Data Review:  CBC:   Results from last 7 days   Lab Units 02/01/19  0921   WBC 10*3/mm3 11.20*   RBC 10*6/mm3 2.47*     BMP:   Results from last 7 days   Lab Units 02/01/19  0921   GLUCOSE mg/dL 148*   CO2 mmol/L 26.7   BUN mg/dL 16   CREATININE mg/dL 0.71*   CALCIUM mg/dL 9.8     Coagulation: No results found for: INR, APTT    Assessment/Recommendations:  King Pasron is a 68 y.o. male with:    1. Post herpetic neuralgia, right side  - continue management per primary    2. 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    Thank you for the consult.    Deniz Bowman Jr, MD  2/4/2019 3:54 PM

## 2019-02-04 NOTE — PROGRESS NOTES
[facility] INPATIENT PROGRESS NOTE    Length of Stay:  12 days    CHIEF COMPLAINT/REASON FOR VISIT:  Stage Adelaide ( T3,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  2.  Combined radiation and chemotherapy with cisplatin and Taxol initiated on 8/10/2017 concurrent with radiation.    3.  Long smoking history with COPD.  4.  Mediport and PEG tube placed by Dr. Valenzuela on 7/31/2017.  Subsequent PEG tube infection requiring antibiotics.  5.  Chemotherapy completed 9/15/2017 and radiation therapy completed 10/9/2017.  6.  Right neck dissection by Dr. Clinton Styles 5/17/2018 for persistent disease in the right neck.  7.  PET scan from 6/26/2018 showing multiple hypermetabolic lung lesions consistent with metastatic disease.  8.  Palliative Opdivo therapy initiated 7/13/2018, last given on 12/28/2018.      SUBJECTIVE:  Afebrile but uncomfortable with stabbing pain intermittently behind right eye. Spoke with hospice and may be ready for DC home with hospice next 1-2 days.         ROS:  Positive for - right rib pain, fever, right 4th digit pain, right eye pain  Negative for - nasuea/vomiting    OBJECTIVE:  Vitals:    02/03/19 2058 02/04/19 0520 02/04/19 0744 02/04/19 0826   BP:  94/62  (!) 89/47   BP Location:  Right arm  Left arm   Patient Position:  Lying  Lying   Pulse: 104 88 84 92   Resp: 16 18 18 18   Temp:  97.8 °F (36.6 °C)  97 °F (36.1 °C)   TempSrc:  Oral  Oral   SpO2: 97% 98% 96% 95%   Weight:       Height:             PHYSICAL EXAMINATION:  General:  Chronically ill appearing, NAD  HEENT:  Crusted rash right upper face, significantly improved.  R eye without significant purulent drainage or erythema.      Chest/Lungs:  CTAB  Heart:  RRR no m/r/g  Abdomen/GI:  Soft, NT, ND, NABS.  PEG tube in place.  Extremities:  WWP, no edema, no cords, there is mild erythema, tenderness and swelling at the distal right 4th finger with purulent drainage that I am  able to express.    DIAGNOSTIC DATA:  Results Review:     I reviewed the patient's new clinical results.    Results from last 7 days   Lab Units 02/01/19  0921   WBC 10*3/mm3 11.20*   HEMOGLOBIN g/dL 9.1*   HEMATOCRIT % 28.3*   PLATELETS 10*3/mm3 376     Lab Results   Component Value Date    NEUTROABS 9.20 (H) 02/01/2019     Results from last 7 days   Lab Units 02/01/19  0921   SODIUM mmol/L 137   POTASSIUM mmol/L 4.1   CHLORIDE mmol/L 99   CO2 mmol/L 26.7   BUN mg/dL 16   CREATININE mg/dL 0.71*   GLUCOSE mg/dL 148*   CALCIUM mg/dL 9.8     Results from last 7 days   Lab Units 01/30/19  0646   INR  1.10           IMAGING:    CXR 1/28 stable.      IMPRESSION:  Redemonstration of pulmonary masses/nodules, some with  cavitation. Appearance is similar to prior exam. Likely atelectasis at  the left base. Continued follow-up is recommended as indications  Persist.    Venous duplex 1/30:  · Acute right lower extremity deep vein thrombosis noted in the peroneal and gastrocnemius/soleal.  · All other veins appeared normal bilaterally.    CT C/A/P 2/1:  IMPRESSION:  Impression:  1.  Interval enlargement of multiple large pulmonary masses since  1/2/2019 as above. Multiple of the metastatic lesions containing foci of  gas. While findings likely represent necrotic metastases, superinfected  metastatic disease would also appear similar in the appropriate clinical  setting and correlation with patient history with potential percutaneous  sampling is recommended if clinically indicated.  2.  New, likely pathologic fracture of the right seventh rib.  3.  Mildly prominent mediastinal lymph nodes, as before.  4.  Percutaneous gastrostomy tube appropriately positioned in the  stomach with a few foci of gas within the adjacent subcutaneous tissues.  5.  Other chronic findings as above.    Xray right hand 2/2/2019:  No apparent abscess or osteomyelitis.      No acute fracture, dislocation, erosion, or radiopaque foreign body is  noted.  Mild soft tissue swelling is apparent distally in the fourth  digit. The bones are diffusely osteopenic. Osteoarthritic degenerative  changes are seen predominantly at the interphalangeal joints of the  lateral aspect of the wrist. If there is clinical suspicion for  radiographically occult osteomyelitis, MRI or bone scan could be  considered.    Images personally reviewed.      Assessment/Plan   ASSESSMENT/PLAN:  This is a 68 y.o. male with:     1.  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  Chemotherapy was completed 9/15/2017 and radiation completed on 10/9/2017.      He had an excellent response but unfortunately as noted above he does have persistent squamous cell carcinoma in the right neck node and underwent right neck dissection 5/17/2018. See below.         2.  Comorbidities including COPD.  3.  PEG tube removed by Dr. Valenzuela 11/30/2017.  4.  Development of metastatic disease to the lungs noted on PET scan 6/26/2018.  5.  He received palliative therapy with Opdivo.  CT scans after 8 cycles showed some enlargement of the pulmonary nodules but no significant new sites of disease and he continued.   Initiated 7/13/2018, 12th and final cycle given on 12/28/2018.  On discussion with Dr. Garcia, he feels patient has progressed on immunotherapy and the best option likely is palliative care.     6.  Patient admitted now with dysphagia with esophageal dysmotility and aspiration on the video swallow.    · PEG tube placed on 1/30 by Dr. Patel    7.  Herpes zoster infection with facial cellulitis.  Valtrex and vancomycin complete  ·  He complains of worsening pain in the right eye since about 2/2.    · He's already on a high dose of gabapentin 800 mg every 8 hours.  · May need ophthalmology to see him tomorrow    8.  Pain in the right rib secondary to tumor.   Dr. Marroquin has  seen and plans palliative radiation for pain.  Continue oxycodone.  On 1/29 omitted the apap and made oxycodone available q4h prn.      9.  Fever:  Blood cultures negative or NGTD.  Dr. Grace with ID following.    · New RLE calf vein thrombosis which could be the etiology.  · Procalcitonin decreased to 0.36 from 0.59    · Tumor fever possible but diagnosis of exclusion  · Given CT findings of masses containing gas c/w necrotic tumor vs superinfection, though doubt multiple would be infected.  Discussed with Dr. Grace  · Naproxen 500 mg bid started on 2/1    10.  Constipation:  Bowel regimen    11.  No CPR.  Seems reluctant for palliative care.  Focusing on radiation and still seems to want more systemic therapy, though he is not a candidate.      12.  Acute RLE DVT in the peroneal and gastroc/soleal veins.  This may be the etiology of his fever.  · Cancer related as well as immobilization     · Continue Eliquis 5 mg bid  · BID Pepcid for GI prophylaxis    13.  Purulent drainage from right 4th finger  · Xray right hand 2/2 with some mild soft tissue swelling  · Dr. Ontiveros with ID evaluated  · Doubt this is the reason for fever but possible  · Given persistent pain and swelling and fevers, though I doubt that this is the cause of his fever, will get an MRI of his right hand.         PLAN:  1.  Palliative radiation ongoing  2.  No further systemic therapy is planned for his malignancy.    3.  He previously declined [facility] but they were consulted on 2/2 by Dr. Cordoba and he and his sisters are meeting with the [facility] nurse now  4.  Continue Eliquis 5 mg q12h.   5.  Oxycodone prn pain    6.  Continue Naproxen 500 mg bid for presumed tumor fever.    8.  Continue BID Pepcid for GI prophylaxis; high risk for bleeding on both Eliquis and Naproxen.    9.  MRI right hand  10.  Continue gabapentin and he's on 800 mg tid.    11.  Awaiting ophthalmology evaluation  12.  Given that rash from zoster has improved  significantly, ? Utility of resuming the Valtrex for pain.  13.  Patient may be ready for discharge home for palliative care with Hospice next 1-2 days                 Aakash Garcia MD

## 2019-02-04 NOTE — PROGRESS NOTES
INFECTIOUS DISEASES PROGRESS NOTE    CC: f/u fever    S:   No fevers past 24 hours but did get a dose of steroids for possible post-herpetic neuralgia not controlled with neurontin. He reports some dull constant R eye pain. No vesicles. He still has mild dull constant R 4th finger pain and mild swelling. NO erythema. It is dry. MRI was ordered.     ROS: no n/v/d    O:  Physical Exam:  Temp:  [97 °F (36.1 °C)-99.1 °F (37.3 °C)] 97 °F (36.1 °C)  Heart Rate:  [] 92  Resp:  [16-20] 18  BP: ()/(47-62) 89/47  Physical Exam   Constitutional: He is oriented to person, place, and time. No distress.   Eyes: No scleral icterus.   Cardiovascular: Regular rhythm.   tachycardic   Pulmonary/Chest: Effort normal and breath sounds normal.   Abdominal: Soft. Tenderness: from peg.   Neurological: He is alert and oriented to person, place, and time.   Skin:   R 4th finger with dry skin, small lesion on dorsal pad of finger; no active erythema or drainage but there is swelling   Psychiatric: He has a normal mood and affect. His behavior is normal.   Skin: no vesicles     Diagnostics:    micro reviewed today  Lab Results   Component Value Date    WBC 11.20 (H) 02/01/2019    HGB 9.1 (L) 02/01/2019    HCT 28.3 (L) 02/01/2019    .6 (H) 02/01/2019     02/01/2019     Lab Results   Component Value Date    GLUCOSE 148 (H) 02/01/2019    BUN 16 02/01/2019    CREATININE 0.71 (L) 02/01/2019    EGFRIFNONA 110 02/01/2019    BCR 22.5 02/01/2019    K 4.1 02/01/2019    CO2 26.7 02/01/2019    CALCIUM 9.8 02/01/2019    ALBUMIN 2.90 (L) 02/01/2019    AST 16 02/01/2019    ALT 24 02/01/2019     Micro:  1/23 BCx: negative  1/23 RVP; negative  1/28 BCx: negative    Radiology (report reviewed):  XR hand negative for osteomyelitis  MRI pending    Assessment/Plan   1. Fever in Adult   -most likely combination of acute RLE DVT and necrotic pulmonary metastases  -all culture data is negative  -would be very unusual to have bilateral  suprainfected necrotic metasases     2. Herpes Zoster V1  --lesions appear dry and crusting  --completed course of valtrex  --reports eye pain; given steroids for post-herpetic neuralgia  --primary team considering ophthalmology evaluation     3. Dysphagia  --subacute  --likely noninfectious given negative esophagram and CT neck  --Status post PEG     4.  Squamous cell carcinoma of the hypopharynx with metastatic lymphadenopathy in the right neck and pulmonary metastases  --possible tumor fever so started on prn Naprosyn suppression test with good results    5. Right 4th finger swelling  -XR negative for bony process  -appears fairly benign; no drainage, heat, or erythema; there is swelling  -MRI done last night; will f/u report  -ordered warm compresses      Patient considering [facility] care.    Lester Ontiveros MD  0915 AM  02/04/19

## 2019-02-05 PROBLEM — H57.11 EYE PAIN, RIGHT: Status: ACTIVE | Noted: 2019-02-05

## 2019-02-05 PROCEDURE — 99232 SBSQ HOSP IP/OBS MODERATE 35: CPT | Performed by: INTERNAL MEDICINE

## 2019-02-05 PROCEDURE — 94799 UNLISTED PULMONARY SVC/PX: CPT

## 2019-02-05 PROCEDURE — 77412 RADIATION TX DELIVERY LVL 3: CPT | Performed by: RADIOLOGY

## 2019-02-05 PROCEDURE — 25010000002 METHYLPREDNISOLONE PER 125 MG: Performed by: HOSPITALIST

## 2019-02-05 RX ORDER — HYDROMORPHONE HYDROCHLORIDE 1 MG/ML
0.5 INJECTION, SOLUTION INTRAMUSCULAR; INTRAVENOUS; SUBCUTANEOUS
Status: DISCONTINUED | OUTPATIENT
Start: 2019-02-05 | End: 2019-02-06

## 2019-02-05 RX ORDER — LORAZEPAM 2 MG/ML
0.5 INJECTION INTRAMUSCULAR EVERY 6 HOURS PRN
Status: DISCONTINUED | OUTPATIENT
Start: 2019-02-05 | End: 2019-02-08 | Stop reason: HOSPADM

## 2019-02-05 RX ORDER — METHYLPREDNISOLONE SODIUM SUCCINATE 125 MG/2ML
60 INJECTION, POWDER, LYOPHILIZED, FOR SOLUTION INTRAMUSCULAR; INTRAVENOUS DAILY
Status: DISCONTINUED | OUTPATIENT
Start: 2019-02-05 | End: 2019-02-08 | Stop reason: HOSPADM

## 2019-02-05 RX ADMIN — GABAPENTIN 800 MG: 400 CAPSULE ORAL at 13:48

## 2019-02-05 RX ADMIN — LEVOTHYROXINE SODIUM 75 MCG: 75 TABLET ORAL at 08:15

## 2019-02-05 RX ADMIN — FAMOTIDINE 20 MG: 20 TABLET, FILM COATED ORAL at 08:15

## 2019-02-05 RX ADMIN — BUDESONIDE AND FORMOTEROL FUMARATE DIHYDRATE 2 PUFF: 160; 4.5 AEROSOL RESPIRATORY (INHALATION) at 10:16

## 2019-02-05 RX ADMIN — ACETAMINOPHEN 650 MG: 325 TABLET, FILM COATED ORAL at 06:01

## 2019-02-05 RX ADMIN — APIXABAN 5 MG: 5 TABLET, FILM COATED ORAL at 12:04

## 2019-02-05 RX ADMIN — APIXABAN 5 MG: 5 TABLET, FILM COATED ORAL at 21:40

## 2019-02-05 RX ADMIN — OXYCODONE HYDROCHLORIDE 10 MG: 5 TABLET ORAL at 12:08

## 2019-02-05 RX ADMIN — METHYLPREDNISOLONE SODIUM SUCCINATE 60 MG: 125 INJECTION, POWDER, FOR SOLUTION INTRAMUSCULAR; INTRAVENOUS at 13:48

## 2019-02-05 RX ADMIN — OXYCODONE HYDROCHLORIDE 10 MG: 5 TABLET ORAL at 21:43

## 2019-02-05 RX ADMIN — NICOTINE 1 PATCH: 14 PATCH, EXTENDED RELEASE TRANSDERMAL at 08:16

## 2019-02-05 RX ADMIN — GABAPENTIN 800 MG: 400 CAPSULE ORAL at 21:40

## 2019-02-05 RX ADMIN — Medication 500 MCG: at 08:15

## 2019-02-05 RX ADMIN — GABAPENTIN 800 MG: 400 CAPSULE ORAL at 06:02

## 2019-02-05 RX ADMIN — OXYCODONE HYDROCHLORIDE 10 MG: 5 TABLET ORAL at 16:17

## 2019-02-05 RX ADMIN — FAMOTIDINE 20 MG: 20 TABLET, FILM COATED ORAL at 21:40

## 2019-02-05 NOTE — PROGRESS NOTES
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  2.  Combined radiation and chemotherapy with cisplatin and Taxol initiated on 8/10/2017 concurrent with radiation.    3.  Long smoking history with COPD.  4.  Mediport and PEG tube placed by Dr. Valenzuela on 7/31/2017.  Subsequent PEG tube infection requiring antibiotics.  5.  Chemotherapy completed 9/15/2017 and radiation therapy completed 10/9/2017.  6.  Right neck dissection by Dr. Clinton Styles 5/17/2018 for persistent disease in the right neck.  7.  PET scan from 6/26/2018 showing multiple hypermetabolic lung lesions consistent with metastatic disease.  8.  Palliative Opdivo therapy initiated 7/13/2018, last given on 12/28/2018.      SUBJECTIVE:  Afebrile but uncomfortable with stabbing pain intermittently behind right eye. Spoke with hospice and may be ready for DC home with hospice next 1-2 days.         On 2/5/2019, patient reported he had pain 10/10 and was given medication just before 12:00 noon.  According to his nurse, patient did not take any pain medicine since last night.  I encourage patient to take more frequent pain medication to control his pain.  There is no reason for him to suffer from pain.  According to his sisters, patient has poor appetite, he does eat a little by mouth.  He also gets tube feeding in the night.  Patient reports he has no constipation, he sometimes most about twice a day.     ROS:  Positive for - right rib pain, fever, right 4th digit pain, right eye pain  Negative for - nasuea/vomiting    OBJECTIVE:  Vitals:    02/05/19 0450 02/05/19 0724 02/05/19 0908 02/05/19 1016   BP: 107/65 103/52     BP Location: Left arm Left arm     Patient Position: Lying Lying     Pulse: 109 115  116   Resp: 16 18  16   Temp: (!) 101.7 °F (38.7 °C) (!) 101.2 °F (38.4 °C)  98.7 °F (37.1 °C)    TempSrc: Oral Oral Oral    SpO2: 97% 95%     Weight:       Height:             PHYSICAL EXAMINATION:  General:  Chronically ill appearing, NAD  HEENT:  Crusted rash right upper face, significantly improved.  R eye without significant purulent drainage or erythema.      Chest/Lungs:  CTAB  Heart:  RRR no m/r/g  Abdomen/GI:  Soft, NT, ND, NABS.  PEG tube in place.  Extremities:  No edema, no cords, there is mild erythema, tenderness and swelling at the distal right 4th finger.      Assessment/Plan   ASSESSMENT/PLAN:  This is a 68 y.o. male with:     1.  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  Chemotherapy was completed 9/15/2017 and radiation completed on 10/9/2017.      He had an excellent response but unfortunately as noted above he does have persistent squamous cell carcinoma in the right neck node and underwent right neck dissection 5/17/2018. See below.         2.  Comorbidities including COPD.  3.  PEG tube removed by Dr. Valenzuela 11/30/2017.  4.  Development of metastatic disease to the lungs noted on PET scan 6/26/2018.    5.  He received palliative therapy with Opdivo.  CT scans after 8 cycles showed some enlargement of the pulmonary nodules but no significant new sites of disease and he continued.   Initiated 7/13/2018, 12th and final cycle given on 12/28/2018.      Disease progression, patient is no longer candidate for further systematic therapy.  The patient now is on palliative care/hospice care    6.  Patient admitted now with dysphagia with esophageal dysmotility and aspiration on the video swallow.    · PEG tube placed on 1/30 by Dr. Patel    7.  Herpes zoster infection with facial cellulitis.  Valtrex and vancomycin complete  ·  He complains of worsening pain in the right eye since about 2/2.    · He's already on a high dose of  gabapentin 800 mg every 8 hours.  · May need ophthalmology to see him tomorrow    8.  Pain in the right rib secondary to tumor.   Dr. Marroquin has seen and started paliative radiation for pain.  Continue oxycodone.    9.  Constipation:   resolved with Bowel regimen     10.  No CPR.  Seems reluctant for palliative care.  Focusing on radiation and still seems to want more systemic therapy, though he is not a candidate.      11.  Acute RLE DVT in the peroneal and gastroc/soleal veins.  This may be the etiology of his fever.  · Cancer related as well as immobilization     · Continue Eliquis 5 mg bid  · BID Pepcid for GI prophylaxis.     12.  Right eye blurriness.  Seen by ophthalmologist on 2/4/2019.  Patient had thrombophilia, requires anticoagulation.  Not a candidate for holding anticoagulation for the surgical procedure recommended by ophthalmologist.       PLAN:  1.  Palliative radiation ongoing  2.  No further systemic therapy is planned for his malignancy.    3.  He previously declined [facility] but they were consulted on 2/2 by Dr. Cordoba and he and his sistare agreeable with [facility] care.  4.  Continue Eliquis 5 mg q12h.   5.  Oxycodone prn pain    6.  Continue Naproxen 500 mg bid for presumed tumor fever.    8.  Continue BID Pepcid for GI prophylaxis; high risk for bleeding on both Eliquis and Naproxen.    9.  MRI right hand  10.  Continue gabapentin and he's on 800 mg tid.    11.  Patient may be ready for discharge home for palliative care with Hospice next 1-2 days          Discussed with the patient and the his sisters.     I discussed with Dr. Cordoba today for patient care.        Miguel Sim MD PhD

## 2019-02-05 NOTE — PLAN OF CARE
Problem: Patient Care Overview  Goal: Plan of Care Review  Outcome: Ongoing (interventions implemented as appropriate)   02/05/19 1611   Coping/Psychosocial   Plan of Care Reviewed With patient;daughter   Plan of Care Review   Progress no change   OTHER   Outcome Summary Pt had uneventful day. decision was made to not go through with eye surgery due to risk of coming off of eliquis. pt unable to go home and take care of himself due to vision issues and weakness, will transfer to  for palliative care. tolerated TF eating minimal amounts of food during the day. pain controlled well with PRN calvin. IV solumedrol daily for continued eye pain. per dr. henriquez to continue current treatment but transifiton to palliaitve. day 4/10 radiation treatments. family at bedside     Goal: Individualization and Mutuality  Outcome: Ongoing (interventions implemented as appropriate)

## 2019-02-05 NOTE — PLAN OF CARE
Problem: Patient Care Overview  Goal: Plan of Care Review  Outcome: Ongoing (interventions implemented as appropriate)   02/05/19 0417   Coping/Psychosocial   Plan of Care Reviewed With patient   Plan of Care Review   Progress no change   OTHER   Outcome Summary PT @ goal for TF from 8p-8a, daily radiation, rt bottom eye lid inverts inwards causing pain, opth recommends surgery, currently tape to eye lid, TMAX 101.2...tylenol does not help       Problem: Skin Injury Risk (Adult)  Goal: Skin Health and Integrity  Outcome: Ongoing (interventions implemented as appropriate)      Problem: Nutrition, Imbalanced: Inadequate Oral Intake (Adult)  Goal: Prevent Further Weight Loss  Outcome: Ongoing (interventions implemented as appropriate)      Problem: Fall Risk (Adult)  Goal: Absence of Fall  Outcome: Ongoing (interventions implemented as appropriate)      Problem: Nutrition, Enteral (Adult)  Goal: Signs and Symptoms of Listed Potential Problems Will be Absent, Minimized or Managed (Nutrition, Enteral)  Outcome: Ongoing (interventions implemented as appropriate)      Problem: Oncology Care (Adult)  Goal: Signs and Symptoms of Listed Potential Problems Will be Absent, Minimized or Managed (Oncology Care)  Outcome: Ongoing (interventions implemented as appropriate)

## 2019-02-05 NOTE — PROGRESS NOTES
ID note   CC: Follow-up fever  Subjective: Continues to have fever.  He has some difficulty with seeing of the right eye and ophthalmology was consulted and recommended outpatient surgical correction.  Family is requesting to stay in the hospital until he is discharged.  MRI came back as soft tissue metastases of the right finger    Objective: Temperature 101.2, no acute distress, right eye without active lesions, abdomen soft and nontender.  No erythema or warmth of the right fourth finger    All microbiology negative    1. Fever in Adult   -most likely combination of acute RLE DVT and necrotic pulmonary metastases  -all culture data is negative  -would be very unusual to have bilateral suprainfected necrotic metasases     2. Herpes Zoster V1  --lesions appear dry and crusting  --completed course of valtrex  --reports eye pain; given steroids for post-herpetic neuralgia  --primary team considering ophthalmology evaluation     3. Dysphagia  --subacute  --likely noninfectious given negative esophagram and CT neck  --Status post PEG     4.  Squamous cell carcinoma of the hypopharynx with metastatic lymphadenopathy in the right neck and pulmonary metastases  --possible tumor fever     5. Right 4th finger swelling  -XR negative for bony process  -MRI shows soft tissue metastases      Despite his fevers, has been very clinically stable.  It is hard to imagine that if he had a renegade infection he want to be deteriorating. This further supports a noninfections process.  Planning to go home with hospice which seems reasonable.  We will see PRN.

## 2019-02-05 NOTE — PROGRESS NOTES
"    DAILY PROGRESS NOTE  [facility]    Patient Identification:  Name: King Parson  Age: 68 y.o.  Sex: male  :  1950  MRN: [id number]         Primary Care Physician: Elise Ortiz APRN    Subjective:  Interval History: He's been having consumed with right eye pain and decreased vision.  Case discussed at length with patient as well as sister ×2 at bedside.  Patient is alert and oriented ×3 and understands that going home with hospice is no longer an option as he does not have 24 7 support from family members at bedside.  No altered mentation chest pain or shortness of breath    Objective:d/w RN/CCP    Scheduled Meds:    apixaban 5 mg Oral Q12H   Followed by      [START ON 2019] apixaban 5 mg Oral Q12H   budesonide-formoterol 2 puff Inhalation BID - RT   famotidine 20 mg Oral BID   gabapentin 800 mg Oral Q8H   levothyroxine 75 mcg Oral Daily   methylPREDNISolone sodium succinate 60 mg Intravenous Daily   nicotine 1 patch Transdermal Q24H   vitamin B-12 500 mcg Oral Daily     Continuous Infusions:     Vital signs in last 24 hours:  Temp:  [97.2 °F (36.2 °C)-101.7 °F (38.7 °C)] 98.7 °F (37.1 °C)  Heart Rate:  [] 116  Resp:  [16-18] 16  BP: ()/(52-65) 103/52    Intake/Output:    Intake/Output Summary (Last 24 hours) at 2019 1149  Last data filed at 2019 0450  Gross per 24 hour   Intake 360 ml   Output 350 ml   Net 10 ml       Exam:  /52 (BP Location: Left arm, Patient Position: Lying)   Pulse 116   Temp 98.7 °F (37.1 °C) (Oral)   Resp 16   Ht 175.3 cm (69\")   Wt 51.2 kg (112 lb 12.8 oz)   SpO2 95%   BMI 16.66 kg/m²     General Appearance:    Alert, cooperative, discomfort from right eye pain, AAOx3, interactive and conversational                          Head:    Normocephalic, without obvious abnormality, atraumatic                           Eyes:    PERRLA/EOMI - entropion w/ injection w/o discharge                         Throat:   Lips, tongue, " gums normal; oral mucosa pink and moist                           Neck:   No JVD                         Lungs:    Clear to auscultation bilaterally, respirations unlabored                 Chest Wall:    No tenderness or deformity                          Heart:    Regular rate and rhythm, S1 and S2 normal                  Abdomen:     Soft, non-tender, bowel sounds active, PEG                 Extremities:   No cyanosis or edema                        Pulses:   Pulses palpable in all extremities                  Neurologic:   CNII-XII intact, moving all      Data Review:  Labs in chart were reviewed.    Assessment:  Active Hospital Problems    Diagnosis Date Noted   • **Acute deep vein thrombosis (DVT) of right lower extremity (CMS/HCC) [I82.401] 01/31/2019   • Eye pain, right [H57.11] 02/05/2019   • Tobacco abuse [Z72.0] 01/24/2019   • Varicella zoster- over V1,  slit lamp neg 1/23/19 [B02.9, B01.9] 01/23/2019   • Dysphagia [R13.10] 01/23/2019   • Esophageal dysmotility [K22.4] 01/23/2019   • Aspiration syndrome [T17.900A] 01/23/2019   • History of immunotherapy [Z92.89] 01/23/2019   • Abnormal weight loss [R63.4] 01/23/2019   • GERD (gastroesophageal reflux disease) [K21.9] 01/23/2019   • Failure to thrive (0-17) [R62.51] 01/23/2019   • Lung metastases (CMS/HCC) [C78.00] 07/27/2018   • 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, N2a, M0) [C13.9] 07/24/2017   • Lumbar radiculopathy [M54.16] 02/08/2016      Resolved Hospital Problems    Diagnosis Date Noted Date Resolved   • Swelling of right side of face [R22.0] 01/23/2019 01/31/2019   • Cellulitis of head except face [L03.811] 01/23/2019 01/31/2019       Plan:  >35min spent coordinating care    Going home with hospice is no longer an option at this point.  Case discussed with patient as well as sisters ×2 at bedside.  One of the sisters is present which is the one he appoints in the event that he cannot make his own medical decisions    Plan at this  juncture is to transfer up to [address] and implement modified palliative care orders.  We'll continue with daily medications at this juncture and monitor clinical response.  Will resume Solu-Medrol 60 mg IV daily as it gave him significant relief with fevers as well as right-sided eye pain.    Also to continue with nightly tube feeds    Patient is not cleared to undergo any type of eye surgery nor is the patient cleared to have any withholding of anticoagulation    Discuss case with oncologist ×2 and both with the RN in agreement with the above plan    Transfer orders placed - will have to take it on a daily basis and see how he does regarding symptomatically control.  If the patient clinically declines then we will transition towards HSB status.  If he remains stable and we can achieve better pain controlled and the plan would be long-term care versus inpatient hospice      Jose A Cordoba MD  2/5/2019  11:49 AM

## 2019-02-06 ENCOUNTER — EPISODE CHANGES (OUTPATIENT)
Dept: CASE MANAGEMENT | Facility: OTHER | Age: 69
End: 2019-02-06

## 2019-02-06 PROCEDURE — 77412 RADIATION TX DELIVERY LVL 3: CPT | Performed by: RADIOLOGY

## 2019-02-06 PROCEDURE — 97110 THERAPEUTIC EXERCISES: CPT

## 2019-02-06 PROCEDURE — 25010000002 METHYLPREDNISOLONE PER 125 MG: Performed by: HOSPITALIST

## 2019-02-06 PROCEDURE — 99231 SBSQ HOSP IP/OBS SF/LOW 25: CPT | Performed by: INTERNAL MEDICINE

## 2019-02-06 PROCEDURE — 25010000002 HYDROMORPHONE 1 MG/ML SOLUTION: Performed by: HOSPITALIST

## 2019-02-06 RX ADMIN — GABAPENTIN 800 MG: 400 CAPSULE ORAL at 14:24

## 2019-02-06 RX ADMIN — Medication 500 MCG: at 08:26

## 2019-02-06 RX ADMIN — APIXABAN 5 MG: 5 TABLET, FILM COATED ORAL at 08:26

## 2019-02-06 RX ADMIN — METHYLPREDNISOLONE SODIUM SUCCINATE 60 MG: 125 INJECTION, POWDER, FOR SOLUTION INTRAMUSCULAR; INTRAVENOUS at 08:26

## 2019-02-06 RX ADMIN — FAMOTIDINE 20 MG: 20 TABLET, FILM COATED ORAL at 08:26

## 2019-02-06 RX ADMIN — HYDROMORPHONE HYDROCHLORIDE 0.5 MG: 1 INJECTION, SOLUTION INTRAMUSCULAR; INTRAVENOUS; SUBCUTANEOUS at 14:24

## 2019-02-06 RX ADMIN — GABAPENTIN 800 MG: 400 CAPSULE ORAL at 06:36

## 2019-02-06 RX ADMIN — NICOTINE 1 PATCH: 14 PATCH, EXTENDED RELEASE TRANSDERMAL at 08:28

## 2019-02-06 RX ADMIN — HYDROMORPHONE HYDROCHLORIDE 0.5 MG: 1 INJECTION, SOLUTION INTRAMUSCULAR; INTRAVENOUS; SUBCUTANEOUS at 21:19

## 2019-02-06 RX ADMIN — APIXABAN 5 MG: 5 TABLET, FILM COATED ORAL at 21:06

## 2019-02-06 RX ADMIN — FAMOTIDINE 20 MG: 20 TABLET, FILM COATED ORAL at 21:06

## 2019-02-06 RX ADMIN — OXYCODONE HYDROCHLORIDE 10 MG: 5 TABLET ORAL at 06:50

## 2019-02-06 RX ADMIN — LEVOTHYROXINE SODIUM 75 MCG: 75 TABLET ORAL at 08:26

## 2019-02-06 RX ADMIN — HYDROMORPHONE HYDROCHLORIDE 0.5 MG: 1 INJECTION, SOLUTION INTRAMUSCULAR; INTRAVENOUS; SUBCUTANEOUS at 18:05

## 2019-02-06 RX ADMIN — HYDROMORPHONE HYDROCHLORIDE 0.5 MG: 1 INJECTION, SOLUTION INTRAMUSCULAR; INTRAVENOUS; SUBCUTANEOUS at 12:19

## 2019-02-06 RX ADMIN — GABAPENTIN 800 MG: 400 CAPSULE ORAL at 21:06

## 2019-02-06 NOTE — PLAN OF CARE
Problem: Patient Care Overview  Goal: Plan of Care Review  Outcome: Ongoing (interventions implemented as appropriate)   02/06/19 8392   Coping/Psychosocial   Plan of Care Reviewed With patient   Plan of Care Review   Progress no change   OTHER   Outcome Summary Pt rested well tonight-up to BR with standby assist-Nutren 1.5 infusing at 70cc/hr-no residuals. Received one dose of oxycodone for pain. Reminded to keep HOB elevated secondary to aspiration risk-he forgets this. Will continue to monitor and offer comfort and support per palliative POC.       Problem: Skin Injury Risk (Adult)  Goal: Skin Health and Integrity  Outcome: Ongoing (interventions implemented as appropriate)      Problem: Nutrition, Imbalanced: Inadequate Oral Intake (Adult)  Goal: Prevent Further Weight Loss  Outcome: Ongoing (interventions implemented as appropriate)      Problem: Fall Risk (Adult)  Goal: Absence of Fall  Outcome: Ongoing (interventions implemented as appropriate)      Problem: Nutrition, Enteral (Adult)  Goal: Signs and Symptoms of Listed Potential Problems Will be Absent, Minimized or Managed (Nutrition, Enteral)  Outcome: Ongoing (interventions implemented as appropriate)      Problem: Oncology Care (Adult)  Goal: Signs and Symptoms of Listed Potential Problems Will be Absent, Minimized or Managed (Oncology Care)  Outcome: Ongoing (interventions implemented as appropriate)      Problem: Palliative Care (Adult)  Goal: Maximized Comfort  Outcome: Ongoing (interventions implemented as appropriate)    Goal: Enhanced Quality of Life  Outcome: Ongoing (interventions implemented as appropriate)

## 2019-02-06 NOTE — PLAN OF CARE
Problem: Patient Care Overview  Goal: Plan of Care Review  Outcome: Ongoing (interventions implemented as appropriate)   02/06/19 1148   Coping/Psychosocial   Plan of Care Reviewed With patient   Plan of Care Review   Progress improving   OTHER   Outcome Summary Pt increasing with amb distance and bed mobility safety to get to EOB and then transfer to RWX

## 2019-02-06 NOTE — PROGRESS NOTES
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  2.  Combined radiation and chemotherapy with cisplatin and Taxol initiated on 8/10/2017 concurrent with radiation.    3.  Long smoking history with COPD.  4.  Mediport and PEG tube placed by Dr. Valenzuela on 7/31/2017.  Subsequent PEG tube infection requiring antibiotics.  5.  Chemotherapy completed 9/15/2017 and radiation therapy completed 10/9/2017.  6.  Right neck dissection by Dr. Clinton Styles 5/17/2018 for persistent disease in the right neck.  7.  PET scan from 6/26/2018 showing multiple hypermetabolic lung lesions consistent with metastatic disease.  8.  Palliative Opdivo therapy initiated 7/13/2018, last given on 12/28/2018.      SUBJECTIVE:  Afebrile but uncomfortable with stabbing pain intermittently behind right eye. Spoke with hospice and may be ready for DC home with hospice next 1-2 days.         On 2/5/2019, patient reported he had pain 10/10 and was given medication just before 12:00 noon.  According to his nurse, patient did not take any pain medicine since last night.  I encourage patient to take more frequent pain medication to control his pain.  There is no reason for him to suffer from pain.  According to his sisters, patient has poor appetite, he does eat a little by mouth.  He also gets tube feeding in the night.  Patient reports he has no constipation, he sometimes most about twice a day.     On 2/6/2019, patient reports that his pain is not well controlled.  Dr. Cordoba started patient on IV Dilaudid every 2 hours as needed.    ROS:  Positive for - right rib pain, fever, right 4th digit pain, right eye pain  Negative for - nasuea/vomiting    OBJECTIVE:  Vitals:    02/05/19 0908 02/05/19 1016 02/05/19 1521 02/06/19 0518   BP:   (!) 82/48 (!) 88/50   BP Location:    Left arm Left arm   Patient Position:   Lying Lying   Pulse:  116 97 89   Resp:  16 16 16   Temp: 98.7 °F (37.1 °C)  100.5 °F (38.1 °C) 99.3 °F (37.4 °C)   TempSrc: Oral  Oral Oral   SpO2:   97% 95%   Weight:       Height:             PHYSICAL EXAMINATION:  General:  Chronically ill appearing, NAD  HEENT:  Crusted rash right upper face, significantly improved.  R eye without significant purulent drainage or erythema.      Chest/Lungs:  CTAB  Heart:  RRR no m/r/g  Abdomen/GI:  Soft, NT, ND, NABS.  PEG tube in place.  Extremities:  No edema, no cords, there is mild erythema, tenderness and swelling at the distal right 4th finger.      Assessment/Plan   ASSESSMENT/PLAN:  This is a 68 y.o. male with:     1.  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  Chemotherapy was completed 9/15/2017 and radiation completed on 10/9/2017.      He had an excellent response but unfortunately as noted above he does have persistent squamous cell carcinoma in the right neck node and underwent right neck dissection 5/17/2018. See below.         2.  Comorbidities including COPD.  3.  PEG tube removed by Dr. Valenzuela 11/30/2017.  4.  Development of metastatic disease to the lungs noted on PET scan 6/26/2018.    5.  He received palliative therapy with Opdivo.  CT scans after 8 cycles showed some enlargement of the pulmonary nodules but no significant new sites of disease and he continued.   Initiated 7/13/2018, 12th and final cycle given on 12/28/2018.      Disease progression, patient is no longer candidate for further systematic therapy.  The patient now is on palliative care/hospice care    6.  Patient admitted now with dysphagia with esophageal dysmotility and aspiration on the video swallow.    · PEG tube placed on 1/30 by Dr. Patel    7.  Herpes zoster infection with facial cellulitis.  Valtrex  and vancomycin complete  ·  He complains of worsening pain in the right eye since about 2/2.    · He's already on a high dose of gabapentin 800 mg every 8 hours.  · May need ophthalmology to see him tomorrow    8.  Pain in the right rib secondary to tumor.   Dr. Marroquin has seen and started paliative radiation for pain.  Continue oxycodone.    9.  Constipation:   resolved with Bowel regimen     10.  No CPR.  Seems reluctant for palliative care.  Focusing on radiation and still seems to want more systemic therapy, though he is not a candidate.      11.  Acute RLE DVT in the peroneal and gastroc/soleal veins.  This may be the etiology of his fever.  · Cancer related as well as immobilization     · Continue Eliquis 5 mg bid  · BID Pepcid for GI prophylaxis.     12.  Right eye blurriness.  Seen by ophthalmologist on 2/4/2019.  Patient had thrombophilia, requires anticoagulation.  Not a candidate for holding anticoagulation for the surgical procedure recommended by ophthalmologist.       PLAN:  1.  Palliative radiation ongoing  2.  No further systemic therapy is planned for his malignancy.    3. Continue palliative care/hospice care.  4.  Continue Eliquis 5 mg q12h.   5.  Continue intravenous Dilaudid as needed every 2 hours, continue Oxycodone prn pain    6.  Continue BID Pepcid for GI prophylaxis; high risk for bleeding on both Eliquis.    7.  Continue gabapentin and he's on 800 mg tid.           Discussed with patient and his sisters.         I discussed with Dr. Cordoba today for patient care.        Miguel Sim MD PhD

## 2019-02-06 NOTE — PROGRESS NOTES
Discharge Planning Assessment  [facility]     Patient Name: King Parson  MRN: [id number]  Today's Date: 2/6/2019    Admit Date: 1/23/2019    Discharge Needs Assessment    No documentation.       Discharge Plan     Row Name 02/06/19 1118       Plan    Plan Comments  The patient transferred to [facility] from [facility] on 2/5/19 @ 15:15. The patient is palliative. [facility] to evaluate for a [facility] scattered bed. CCP will follow for any needs that may arise. MAHSA Gerard RN, CCP.         Destination      No service coordination in this encounter.      Durable Medical Equipment      No service coordination in this encounter.      Dialysis/Infusion      Service Provider Request Status Selected Services Address Phone Number Fax Number    [facility] Pending - Request Sent N/A [id number] [facility] [address], [facility] [address] [phone]      Home Medical Care      Service Provider Request Status Selected Services Address Phone Number Fax Number    [facility] Accepted N/A [address] [facility] [place] [id number] [phone]    [facility] Pending - No Request Sent N/A [address], [facility] [address] [phone]      Community Resources      No service coordination in this encounter.        Expected Discharge Date and Time     Expected Discharge Date Expected Discharge Time    Feb 5, 2019         Demographic Summary    No documentation.       Functional Status    No documentation.       Psychosocial    No documentation.       Abuse/Neglect    No documentation.       Legal    No documentation.       Substance Abuse    No documentation.       Patient Forms    No documentation.           Alyce Gerard RN

## 2019-02-06 NOTE — PROGRESS NOTES
Continued Stay Note  [facility]     Patient Name: King Parson  MRN: [id number]  Today's Date: 2/6/2019    Admit Date: 1/23/2019    Discharge Plan     Row Name 02/06/19 1155       Plan    Plan  Follow for patient's prognosis and [facility] eval.    Plan Comments  Met with the patient and his sister Blaire Bansal [phone] at bedside.  Discussed the patient's goals of care being pain control and symptom management.  CCP stated that discharge planning will be revisited and discussed pending patient's prognosis and medical stability.  The patient's sister stated that due to the patient's vision issues and weakness that he could not go home and take care of himself and the family is not able to provide 24/7 care at home for the patient.  CCP informed them that after [facility] eval if he becomes medically stable that if returning home with family assistance is not an option then marlynley looking at nursing home placement for long term care would be needed.  The patient's sister stated that if it gets to the point where nursing home placement is needed then looking at [facility] where their mother lives would be the likely choice.  CCP informed them that this will be discussed later and that CCP will follow for [facility] eval and patient's prognosis and will assist as needed.  LEXI Vasquez    Row Name 02/06/19 1118       Plan    Plan Comments  The patient transferred to [facility] from [facility] on 2/5/19 @ 15:15. The patient is palliative. [facility] to evaluate for a [facility] scattered bed. CCP will follow for any needs that may arise. MAHSA Gerard RN, CCP.         Discharge Codes    No documentation.       Expected Discharge Date and Time     Expected Discharge Date Expected Discharge Time    Feb 5, 2019             LEXI Kelly

## 2019-02-06 NOTE — PLAN OF CARE
Palliative Care Social Work Note:    Met with the patient and his sister Blaire Bansal [phone] at bedside to provide psychosocial support.  The patient was originally off the floor in radiation and then joined the conversation upon his return.  The patient states that he is feeling o.k. but is having some pain in his chest and he was encouraged to discuss that with his RN so that it can be addressed.  The patient states that he is now accepting the fact that he is declining and states that Dr. Cordoba has done a great job with explaining his prognosis to him.  The patient’s sister states that they as a family have discussed with him that his cancer is spreading and that he is going to die.      The patient’s diagnosis of cancer has only been made almost a year ago when it started in his throat and has since progressed to his lungs and bones.  Their father  of prostate and bone cancer.  The patient’s sister stated that the patient took care of their 89 year old mother for about 7 years and she now resides at [facility] in [place].  She stated that they just buried a sister on Sat 19 who  from a heart attack as did another brother of theirs who  2 years ago.   She brought their mother up to see to the patient Sat 19 after their sister’s  to inform her of the patient’s prognosis and to allow her to spend some time with him.      The patient stated that he was aware that [facility] will probably follow back up with him and his sister was informed also.  The patient’s sister stated that due to the patient’s vision issues and the progression of his disease that he is not able to return home and take care of himself as they do not have 24/7 care available to assist him therefore if placement eventually needs to be found for the patient looking at [facility] in [place] where there mother is placed may be the best option. The patient's sister stated that the patient thought he would  outlive their mother but is now sad that he will not be able to do that. When this topic was approached with the patient he did not want to talk about it.  The patient had company show up and he and his sister expressed being appreciative of the visit from the .     will follow to provide additional support as needed.      LEXI Vasquez

## 2019-02-06 NOTE — THERAPY TREATMENT NOTE
Acute Care - Physical Therapy Treatment Note  [facility]     Patient Name: King Parson  : 1950  MRN: [id number]  Today's Date: 2019  Onset of Illness/Injury or Date of Surgery: 19  Date of Referral to PT: 19  Referring Physician: Arianna Acevedo, APRN    Admit Date: 2019    Visit Dx:    ICD-10-CM ICD-9-CM   1. Varicella zoster B02.9 053.9    B01.9    2. Febrile illness R50.9 780.60   3. Failure to thrive (0-17) R62.51 783.41   4. Esophageal dysmotility K22.4 530.5   5. Impaired mobility Z74.09 799.89   6. Dysphagia, unspecified type R13.10 787.20   7. 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, M0)   • Throat cancer (CMS/HCC)   • Encounter for fitting and adjustment of vascular catheter   • Other chronic pain   • Squamous cell carcinoma   • Cervicalgia   • Cervical spinal stenosis   • Encounter for monitoring opioid maintenance therapy   • Lung metastases (CMS/HCC)   • Encounter for long-term (current) use of high-risk medication   • DDD (degenerative disc disease), cervical   • Cervical radiculitis   • Varicella zoster- over V1,  slit lamp neg 19   • Dysphagia   • Esophageal dysmotility   • Aspiration syndrome   • History of immunotherapy   • Abnormal weight loss   • GERD (gastroesophageal reflux disease)   • Tobacco abuse   • Failure to thrive (0-17)   • Acute deep vein thrombosis (DVT) of right lower extremity (CMS/HCC)   • Eye pain, right       Therapy Treatment    Rehabilitation Treatment Summary     Row Name 19 1100             Treatment Time/Intention    Discipline  physical therapy assistant   -CW      Document Type  therapy note (daily note)  -CW      Subjective Information  complains of;weakness;fatigue  -CW      Mode of Treatment  physical therapy  -CW      Therapy Frequency (PT Clinical Impression)  5 times/wk  -CW      Patient Effort  good  -CW      Existing Precautions/Restrictions  fall  -CW      Treatment Considerations/Comments  AFO R LE  -CW      Recorded by [CW] Afshin Contreras, PTA 02/06/19 1148      Row Name 02/06/19 1100             Vital Signs    O2 Delivery Pre Treatment  room air  -CW      Recorded by [CW] Afshin Contreras, PTA 02/06/19 1148      Row Name 02/06/19 1100             Cognitive Assessment/Intervention- PT/OT    Orientation Status (Cognition)  oriented x 4  -CW      Follows Commands (Cognition)  WNL  -CW      Personal Safety Interventions  fall prevention program maintained;gait belt;muscle strengthening facilitated;nonskid shoes/slippers when out of bed  -CW      Recorded by [CW] Afshin Contreras, PTA 02/06/19 1148      Row Name 02/06/19 1100             Bed Mobility Assessment/Treatment    Bed Mobility Assessment/Treatment  supine-sit;sit-supine  -CW      Supine-Sit [place] (Bed Mobility)  supervision  -CW      Sit-Supine [place] (Bed Mobility)  supervision  -CW      Recorded by [CW] Afshin Contreras, PTA 02/06/19 1148      Row Name 02/06/19 1100             Transfer Assessment/Treatment    Transfer Assessment/Treatment  sit-stand transfer;stand-sit transfer  -CW      Recorded by [CW] Afshin Contreras, PTA 02/06/19 1148      Row Name 02/06/19 1100             Sit-Stand Transfer    Sit-Stand [place] (Transfers)  stand by assist  -CW      Assistive Device (Sit-Stand Transfers)  walker, front-wheeled  -CW      Recorded by [CW] Afshin Contreras, PTA 02/06/19 1148      Row Name 02/06/19 1100             Stand-Sit Transfer    Stand-Sit [place] (Transfers)  stand by assist  -CW      Assistive Device (Stand-Sit Transfers)  walker, front-wheeled   -CW      Recorded by [CW] Afshin Contreras, PTA 02/06/19 1148      Row Name 02/06/19 1100             Gait/Stairs Assessment/Training    Fisher Level (Gait)  contact guard;stand by assist  -CW      Assistive Device (Gait)  walker, front-wheeled  -CW      Distance in Feet (Gait)  350  -CW      Pattern (Gait)  swing-through  -CW      Deviations/Abnormal Patterns (Gait)  gait speed decreased;festinating/shuffling  -CW      Recorded by [CW] Afshin Contreras, PTA 02/06/19 1148      Row Name 02/06/19 1100             Positioning and Restraints    Pre-Treatment Position  in bed  -CW      Post Treatment Position  bed  -CW      In Bed  notified nsg;supine;call light within reach;encouraged to call for assist;with family/caregiver  -CW      Recorded by [CW] Afshin Contreras, PTA 02/06/19 1148      Row Name 02/06/19 1100             Pain Scale: Numbers Pre/Post-Treatment    Pain Scale: Numbers, Pretreatment  3/10  -CW      Pain Scale: Numbers, Post-Treatment  4/10  -CW      Pain Location  back  -CW      Pain Intervention(s)  Repositioned;Ambulation/increased activity  -CW      Recorded by [CW] Afshin Contreras, PTA 02/06/19 1148      Row Name 02/06/19 1100             Outcome Summary/Treatment Plan (PT)    Anticipated Discharge Disposition (PT)  -- per chart planning for home w/ hospice  -CW      Recorded by [] Afshin Contreras, PTA 02/06/19 1148        User Key  (r) = Recorded By, (t) = Taken By, (c) = Cosigned By    Initials Name Effective Dates Discipline    CW Afshin Contreras, PTA 03/07/18 -  PT                   Physical Therapy Education     Title: PT OT SLP Therapies (Done)     Topic: Physical Therapy (Done)     Point: Mobility training (Done)     Learning Progress Summary           Patient Acceptance, E,TB, VU,DU by CW at 2/6/2019 11:48 AM    Acceptance, E, NR by AR at 2/3/2019  2:15 PM    Acceptance, E,TB, VU,DU by CW at 2/1/2019  4:35 PM    Acceptance, E,TB, VU,DU by CW at 1/31/2019  3:21  PM    Acceptance, E,TB, VU,DU by CW at 1/29/2019  1:50 PM    Acceptance, E,TB, VU by KH at 1/27/2019  1:51 PM    Acceptance, E,TB, VU by KH at 1/26/2019 12:04 PM   Family Acceptance, E,TB, VU,DU by CW at 2/6/2019 11:48 AM                   Point: Home exercise program (Done)     Learning Progress Summary           Patient Acceptance, E,TB, VU,DU by CW at 2/6/2019 11:48 AM    Acceptance, E, NR by AR at 2/3/2019  2:15 PM    Acceptance, E,TB, VU,DU by CW at 2/1/2019  4:35 PM    Acceptance, E,TB, VU,DU by CW at 1/31/2019  3:21 PM    Acceptance, E,TB, VU,DU by CW at 1/29/2019  1:50 PM    Acceptance, E,TB, VU by KH at 1/26/2019 12:04 PM   Family Acceptance, E,TB, VU,DU by CW at 2/6/2019 11:48 AM                   Point: Body mechanics (Done)     Learning Progress Summary           Patient Acceptance, E,TB, VU,DU by CW at 2/6/2019 11:48 AM    Acceptance, E, NR by AR at 2/3/2019  2:15 PM    Acceptance, E,TB, VU,DU by CW at 2/1/2019  4:35 PM    Acceptance, E,TB, VU,DU by CW at 1/31/2019  3:21 PM    Acceptance, E,TB, VU,DU by CW at 1/29/2019  1:50 PM    Acceptance, E,TB, VU by MARSHAL at 1/27/2019  1:51 PM    Acceptance, E,TB, VU by KH at 1/26/2019 12:04 PM   Family Acceptance, E,TB, VU,DU by CW at 2/6/2019 11:48 AM                   Point: Precautions (Done)     Learning Progress Summary           Patient Acceptance, E,TB, VU,DU by CW at 2/6/2019 11:48 AM    Acceptance, E, NR by AR at 2/3/2019  2:15 PM    Acceptance, E,TB, VU,DU by CW at 2/1/2019  4:35 PM    Acceptance, E,TB, VU,DU by CW at 1/31/2019  3:21 PM    Acceptance, E,TB, VU,DU by CW at 1/29/2019  1:50 PM    Acceptance, ETB, VU by MARSHAL at 1/27/2019  1:51 PM    Acceptance, ETBHARMEET by MARSHAL at 1/26/2019 12:04 PM   Family Acceptance, EPETE VU,JOEL by ROBI at 2/6/2019 11:48 AM                               User Key     Initials Effective Dates Name Provider Type Discipline    AR 04/03/18 -  Ella Ryan, PT Physical Therapist PT     06/22/16 -  Tori Bradford, PT Physical  Therapist PT    CW 03/07/18 -  Afshin Contreras PTA Physical Therapy Assistant PT                PT Recommendation and Plan  Anticipated Discharge Disposition (PT): (per chart planning for home w/ hospice)  Therapy Frequency (PT Clinical Impression): 5 times/wk  Outcome Summary/Treatment Plan (PT)  Anticipated Discharge Disposition (PT): (per chart planning for home w/ hospice)  Plan of Care Reviewed With: patient  Progress: improving  Outcome Summary: Pt increasing with amb distance and bed mobility safety to get to EOB and then transfer to RWX  Outcome Measures     Row Name 02/06/19 1100 02/03/19 1400          How much help from another person do you currently need...    Turning from your back to your side while in flat bed without using bedrails?  4  -CW  4  -AR     Moving from lying on back to sitting on the side of a flat bed without bedrails?  4  -CW  4  -AR     Moving to and from a bed to a chair (including a wheelchair)?  3  -CW  3  -AR     Standing up from a chair using your arms (e.g., wheelchair, bedside chair)?  3  -CW  3  -AR     Climbing 3-5 steps with a railing?  3  -CW  3  -AR     To walk in hospital room?  3  -CW  3  -AR     AM-PAC 6 Clicks Score  20  -CW  20  -AR        Functional Assessment    Outcome Measure Options  AM-PAC 6 Clicks Basic Mobility (PT)  -CW  --       User Key  (r) = Recorded By, (t) = Taken By, (c) = Cosigned By    Initials Name Provider Type    AR Ella Ryan, PT Physical Therapist    CW Afshin Contreras, SVEN Physical Therapy Assistant         Time Calculation:   PT Charges     Row Name 02/06/19 1149             Time Calculation    Start Time  1130  -CW      Stop Time  1149  -CW      Time Calculation (min)  19 min  -CW      PT Received On  02/06/19  -CW      PT - Next Appointment  02/07/19  -CW        User Key  (r) = Recorded By, (t) = Taken By, (c) = Cosigned By    Initials Name Provider Type    Afshin Killian, SVEN Physical Therapy Assistant        Therapy  Suggested Charges     Code   Minutes Charges    None           Therapy Charges for Today     Code Description Service Date Service Provider Modifiers Qty    27935366229 HC PT THER PROC EA 15 MIN 2/6/2019 Afshin Contreras, PTA GP 1    42424468327 HC PT THER SUPP EA 15 MIN 2/6/2019 Afshin Contreras, PTA GP 1          PT G-Codes  Outcome Measure Options: AM-PAC 6 Clicks Basic Mobility (PT)  AM-PAC 6 Clicks Score: 20    Afshin Contreras PTA  2/6/2019

## 2019-02-06 NOTE — PROGRESS NOTES
"    DAILY PROGRESS NOTE  [facility]    Patient Identification:  Name: King Parson  Age: 68 y.o.  Sex: male  :  1950  MRN: [id number]         Primary Care Physician: Elise Ortiz APRN    Subjective:  Interval History: Right eye is better today as he is able to open it more often without as much discomfort.  His drainage is decreased.  He is still maintaining with some of the eyedrops.  Denies any significant headache chest pain or troubles breathing.  He is tolerating tube feeds as well as tolerating all the changes in goals of care from a medical standpoint as well     Objective: Smiley at bedside and also dust this case with JERRY Mead    Scheduled Meds:  apixaban 5 mg Oral Q12H   Followed by      [START ON 2019] apixaban 5 mg Oral Q12H   budesonide-formoterol 2 puff Inhalation BID - RT   famotidine 20 mg Oral BID   gabapentin 800 mg Oral Q8H   levothyroxine 75 mcg Oral Daily   methylPREDNISolone sodium succinate 60 mg Intravenous Daily   nicotine 1 patch Transdermal Q24H   vitamin B-12 500 mcg Oral Daily     Continuous Infusions:     Vital signs in last 24 hours:  Temp:  [99.3 °F (37.4 °C)-100.5 °F (38.1 °C)] 99.3 °F (37.4 °C)  Heart Rate:  [89-97] 89  Resp:  [16] 16  BP: (82-88)/(48-50) 88/50    Intake/Output:    Intake/Output Summary (Last 24 hours) at 2019 1415  Last data filed at 2019 1219  Gross per 24 hour   Intake 1447 ml   Output --   Net 1447 ml       Exam:  BP (!) 88/50 (BP Location: Left arm, Patient Position: Lying)   Pulse 89   Temp 99.3 °F (37.4 °C) (Oral)   Resp 16   Ht 175.3 cm (69\")   Wt 51.2 kg (112 lb 12.8 oz)   SpO2 95%   BMI 16.66 kg/m²     General Appearance:    Alert, cooperative, no distress, AAOx3                          Eyes:    Heaps open a lot more today still with eyelid entropion                         Throat:   Lips, tongue, gums normal; oral mucosa pink and moist                         Lungs:    Clear to auscultation " bilaterally, respirations unlabored                          Heart:    Regular rate and rhythm, S1 and S2 normal                  Abdomen:     Soft, non-tender, bowel sounds active                   Extremities:   No cyanosis or edema                          Data Review:  Labs in chart were reviewed.    Assessment:  Active Hospital Problems    Diagnosis Date Noted   • **Acute deep vein thrombosis (DVT) of right lower extremity (CMS/HCC) [I82.401] 01/31/2019   • Eye pain, right [H57.11] 02/05/2019   • Tobacco abuse [Z72.0] 01/24/2019   • Varicella zoster- over V1,  slit lamp neg 1/23/19 [B02.9, B01.9] 01/23/2019   • Dysphagia [R13.10] 01/23/2019   • Esophageal dysmotility [K22.4] 01/23/2019   • Aspiration syndrome [T17.900A] 01/23/2019   • History of immunotherapy [Z92.89] 01/23/2019   • Abnormal weight loss [R63.4] 01/23/2019   • GERD (gastroesophageal reflux disease) [K21.9] 01/23/2019   • Failure to thrive (0-17) [R62.51] 01/23/2019   • Lung metastases (CMS/HCC) [C78.00] 07/27/2018   • 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, 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, M0) [C13.9] 07/24/2017   • Lumbar radiculopathy [M54.16] 02/08/2016      Resolved Hospital Problems    Diagnosis Date Noted Date Resolved   • Swelling of right side of face [R22.0] 01/23/2019 01/31/2019   • Cellulitis of head except face [L03.811] 01/23/2019 01/31/2019       Plan:  Daily IV Solu-Medrol as this really helps his right eye    Palliative rads for rib metastatic lesion    Remain on [address] for modified palliative care - hospice to evaluate tomorrow for hospice scattered bed status - home with hospice is no longer an option    Continue nightly tube feeds for now    Continue Eliquis for DVT    Interim discharge summary noted in handout    Jose A Cordoba MD  2/6/2019  2:15 PM

## 2019-02-06 NOTE — PLAN OF CARE
Problem: Patient Care Overview  Goal: Plan of Care Review  Outcome: Ongoing (interventions implemented as appropriate)   02/05/19 1820   Coping/Psychosocial   Plan of Care Reviewed With patient   Plan of Care Review   Progress no change   OTHER   Outcome Summary Patient transferred to [facility] to transition to palliative care. Family/patient spoke with RN regarding goals of care and treatment plan. Family voiced concerns of pain management and symptom control. Will continue to monitor and the palliative team will follow.      Goal: Individualization and Mutuality  Outcome: Ongoing (interventions implemented as appropriate)    Goal: Discharge Needs Assessment  Outcome: Ongoing (interventions implemented as appropriate)    Goal: Interprofessional Rounds/Family Conf  Outcome: Ongoing (interventions implemented as appropriate)      Problem: Skin Injury Risk (Adult)  Goal: Skin Health and Integrity  Outcome: Ongoing (interventions implemented as appropriate)      Problem: Nutrition, Imbalanced: Inadequate Oral Intake (Adult)  Goal: Prevent Further Weight Loss  Outcome: Ongoing (interventions implemented as appropriate)      Problem: Fall Risk (Adult)  Goal: Absence of Fall  Outcome: Ongoing (interventions implemented as appropriate)      Problem: Nutrition, Enteral (Adult)  Goal: Signs and Symptoms of Listed Potential Problems Will be Absent, Minimized or Managed (Nutrition, Enteral)  Outcome: Ongoing (interventions implemented as appropriate)      Problem: Oncology Care (Adult)  Goal: Signs and Symptoms of Listed Potential Problems Will be Absent, Minimized or Managed (Oncology Care)  Outcome: Ongoing (interventions implemented as appropriate)      Problem: Palliative Care (Adult)  Goal: Identify Related Risk Factors and Signs and Symptoms  Outcome: Outcome(s) achieved Date Met: 02/05/19    Goal: Maximized Comfort  Outcome: Ongoing (interventions implemented as appropriate)    Goal: Enhanced Quality of Life  Outcome:  Ongoing (interventions implemented as appropriate)

## 2019-02-07 PROCEDURE — 77412 RADIATION TX DELIVERY LVL 3: CPT | Performed by: RADIOLOGY

## 2019-02-07 PROCEDURE — 25010000002 HYDROMORPHONE 1 MG/ML SOLUTION: Performed by: HOSPITALIST

## 2019-02-07 PROCEDURE — 94799 UNLISTED PULMONARY SVC/PX: CPT

## 2019-02-07 PROCEDURE — 77427 RADIATION TX MANAGEMENT X5: CPT | Performed by: RADIOLOGY

## 2019-02-07 PROCEDURE — 99231 SBSQ HOSP IP/OBS SF/LOW 25: CPT | Performed by: INTERNAL MEDICINE

## 2019-02-07 PROCEDURE — 25010000002 METHYLPREDNISOLONE PER 125 MG: Performed by: HOSPITALIST

## 2019-02-07 PROCEDURE — 77417 THER RADIOLOGY PORT IMAGE(S): CPT | Performed by: RADIOLOGY

## 2019-02-07 PROCEDURE — 77336 RADIATION PHYSICS CONSULT: CPT | Performed by: RADIOLOGY

## 2019-02-07 PROCEDURE — 25010000002 LORAZEPAM PER 2 MG: Performed by: HOSPITALIST

## 2019-02-07 RX ORDER — CHOLECALCIFEROL (VITAMIN D3) 125 MCG
500 CAPSULE ORAL DAILY
Status: CANCELLED | OUTPATIENT
Start: 2019-02-08

## 2019-02-07 RX ORDER — NICOTINE 21 MG/24HR
1 PATCH, TRANSDERMAL 24 HOURS TRANSDERMAL
Status: CANCELLED | OUTPATIENT
Start: 2019-02-08

## 2019-02-07 RX ORDER — ALBUTEROL SULFATE 2.5 MG/3ML
2.5 SOLUTION RESPIRATORY (INHALATION) EVERY 6 HOURS PRN
Status: CANCELLED | OUTPATIENT
Start: 2019-02-07

## 2019-02-07 RX ORDER — ACETAMINOPHEN 160 MG/5ML
650 SOLUTION ORAL EVERY 6 HOURS PRN
Status: CANCELLED | OUTPATIENT
Start: 2019-02-07

## 2019-02-07 RX ORDER — LEVOTHYROXINE SODIUM 0.07 MG/1
75 TABLET ORAL DAILY
Status: CANCELLED | OUTPATIENT
Start: 2019-02-08

## 2019-02-07 RX ORDER — SENNOSIDES 8.6 MG
1 TABLET ORAL NIGHTLY PRN
Status: CANCELLED | OUTPATIENT
Start: 2019-02-07

## 2019-02-07 RX ORDER — FAMOTIDINE 20 MG/1
20 TABLET, FILM COATED ORAL 2 TIMES DAILY
Status: CANCELLED | OUTPATIENT
Start: 2019-02-07

## 2019-02-07 RX ORDER — METHYLPREDNISOLONE SODIUM SUCCINATE 125 MG/2ML
60 INJECTION, POWDER, LYOPHILIZED, FOR SOLUTION INTRAMUSCULAR; INTRAVENOUS DAILY
Status: CANCELLED | OUTPATIENT
Start: 2019-02-08

## 2019-02-07 RX ORDER — NAPROXEN 500 MG/1
500 TABLET ORAL 2 TIMES DAILY PRN
Status: CANCELLED | OUTPATIENT
Start: 2019-02-07

## 2019-02-07 RX ORDER — OXYCODONE HYDROCHLORIDE 5 MG/1
10 TABLET ORAL EVERY 4 HOURS PRN
Status: CANCELLED | OUTPATIENT
Start: 2019-02-07 | End: 2019-02-08

## 2019-02-07 RX ORDER — GABAPENTIN 400 MG/1
800 CAPSULE ORAL EVERY 8 HOURS SCHEDULED
Status: CANCELLED | OUTPATIENT
Start: 2019-02-07

## 2019-02-07 RX ORDER — ACETAMINOPHEN 325 MG/1
650 TABLET ORAL EVERY 6 HOURS PRN
Status: CANCELLED | OUTPATIENT
Start: 2019-02-07

## 2019-02-07 RX ORDER — LORAZEPAM 2 MG/ML
0.5 INJECTION INTRAMUSCULAR EVERY 6 HOURS PRN
Status: CANCELLED | OUTPATIENT
Start: 2019-02-07 | End: 2019-02-15

## 2019-02-07 RX ORDER — BUDESONIDE AND FORMOTEROL FUMARATE DIHYDRATE 160; 4.5 UG/1; UG/1
2 AEROSOL RESPIRATORY (INHALATION)
Status: CANCELLED | OUTPATIENT
Start: 2019-02-07

## 2019-02-07 RX ADMIN — HYDROMORPHONE HYDROCHLORIDE 0.5 MG: 1 INJECTION, SOLUTION INTRAMUSCULAR; INTRAVENOUS; SUBCUTANEOUS at 18:01

## 2019-02-07 RX ADMIN — LORAZEPAM 0.5 MG: 2 INJECTION INTRAMUSCULAR; INTRAVENOUS at 07:53

## 2019-02-07 RX ADMIN — NICOTINE 1 PATCH: 14 PATCH, EXTENDED RELEASE TRANSDERMAL at 09:39

## 2019-02-07 RX ADMIN — HYDROMORPHONE HYDROCHLORIDE 0.5 MG: 1 INJECTION, SOLUTION INTRAMUSCULAR; INTRAVENOUS; SUBCUTANEOUS at 13:14

## 2019-02-07 RX ADMIN — FAMOTIDINE 20 MG: 20 TABLET, FILM COATED ORAL at 09:38

## 2019-02-07 RX ADMIN — BUDESONIDE AND FORMOTEROL FUMARATE DIHYDRATE 2 PUFF: 160; 4.5 AEROSOL RESPIRATORY (INHALATION) at 09:58

## 2019-02-07 RX ADMIN — LEVOTHYROXINE SODIUM 75 MCG: 75 TABLET ORAL at 09:38

## 2019-02-07 RX ADMIN — FAMOTIDINE 20 MG: 20 TABLET, FILM COATED ORAL at 21:15

## 2019-02-07 RX ADMIN — GABAPENTIN 800 MG: 400 CAPSULE ORAL at 15:34

## 2019-02-07 RX ADMIN — METHYLPREDNISOLONE SODIUM SUCCINATE 60 MG: 125 INJECTION, POWDER, FOR SOLUTION INTRAMUSCULAR; INTRAVENOUS at 09:38

## 2019-02-07 RX ADMIN — HYDROMORPHONE HYDROCHLORIDE 0.5 MG: 1 INJECTION, SOLUTION INTRAMUSCULAR; INTRAVENOUS; SUBCUTANEOUS at 04:15

## 2019-02-07 RX ADMIN — APIXABAN 5 MG: 5 TABLET, FILM COATED ORAL at 21:15

## 2019-02-07 RX ADMIN — GABAPENTIN 800 MG: 400 CAPSULE ORAL at 07:52

## 2019-02-07 RX ADMIN — Medication 500 MCG: at 09:38

## 2019-02-07 RX ADMIN — HYDROMORPHONE HYDROCHLORIDE 0.5 MG: 1 INJECTION, SOLUTION INTRAMUSCULAR; INTRAVENOUS; SUBCUTANEOUS at 21:15

## 2019-02-07 RX ADMIN — GABAPENTIN 800 MG: 400 CAPSULE ORAL at 21:15

## 2019-02-07 RX ADMIN — BUDESONIDE AND FORMOTEROL FUMARATE DIHYDRATE 2 PUFF: 160; 4.5 AEROSOL RESPIRATORY (INHALATION) at 21:06

## 2019-02-07 RX ADMIN — APIXABAN 5 MG: 5 TABLET, FILM COATED ORAL at 09:45

## 2019-02-07 RX ADMIN — HYDROMORPHONE HYDROCHLORIDE 0.5 MG: 1 INJECTION, SOLUTION INTRAMUSCULAR; INTRAVENOUS; SUBCUTANEOUS at 07:53

## 2019-02-07 NOTE — DISCHARGE SUMMARY
[facility]    Date of Discharge:  2/7/2019    PCP: Elise Ortiz, APRN    Discharge Diagnosis:   Active Hospital Problems    Diagnosis Date Noted   • **Acute deep vein thrombosis (DVT) of right lower extremity (CMS/[facility]) [I82.401] 01/31/2019   • Eye pain, right [H57.11] 02/05/2019   • Tobacco abuse [Z72.0] 01/24/2019   • Varicella zoster- over V1,  slit lamp neg 1/23/19 [B02.9, B01.9] 01/23/2019   • Dysphagia [R13.10] 01/23/2019   • Esophageal dysmotility [K22.4] 01/23/2019   • Aspiration syndrome [T17.900A] 01/23/2019   • History of immunotherapy [Z92.89] 01/23/2019   • Abnormal weight loss [R63.4] 01/23/2019   • GERD (gastroesophageal reflux disease) [K21.9] 01/23/2019   • Failure to thrive (0-17) [R62.51] 01/23/2019   • Lung metastases (CMS/[facility]) [C78.00] 07/27/2018   • 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, 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, 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] 07/24/2017   • Lumbar radiculopathy [M54.16] 02/08/2016      Resolved Hospital Problems    Diagnosis Date Noted Date Resolved   • Swelling of right side of face [R22.0] 01/23/2019 01/31/2019   • Cellulitis of head except face [L03.811] 01/23/2019 01/31/2019       Consults     Date and Time Order Name Status Description    2/3/2019 1453 Inpatient Ophthalmology Consult Completed     1/28/2019 0902 Inpatient Infectious Diseases Consult      1/27/2019 1152 Inpatient Radiation Oncology Consult Completed     1/23/2019 1900 Inpatient Infectious Diseases Consult Completed     1/23/2019 1900 Hematology & Oncology Inpatient Consult      1/23/2019 1817 Inpatient Gastroenterology Consult Completed     1/23/2019 1640 [facility] (on-call MD unless specified) Completed         Hospital Course  Please see history and physical for details. Patient is a 68 y.o. male with multiple medical problems including metastatic lung cancer initially admitted for facial swelling. Patient was felt to be septic due to facial zoster with secondary bacterial  component. He was started on IV vancomycin as well as acyclovir. Infectious disease was consulted and vancomycin was discontinued with clinical improvement and acyclovir was completed. Patient still had some right eye pain that would wax and wane. Ultimately trial of steroid helped relieve his discomfort. Ophthalmology recommended surgical intervention. He was also found to have bilateral lower extremity DVT/CVT and was started on Eliquis for anticoagulation. It was not felt safe for patient to come off anticoagulation for an eye surgery when he is already terminal due to progression of metastatic cancer. As result of his cancer he has dysphagia and is using tube feeds at night and he is tolerating. He had persistent fever most likely secondary to progression of cancer given the necrotic lesions noted on his lungs possibly compounded by the DVT. Initial plans were to go home with hospice unfortunately he does not have the additional support to maintain himself independently at home. He has two sisters at bedside who are involved but they are unable to watch him 24/7. Patient has come a long way during this hospitalization in regards to confronting his mortality. Patient is being [facility] to [facility] for palliative/comfort care. Today he is feeling OK and his pain is controlled. I discussed the patient's findings and my recommendations with patient and nursing staff and CCP.    Condition on Discharge: Palliative.     Temp:  [96.9 °F (36.1 °C)-100.4 °F (38 °C)] 100.4 °F (38 °C)  Heart Rate:  [] 92  Resp:  [16-18] 16  BP: (105-109)/(56-62) 105/56  Body mass index is 16.66 kg/m².    Physical Exam   Constitutional: He appears cachectic.   Cardiovascular: Normal rate and regular rhythm.   Pulmonary/Chest: No respiratory distress. He has decreased breath sounds.   Abdominal: Soft. There is no tenderness.   Neurological: He is alert.   Skin: Skin is warm and dry.     Discharge Medications  Inter-facility  transfer medications (From admission, onward)            albuterol (PROVENTIL) nebulizer solution 0.083% 2.5 mg/3mL  Every 6 Hours PRN          vitamin B-12 (CYANOCOBALAMIN) tablet 500 mcg  Daily          gabapentin (NEURONTIN) capsule 800 mg  Every 8 Hours Scheduled          levothyroxine (SYNTHROID, LEVOTHROID) tablet 75 mcg  Daily          budesonide-formoterol (SYMBICORT) 160-4.5 MCG/ACT inhaler 2 puff  2 Times Daily - RT          nicotine (NICODERM CQ) 14 MG/24HR patch 1 patch  Every 24 Hours Scheduled          Glycerin-Hypromellose- (ARTIFICIAL TEARS) 0.2-0.2-1 % ophthalmic solution solution 2 drop  Every 1 Hour PRN          acetaminophen (TYLENOL) 160 MG/5ML solution 650 mg  Every 6 Hours PRN          acetaminophen (TYLENOL) tablet 650 mg  Every 6 Hours PRN          benzocaine (HURRICAINE) 20 % oral spray 2 spray  4 Times Daily PRN          senna (SENOKOT) tablet 8.6 mg  Nightly PRN          polyethylene glycol 3350 powder (packet)  Daily PRN          oxyCODONE (ROXICODONE) immediate release tablet 10 mg  Every 4 Hours PRN          apixaban (ELIQUIS) tablet 5 mg  (apixaban (ELIQUIS))  Every 12 Hours Scheduled          famotidine (PEPCID) tablet 20 mg  2 Times Daily          naproxen (NAPROSYN) tablet 500 mg  2 Times Daily PRN          methylPREDNISolone sodium succinate (SOLU-Medrol) injection 60 mg  Daily          LORazepam (ATIVAN) injection 0.5 mg  Every 6 Hours PRN          HYDROmorphone (DILAUDID) injection 0.5 mg  Every 2 Hours PRN             Eligio Mcallister MD  02/07/19  9:52 AM    Discharge time spent greater than 30 minutes.

## 2019-02-07 NOTE — PROGRESS NOTES
Case Management Discharge Note    Final Note: Admitted to a [facility] scattered bed on 2/7/19. MAHSA Gerard RN, CCP.     Destination - Selection Complete      Service Provider Request Status Selected Services Address Phone Number Fax Number    [facility] [address] [id number] [phone]      Durable Medical Equipment      No service has been selected for the patient.      Dialysis/Infusion      No service has been selected for the patient.      Home Medical Care      No service has been selected for the patient.      Community Resources      No service has been selected for the patient.             Final Discharge Disposition Code: 51 - hospice medical facility

## 2019-02-07 NOTE — CONSULTS
Spiritual Care:  Patient asleep. Introduced myself to sister. Provided moral and spiritual support.   Karyn Waldron

## 2019-02-07 NOTE — PLAN OF CARE
Problem: Patient Care Overview  Goal: Plan of Care Review  Outcome: Ongoing (interventions implemented as appropriate)   02/07/19 6220   Coping/Psychosocial   Plan of Care Reviewed With patient;family   Plan of Care Review   Progress no change   OTHER   Outcome Summary patient medicated x1 under my care. patient and family did not want to sign with hospice this evening, may reconsult at a later time. family concerned patient is not asking for pain medicated frequent enough. will conitnue to monitor.        Problem: Skin Injury Risk (Adult)  Goal: Skin Health and Integrity  Outcome: Ongoing (interventions implemented as appropriate)      Problem: Nutrition, Imbalanced: Inadequate Oral Intake (Adult)  Goal: Prevent Further Weight Loss  Outcome: Ongoing (interventions implemented as appropriate)      Problem: Fall Risk (Adult)  Goal: Absence of Fall  Outcome: Ongoing (interventions implemented as appropriate)      Problem: Palliative Care (Adult)  Goal: Maximized Comfort  Outcome: Ongoing (interventions implemented as appropriate)    Goal: Enhanced Quality of Life  Outcome: Ongoing (interventions implemented as appropriate)

## 2019-02-07 NOTE — PLAN OF CARE
Problem: Patient Care Overview  Goal: Plan of Care Review  Outcome: Ongoing (interventions implemented as appropriate)   02/06/19 2741   Coping/Psychosocial   Plan of Care Reviewed With patient   Plan of Care Review   Progress no change   OTHER   Outcome Summary Per patient, PRN dilaudid better controlling pain; however, pain does persist. Tube feeds stopped per order and will restart tonight. Palliative radiation complete. Patient a tad frustrated regarding frequent interruptions and lack of sleep. Will continue to monitor.      Goal: Individualization and Mutuality  Outcome: Ongoing (interventions implemented as appropriate)    Goal: Discharge Needs Assessment  Outcome: Ongoing (interventions implemented as appropriate)    Goal: Interprofessional Rounds/Family Conf  Outcome: Ongoing (interventions implemented as appropriate)      Problem: Skin Injury Risk (Adult)  Goal: Skin Health and Integrity  Outcome: Ongoing (interventions implemented as appropriate)      Problem: Nutrition, Imbalanced: Inadequate Oral Intake (Adult)  Goal: Prevent Further Weight Loss  Outcome: Ongoing (interventions implemented as appropriate)      Problem: Fall Risk (Adult)  Goal: Absence of Fall  Outcome: Ongoing (interventions implemented as appropriate)      Problem: Nutrition, Enteral (Adult)  Goal: Signs and Symptoms of Listed Potential Problems Will be Absent, Minimized or Managed (Nutrition, Enteral)  Outcome: Ongoing (interventions implemented as appropriate)      Problem: Oncology Care (Adult)  Goal: Signs and Symptoms of Listed Potential Problems Will be Absent, Minimized or Managed (Oncology Care)  Outcome: Ongoing (interventions implemented as appropriate)      Problem: Palliative Care (Adult)  Goal: Maximized Comfort  Outcome: Ongoing (interventions implemented as appropriate)    Goal: Enhanced Quality of Life  Outcome: Ongoing (interventions implemented as appropriate)

## 2019-02-07 NOTE — PROGRESS NOTES
Discharge Planning Assessment  [facility]     Patient Name: King Parson  MRN: [id number]  Today's Date: 2/7/2019    Admit Date: 1/23/2019    Discharge Needs Assessment    No documentation.       Discharge Plan     Row Name 02/07/19 1528       Plan    Plan Comments  Lázaro/Bimal/RN is to meet with the family at 18:30 to sign consents for a [facility] scattered bed. MAHSA Gerard RN, CCP.         Destination      No service coordination in this encounter.      Durable Medical Equipment      No service coordination in this encounter.      Dialysis/Infusion      Service Provider Request Status Selected Services Address Phone Number Fax Number    [facility] Pending - Request Sent N/A [address] [address], [facility] [address] [phone]      Home Medical Care      Service Provider Request Status Selected Services Address Phone Number Fax Number    [facility] Accepted N/A [address] [address] [phone]    [facility] Pending - No Request Sent N/A [address] [address] [phone]      Community Resources      No service coordination in this encounter.        Expected Discharge Date and Time     Expected Discharge Date Expected Discharge Time    Feb 7, 2019         Demographic Summary    No documentation.       Functional Status    No documentation.       Psychosocial    No documentation.       Abuse/Neglect    No documentation.       Legal    No documentation.       Substance Abuse    No documentation.       Patient Forms    No documentation.           Alyce Gerard RN

## 2019-02-07 NOTE — PROGRESS NOTES
Discharge Planning Assessment  [facility]     Patient Name: King Parson  MRN: [id number]  Today's Date: 2/7/2019    Admit Date: 1/23/2019    Discharge Needs Assessment    No documentation.       Discharge Plan     Row Name 02/07/19 1549       Plan    Plan Comments  Spoke with the patient's sister/Blarie and explained palliative and [facility] services. They state understanding and will meet with Lázaro/Bimal/LOLA this evening and sign consents. MAHSA Gerard RN, CCP.     Row Name 02/07/19 1528       Plan    Plan Comments  Lázaro/Bimal/RN is to meet with the family at 18:30 to sign consents for a [facility] scattered bed. MAHSA Gerard RN, CCP.         Destination      No service coordination in this encounter.      Durable Medical Equipment      No service coordination in this encounter.      Dialysis/Infusion      Service Provider Request Status Selected Services Address Phone Number Fax Number    [facility] Pending - Request Sent N/A [address], [facility] [address] [phone]      Home Medical Care      Service Provider Request Status Selected Services Address Phone Number Fax Number    [facility] Accepted N/A [address] [facility] [address] [address] [phone]    [facility] Pending - No Request Sent N/A [address], [facility] [address] [phone]      Community Resources      No service coordination in this encounter.        Expected Discharge Date and Time     Expected Discharge Date Expected Discharge Time    Feb 7, 2019         Demographic Summary    No documentation.       Functional Status    No documentation.       Psychosocial    No documentation.       Abuse/Neglect    No documentation.       Legal    No documentation.       Substance Abuse    No documentation.       Patient Forms    No documentation.           Alyce Gerard, LOLA

## 2019-02-07 NOTE — PROGRESS NOTES
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  2.  Combined radiation and chemotherapy with cisplatin and Taxol initiated on 8/10/2017 concurrent with radiation.    3.  Long smoking history with COPD.  4.  Mediport and PEG tube placed by Dr. Valenzuela on 7/31/2017.  Subsequent PEG tube infection requiring antibiotics.  5.  Chemotherapy completed 9/15/2017 and radiation therapy completed 10/9/2017.  6.  Right neck dissection by Dr. Clinton Styles 5/17/2018 for persistent disease in the right neck.  7.  PET scan from 6/26/2018 showing multiple hypermetabolic lung lesions consistent with metastatic disease.  8.  Palliative Opdivo therapy initiated 7/13/2018, last given on 12/28/2018.      SUBJECTIVE:  Afebrile but uncomfortable with stabbing pain intermittently behind right eye. Spoke with hospice and may be ready for DC home with hospice next 1-2 days.         On 2/5/2019, patient reported he had pain 10/10 and was given medication just before 12:00 noon.  According to his nurse, patient did not take any pain medicine since last night.  I encourage patient to take more frequent pain medication to control his pain.  There is no reason for him to suffer from pain.  According to his sisters, patient has poor appetite, he does eat a little by mouth.  He also gets tube feeding in the night.  Patient reports he has no constipation, he sometimes most about twice a day.     On 2/6/2019, patient reports that his pain is not well controlled.  Dr. Cordoba started patient on IV Dilaudid every 2 hours as needed.    On 2/7/2019, his nurse reported patient did receive 2 doses of IV Dilaudid today.  Patient reports his pain is controlled.     ROS:  Positive for - right rib pain, fever, right 4th digit pain, right eye pain  Negative for -  nasuea/vomiting    OBJECTIVE:  Vitals:    02/07/19 0407 02/07/19 0435 02/07/19 0958 02/07/19 1002   BP: 109/59 105/56     BP Location:  Right arm     Patient Position: Lying Lying     Pulse: 101 92 98 98   Resp: 18 16 16 16   Temp: 100.4 °F (38 °C) 100.4 °F (38 °C)     TempSrc: Oral Oral     SpO2: 97% 90% 95%    Weight:       Height:             PHYSICAL EXAMINATION:  General:  Chronically ill appearing, NAD  HEENT:  Crusted rash right upper face, significantly improved.  R eye without significant purulent drainage or erythema.      Chest/Lungs:  CTAB  Heart:  RRR no m/r/g  Abdomen/GI:  Soft, NT, ND, NABS.  PEG tube in place.  Extremities:  No edema, no cords, there is mild erythema, tenderness and swelling at the distal right 4th finger.      Assessment/Plan   ASSESSMENT/PLAN:  This is a 68 y.o. male with:     1.  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  Chemotherapy was completed 9/15/2017 and radiation completed on 10/9/2017.      He had an excellent response but unfortunately as noted above he does have persistent squamous cell carcinoma in the right neck node and underwent right neck dissection 5/17/2018. See below.         2.  Comorbidities including COPD.  3.  PEG tube removed by Dr. Valenzuela 11/30/2017.  4.  Development of metastatic disease to the lungs noted on PET scan 6/26/2018.    5.  He received palliative therapy with Opdivo.  CT scans after 8 cycles showed some enlargement of the pulmonary nodules but no significant new sites of disease and he continued.   Initiated 7/13/2018, 12th and final cycle given on 12/28/2018.      Disease progression, patient is no longer candidate for further systematic therapy.  The patient now is on palliative care/hospice care    6.  Patient admitted now with dysphagia with esophageal dysmotility and aspiration on the  video swallow.    · PEG tube placed on 1/30 by Dr. Patel    7.  Herpes zoster infection with facial cellulitis.  Valtrex and vancomycin complete  ·  He complains of worsening pain in the right eye since about 2/2.    · He's already on a high dose of gabapentin 800 mg every 8 hours.  · May need ophthalmology to see him tomorrow    8.  Pain in the right rib secondary to tumor.   Dr. Marroquin has seen and started paliative radiation for pain.  Continue oxycodone.    9.  Constipation:   resolved with Bowel regimen     10.  No CPR.  Seems reluctant for palliative care.  Focusing on radiation and still seems to want more systemic therapy, though he is not a candidate.      11.  Acute RLE DVT in the peroneal and gastroc/soleal veins.  This may be the etiology of his fever.  · Cancer related as well as immobilization     · Continue Eliquis 5 mg bid  · BID Pepcid for GI prophylaxis.     12.  Right eye blurriness.  Seen by ophthalmologist on 2/4/2019.  Patient had thrombophilia, requires anticoagulation.  Not a candidate for holding anticoagulation for the surgical procedure recommended by ophthalmologist.       PLAN:  1.  Palliative radiation ongoing  2.  No further systemic therapy is planned for his malignancy.    3. Continue palliative care/hospice care.  4.  Continue Eliquis 5 mg q12h.   5.  Continue intravenous Dilaudid as needed every 2 hours, continue Oxycodone prn pain    6.  Continue BID Pepcid for GI prophylaxis; high risk for bleeding on both Eliquis.    7.  Continue gabapentin and he's on 800 mg tid.           Discussed with patient.    We will sign off. Please call if having questions.    Thank you very much!    Miguel Sim MD PhD

## 2019-02-07 NOTE — SIGNIFICANT NOTE
02/07/19 1340   Rehab Treatment   Discipline physical therapy assistant   Reason Treatment Not Performed patient/family declined treatment, not feeling well  (Pt states that he just not up for PT today.  RN Gabrielle is aware and said thats fine that he is mostly comfort care.  PT to follow up tomorrow.)   Recommendation   PT - Next Appointment 02/08/19

## 2019-02-07 NOTE — PLAN OF CARE
Problem: Patient Care Overview  Goal: Plan of Care Review  Outcome: Ongoing (interventions implemented as appropriate)   02/07/19 0639   Coping/Psychosocial   Plan of Care Reviewed With patient   Plan of Care Review   Progress no change   OTHER   Outcome Summary Dilaudid x2 for c/o generalized pain. Scheduled gabapentin per orders. TF stopped early this morning after pt c/o stomach burning, iv dilaudid also given at this time. Safety measures observed. Will monitor for needs       Problem: Skin Injury Risk (Adult)  Goal: Skin Health and Integrity  Outcome: Ongoing (interventions implemented as appropriate)      Problem: Nutrition, Imbalanced: Inadequate Oral Intake (Adult)  Goal: Prevent Further Weight Loss  Outcome: Ongoing (interventions implemented as appropriate)      Problem: Fall Risk (Adult)  Goal: Absence of Fall  Outcome: Ongoing (interventions implemented as appropriate)      Problem: Nutrition, Enteral (Adult)  Goal: Signs and Symptoms of Listed Potential Problems Will be Absent, Minimized or Managed (Nutrition, Enteral)  Outcome: Outcome(s) achieved Date Met: 02/07/19      Problem: Oncology Care (Adult)  Goal: Signs and Symptoms of Listed Potential Problems Will be Absent, Minimized or Managed (Oncology Care)  Outcome: Outcome(s) achieved Date Met: 02/07/19      Problem: Palliative Care (Adult)  Goal: Maximized Comfort  Outcome: Ongoing (interventions implemented as appropriate)    Goal: Enhanced Quality of Life  Outcome: Ongoing (interventions implemented as appropriate)

## 2019-02-08 ENCOUNTER — HOSPITAL ENCOUNTER (INPATIENT)
Facility: HOSPITAL | Age: 69
LOS: 10 days | Discharge: HOSPICE | End: 2019-02-18
Attending: HOSPITALIST | Admitting: INTERNAL MEDICINE

## 2019-02-08 VITALS
HEIGHT: 69 IN | BODY MASS INDEX: 16.71 KG/M2 | SYSTOLIC BLOOD PRESSURE: 111 MMHG | WEIGHT: 112.8 LBS | RESPIRATION RATE: 16 BRPM | HEART RATE: 94 BPM | OXYGEN SATURATION: 95 % | DIASTOLIC BLOOD PRESSURE: 63 MMHG | TEMPERATURE: 100.6 F

## 2019-02-08 DIAGNOSIS — R53.1 GENERALIZED WEAKNESS: Primary | ICD-10-CM

## 2019-02-08 PROCEDURE — 94799 UNLISTED PULMONARY SVC/PX: CPT

## 2019-02-08 PROCEDURE — 77412 RADIATION TX DELIVERY LVL 3: CPT | Performed by: RADIOLOGY

## 2019-02-08 PROCEDURE — 25010000002 METHYLPREDNISOLONE PER 125 MG: Performed by: HOSPITALIST

## 2019-02-08 PROCEDURE — 25010000002 HYDROMORPHONE 1 MG/ML SOLUTION: Performed by: HOSPITALIST

## 2019-02-08 PROCEDURE — 25010000002 LORAZEPAM PER 2 MG: Performed by: HOSPITALIST

## 2019-02-08 RX ORDER — FAMOTIDINE 20 MG/1
20 TABLET, FILM COATED ORAL 2 TIMES DAILY
Status: DISCONTINUED | OUTPATIENT
Start: 2019-02-08 | End: 2019-02-08

## 2019-02-08 RX ORDER — LEVOTHYROXINE SODIUM 0.07 MG/1
75 TABLET ORAL
Status: DISCONTINUED | OUTPATIENT
Start: 2019-02-09 | End: 2019-02-08

## 2019-02-08 RX ORDER — SENNOSIDES 8.6 MG
1 TABLET ORAL NIGHTLY PRN
Status: DISCONTINUED | OUTPATIENT
Start: 2019-02-08 | End: 2019-02-18 | Stop reason: HOSPADM

## 2019-02-08 RX ORDER — METHYLPREDNISOLONE SODIUM SUCCINATE 125 MG/2ML
60 INJECTION, POWDER, LYOPHILIZED, FOR SOLUTION INTRAMUSCULAR; INTRAVENOUS DAILY
Status: DISCONTINUED | OUTPATIENT
Start: 2019-02-09 | End: 2019-02-12

## 2019-02-08 RX ORDER — LEVOTHYROXINE SODIUM 0.07 MG/1
75 TABLET ORAL DAILY
Status: DISCONTINUED | OUTPATIENT
Start: 2019-02-09 | End: 2019-02-18 | Stop reason: HOSPADM

## 2019-02-08 RX ORDER — GABAPENTIN 400 MG/1
800 CAPSULE ORAL EVERY 8 HOURS SCHEDULED
Status: DISCONTINUED | OUTPATIENT
Start: 2019-02-08 | End: 2019-02-18 | Stop reason: HOSPADM

## 2019-02-08 RX ORDER — ACETAMINOPHEN 325 MG/1
650 TABLET ORAL EVERY 6 HOURS PRN
Status: DISCONTINUED | OUTPATIENT
Start: 2019-02-08 | End: 2019-02-18 | Stop reason: HOSPADM

## 2019-02-08 RX ORDER — ACETAMINOPHEN 160 MG/5ML
650 SOLUTION ORAL EVERY 6 HOURS PRN
Status: DISCONTINUED | OUTPATIENT
Start: 2019-02-08 | End: 2019-02-18 | Stop reason: HOSPADM

## 2019-02-08 RX ORDER — METHYLPREDNISOLONE SODIUM SUCCINATE 125 MG/2ML
60 INJECTION, POWDER, LYOPHILIZED, FOR SOLUTION INTRAMUSCULAR; INTRAVENOUS DAILY
Status: DISCONTINUED | OUTPATIENT
Start: 2019-02-08 | End: 2019-02-08

## 2019-02-08 RX ORDER — CHOLECALCIFEROL (VITAMIN D3) 125 MCG
500 CAPSULE ORAL DAILY
Status: DISCONTINUED | OUTPATIENT
Start: 2019-02-08 | End: 2019-02-08

## 2019-02-08 RX ORDER — NICOTINE 21 MG/24HR
1 PATCH, TRANSDERMAL 24 HOURS TRANSDERMAL
Status: DISCONTINUED | OUTPATIENT
Start: 2019-02-09 | End: 2019-02-18 | Stop reason: HOSPADM

## 2019-02-08 RX ORDER — NAPROXEN 500 MG/1
500 TABLET ORAL 2 TIMES DAILY PRN
Status: DISCONTINUED | OUTPATIENT
Start: 2019-02-08 | End: 2019-02-16

## 2019-02-08 RX ORDER — BUDESONIDE AND FORMOTEROL FUMARATE DIHYDRATE 160; 4.5 UG/1; UG/1
2 AEROSOL RESPIRATORY (INHALATION)
Status: DISCONTINUED | OUTPATIENT
Start: 2019-02-08 | End: 2019-02-18 | Stop reason: HOSPADM

## 2019-02-08 RX ORDER — FAMOTIDINE 20 MG/1
20 TABLET, FILM COATED ORAL 2 TIMES DAILY
Status: DISCONTINUED | OUTPATIENT
Start: 2019-02-08 | End: 2019-02-18 | Stop reason: HOSPADM

## 2019-02-08 RX ORDER — BUDESONIDE AND FORMOTEROL FUMARATE DIHYDRATE 160; 4.5 UG/1; UG/1
2 AEROSOL RESPIRATORY (INHALATION)
Status: DISCONTINUED | OUTPATIENT
Start: 2019-02-08 | End: 2019-02-08

## 2019-02-08 RX ORDER — ALBUTEROL SULFATE 2.5 MG/3ML
2.5 SOLUTION RESPIRATORY (INHALATION) EVERY 6 HOURS PRN
Status: DISCONTINUED | OUTPATIENT
Start: 2019-02-08 | End: 2019-02-18 | Stop reason: HOSPADM

## 2019-02-08 RX ORDER — GABAPENTIN 400 MG/1
800 CAPSULE ORAL EVERY 8 HOURS SCHEDULED
Status: DISCONTINUED | OUTPATIENT
Start: 2019-02-08 | End: 2019-02-08

## 2019-02-08 RX ORDER — LORAZEPAM 2 MG/ML
0.5 INJECTION INTRAMUSCULAR EVERY 6 HOURS PRN
Status: DISCONTINUED | OUTPATIENT
Start: 2019-02-08 | End: 2019-02-12

## 2019-02-08 RX ORDER — OXYCODONE HYDROCHLORIDE 5 MG/1
10 TABLET ORAL EVERY 4 HOURS PRN
Status: ACTIVE | OUTPATIENT
Start: 2019-02-08 | End: 2019-02-09

## 2019-02-08 RX ORDER — CHOLECALCIFEROL (VITAMIN D3) 125 MCG
500 CAPSULE ORAL DAILY
Status: DISCONTINUED | OUTPATIENT
Start: 2019-02-09 | End: 2019-02-18 | Stop reason: HOSPADM

## 2019-02-08 RX ADMIN — NICOTINE 1 PATCH: 14 PATCH, EXTENDED RELEASE TRANSDERMAL at 08:08

## 2019-02-08 RX ADMIN — HYDROMORPHONE HYDROCHLORIDE 0.5 MG: 1 INJECTION, SOLUTION INTRAMUSCULAR; INTRAVENOUS; SUBCUTANEOUS at 11:18

## 2019-02-08 RX ADMIN — LEVOTHYROXINE SODIUM 75 MCG: 75 TABLET ORAL at 08:07

## 2019-02-08 RX ADMIN — Medication 500 MCG: at 08:07

## 2019-02-08 RX ADMIN — HYDROMORPHONE HYDROCHLORIDE 0.5 MG: 1 INJECTION, SOLUTION INTRAMUSCULAR; INTRAVENOUS; SUBCUTANEOUS at 07:33

## 2019-02-08 RX ADMIN — APIXABAN 5 MG: 5 TABLET, FILM COATED ORAL at 08:07

## 2019-02-08 RX ADMIN — HYDROMORPHONE HYDROCHLORIDE 0.5 MG: 1 INJECTION, SOLUTION INTRAMUSCULAR; INTRAVENOUS; SUBCUTANEOUS at 18:32

## 2019-02-08 RX ADMIN — FAMOTIDINE 20 MG: 20 TABLET, FILM COATED ORAL at 21:07

## 2019-02-08 RX ADMIN — GABAPENTIN 800 MG: 400 CAPSULE ORAL at 21:07

## 2019-02-08 RX ADMIN — BUDESONIDE AND FORMOTEROL FUMARATE DIHYDRATE 2 PUFF: 160; 4.5 AEROSOL RESPIRATORY (INHALATION) at 19:39

## 2019-02-08 RX ADMIN — GABAPENTIN 800 MG: 400 CAPSULE ORAL at 06:08

## 2019-02-08 RX ADMIN — GABAPENTIN 800 MG: 400 CAPSULE ORAL at 14:04

## 2019-02-08 RX ADMIN — HYDROMORPHONE HYDROCHLORIDE 0.5 MG: 1 INJECTION, SOLUTION INTRAMUSCULAR; INTRAVENOUS; SUBCUTANEOUS at 15:13

## 2019-02-08 RX ADMIN — LORAZEPAM 0.5 MG: 2 INJECTION INTRAMUSCULAR; INTRAVENOUS at 07:33

## 2019-02-08 RX ADMIN — HYDROMORPHONE HYDROCHLORIDE 0.5 MG: 1 INJECTION, SOLUTION INTRAMUSCULAR; INTRAVENOUS; SUBCUTANEOUS at 21:07

## 2019-02-08 RX ADMIN — APIXABAN 5 MG: 5 TABLET, FILM COATED ORAL at 21:07

## 2019-02-08 RX ADMIN — FAMOTIDINE 20 MG: 20 TABLET, FILM COATED ORAL at 08:07

## 2019-02-08 RX ADMIN — HYDROMORPHONE HYDROCHLORIDE 0.5 MG: 1 INJECTION, SOLUTION INTRAMUSCULAR; INTRAVENOUS; SUBCUTANEOUS at 04:49

## 2019-02-08 RX ADMIN — METHYLPREDNISOLONE SODIUM SUCCINATE 60 MG: 125 INJECTION, POWDER, FOR SOLUTION INTRAMUSCULAR; INTRAVENOUS at 08:07

## 2019-02-08 RX ADMIN — BUDESONIDE AND FORMOTEROL FUMARATE DIHYDRATE 2 PUFF: 160; 4.5 AEROSOL RESPIRATORY (INHALATION) at 07:19

## 2019-02-08 NOTE — NURSING NOTE
Arrived on unit, reviewed records and spoke with LOLA Anthony to discuss patient's disease progression. Met with Patient and family at bedside to discuss how patient progressed over night and goals of care. Provided detailed EOS. Patient and family agreeable to [facility] scattered bed. Obtained consents via patient's sister-Blaire per patients request. Per D-Dr. Goodwin patient is HSB appropriate. Spoke with D-Dr. Lundy to obtain pain regimen recommendation as patient is still rating pain 10/10. Dr. Lundy recommended 0.5mg IV dilaudid Q4 hrs scheduled, and 0.5mg IV dilaudid Q1 hr PRN. Notified LOLA Anthony who stated she would discuss [facility] recommendation with Dr. Mcallister. Spoke with LOLA Bates with [facility] who stated Dr. Mcallister confirmed he would admit patient to HSB today. Updated LOLA Anthony and [facility]. [facility] team will follow up tomorrow.     Thanks for the referral and opportunity to care for this patient.    Lázaro Mercedes RN, BSN  Referral and admission coordinator  [phone]

## 2019-02-08 NOTE — PLAN OF CARE
Problem: Patient Care Overview  Goal: Plan of Care Review  Outcome: Ongoing (interventions implemented as appropriate)   02/08/19 0401   Coping/Psychosocial   Plan of Care Reviewed With patient   Plan of Care Review   Progress no change   OTHER   Outcome Summary Continue to encourage pt to take pain meds but he has only had one dose of dilaudid thus far-may agree to take another dose this am with morning meds. He has slept well but still needs to be reminded to keep the HOB elevated. He is tolerating TF and still has some PO intake. Up with standby assist and walker to BR, bed alarm in place. Will continue to monitor and offer comfort and support per palliative POC.       Problem: Skin Injury Risk (Adult)  Goal: Skin Health and Integrity  Outcome: Ongoing (interventions implemented as appropriate)      Problem: Nutrition, Imbalanced: Inadequate Oral Intake (Adult)  Goal: Prevent Further Weight Loss  Outcome: Ongoing (interventions implemented as appropriate)      Problem: Fall Risk (Adult)  Goal: Absence of Fall  Outcome: Ongoing (interventions implemented as appropriate)      Problem: Palliative Care (Adult)  Goal: Maximized Comfort  Outcome: Ongoing (interventions implemented as appropriate)    Goal: Enhanced Quality of Life  Outcome: Ongoing (interventions implemented as appropriate)

## 2019-02-08 NOTE — NURSING NOTE
"Arrived on unit, reviewed records and spoke with LOLA Anthony to discuss patient's disease progression. Met with patient and sister-Blaire at bedside to discuss patient's disease progression and goals of care. Sister stated patient was having a \"bad day\", Pt became agitated and requested this RAC come back later in the day when his DPOA-Ivet would be present. Spoke with HMD-Dr. Goodwin who stated patient is HSB appropriate. Returned to patient's room this evening and met with patient and his sister/DPOA-Ivet to discuss patient's disease progression and goals of care. Provided detailed EOS with focus on HSB. Patient appeared agitated again and stated \" I don't know what I want, I cannot think right now.\" Patient and family requested time to think about [facility] services and that [facility] follow up tomorrow. Family stated they were not sure what time would be good to meet tomorrow for [facility] follow up. Provided preadmission info packet and instructed to call to make [facility] apt. when ready. Patient's sister Ivet stated they would like to discuss options with [facility].  Updated LOLA Dubon. Note left for [facility].     Thanks for the referral and opportunity to care for this patient.    Lázaro Mercedes RN, BSN  Referral and admission coordinator  [phone]  "

## 2019-02-08 NOTE — PROGRESS NOTES
[facility]     LOS: 16 days     Name: King Parson  Age: 68 y.o.  Sex: male  :  1950  MRN: [id number]         Primary Care Physician: Elise Ortiz APRN    Diet Dysphagia; II - Pureed; Nectar / Syrup Thick; No Straws    Subjective   no new complaints. Pain over right eye is stable    Review of Systems   Respiratory: Negative for shortness of breath.    Cardiovascular: Negative for chest pain.      Objective   Temp:  [98.6 °F (37 °C)-100.6 °F (38.1 °C)] 100.6 °F (38.1 °C)  Heart Rate:  [] 94  Resp:  [16-18] 16  BP: ()/(56-63) 111/63  SpO2:  [94 %-97 %] 95 %  on   ;   Device (Oxygen Therapy): room air  Body mass index is 16.66 kg/m².    Physical Exam   Constitutional: He appears cachectic.   Cardiovascular: Regular rhythm.   Pulmonary/Chest: No respiratory distress. He has decreased breath sounds.   Abdominal: Bowel sounds are normal. There is no tenderness. There is no rebound and no guarding.   Musculoskeletal: He exhibits no edema.   Neurological: He is alert.     Reviewed medications and new clinical results    apixaban 5 mg Oral Q12H   budesonide-formoterol 2 puff Inhalation BID - RT   famotidine 20 mg Oral BID   gabapentin 800 mg Oral Q8H   levothyroxine 75 mcg Oral Daily   methylPREDNISolone sodium succinate 60 mg Intravenous Daily   nicotine 1 patch Transdermal Q24H   vitamin B-12 500 mcg Oral Daily          Lab Results   Component Value Date    ANIONGAP 11.3 2019     Estimated Creatinine Clearance: 64 mL/min (A) (by C-G formula based on SCr of 0.71 mg/dL (L)).    Assessment/Plan   Active Hospital Problems    Diagnosis Date Noted   • **Acute deep vein thrombosis (DVT) of right lower extremity (CMS/HCC) [I82.401] 2019   • Eye pain, right [H57.11] 2019   • Tobacco abuse [Z72.0] 2019   • Varicella zoster- over V1,  slit lamp neg 19 [B02.9, B01.9] 2019   • Dysphagia [R13.10] 2019   •  Esophageal dysmotility [K22.4] 01/23/2019   • Aspiration syndrome [T17.900A] 01/23/2019   • History of immunotherapy [Z92.89] 01/23/2019   • Abnormal weight loss [R63.4] 01/23/2019   • GERD (gastroesophageal reflux disease) [K21.9] 01/23/2019   • Failure to thrive (0-17) [R62.51] 01/23/2019   • Lung metastases (CMS/HCC) [C78.00] 07/27/2018   • 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

## 2019-02-08 NOTE — PROGRESS NOTES
Case Management Discharge Note    Final Note: Admitted to a [facility] scattered bed on 2/8/19. MAHSA Gerard RN, CCP.     Destination - Selection Complete      Service Provider Request Status Selected Services Address Phone Number Fax Number    [facility] [id number] [id number] [phone]      Durable Medical Equipment      No service has been selected for the patient.      Dialysis/Infusion      No service has been selected for the patient.      Home Medical Care      No service has been selected for the patient.      Community Resources      No service has been selected for the patient.             Final Discharge Disposition Code: 51 - hospice medical facility

## 2019-02-09 PROBLEM — Z51.5 ADMISSION FOR HOSPICE CARE: Status: ACTIVE | Noted: 2019-02-09

## 2019-02-09 PROCEDURE — 25010000002 METHYLPREDNISOLONE PER 125 MG: Performed by: HOSPITALIST

## 2019-02-09 PROCEDURE — 92526 ORAL FUNCTION THERAPY: CPT

## 2019-02-09 PROCEDURE — 94799 UNLISTED PULMONARY SVC/PX: CPT

## 2019-02-09 PROCEDURE — 25010000002 HYDROMORPHONE 1 MG/ML SOLUTION: Performed by: HOSPITALIST

## 2019-02-09 RX ORDER — ALBUTEROL SULFATE 90 UG/1
2 AEROSOL, METERED RESPIRATORY (INHALATION) EVERY 4 HOURS PRN
Status: DISCONTINUED | OUTPATIENT
Start: 2019-02-09 | End: 2019-02-18 | Stop reason: HOSPADM

## 2019-02-09 RX ADMIN — LEVOTHYROXINE SODIUM 75 MCG: 75 TABLET ORAL at 09:35

## 2019-02-09 RX ADMIN — HYDROMORPHONE HYDROCHLORIDE 0.5 MG: 1 INJECTION, SOLUTION INTRAMUSCULAR; INTRAVENOUS; SUBCUTANEOUS at 21:27

## 2019-02-09 RX ADMIN — HYDROMORPHONE HYDROCHLORIDE 0.5 MG: 1 INJECTION, SOLUTION INTRAMUSCULAR; INTRAVENOUS; SUBCUTANEOUS at 18:27

## 2019-02-09 RX ADMIN — BUDESONIDE AND FORMOTEROL FUMARATE DIHYDRATE 2 PUFF: 160; 4.5 AEROSOL RESPIRATORY (INHALATION) at 07:31

## 2019-02-09 RX ADMIN — HYDROMORPHONE HYDROCHLORIDE 0.5 MG: 1 INJECTION, SOLUTION INTRAMUSCULAR; INTRAVENOUS; SUBCUTANEOUS at 11:43

## 2019-02-09 RX ADMIN — APIXABAN 5 MG: 5 TABLET, FILM COATED ORAL at 09:36

## 2019-02-09 RX ADMIN — HYDROMORPHONE HYDROCHLORIDE 0.5 MG: 1 INJECTION, SOLUTION INTRAMUSCULAR; INTRAVENOUS; SUBCUTANEOUS at 15:11

## 2019-02-09 RX ADMIN — HYDROMORPHONE HYDROCHLORIDE 0.5 MG: 1 INJECTION, SOLUTION INTRAMUSCULAR; INTRAVENOUS; SUBCUTANEOUS at 09:36

## 2019-02-09 RX ADMIN — HYDROMORPHONE HYDROCHLORIDE 0.5 MG: 1 INJECTION, SOLUTION INTRAMUSCULAR; INTRAVENOUS; SUBCUTANEOUS at 06:21

## 2019-02-09 RX ADMIN — GABAPENTIN 800 MG: 400 CAPSULE ORAL at 21:05

## 2019-02-09 RX ADMIN — HYDROMORPHONE HYDROCHLORIDE 0.5 MG: 1 INJECTION, SOLUTION INTRAMUSCULAR; INTRAVENOUS; SUBCUTANEOUS at 01:36

## 2019-02-09 RX ADMIN — Medication 500 MCG: at 09:35

## 2019-02-09 RX ADMIN — FAMOTIDINE 20 MG: 20 TABLET, FILM COATED ORAL at 09:36

## 2019-02-09 RX ADMIN — GABAPENTIN 800 MG: 400 CAPSULE ORAL at 15:12

## 2019-02-09 RX ADMIN — GABAPENTIN 800 MG: 400 CAPSULE ORAL at 06:21

## 2019-02-09 RX ADMIN — BUDESONIDE AND FORMOTEROL FUMARATE DIHYDRATE 2 PUFF: 160; 4.5 AEROSOL RESPIRATORY (INHALATION) at 19:41

## 2019-02-09 RX ADMIN — FAMOTIDINE 20 MG: 20 TABLET, FILM COATED ORAL at 20:53

## 2019-02-09 RX ADMIN — APIXABAN 5 MG: 5 TABLET, FILM COATED ORAL at 20:53

## 2019-02-09 RX ADMIN — METHYLPREDNISOLONE SODIUM SUCCINATE 60 MG: 125 INJECTION, POWDER, FOR SOLUTION INTRAMUSCULAR; INTRAVENOUS at 09:41

## 2019-02-09 RX ADMIN — HYDROMORPHONE HYDROCHLORIDE 0.5 MG: 1 INJECTION, SOLUTION INTRAMUSCULAR; INTRAVENOUS; SUBCUTANEOUS at 13:35

## 2019-02-09 NOTE — PLAN OF CARE
Problem: Patient Care Overview  Goal: Plan of Care Review  Outcome: Ongoing (interventions implemented as appropriate)   02/09/19 0412   Coping/Psychosocial   Plan of Care Reviewed With patient   Plan of Care Review   Progress no change   OTHER   Outcome Summary Will continue to promote good respiratory hygiene.

## 2019-02-09 NOTE — NURSING NOTE
Madeline Pt regarding the inhaler not being on hospital formulary. Explained he can bring in his home inhaler for use here if he would like but that we do have him on duo neb treatments prn. He vu. He will have family check for rescue inhaler and bring in if he has one. At that time RN will need to DC duo neb treatments.

## 2019-02-09 NOTE — PLAN OF CARE
Problem: Fall Risk (Adult)  Goal: Absence of Fall  Outcome: Ongoing (interventions implemented as appropriate)      Problem: Nutrition, Imbalanced: Inadequate Oral Intake (Adult)  Goal: Prevent Further Weight Loss  Outcome: Ongoing (interventions implemented as appropriate)      Problem: Palliative Care (Adult)  Goal: Maximized Comfort  Outcome: Ongoing (interventions implemented as appropriate)    Goal: Enhanced Quality of Life  Outcome: Ongoing (interventions implemented as appropriate)      Problem: Patient Care Overview  Goal: Plan of Care Review  Outcome: Ongoing (interventions implemented as appropriate)   02/08/19 1935   Coping/Psychosocial   Plan of Care Reviewed With patient   Plan of Care Review   Progress declining   OTHER   Outcome Summary Patient alert and oriented. Patient went down for palliative radiation this morning and returned before lunch. Patient has scheduled IV 0.5mg Dilaudid due to patient's constant pain level of 9 or higher. Patient also has PRN 0.5mg Dilaudid ordered Q1hr for break through pain. Patient complains of pain primarily in his back and shoulders. Patient was admitted to HSB today and flipped. Patient resting comfortably. Will continue to monitor.     Goal: Individualization and Mutuality  Outcome: Ongoing (interventions implemented as appropriate)      Problem: Skin Injury Risk (Adult)  Goal: Skin Health and Integrity  Outcome: Ongoing (interventions implemented as appropriate)

## 2019-02-09 NOTE — PROGRESS NOTES
[facility] Visit Report    King Parson  [id number]  2/9/2019    Admission R/T [facility] Dx: YES      Reason for [facility] Admission: CA of Hypopharynx with mets to lung      Symptom  Management: Pain control      Nursing/Medication Recommendations: Recommend increasing scheduled IV Dilaudid to 1mg and possibly starting a long acting pain medication orally and using the IV Dilaudid as breakthrough as patient continues to rate pain 9/10 and says the pain medication does not last long enough.      Psychosocial Issues and Recommendations: Provide support to patient and family      Spiritual Concerns and Recommendations: None at present      [facility] Discharge Plans:  None at present, continue to monitor for decline and requires daily RN assessment for symptom management of pain using IV medications and is meeting criteria for GIP as a [facility] scattered bed patient.      Review of Visit: Close monitoring for safety, requires daily RN assessments for symptom management of pain, comfort care for declining patient. Patient awake, alert, sitting up in chair. Color pale to ashen. Answers questions appropriately and rates his pain at a 9/10. Nurse has entered the room and is medicating now, patient had been up in the shower. Expresses he gets relief when IV Dilaudid given but that it does not last long enough. Left recommendation to increase dose and start patient on a long-acting oral dose of pain medication and discussed with staff RN Jana. Sisters at bedside and emotional support provided to them and to patient. Patient has received IV Dilaudid 0.5mg q4hrs and also received IV Dilaudid 0.5mg x 2 prn doses today. Also discussed with patient and staff RN to try to use the IV Ativan along with the Ativan and the patient agreed to try. Will continue to see daily to assess needs, monitor status and offer support.        Jessica Escamilla, RN  [facility] Visit Nurse

## 2019-02-09 NOTE — H&P
[facility]    Patient Identification:  Name: King Parson  Age: 68 y.o.  Sex: male  :  1950  MRN: [id number]         Primary Care Physician: Elise Ortiz APRN    cc: palliative care    Subjective   Patient is a 68 y.o. male with multiple medical problems including metastatic lung cancer initially admitted for facial swelling. Patient was felt to be septic due to facial zoster with secondary bacterial component. He was started on IV vancomycin as well as acyclovir. Infectious disease was consulted and vancomycin was discontinued with clinical improvement and acyclovir was completed. Patient still had some right eye pain that would wax and wane. Ultimately trial of steroid helped relieve his discomfort. Ophthalmology recommended surgical intervention. He was also found to have bilateral lower extremity DVT/CVT and was started on Eliquis for anticoagulation. It was not felt safe for patient to come off anticoagulation for an eye surgery when he is already terminal due to progression of metastatic cancer. As result of his cancer he has dysphagia and is using tube feeds at night and he is tolerating. He had persistent fever most likely secondary to progression of cancer given the necrotic lesions noted on his lungs possibly compounded by the DVT. Initial plans were to go home with hospice unfortunately he does not have the additional support. He has two sisters who are involved but they are unable to watch him . Patient has come a long way during this hospitalization in regards to confronting his mortality. Patient is being admitted to [facility] for palliative/comfort care. Today he is requesting albuterol inhaler as needed. He does not want nebulized inhaler.    Past Medical History:   Diagnosis Date   • Asthma    • Bruises easily    • COPD (chronic obstructive pulmonary disease) (CMS/[facility])    • GERD (gastroesophageal reflux  disease)    • History of anemia    • History of chemotherapy     NOV 2017   • History of chemotherapy     NOV 2017   • History of chest pain 02/01/2017    HAD NEG. STRESS TEST IN EPIC   • Hyperlipidemia    • Hypertension    • Low back pain     chronic, also bilat leg pain   • Lung cancer (CMS/HCC) 2018   • Lung cancer (CMS/HCC)    • Neck mass     right side   • Osteoarthritis    • Poor vision    • Shingles    • Squamous cell cancer of hypopharynx (CMS/HCC) 2017   • Vision loss of left eye      Past Surgical History:   Procedure Laterality Date   • ABDOMINAL SURGERY     • ELBOW ARTHROTOMY Bilateral     bursa removed   • ENDOSCOPY W/ PEG TUBE PLACEMENT N/A 7/31/2017    Procedure: ESOPHAGOGASTRODUODENOSCOPY WITH PERCUTANEOUS ENDOSCOPIC GASTROSTOMY TUBE INSERTION;  Surgeon: Raul Valenzuela MD;  Location: [facility] OR;  Service:    • EPIDURAL BLOCK     • EYE SURGERY Left 1993    HEMORRHAGE LEFT EYE   • HAND TENDON REPAIR      LEFT THUMB   • INCISION AND DRAINAGE ABSCESS Left     LEFT HAND, STREP INFECTION   • NECK DISSECTION Right 5/17/2018    Procedure: RIGHT MODIFIED RADICAL  NECK DISSECTION;  Surgeon: Clinton Styles MD;  Location: [facility] OR OSC;  Service: ENT   • PEG TUBE INSERTION N/A 1/30/2019    Procedure: PERCUTANEOUS ENDOSCOPIC GASTROSTOMY TUBE INSERTION;  Surgeon: Mandy Patel MD;  Location: [facility] ENDOSCOPY;  Service: Gastroenterology   • VENOUS ACCESS DEVICE (PORT) INSERTION N/A 7/31/2017    Procedure: INSERTION VENOUS ACCESS DEVICE;  Surgeon: Raul Valenzuela MD;  Location: [facility] OR;  Service:      Current medications reviewed.    Family History   Problem Relation Age of Onset   • Prostate cancer Father 57   • Bone cancer Father    • Heart disease Sister         cath stent placement    • Heart attack Sister    • Heart attack Brother    • Diabetes Brother    • Heart disease Brother    • Hypertension Other    • Malig Hyperthermia Neg Hx      Social History     Tobacco Use   • Smoking status:  Current Every Day Smoker     Packs/day: 0.25     Years: 19.00     Pack years: 4.75     Types: Cigarettes   • Smokeless tobacco: Never Used   • Tobacco comment: Pt smokes 5 or more cigarettes a day   Substance Use Topics   • Alcohol use: No   • Drug use: No     Review of Systems   Respiratory: Negative for shortness of breath.    Cardiovascular: Negative for chest pain.     Objective      Vital Signs  Temp:  [97.4 °F (36.3 °C)-98.3 °F (36.8 °C)] 97.4 °F (36.3 °C)  Heart Rate:  [] 101  Resp:  [16-20] 20  BP: ()/(41-67) 123/67  There is no height or weight on file to calculate BMI.    Physical Exam   Constitutional: He appears cachectic. No distress.   Cardiovascular: Normal rate and regular rhythm.   Pulmonary/Chest: Effort normal. No respiratory distress. He has decreased breath sounds.   Abdominal: Soft. There is no tenderness.   Musculoskeletal: He exhibits no edema.   Neurological: He is alert.   Skin: Skin is warm and dry.     Results Review:  I reviewed the patient's new clinical results.    Lab Results   Component Value Date    ANIONGAP 11.3 02/01/2019     Estimated Creatinine Clearance: 64 mL/min (A) (by C-G formula based on SCr of 0.71 mg/dL (L)).    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     68 y.o. male with a history of head and neck cancer status post chemotherapy, radiation, pulmonary metastases, acial zoster, lower  extremity DVT/CVT on anticoagulation admitted for comfort measures.    · Continue comfort/palliative care  · Scheduled Dilaudid as well as scheduled Solu-Medrol  · [facility] scattered bed  · discussed with patient and nursing staff.    Eligio Mcallister MD  02/09/19  10:23 AM

## 2019-02-09 NOTE — THERAPY TREATMENT NOTE
Acute Care - Speech Language Pathology   Swallow Treatment Note [facility]     Patient Name: King Parson  : 1950  MRN: [id number]  Today's Date: 2019               Admit Date: 2019    Visit Dx:    No diagnosis found.  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, M0)   • Throat cancer (CMS/HCC)   • Encounter for fitting and adjustment of vascular catheter   • Other chronic pain   • Squamous cell carcinoma   • Cervicalgia   • Cervical spinal stenosis   • Encounter for monitoring opioid maintenance therapy   • Lung metastases (CMS/HCC)   • Encounter for long-term (current) use of high-risk medication   • DDD (degenerative disc disease), cervical   • Cervical radiculitis   • Varicella zoster- over V1,  slit lamp neg 19   • Dysphagia   • Esophageal dysmotility   • Aspiration syndrome   • History of immunotherapy   • Abnormal weight loss   • GERD (gastroesophageal reflux disease)   • Tobacco abuse   • Failure to thrive (0-17)   • Acute deep vein thrombosis (DVT) of right lower extremity (CMS/HCC)   • Eye pain, right   • Admission for hospice care       Therapy Treatment  Rehabilitation Treatment Summary     Row Name 19 1100             Treatment Time/Intention    Discipline  speech language pathologist  -HS      Document Type  therapy note (daily note)  -HS      Subjective Information  no complaints  -HS      Mode of Treatment  speech-language pathology  -HS      Patient/Family Observations  Patient sitting upright in bed. Multiple family members present.   -HS      Care Plan Review  care plan/treatment goals reviewed;risks/benefits reviewed;patient/other agree to  care plan  -      Care Plan Review, Other Participant(s)  family  -HS      Patient Effort  good  -HS      Comment  New orders recieved. Per RN, patient/family interested in H2O/ice chip protocol. Pt. has been getting tube feeds at night and PO (puree/NTL) during the day. He is tolerating his current PO diet. SLP observed patient with NTL this date. No overt s/s of aspiration. Per patient, he is not interested in initiating the H2O protocol or having ice chips as he has a fear of aspirating. He wishes to continue his modified diet at this time. SLP discussed diet changes for quality of life. Would recommend allowing patient to decide based on his preferences. No further ST services warranted at this time. Please re-consult if/when needed.     -HS      Recorded by [HS] Julita Chavez MS CCC-SLP 02/09/19 1200        User Key  (r) = Recorded By, (t) = Taken By, (c) = Cosigned By    Initials Name Effective Dates Discipline    Julita Paredes MS CCC-SLP 06/08/18 -  SLP          EDUCATION  The patient has been educated in the following areas:   Dysphagia (Swallowing Impairment) Oral Care/Hydration.    SLP Recommendation and Plan        Plan of Care Reviewed With: patient, family  Plan of Care Review  Plan of Care Reviewed With: patient, family  Progress: no change  Outcome Summary: New orders received. Patient is currently tolerating puree, nectar thick liquids during the day with tube feeds at night. He is not interested in initiating the H20 protocol at this time. Completed education and addressed all concerns/questions with the patient and family. Recommend allowing patient to decide on PO based on his preferences for quality of life.           Time Calculation:   Time Calculation- SLP     Row Name 02/09/19 1214             Time Calculation- SLP    SLP Received On  02/09/19  -        User Key  (r) = Recorded By, (t) = Taken By, (c) = Cosigned By    Initials Name Provider Type    Julita Paredes MS  CCC-SLP Speech and Language Pathologist          Therapy Charges for Today     Code Description Service Date Service Provider Modifiers Qty    [id number] HC ST TREATMENT SWALLOW 3 2/9/2019 Julita Chavez, MS BETHEA-SLP GN 1                 Jultia Chavez MS CCC-SLP  2/9/2019

## 2019-02-09 NOTE — PLAN OF CARE
Problem: Fall Risk (Adult)  Goal: Absence of Fall  Outcome: Ongoing (interventions implemented as appropriate)      Problem: Nutrition, Imbalanced: Inadequate Oral Intake (Adult)  Goal: Prevent Further Weight Loss  Outcome: Ongoing (interventions implemented as appropriate)      Problem: Palliative Care (Adult)  Goal: Maximized Comfort  Outcome: Ongoing (interventions implemented as appropriate)    Goal: Enhanced Quality of Life  Outcome: Ongoing (interventions implemented as appropriate)      Problem: Patient Care Overview  Goal: Plan of Care Review  Outcome: Ongoing (interventions implemented as appropriate)   02/09/19 0418   Coping/Psychosocial   Plan of Care Reviewed With patient   Plan of Care Review   Progress no change   OTHER   Outcome Summary Pt medicated with scheduled Dilaudid, no breakthrough meds needed at this time. Pt ambulated throughout the shift with assistance. Tube feed running currently, from 8pm-8am. Pt appears to be resting comfortably. Will continue to monitor per comfort care.      Goal: Individualization and Mutuality  Outcome: Ongoing (interventions implemented as appropriate)    Goal: Discharge Needs Assessment  Outcome: Ongoing (interventions implemented as appropriate)    Goal: Interprofessional Rounds/Family Conf  Outcome: Ongoing (interventions implemented as appropriate)      Problem: Skin Injury Risk (Adult)  Goal: Skin Health and Integrity  Outcome: Ongoing (interventions implemented as appropriate)

## 2019-02-09 NOTE — PLAN OF CARE
Problem: Patient Care Overview  Goal: Plan of Care Review   02/09/19 1203   Coping/Psychosocial   Plan of Care Reviewed With patient;family   Plan of Care Review   Progress no change   OTHER   Outcome Summary New orders received. Patient is currently tolerating puree, nectar thick liquids during the day with tube feeds at night. He is not interested in initiating the H20 protocol at this time. Completed education and addressed all concerns/questions with the patient and family. Recommend allowing patient to decide on PO based on his preferences for quality of life.

## 2019-02-10 PROBLEM — J44.9 COPD MIXED TYPE (HCC): Status: ACTIVE | Noted: 2019-02-10

## 2019-02-10 PROCEDURE — 94799 UNLISTED PULMONARY SVC/PX: CPT

## 2019-02-10 PROCEDURE — 25010000002 METHYLPREDNISOLONE PER 125 MG: Performed by: HOSPITALIST

## 2019-02-10 PROCEDURE — 25010000002 HYDROMORPHONE 1 MG/ML SOLUTION: Performed by: HOSPITALIST

## 2019-02-10 PROCEDURE — 25010000002 HYDROMORPHONE 1 MG/ML SOLUTION: Performed by: INTERNAL MEDICINE

## 2019-02-10 PROCEDURE — 99222 1ST HOSP IP/OBS MODERATE 55: CPT | Performed by: INTERNAL MEDICINE

## 2019-02-10 RX ADMIN — GABAPENTIN 800 MG: 400 CAPSULE ORAL at 21:17

## 2019-02-10 RX ADMIN — GABAPENTIN 800 MG: 400 CAPSULE ORAL at 14:10

## 2019-02-10 RX ADMIN — Medication 500 MCG: at 09:08

## 2019-02-10 RX ADMIN — HYDROMORPHONE HYDROCHLORIDE 1 MG: 1 INJECTION, SOLUTION INTRAMUSCULAR; INTRAVENOUS; SUBCUTANEOUS at 06:22

## 2019-02-10 RX ADMIN — BUDESONIDE AND FORMOTEROL FUMARATE DIHYDRATE 2 PUFF: 160; 4.5 AEROSOL RESPIRATORY (INHALATION) at 21:37

## 2019-02-10 RX ADMIN — HYDROMORPHONE HYDROCHLORIDE 1 MG: 1 INJECTION, SOLUTION INTRAMUSCULAR; INTRAVENOUS; SUBCUTANEOUS at 10:19

## 2019-02-10 RX ADMIN — FAMOTIDINE 20 MG: 20 TABLET, FILM COATED ORAL at 21:17

## 2019-02-10 RX ADMIN — APIXABAN 5 MG: 5 TABLET, FILM COATED ORAL at 21:17

## 2019-02-10 RX ADMIN — HYDROMORPHONE HYDROCHLORIDE 1 MG: 1 INJECTION, SOLUTION INTRAMUSCULAR; INTRAVENOUS; SUBCUTANEOUS at 18:01

## 2019-02-10 RX ADMIN — APIXABAN 5 MG: 5 TABLET, FILM COATED ORAL at 09:08

## 2019-02-10 RX ADMIN — HYDROMORPHONE HYDROCHLORIDE 1 MG: 1 INJECTION, SOLUTION INTRAMUSCULAR; INTRAVENOUS; SUBCUTANEOUS at 14:10

## 2019-02-10 RX ADMIN — HYDROMORPHONE HYDROCHLORIDE 0.5 MG: 1 INJECTION, SOLUTION INTRAMUSCULAR; INTRAVENOUS; SUBCUTANEOUS at 01:21

## 2019-02-10 RX ADMIN — HYDROMORPHONE HYDROCHLORIDE 1 MG: 1 INJECTION, SOLUTION INTRAMUSCULAR; INTRAVENOUS; SUBCUTANEOUS at 21:52

## 2019-02-10 RX ADMIN — METHYLPREDNISOLONE SODIUM SUCCINATE 60 MG: 125 INJECTION, POWDER, FOR SOLUTION INTRAMUSCULAR; INTRAVENOUS at 09:07

## 2019-02-10 RX ADMIN — FAMOTIDINE 20 MG: 20 TABLET, FILM COATED ORAL at 09:08

## 2019-02-10 RX ADMIN — BUDESONIDE AND FORMOTEROL FUMARATE DIHYDRATE 2 PUFF: 160; 4.5 AEROSOL RESPIRATORY (INHALATION) at 09:29

## 2019-02-10 RX ADMIN — LEVOTHYROXINE SODIUM 75 MCG: 75 TABLET ORAL at 09:08

## 2019-02-10 RX ADMIN — GABAPENTIN 800 MG: 400 CAPSULE ORAL at 06:22

## 2019-02-10 NOTE — SIGNIFICANT NOTE
02/10/19 0946   Rehab Time/Intention   Evaluation Not Performed patient/family declined evaluation  (PT decline PT evaluation at this time.  nursing aware of refusal.  Will follow up tomorrow. )   Rehab Treatment   Discipline physical therapist   Recommendation   PT - Next Appointment 02/11/19

## 2019-02-10 NOTE — PLAN OF CARE
Problem: Fall Risk (Adult)  Goal: Absence of Fall  Outcome: Ongoing (interventions implemented as appropriate)      Problem: Nutrition, Imbalanced: Inadequate Oral Intake (Adult)  Goal: Prevent Further Weight Loss  Outcome: Ongoing (interventions implemented as appropriate)      Problem: Palliative Care (Adult)  Goal: Maximized Comfort  Outcome: Ongoing (interventions implemented as appropriate)    Goal: Enhanced Quality of Life  Outcome: Ongoing (interventions implemented as appropriate)      Problem: Patient Care Overview  Goal: Plan of Care Review  Outcome: Ongoing (interventions implemented as appropriate)   02/10/19 0413   Coping/Psychosocial   Plan of Care Reviewed With patient   Plan of Care Review   Progress no change   OTHER   Outcome Summary Pt medicated with scheduled Dilaudid throughout the shift. Pt requested Dilaudid be increased to last longer, MD paged, see orders. Pt ambulated throughout the shift with assistance. Tube feeding has been infusing since 2000, will be finished at 0800. Pt appears comfortable at this time. Will continue to monitor per comfort care.      Goal: Individualization and Mutuality  Outcome: Ongoing (interventions implemented as appropriate)    Goal: Discharge Needs Assessment  Outcome: Ongoing (interventions implemented as appropriate)    Goal: Interprofessional Rounds/Family Conf  Outcome: Ongoing (interventions implemented as appropriate)      Problem: Skin Injury Risk (Adult)  Goal: Skin Health and Integrity  Outcome: Ongoing (interventions implemented as appropriate)

## 2019-02-10 NOTE — H&P
Palliative Care/Hospice Admit/Consult Note       Referring Provider: Eligio Mcallister MD  Reason for Consultation: Hospice Care  Date of Admission:  2/8/2019    Patient Care Team:  Elise Ortiz APRN as PCP - General (Nurse Practitioner)  Aakash Garcia MD as PCP - Claims Attributed  Clinton Styles MD as Referring Physician (Otolaryngology)  Cosmo Conway MD as Consulting Physician (Cardiology)  Edgar Prince MD as Consulting Physician (Pain Medicine)  Aakash Garcia MD as Consulting Physician (Hematology and Oncology)  North Marroquin MD as Consulting Physician (Radiation Oncology)  Drew Rucker MD as Consulting Physician (Hospice and Palliative Medicine)    Chief complaint:  Stage Adelaide ( T3,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    History of present illness:  The patient is a 68 y.o. male who has stage Adelaide (T3, N2 a, M0) squamous cell carcinoma of the hypopharynx with metastatic lymphadenopathy to the right neck. He was treated with combined radiation and cisplatin and Taxol chemotherapy. Chemotherapy was completed 9/15/2017 and radiation completed on 10/9/2017. He had an excellent response but unfortunately as noted above he does have persistent squamous cell carcinoma in the right neck node and underwent right neck dissection 5/17/2018. He received palliative therapy with Opdivo. CT scans after 8 cycles showed some enlargement of the pulmonary nodules but no significant new sites of disease and he continued.   Initiated 7/13/2018, 12th and final cycle given on 12/28/2018. Since disease progression noted, patient is no longer candidate for further systematic therapy.  The patient now is on palliative care/hospice care due to his cancer.     He has a history of herpes zoster infection with facial cellulitis.  Valtrex and vancomycin completed. He complains of worsening pain in the right eye since about 2/2/19. He's  already on a high dose of gabapentin 800 mg every 8 hours. Not a candidate for holding anticoagulation for the surgical procedure recommended by ophthalmologist.      Acute RLE DVT in the peroneal and gastroc/soleal veins.  This may be cancer related as well as immobilization. Continue Eliquis 5 mg bid and BID Pepcid for GI prophylaxis.     At the time of my exam, his main complaint is his right eye. He has tape holding the lower lid down. He has chronic pain, nothing new. SOA at times with activity. No GI complaints and no new focal neuro complaints.     Review of Systems  Pertinent items are noted in HPI    Palliative Performance Scale  Palliative Performance Scale Score: 40%    History  Past Medical History:   Diagnosis Date   • Asthma    • Bruises easily    • COPD (chronic obstructive pulmonary disease) (CMS/HCC)    • GERD (gastroesophageal reflux disease)    • History of anemia    • History of chemotherapy     NOV 2017   • History of chemotherapy     NOV 2017   • History of chest pain 02/01/2017    HAD NEG. STRESS TEST IN EPIC   • Hyperlipidemia    • Hypertension    • Low back pain     chronic, also bilat leg pain   • Lung cancer (CMS/HCC) 2018   • Lung cancer (CMS/HCC)    • Neck mass     right side   • Osteoarthritis    • Poor vision    • Shingles    • Squamous cell cancer of hypopharynx (CMS/HCC) 2017   • Vision loss of left eye    ,   Past Surgical History:   Procedure Laterality Date   • ABDOMINAL SURGERY     • ELBOW ARTHROTOMY Bilateral     bursa removed   • ENDOSCOPY W/ PEG TUBE PLACEMENT N/A 7/31/2017    Procedure: ESOPHAGOGASTRODUODENOSCOPY WITH PERCUTANEOUS ENDOSCOPIC GASTROSTOMY TUBE INSERTION;  Surgeon: Raul Valenzuela MD;  Location: [facility];  Service:    • EPIDURAL BLOCK     • EYE SURGERY Left 1993    HEMORRHAGE LEFT EYE   • HAND TENDON REPAIR      LEFT THUMB   • INCISION AND DRAINAGE ABSCESS Left     LEFT HAND, STREP INFECTION   • NECK DISSECTION Right 5/17/2018    Procedure: RIGHT MODIFIED  RADICAL  NECK DISSECTION;  Surgeon: Clinton Styles MD;  Location:  [facility];  Service: ENT   • PEG TUBE INSERTION N/A 1/30/2019    Procedure: PERCUTANEOUS ENDOSCOPIC GASTROSTOMY TUBE INSERTION;  Surgeon: Mandy Patel MD;  Location: [facility] ENDOSCOPY;  Service: Gastroenterology   • VENOUS ACCESS DEVICE (PORT) INSERTION N/A 7/31/2017    Procedure: INSERTION VENOUS ACCESS DEVICE;  Surgeon: Raul Valenzuela MD;  Location: [facility] MAIN OR;  Service:    ,   Family History   Problem Relation Age of Onset   • Prostate cancer Father 57   • Bone cancer Father    • Heart disease Sister         cath stent placement    • Heart attack Sister    • Heart attack Brother    • Diabetes Brother    • Heart disease Brother    • Hypertension Other    • Malig Hyperthermia Neg Hx     and   Social History     Tobacco Use   • Smoking status: Current Every Day Smoker     Packs/day: 0.25     Years: 19.00     Pack years: 4.75     Types: Cigarettes   • Smokeless tobacco: Never Used   • Tobacco comment: Pt smokes 5 or more cigarettes a day   Substance Use Topics   • Alcohol use: No   • Drug use: No       Vital Signs   Temp:  [97.3 °F (36.3 °C)-97.8 °F (36.6 °C)] 97.8 °F (36.6 °C)  Heart Rate:  [82-90] 90  Resp:  [16-18] 16  BP: (93-98)/(56-59) 98/56  Device (Oxygen Therapy): room air SpO2:  [97 %] 97 %    Physical Exam:     General Appearance:    Alert, cooperative, and complains of right eye discomfort.   Head:    Normocephalic, without obvious abnormality, atraumatic   Eyes:            Lids and lashes normal on left, conjunctivae and sclerae normal, no Icterus, right with redness and tape keeping lower lid externalized    Ears:    Ears appear intact with no abnormalities noted   Throat:   No oral lesions, no thrush, oral mucosa moist   Neck:   No adenopathy, supple, trachea midline, no thyromegaly   Back:     No scoliosis present   Lungs:     Clear to auscultation, minimal rhonchi, respirations regular, even and not labored    Heart:     Regular rhythm and normal rate   Chest Wall:    No abnormalities observed, port left chect   Abdomen:     Normal bowel sounds, soft, non-tender, non-distended   Rectal:     Deferred   Extremities:   No edema, no cyanosis   Pulses:   Radial pulses palpable and equal bilaterally   Skin:   No bleeding       Neurologic:   AAOX3       Results Review:   I reviewed the patient's new clinical results.      Impression:      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, N2a, M0)    Lung metastases (CMS/HCC)    Admission for hospice care    Other chronic pain    Varicella zoster- over V1,  slit lamp neg 1/23/19    Acute deep vein thrombosis (DVT) of right lower extremity (CMS/HCC)    Eye pain, right 2nd to herpes zoster    COPD mixed type (CMS/[facility])    Esophageal dysmotility        Plan:  I reviewed all with the patient and RN and [facility] RN. Consider transitioning to PO Dilaudid tomorrow since taking other PO meds. Also change solumedrol to prednisone. Continue to adjust meds for comfort. Will review plans with the patient tomorrow. Placement may be required?      Drew Rucker MD  Hospice and Palliative Medicine  02/10/19  4:23 PM

## 2019-02-10 NOTE — PLAN OF CARE
Problem: Fall Risk (Adult)  Goal: Absence of Fall  Outcome: Ongoing (interventions implemented as appropriate)      Problem: Nutrition, Imbalanced: Inadequate Oral Intake (Adult)  Goal: Prevent Further Weight Loss  Outcome: Ongoing (interventions implemented as appropriate)      Problem: Palliative Care (Adult)  Goal: Maximized Comfort  Outcome: Ongoing (interventions implemented as appropriate)    Goal: Enhanced Quality of Life  Outcome: Ongoing (interventions implemented as appropriate)      Problem: Patient Care Overview  Goal: Plan of Care Review  Outcome: Ongoing (interventions implemented as appropriate)   02/10/19 8174   Coping/Psychosocial   Plan of Care Reviewed With patient   Plan of Care Review   Progress no change   OTHER   Outcome Summary Pt medicated with scheduled dilaudid. Pt did not call out for breakthrough pain. Pt has looked comfortable but c/o pain always between 8-10 continually. Will continue to provide comfort measures and monitor       Problem: Skin Injury Risk (Adult)  Goal: Skin Health and Integrity  Outcome: Ongoing (interventions implemented as appropriate)

## 2019-02-10 NOTE — PROGRESS NOTES
[facility] Visit Report    King Parson  [id number]  2/10/2019    Admission R/T [facility] Dx: YES      Reason for [facility] Admission: CA of Hypopharynx with mets to lung      Symptom  Management: Pain control      Nursing/Medication Recommendations: No recommendations at this time      Psychosocial Issues and Recommendations: Provide support to patient and family      Spiritual Concerns and Recommendations: None at present      [facility] Discharge Plans:  None at present, continue to monitor for decline. Requires daily RN assessment for symptom management of pain using scheduled and prn IV medications and is meeting criteria for GIP as a [facility] scattered bed patient.      Review of Visit: Close monitoring for safety, requires daily RN assessment for symptom management of pain, comfort care for declining patient. Patient lying in bed, drowsy but arouses easily when name called. Color slightly pale to ashen. Right neck incision intact. PEG tube in place, gets continuous feeding 8pm to 8am. Spoke to patient regarding pain assessment and he states pain remains 9/10, have always had a lot of pain, chronic in nature. Dilaudid scheduled dose increased from 0.5mg to 1mg and he thinks that it does help. VSS. Emotional support provided. Discussed care needs with staff LOLA Bates regarding transitioning to oral long acting morphine or a Fentanyl patch and she was going to put a note to Dr. Rucker. Will need to discuss possible disposition with patient and sisters this week. Also spoke with Dr. Rucker and he agrees we will have to have this discussion. Not sure if it will be home or nursing home with [facility] to follow. No family present during visit. Will continue to follow daily to assess needs, monitor status and offer support.        Jessica Escamilla RN  [facility] Visit Nurse

## 2019-02-11 PROCEDURE — 97110 THERAPEUTIC EXERCISES: CPT

## 2019-02-11 PROCEDURE — 99232 SBSQ HOSP IP/OBS MODERATE 35: CPT | Performed by: INTERNAL MEDICINE

## 2019-02-11 PROCEDURE — 94799 UNLISTED PULMONARY SVC/PX: CPT

## 2019-02-11 PROCEDURE — 77412 RADIATION TX DELIVERY LVL 3: CPT | Performed by: RADIOLOGY

## 2019-02-11 PROCEDURE — 25010000002 METHYLPREDNISOLONE PER 125 MG: Performed by: HOSPITALIST

## 2019-02-11 PROCEDURE — 25010000002 HYDROMORPHONE 1 MG/ML SOLUTION: Performed by: INTERNAL MEDICINE

## 2019-02-11 RX ORDER — HYDROMORPHONE HYDROCHLORIDE 2 MG/1
6 TABLET ORAL EVERY 6 HOURS SCHEDULED
Status: DISCONTINUED | OUTPATIENT
Start: 2019-02-11 | End: 2019-02-12

## 2019-02-11 RX ADMIN — NICOTINE 1 PATCH: 14 PATCH, EXTENDED RELEASE TRANSDERMAL at 08:18

## 2019-02-11 RX ADMIN — LEVOTHYROXINE SODIUM 75 MCG: 75 TABLET ORAL at 08:13

## 2019-02-11 RX ADMIN — HYDROMORPHONE HYDROCHLORIDE 1 MG: 1 INJECTION, SOLUTION INTRAMUSCULAR; INTRAVENOUS; SUBCUTANEOUS at 14:03

## 2019-02-11 RX ADMIN — GABAPENTIN 800 MG: 400 CAPSULE ORAL at 21:18

## 2019-02-11 RX ADMIN — HYDROMORPHONE HYDROCHLORIDE 1 MG: 1 INJECTION, SOLUTION INTRAMUSCULAR; INTRAVENOUS; SUBCUTANEOUS at 17:35

## 2019-02-11 RX ADMIN — METHYLPREDNISOLONE SODIUM SUCCINATE 60 MG: 125 INJECTION, POWDER, FOR SOLUTION INTRAMUSCULAR; INTRAVENOUS at 08:13

## 2019-02-11 RX ADMIN — HYDROMORPHONE HYDROCHLORIDE 1 MG: 1 INJECTION, SOLUTION INTRAMUSCULAR; INTRAVENOUS; SUBCUTANEOUS at 01:52

## 2019-02-11 RX ADMIN — BUDESONIDE AND FORMOTEROL FUMARATE DIHYDRATE 2 PUFF: 160; 4.5 AEROSOL RESPIRATORY (INHALATION) at 08:33

## 2019-02-11 RX ADMIN — BUDESONIDE AND FORMOTEROL FUMARATE DIHYDRATE 2 PUFF: 160; 4.5 AEROSOL RESPIRATORY (INHALATION) at 20:27

## 2019-02-11 RX ADMIN — FAMOTIDINE 20 MG: 20 TABLET, FILM COATED ORAL at 21:18

## 2019-02-11 RX ADMIN — FAMOTIDINE 20 MG: 20 TABLET, FILM COATED ORAL at 08:13

## 2019-02-11 RX ADMIN — HYDROMORPHONE HYDROCHLORIDE 1 MG: 1 INJECTION, SOLUTION INTRAMUSCULAR; INTRAVENOUS; SUBCUTANEOUS at 05:32

## 2019-02-11 RX ADMIN — Medication 500 MCG: at 08:13

## 2019-02-11 RX ADMIN — GABAPENTIN 800 MG: 400 CAPSULE ORAL at 05:32

## 2019-02-11 RX ADMIN — HYDROMORPHONE HYDROCHLORIDE 6 MG: 2 TABLET ORAL at 21:18

## 2019-02-11 RX ADMIN — APIXABAN 5 MG: 5 TABLET, FILM COATED ORAL at 08:11

## 2019-02-11 RX ADMIN — APIXABAN 5 MG: 5 TABLET, FILM COATED ORAL at 21:18

## 2019-02-11 RX ADMIN — GABAPENTIN 800 MG: 400 CAPSULE ORAL at 14:03

## 2019-02-11 RX ADMIN — HYDROMORPHONE HYDROCHLORIDE 1 MG: 1 INJECTION, SOLUTION INTRAMUSCULAR; INTRAVENOUS; SUBCUTANEOUS at 10:32

## 2019-02-11 NOTE — THERAPY TREATMENT NOTE
Acute Care - Physical Therapy Treatment Note  [facility]     Patient Name: King Parson  : 1950  MRN: [id number]  Today's Date: 2019             Admit Date: 2019    Visit Dx:    ICD-10-CM ICD-9-CM   1. 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, M0)   • Throat cancer (CMS/HCC)   • Encounter for fitting and adjustment of vascular catheter   • Other chronic pain   • Squamous cell carcinoma   • Cervicalgia   • Cervical spinal stenosis   • Encounter for monitoring opioid maintenance therapy   • Lung metastases (CMS/HCC)   • Encounter for long-term (current) use of high-risk medication   • DDD (degenerative disc disease), cervical   • Cervical radiculitis   • Varicella zoster- over V1,  slit lamp neg 19   • Dysphagia   • Esophageal dysmotility   • Aspiration syndrome   • History of immunotherapy   • Abnormal weight loss   • GERD (gastroesophageal reflux disease)   • Tobacco abuse   • Failure to thrive (0-17)   • Acute deep vein thrombosis (DVT) of right lower extremity (CMS/HCC)   • Eye pain, right 2nd to herpes zoster   • Admission for hospice care   • COPD mixed type (CMS/HCC)       Therapy Treatment    Rehabilitation Treatment Summary     Row Name 19 1356             Treatment Time/Intention    Discipline  physical therapist  -      Therapy Frequency (PT Clinical Impression)  3 times/wk  -      Comment  new orders 19  -      Existing Precautions/Restrictions  fall  -      Recorded by [] Mago Patino, PT 19 8997      Row Name 19 9079             Cognitive Assessment/Intervention- PT/OT     Orientation Status (Cognition)  oriented x 4  -LH      Follows Commands (Cognition)  WFL  -      Personal Safety Interventions  fall prevention program maintained;gait belt;nonskid shoes/slippers when out of bed;supervised activity  -LH      Recorded by [] Mago Patino, PT 02/11/19 1359      Row Name 02/11/19 1357             Bed Mobility Assessment/Treatment    Comment (Bed Mobility)  NT  -LH      Recorded by [] Mago Patino, PT 02/11/19 1359      Row Name 02/11/19 1357             Sit-Stand Transfer    Sit-Stand [place] (Transfers)  supervision  -      Assistive Device (Sit-Stand Transfers)  walker, front-wheeled  -LH      Recorded by [] Mago Patino, PT 02/11/19 1359      Row Name 02/11/19 1357             Stand-Sit Transfer    Stand-Sit [place] (Transfers)  supervision  -      Assistive Device (Stand-Sit Transfers)  walker, front-wheeled  -LH      Recorded by [] Mago Patino, PT 02/11/19 1359      Row Name 02/11/19 1357             Gait/Stairs Assessment/Training    [place] Level (Gait)  supervision  -      Assistive Device (Gait)  walker, front-wheeled  -      Distance in Feet (Gait)  250  -LH      Pattern (Gait)  swing-through  -LH      Recorded by [] Mago Patino, PT 02/11/19 1359      Row Name 02/11/19 1357             General ROM    GENERAL ROM COMMENTS  BLEs functional  -LH      Recorded by [] Mago Patino, PT 02/11/19 1359      Row Name 02/11/19 1357             MMT (Manual Muscle Testing)    General MMT Comments  BLEs grossly at least 3/5  -LH      Recorded by [] Mago Patino, PT 02/11/19 1359      Row Name 02/11/19 1358             Positioning and Restraints    Pre-Treatment Position  sitting in chair/recliner  -LH      Post Treatment Position  chair  -LH      In Chair  sitting;call light within reach;encouraged to call for assist;with family/caregiver  -LH      Recorded by [] Mago Patino, PT 02/11/19 1359      Row Name 02/11/19 1357             Pain  Assessment    Additional Documentation  Pain Scale: FACES Pre/Post-Treatment (Group)  -      Recorded by [] Mago Patino, PT 02/11/19 1359      Row Name 02/11/19 1353             Pain Scale: FACES Pre/Post-Treatment    Pain: FACES Scale, Pretreatment  2-->hurts little bit  -LH      Pain: FACES Scale, Post-Treatment  4-->hurts little more  -      Recorded by [] Mago Patino, PT 02/11/19 1359        User Key  (r) = Recorded By, (t) = Taken By, (c) = Cosigned By    Initials Name Effective Dates Discipline     Mago Patino, PT 04/03/18 -  PT               Rehab Goal Summary     Row Name 02/11/19 1400             Bed Mobility Goal 1 (PT)    Time Frame (Bed Mobility Goal 1, PT)  1 week  -      Progress/Outcomes (Bed Mobility Goal 1, PT)  goal ongoing  -         Transfer Goal 1 (PT)    Time Frame (Transfer Goal 1, PT)  1 week  -      Progress/Outcome (Transfer Goal 1, PT)  goal ongoing  -         Gait Training Goal 1 (PT)    Distance (Gait Goal 1, PT)  400  -      Time Frame (Gait Training Goal 1, PT)  1 week  -      Progress/Outcome (Gait Training Goal 1, PT)  goal revised this date  -        User Key  (r) = Recorded By, (t) = Taken By, (c) = Cosigned By    Initials Name Provider Type Discipline     Mago Patino, PT Physical Therapist PT          Physical Therapy Education     Title: PT OT SLP Therapies (Done)     Topic: Physical Therapy (Done)     Point: Mobility training (Done)     Learning Progress Summary           Patient Acceptance, E, NR,VU by  at 2/11/2019  2:00 PM                   Point: Home exercise program (Done)     Learning Progress Summary           Patient Acceptance, E, NR,VU by  at 2/11/2019  2:00 PM                   Point: Body mechanics (Done)     Learning Progress Summary           Patient Acceptance, E, NR,VU by  at 2/11/2019  2:00 PM                   Point: Precautions (Done)     Learning Progress Summary           Patient Acceptance, E, NR,VU by  at  2/11/2019  2:00 PM                               User Key     Initials Effective Dates Name Provider Type [facility] 04/03/18 -  Mago Patino, PT Physical Therapist PT                PT Recommendation and Plan  Therapy Frequency (PT Clinical Impression): 3 times/wk  Plan of Care Reviewed With: patient  Outcome Summary: pt presents w generalized weakness and pain, good tolerance to household ambulation on unit supervision rwx, pt may benefit from skilled PT acutely for inc independnece w functional mobility and assist with DC Placement.   Outcome Measures     Row Name 02/11/19 1400             How much help from another person do you currently need...    Turning from your back to your side while in flat bed without using bedrails?  4  -LH      Moving from lying on back to sitting on the side of a flat bed without bedrails?  4  -LH      Moving to and from a bed to a chair (including a wheelchair)?  3  -LH      Standing up from a chair using your arms (e.g., wheelchair, bedside chair)?  3  -LH      Climbing 3-5 steps with a railing?  3  -LH      To walk in hospital room?  3  -      AM-PAC 6 Clicks Score  20  -         Functional Assessment    Outcome Measure Options  AM-PAC 6 Clicks Basic Mobility (PT)  -        User Key  (r) = Recorded By, (t) = Taken By, (c) = Cosigned By    Initials Name Provider Type     Mago Patino, PT Physical Therapist         Time Calculation:   PT Charges     Row Name 02/11/19 1402 02/11/19 0940          Time Calculation    Start Time  1340  -  --     Stop Time  1358  -  --     Time Calculation (min)  18 min  -  --     PT Received On  02/11/19  [facility]  --     PT - Next Appointment  02/12/19  -  02/11/19  [facility]     PT Goal Re-Cert Due Date  02/18/19  -  --       User Key  (r) = Recorded By, (t) = Taken By, (c) = Cosigned By    Initials Name Provider Type     Mago Patino, PT Physical Therapist        Therapy Suggested Charges     Code   Minutes Charges    None            Therapy Charges for Today     Code Description Service Date Service Provider Modifiers Qty    91915376051 HC PT THER PROC EA 15 MIN 2/11/2019 Mago Patino, PT GP 1          PT G-Codes  Outcome Measure Options: AM-PAC 6 Clicks Basic Mobility (PT)  AM-PAC 6 Clicks Score: 20    Mago Patino, PT  2/11/2019

## 2019-02-11 NOTE — PROGRESS NOTES
[facility] Visit Report    King Parson  [id number]  2/11/2019    [facility] Dx: Yes.     Reason for [facility] Admission: Malignancy of hypopharynx with metastasis to head, face and neck.     Symptom  Management: Pain.    Nursing/Medication Recommendations: Continue to provide comfort care as ordered/needed.    Psychosocial Issues and Recommendations: None at this time.     Spiritual Concerns and Recommendations: None at this time.     [facility] Discharge Plans:  No d/c order at this time. Pt continues to require scheduled IV pain medication for symptom control. Pt also has 2 remaining radiation treatments to complete, of his 10 tx regimen. Per Venice Gerard RN CM and Dr. Rucker, the plan will be to switch the pt to oral long acting or topical long acting pain medication as tolerated. Also due to the pt's pain and level of fatigue following radiation tx, Dr. Rucker feels the pt should remain here at [facility] at this time. He did also explain this to pt/family who VU.    Review of Visit (Include All Collaboration- including names of hospital and family involved during admission/visit): Daily hospital visit provided. Pt was walking around the room when I entered. He is alert and oriented to his situation and POC. He voiced pain 2/10 at this time but says he did just get pain medication from his nurse recently. He voiced that the current medication regimen is effective for controlling his pain. VS:T=97.5, HR=84, B/P=96/54, RR=16, 02 sat 97% on room air. His sister Blaire is at bedside. We discussed disease progression and discharge planning. The pt and family are understanding and agreeable to the plan as discussed by Dr. Rucker and Venice Gerard RN CM. Pt denies any needs/questions or concerns at this time. Will continue to provide daily visit to assess pt needs and offer support as needed.        Nadia Almanzar RN  [facility] [place] [facility] Scattered Bed Team

## 2019-02-11 NOTE — PLAN OF CARE
Problem: Fall Risk (Adult)  Goal: Absence of Fall  Outcome: Ongoing (interventions implemented as appropriate)      Problem: Nutrition, Imbalanced: Inadequate Oral Intake (Adult)  Goal: Prevent Further Weight Loss  Outcome: Ongoing (interventions implemented as appropriate)      Problem: Palliative Care (Adult)  Goal: Maximized Comfort  Outcome: Ongoing (interventions implemented as appropriate)    Goal: Enhanced Quality of Life  Outcome: Ongoing (interventions implemented as appropriate)      Problem: Patient Care Overview  Goal: Plan of Care Review  Outcome: Ongoing (interventions implemented as appropriate)   02/11/19 2568   Coping/Psychosocial   Plan of Care Reviewed With patient;sibling   Plan of Care Review   Progress no change   OTHER   Outcome Summary Patient alert and oriented and up with stand-by assist. Patient received palliative radiation this AM. Patient tolerating dysphagia diet for meals. Patient received 3x scheduled Q4h IV 1mg Dilaudid for pain. MD just changed scheduled pain medication to scheduled 6mg Dilaudid tablet instead of the IV we have been adminstering. Placed finger brace on patient's right hand due to pain when hitting finger on bed rail due to cancer in finger. No discharge plan as of now. Patient and his sister plan to discuss what next steps are for him. Patient currently resting, drinking thickened orange shake. Will continue to monitor.      Goal: Individualization and Mutuality  Outcome: Ongoing (interventions implemented as appropriate)      Problem: Skin Injury Risk (Adult)  Goal: Skin Health and Integrity  Outcome: Ongoing (interventions implemented as appropriate)

## 2019-02-11 NOTE — SIGNIFICANT NOTE
02/11/19 0940   Rehab Time/Intention   Evaluation Not Performed patient/family declined evaluation  (pt refused PT AM requesting to eat before going off the floor. will attempt PM)   Rehab Treatment   Discipline physical therapist   Recommendation   PT - Next Appointment 02/11/19

## 2019-02-11 NOTE — PROGRESS NOTES
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    Interval History:     Patient Complaints: Same right side face pain  Patient Denies:  Worse SOA  History taken from:  Patient and sister and RN    Review of Systems: As above.     Palliative Performance Scale  Palliative Performance Scale Score: 40%  [place] Symptom Assessment System Revised  Pain Score: 8   ESAS Tiredness Score: 5  ESAS Nausea Score: No nausea  ESAS Depression Score: No depression  ESAS Anxiety Score: 6  ESAS Drowsiness Score: 2  ESAS Lack of Appetite Score: 5  ESAS Wellbeing Score: 2  ESAS Dyspnea Score: No shortness of breath  ESAS Other Problem Score: unable to assess  ESAS Source of Information: healthcare professional caregiver, patient  ESAS Intervention: medicated/see MAR  ESAS Intervention Response: tolerated    Vital Signs  Temp:  [96.9 °F (36.1 °C)-97.5 °F (36.4 °C)] 96.9 °F (36.1 °C)  Heart Rate:  [78-87] 80  Resp:  [16] 16  BP: ()/(54-60) 102/60  Device (Oxygen Therapy): room air SpO2:  [96 %-97 %] 96 %    Physical Exam:  General Appearance:    Awake and appears in no acute distress   Throat:   No oral lesions, oral mucosa moist   Neck:   No adenopathy, supple, trachea midline   Lungs:     Clear to auscultation, respirations regular, even      Heart:    Regular rhythm and normal rate   Abdomen:     Occasional bowel sounds, soft and non-tender, non-distended   Extremities:   No edema, no cyanosis, finger on right hand will painful lesion   Pulses:   Radial pulses palpable and equal bilaterally          Results Review:     I reviewed the patient's new clinical results.    Medication Reviewed.    Assessment/Plan       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, N2a, M0)    Lung metastases (CMS/HCC)    Admission for hospice care    Other chronic pain    Varicella zoster- over V1,  slit lamp neg 1/23/19    Acute deep vein thrombosis (DVT) of right lower extremity (CMS/HCC)    Eye pain, right 2nd to herpes zoster    COPD mixed type (CMS/HCC)    Esophageal dysmotility    I reviewed with the patient and his sister. He is just back from XRT to right chest/rib. He has 2 more treatments. All else same. He is getting Q 4 hour IV Dilaudid. He is swallowing other pills. Will try to transition to Dilaudid pills. Continue gabapentin. Adjust meds as needed for comfort.    Plan for disposition:[facility].    Drew Rucker MD  Hospice and Palliative Medicine  02/11/19  5:33 PM

## 2019-02-11 NOTE — PLAN OF CARE
Problem: Fall Risk (Adult)  Goal: Absence of Fall  Outcome: Ongoing (interventions implemented as appropriate)      Problem: Palliative Care (Adult)  Goal: Maximized Comfort  Outcome: Ongoing (interventions implemented as appropriate)    Goal: Enhanced Quality of Life  Outcome: Ongoing (interventions implemented as appropriate)      Problem: Patient Care Overview  Goal: Plan of Care Review  Outcome: Ongoing (interventions implemented as appropriate)   02/11/19 0413   Coping/Psychosocial   Plan of Care Reviewed With patient   Plan of Care Review   Progress no change   OTHER   Outcome Summary pt is medicated q4 hours with scheduled dilaudid, seems to be helping, pt was hooked up to tube feeds all night and tolerated well. will continue to monitor and keep comfortable      Goal: Individualization and Mutuality   02/11/19 0413   Individualization   Patient Specific Preferences Use a mepliex to cushion CA finger, Use tape to keep eyelid from folding in   Patient Specific Goals (Include Timeframe) To keep comfortable    Patient Specific Interventions Scheduled pain medication Q4 hours, tube feeds, amnbulation        Problem: Skin Injury Risk (Adult)  Goal: Skin Health and Integrity  Outcome: Ongoing (interventions implemented as appropriate)

## 2019-02-11 NOTE — PLAN OF CARE
Problem: Patient Care Overview  Goal: Plan of Care Review   02/11/19 1400   Coping/Psychosocial   Plan of Care Reviewed With patient   OTHER   Outcome Summary pt presents w generalized weakness and pain, good tolerance to household ambulation on unit supervision rwx, pt may benefit from skilled PT acutely for inc independnece w functional mobility and assist with DC Placement.

## 2019-02-12 PROCEDURE — 25010000002 HYDROMORPHONE 1 MG/ML SOLUTION: Performed by: HOSPITALIST

## 2019-02-12 PROCEDURE — 25010000002 METHYLPREDNISOLONE PER 125 MG: Performed by: HOSPITALIST

## 2019-02-12 PROCEDURE — 77412 RADIATION TX DELIVERY LVL 3: CPT | Performed by: RADIOLOGY

## 2019-02-12 PROCEDURE — 94799 UNLISTED PULMONARY SVC/PX: CPT

## 2019-02-12 PROCEDURE — 99231 SBSQ HOSP IP/OBS SF/LOW 25: CPT | Performed by: INTERNAL MEDICINE

## 2019-02-12 RX ORDER — MORPHINE SULFATE 4 MG/ML
4 INJECTION, SOLUTION INTRAMUSCULAR; INTRAVENOUS
Status: DISCONTINUED | OUTPATIENT
Start: 2019-02-12 | End: 2019-02-12

## 2019-02-12 RX ORDER — HYDROMORPHONE HYDROCHLORIDE 2 MG/1
4 TABLET ORAL EVERY 6 HOURS SCHEDULED
Status: DISCONTINUED | OUTPATIENT
Start: 2019-02-12 | End: 2019-02-18 | Stop reason: HOSPADM

## 2019-02-12 RX ORDER — MORPHINE SULFATE 20 MG/ML
5 SOLUTION ORAL
Status: DISCONTINUED | OUTPATIENT
Start: 2019-02-12 | End: 2019-02-12

## 2019-02-12 RX ORDER — LORAZEPAM 2 MG/ML
2 INJECTION INTRAMUSCULAR
Status: DISCONTINUED | OUTPATIENT
Start: 2019-02-12 | End: 2019-02-18 | Stop reason: HOSPADM

## 2019-02-12 RX ORDER — LORAZEPAM 2 MG/ML
1 CONCENTRATE ORAL
Status: DISCONTINUED | OUTPATIENT
Start: 2019-02-12 | End: 2019-02-12

## 2019-02-12 RX ORDER — LORAZEPAM 2 MG/ML
0.5 INJECTION INTRAMUSCULAR
Status: DISCONTINUED | OUTPATIENT
Start: 2019-02-12 | End: 2019-02-18 | Stop reason: HOSPADM

## 2019-02-12 RX ORDER — LORAZEPAM 2 MG/ML
0.5 CONCENTRATE ORAL
Status: DISCONTINUED | OUTPATIENT
Start: 2019-02-12 | End: 2019-02-12

## 2019-02-12 RX ORDER — LORAZEPAM 2 MG/ML
2 CONCENTRATE ORAL
Status: DISCONTINUED | OUTPATIENT
Start: 2019-02-12 | End: 2019-02-12

## 2019-02-12 RX ORDER — METHYLPREDNISOLONE SODIUM SUCCINATE 40 MG/ML
40 INJECTION, POWDER, LYOPHILIZED, FOR SOLUTION INTRAMUSCULAR; INTRAVENOUS DAILY
Status: DISCONTINUED | OUTPATIENT
Start: 2019-02-13 | End: 2019-02-13

## 2019-02-12 RX ORDER — MORPHINE SULFATE 20 MG/ML
10 SOLUTION ORAL
Status: DISCONTINUED | OUTPATIENT
Start: 2019-02-12 | End: 2019-02-12

## 2019-02-12 RX ORDER — MORPHINE SULFATE 4 MG/ML
2 INJECTION, SOLUTION INTRAMUSCULAR; INTRAVENOUS
Status: DISCONTINUED | OUTPATIENT
Start: 2019-02-12 | End: 2019-02-12

## 2019-02-12 RX ORDER — LORAZEPAM 2 MG/ML
1 INJECTION INTRAMUSCULAR
Status: DISCONTINUED | OUTPATIENT
Start: 2019-02-12 | End: 2019-02-18 | Stop reason: HOSPADM

## 2019-02-12 RX ADMIN — BUDESONIDE AND FORMOTEROL FUMARATE DIHYDRATE 2 PUFF: 160; 4.5 AEROSOL RESPIRATORY (INHALATION) at 20:25

## 2019-02-12 RX ADMIN — GABAPENTIN 800 MG: 400 CAPSULE ORAL at 15:41

## 2019-02-12 RX ADMIN — APIXABAN 5 MG: 5 TABLET, FILM COATED ORAL at 20:40

## 2019-02-12 RX ADMIN — HYDROMORPHONE HYDROCHLORIDE 6 MG: 2 TABLET ORAL at 09:07

## 2019-02-12 RX ADMIN — Medication 500 MCG: at 09:08

## 2019-02-12 RX ADMIN — APIXABAN 5 MG: 5 TABLET, FILM COATED ORAL at 09:08

## 2019-02-12 RX ADMIN — NICOTINE 1 PATCH: 14 PATCH, EXTENDED RELEASE TRANSDERMAL at 09:11

## 2019-02-12 RX ADMIN — LEVOTHYROXINE SODIUM 75 MCG: 75 TABLET ORAL at 09:08

## 2019-02-12 RX ADMIN — METHYLPREDNISOLONE SODIUM SUCCINATE 60 MG: 125 INJECTION, POWDER, FOR SOLUTION INTRAMUSCULAR; INTRAVENOUS at 09:08

## 2019-02-12 RX ADMIN — GABAPENTIN 800 MG: 400 CAPSULE ORAL at 21:16

## 2019-02-12 RX ADMIN — HYDROMORPHONE HYDROCHLORIDE 4 MG: 2 TABLET ORAL at 15:41

## 2019-02-12 RX ADMIN — HYDROMORPHONE HYDROCHLORIDE 4 MG: 2 TABLET ORAL at 20:40

## 2019-02-12 RX ADMIN — HYDROMORPHONE HYDROCHLORIDE 0.5 MG: 1 INJECTION, SOLUTION INTRAMUSCULAR; INTRAVENOUS; SUBCUTANEOUS at 12:20

## 2019-02-12 RX ADMIN — GABAPENTIN 800 MG: 400 CAPSULE ORAL at 06:10

## 2019-02-12 RX ADMIN — FAMOTIDINE 20 MG: 20 TABLET, FILM COATED ORAL at 09:08

## 2019-02-12 RX ADMIN — BUDESONIDE AND FORMOTEROL FUMARATE DIHYDRATE 2 PUFF: 160; 4.5 AEROSOL RESPIRATORY (INHALATION) at 09:15

## 2019-02-12 RX ADMIN — FAMOTIDINE 20 MG: 20 TABLET, FILM COATED ORAL at 20:40

## 2019-02-12 RX ADMIN — HYDROMORPHONE HYDROCHLORIDE 6 MG: 2 TABLET ORAL at 02:40

## 2019-02-12 NOTE — PROGRESS NOTES
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    Interval History:     Patient Complaints: Same, right side face pain  Patient Denies:  SOA  History taken from:  Patient and RN    Review of Systems: As above.     Palliative Performance Scale  Palliative Performance Scale Score: 40%  [place] Symptom Assessment System Revised  Pain Score: no pain   ESAS Tiredness Score: 5  ESAS Nausea Score: No nausea  ESAS Depression Score: No depression  ESAS Anxiety Score: No anxiety  ESAS Drowsiness Score: 2  ESAS Lack of Appetite Score: 5  ESAS Wellbeing Score: 2  ESAS Dyspnea Score: No shortness of breath  ESAS Other Problem Score: unable to assess  ESAS Source of Information: healthcare professional caregiver  ESAS Intervention: medicated/see MAR  ESAS Intervention Response: tolerated    Vital Signs  Temp:  [97.3 °F (36.3 °C)] 97.3 °F (36.3 °C)  Heart Rate:  [81-83] 83  Resp:  [16-18] 18  BP: (95)/(55) 95/55  Device (Oxygen Therapy): (P) room air SpO2:  [98 %] 98 %    Physical Exam:  General Appearance:    Awake and appears in no acute distress   Throat:   No oral lesions, oral mucosa moist   Neck:   No adenopathy, supple, trachea midline   Lungs:     Clear to auscultation, respirations regular, even     Heart:    Regular rhythm and  normal rate   Abdomen:     Occasional bowel sounds, soft and non-tender, non-distended   Extremities:   No edema, no cyanosis, finger on right hand will painful lesion   Pulses:   Radial pulses palpable and equal bilaterally          Results Review:     I reviewed the patient's new clinical results.    Medication Reviewed.    Assessment/Plan       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, N2a, M0)    Lung metastases (CMS/HCC)    Admission for hospice care    Other chronic pain    Varicella zoster- over V1,  slit lamp neg 1/23/19    Acute deep vein thrombosis (DVT) of right lower extremity (CMS/HCC)    Eye pain, right 2nd to herpes zoster    COPD mixed type (CMS/HCC)    Esophageal dysmotility    I reviewed with the patient and RN at bedside. The patient slept more with Dilaudid 6 mg Q 6 hours. He also had some more confusion? He was worried about being allowed to eat last night dinner tray at lunch? Reviewed with RN and staff and it does not appear that it happened? He is just back from XRT to right chest/rib. He has 1 more treatment. Steroids decreased to 40 mg IV daily. Dilaudid pills to be decreased from 6 down to 4 mg Q 6 hours. Prn Dilaudid IV still available. Continue gabapentin. Continue to adjust meds as needed for comfort.    Plan for disposition:[facility].    Drew Rucker MD  Hospice and Palliative Medicine  02/12/19  5:22 PM

## 2019-02-12 NOTE — PLAN OF CARE
Problem: Fall Risk (Adult)  Goal: Absence of Fall  Outcome: Ongoing (interventions implemented as appropriate)      Problem: Palliative Care (Adult)  Goal: Maximized Comfort  Outcome: Ongoing (interventions implemented as appropriate)    Goal: Enhanced Quality of Life  Outcome: Ongoing (interventions implemented as appropriate)      Problem: Patient Care Overview  Goal: Plan of Care Review   02/12/19 0439   Coping/Psychosocial   Plan of Care Reviewed With patient   Plan of Care Review   Progress improving   OTHER   Outcome Summary pt slept well tonight, gave scheduled dilaudid PO and tolerated well, Tube feeds going at night, will monitor and keep comfortable      Goal: Individualization and Mutuality  Outcome: Ongoing (interventions implemented as appropriate)   02/12/19 0439   Individualization   Patient Specific Preferences Splint on finger for cushion of CA finger, use tape to keep eyelid from folding    Patient Specific Goals (Include Timeframe) to keep comfortable    Patient Specific Interventions Scheduled pain medication, q4 hours, tube feeds, ambulation        Problem: Skin Injury Risk (Adult)  Goal: Skin Health and Integrity  Outcome: Ongoing (interventions implemented as appropriate)

## 2019-02-13 ENCOUNTER — RADIATION ONCOLOGY WEEKLY ASSESSMENT (OUTPATIENT)
Dept: RADIATION ONCOLOGY | Facility: HOSPITAL | Age: 69
End: 2019-02-13

## 2019-02-13 DIAGNOSIS — C78.01 MALIGNANT NEOPLASM METASTATIC TO RIGHT LUNG (HCC): Primary | ICD-10-CM

## 2019-02-13 PROBLEM — C79.51 METASTASIS TO BONE (HCC): Status: ACTIVE | Noted: 2019-02-13

## 2019-02-13 PROCEDURE — 25010000002 HYDROMORPHONE 1 MG/ML SOLUTION: Performed by: INTERNAL MEDICINE

## 2019-02-13 PROCEDURE — 97110 THERAPEUTIC EXERCISES: CPT

## 2019-02-13 PROCEDURE — 94799 UNLISTED PULMONARY SVC/PX: CPT

## 2019-02-13 PROCEDURE — 77412 RADIATION TX DELIVERY LVL 3: CPT | Performed by: RADIOLOGY

## 2019-02-13 PROCEDURE — 25010000002 METHYLPREDNISOLONE PER 40 MG: Performed by: INTERNAL MEDICINE

## 2019-02-13 PROCEDURE — 99231 SBSQ HOSP IP/OBS SF/LOW 25: CPT | Performed by: INTERNAL MEDICINE

## 2019-02-13 RX ORDER — PREDNISONE 20 MG/1
40 TABLET ORAL
Status: DISCONTINUED | OUTPATIENT
Start: 2019-02-14 | End: 2019-02-18 | Stop reason: HOSPADM

## 2019-02-13 RX ADMIN — LEVOTHYROXINE SODIUM 75 MCG: 75 TABLET ORAL at 08:57

## 2019-02-13 RX ADMIN — GABAPENTIN 800 MG: 400 CAPSULE ORAL at 13:52

## 2019-02-13 RX ADMIN — HYDROMORPHONE HYDROCHLORIDE 4 MG: 2 TABLET ORAL at 08:57

## 2019-02-13 RX ADMIN — FAMOTIDINE 20 MG: 20 TABLET, FILM COATED ORAL at 20:47

## 2019-02-13 RX ADMIN — NICOTINE 1 PATCH: 14 PATCH, EXTENDED RELEASE TRANSDERMAL at 09:00

## 2019-02-13 RX ADMIN — HYDROMORPHONE HYDROCHLORIDE 4 MG: 2 TABLET ORAL at 01:32

## 2019-02-13 RX ADMIN — BUDESONIDE AND FORMOTEROL FUMARATE DIHYDRATE 2 PUFF: 160; 4.5 AEROSOL RESPIRATORY (INHALATION) at 07:33

## 2019-02-13 RX ADMIN — APIXABAN 5 MG: 5 TABLET, FILM COATED ORAL at 08:57

## 2019-02-13 RX ADMIN — BUDESONIDE AND FORMOTEROL FUMARATE DIHYDRATE 2 PUFF: 160; 4.5 AEROSOL RESPIRATORY (INHALATION) at 20:40

## 2019-02-13 RX ADMIN — METHYLPREDNISOLONE SODIUM SUCCINATE 40 MG: 40 INJECTION, POWDER, FOR SOLUTION INTRAMUSCULAR; INTRAVENOUS at 08:57

## 2019-02-13 RX ADMIN — GABAPENTIN 800 MG: 400 CAPSULE ORAL at 21:04

## 2019-02-13 RX ADMIN — HYDROMORPHONE HYDROCHLORIDE 4 MG: 2 TABLET ORAL at 13:53

## 2019-02-13 RX ADMIN — GABAPENTIN 800 MG: 400 CAPSULE ORAL at 05:26

## 2019-02-13 RX ADMIN — FAMOTIDINE 20 MG: 20 TABLET, FILM COATED ORAL at 08:57

## 2019-02-13 RX ADMIN — HYDROMORPHONE HYDROCHLORIDE 0.5 MG: 1 INJECTION, SOLUTION INTRAMUSCULAR; INTRAVENOUS; SUBCUTANEOUS at 11:10

## 2019-02-13 RX ADMIN — Medication 500 MCG: at 08:57

## 2019-02-13 RX ADMIN — HYDROMORPHONE HYDROCHLORIDE 4 MG: 2 TABLET ORAL at 20:47

## 2019-02-13 RX ADMIN — APIXABAN 5 MG: 5 TABLET, FILM COATED ORAL at 20:47

## 2019-02-13 NOTE — PLAN OF CARE
Problem: Fall Risk (Adult)  Goal: Absence of Fall  Outcome: Ongoing (interventions implemented as appropriate)  OOB with assist

## 2019-02-13 NOTE — PLAN OF CARE
Problem: Patient Care Overview  Goal: Plan of Care Review  Outcome: Ongoing (interventions implemented as appropriate)   02/13/19 1441   Coping/Psychosocial   Plan of Care Reviewed With patient   Plan of Care Review   Progress improving   OTHER   Outcome Summary Pt improving with transfer safety and I with RWX and amb safety

## 2019-02-13 NOTE — NURSING NOTE
[facility] Visit Report    King Parson  [id number]  2/13/2019    Admission R/T [facility] Dx: Yes    Reason for [facility] Admission:Hypopharnx Cancer    Symptom  Management: Pain    Nursing/Medication Recommendations:Monitor for condition changes and encourage pt to speak up before pain level gets above a 7/10 pain.    Psychosocial Issues and Recommendations:    Spiritual Concerns and Recommendations:    [facility] Discharge Plans:  None present.  Pt continues to remain a pps of 40% and requires frequent doses of IV Dilaudid and titrated doses of Dilaudid as well with pain level that can not get below 7/10.  Pt meets GIP criteria and [facility].    Review of Visit Daily inpatient visit:  Pt is a 40% pps and continues to eat bites/sips of liquids and continues to take frequent doses of Dilaudid 0.5 mg IV, Dilaudid 4 mg po.  RN spoke with Alyce Ashford and pt about pain levels and how to notify staff before pain level gets above a 7/10 with all parties vu.  [facility] will continue to monitor pt daily.        Slick Kuo RN

## 2019-02-13 NOTE — PLAN OF CARE
Problem: Patient Care Overview  Goal: Plan of Care Review  Outcome: Ongoing (interventions implemented as appropriate)   02/13/19 0423   Coping/Psychosocial   Plan of Care Reviewed With patient   Plan of Care Review   Progress no change   OTHER   Outcome Summary Pt appeared comfortable throughout the night. Pt stated the decreaed Dilaudid dose worked well for him. Tube feeding initiated at 2000 will run until 0800. Pt has had no c/o pain, therefore no PRN meds were needed. Will continue to monitor per comfort care.      Goal: Individualization and Mutuality  Outcome: Ongoing (interventions implemented as appropriate)    Goal: Discharge Needs Assessment  Outcome: Ongoing (interventions implemented as appropriate)    Goal: Interprofessional Rounds/Family Conf  Outcome: Ongoing (interventions implemented as appropriate)      Problem: Fall Risk (Adult)  Goal: Absence of Fall  Outcome: Ongoing (interventions implemented as appropriate)      Problem: Nutrition, Imbalanced: Inadequate Oral Intake (Adult)  Goal: Prevent Further Weight Loss  Outcome: Ongoing (interventions implemented as appropriate)      Problem: Palliative Care (Adult)  Goal: Maximized Comfort  Outcome: Ongoing (interventions implemented as appropriate)    Goal: Enhanced Quality of Life  Outcome: Ongoing (interventions implemented as appropriate)      Problem: Skin Injury Risk (Adult)  Goal: Skin Health and Integrity  Outcome: Ongoing (interventions implemented as appropriate)

## 2019-02-14 PROCEDURE — 63710000001 PREDNISONE PER 1 MG: Performed by: INTERNAL MEDICINE

## 2019-02-14 PROCEDURE — 99231 SBSQ HOSP IP/OBS SF/LOW 25: CPT | Performed by: INTERNAL MEDICINE

## 2019-02-14 PROCEDURE — 25010000002 HYDROMORPHONE 1 MG/ML SOLUTION: Performed by: INTERNAL MEDICINE

## 2019-02-14 PROCEDURE — 94799 UNLISTED PULMONARY SVC/PX: CPT

## 2019-02-14 RX ADMIN — GABAPENTIN 800 MG: 400 CAPSULE ORAL at 21:23

## 2019-02-14 RX ADMIN — BUDESONIDE AND FORMOTEROL FUMARATE DIHYDRATE 2 PUFF: 160; 4.5 AEROSOL RESPIRATORY (INHALATION) at 09:44

## 2019-02-14 RX ADMIN — FAMOTIDINE 20 MG: 20 TABLET, FILM COATED ORAL at 20:20

## 2019-02-14 RX ADMIN — APIXABAN 5 MG: 5 TABLET, FILM COATED ORAL at 08:13

## 2019-02-14 RX ADMIN — LEVOTHYROXINE SODIUM 75 MCG: 75 TABLET ORAL at 08:13

## 2019-02-14 RX ADMIN — NICOTINE 1 PATCH: 14 PATCH, EXTENDED RELEASE TRANSDERMAL at 08:13

## 2019-02-14 RX ADMIN — Medication 500 MCG: at 08:13

## 2019-02-14 RX ADMIN — BUDESONIDE AND FORMOTEROL FUMARATE DIHYDRATE 2 PUFF: 160; 4.5 AEROSOL RESPIRATORY (INHALATION) at 20:15

## 2019-02-14 RX ADMIN — HYDROMORPHONE HYDROCHLORIDE 4 MG: 2 TABLET ORAL at 08:13

## 2019-02-14 RX ADMIN — GABAPENTIN 800 MG: 400 CAPSULE ORAL at 14:12

## 2019-02-14 RX ADMIN — HYDROMORPHONE HYDROCHLORIDE 0.5 MG: 1 INJECTION, SOLUTION INTRAMUSCULAR; INTRAVENOUS; SUBCUTANEOUS at 04:18

## 2019-02-14 RX ADMIN — GABAPENTIN 800 MG: 400 CAPSULE ORAL at 05:14

## 2019-02-14 RX ADMIN — APIXABAN 5 MG: 5 TABLET, FILM COATED ORAL at 20:20

## 2019-02-14 RX ADMIN — FAMOTIDINE 20 MG: 20 TABLET, FILM COATED ORAL at 08:13

## 2019-02-14 RX ADMIN — HYDROMORPHONE HYDROCHLORIDE 4 MG: 2 TABLET ORAL at 14:12

## 2019-02-14 RX ADMIN — HYDROMORPHONE HYDROCHLORIDE 4 MG: 2 TABLET ORAL at 20:20

## 2019-02-14 RX ADMIN — HYDROMORPHONE HYDROCHLORIDE 4 MG: 2 TABLET ORAL at 02:08

## 2019-02-14 RX ADMIN — PREDNISONE 40 MG: 20 TABLET ORAL at 08:13

## 2019-02-14 NOTE — PLAN OF CARE
Problem: Patient Care Overview  Goal: Plan of Care Review  Outcome: Ongoing (interventions implemented as appropriate)   02/14/19 2984   Coping/Psychosocial   Plan of Care Reviewed With patient   Plan of Care Review   Progress no change   OTHER   Outcome Summary Scheduled Dilaudid controlling pain. Tube feedings stopped early this AM per orders. Patient's lower right eyelid taped down. Will continue to monitor.      Goal: Individualization and Mutuality  Outcome: Ongoing (interventions implemented as appropriate)    Goal: Discharge Needs Assessment  Outcome: Ongoing (interventions implemented as appropriate)    Goal: Interprofessional Rounds/Family Conf  Outcome: Ongoing (interventions implemented as appropriate)      Problem: Fall Risk (Adult)  Goal: Absence of Fall  Outcome: Ongoing (interventions implemented as appropriate)      Problem: Nutrition, Imbalanced: Inadequate Oral Intake (Adult)  Goal: Prevent Further Weight Loss  Outcome: Ongoing (interventions implemented as appropriate)      Problem: Palliative Care (Adult)  Goal: Maximized Comfort  Outcome: Ongoing (interventions implemented as appropriate)    Goal: Enhanced Quality of Life  Outcome: Ongoing (interventions implemented as appropriate)      Problem: Skin Injury Risk (Adult)  Goal: Skin Health and Integrity  Outcome: Ongoing (interventions implemented as appropriate)

## 2019-02-14 NOTE — PROGRESS NOTES
Palliative Care/Hospice Follow Up Note       LOS: 5 days   Patient Care Team:  Elise Ortiz APRN as PCP - General (Nurse Practitioner)  Aakash Garcia MD as PCP - Claims Attributed  Clinton Styles MD as Referring Physician (Otolaryngology)  Cosmo Conway MD as Consulting Physician (Cardiology)  Edgar Prince MD as Consulting Physician (Pain Medicine)  Aakash Garcia MD as Consulting Physician (Hematology and Oncology)  North Marroquin MD as Consulting Physician (Radiation Oncology)  Drew Rucker MD as Consulting Physician (Hospice and Palliative Medicine)    Chief Complaint:  Stage [place] ( T3,N2a,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    Interval History:     Patient Complaints: Same, right side face pain  Patient Denies:  SOA  History taken from:  Patient and RN    Review of Systems: As above.     Palliative Performance Scale  Palliative Performance Scale Score: 50%  [place] Symptom Assessment System Revised  Pain Score: 6   ESAS Tiredness Score: 3  ESAS Nausea Score: No nausea  ESAS Depression Score: No depression  ESAS Anxiety Score: No anxiety  ESAS Drowsiness Score: 3  ESAS Lack of Appetite Score: 5  ESAS Wellbeing Score: 3  ESAS Dyspnea Score: No shortness of breath  ESAS Other Problem Score: unable to assess  ESAS Source of Information: healthcare professional caregiver  ESAS Intervention: medicated/see MAR  ESAS Intervention Response: tolerated    Vital Signs  Temp:  [97.6 °F (36.4 °C)-97.7 °F (36.5 °C)] 97.7 °F (36.5 °C)  Heart Rate:  [76-84] 84  Resp:  [16-18] 16  BP: (90-95)/(54-56) 90/56  Device (Oxygen Therapy): room air SpO2:  [95 %] 95 %    Physical Exam:  General Appearance:    Awake and appears in no acute distress, same   Throat:   No oral lesions, oral mucosa moist   Neck:   No adenopathy, supple, trachea midline   Lungs:     Clear to auscultation, respirations regular, even     Heart:     Regular rhythm and normal rate   Abdomen:     Occasional bowel sounds, soft and non-tender, non-distended   Extremities:   No edema, no cyanosis, 4th finger on right hand with painful lesion and has a splint   Pulses:   Radial pulses palpable and equal bilaterally          Results Review:     I reviewed the patient's new clinical results.    Medication Reviewed.    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 The patient is receiving Dilaudid 4 mg Q 6 hours. He has completed his full coarse of XRT to right chest/rib. Steroids changed prednisone 40 mg PO daily. He has required 1 dose 0.5 mg Dilaudid IV thus far today. Continue gabapentin. Continue to adjust meds as needed for comfort. Disposition may need to be considered.    Plan for disposition:[facility].    Drew Rucker MD  Hospice and Palliative Medicine  02/13/19  8:19 PM

## 2019-02-14 NOTE — PROGRESS NOTES
Discharge Planning Assessment  [facility]     Patient Name: King Parson  MRN: [id number]  Today's Date: 2/14/2019    Admit Date: 2/8/2019    Discharge Needs Assessment    No documentation.       Discharge Plan     Row Name 02/14/19 1456       Plan    Plan Comments  Spoke with Ivet/sister and Blaire/sister along with the patient on a discharge plan for Monday. Slick with [facility] was part of the meeting also. The goals are for the patient to demonstrate and teach back how to successfully administer tube feeding to himself. Discharge plan is home on 2-18-19 with [facility]. MAHSA Gerard RN, CCP.         Destination      No service coordination in this encounter.      Durable Medical Equipment      No service coordination in this encounter.      Dialysis/Infusion      No service coordination in this encounter.      Home Medical Care      No service coordination in this encounter.      Community Resources      No service coordination in this encounter.          Demographic Summary    No documentation.       Functional Status    No documentation.       Psychosocial    No documentation.       Abuse/Neglect    No documentation.       Legal    No documentation.       Substance Abuse    No documentation.       Patient Forms    No documentation.           Alyce Gerard RN

## 2019-02-14 NOTE — PLAN OF CARE
Problem: Patient Care Overview  Goal: Plan of Care Review  Outcome: Ongoing (interventions implemented as appropriate)   02/14/19 0426   Coping/Psychosocial   Plan of Care Reviewed With patient   Plan of Care Review   Progress no change   OTHER   Outcome Summary Pt medicated x1 for breakthrough pain with IV Dilaudid, tolerated well. Pt tolerating PO Dilaudid as well. Tube feed running from [id number]. Pt ambulated throughout shift with stand by assist. Pt appears comfortable at this time. Will continue to monitor per comfort care.      Goal: Individualization and Mutuality  Outcome: Ongoing (interventions implemented as appropriate)    Goal: Discharge Needs Assessment  Outcome: Ongoing (interventions implemented as appropriate)    Goal: Interprofessional Rounds/Family Conf  Outcome: Ongoing (interventions implemented as appropriate)      Problem: Fall Risk (Adult)  Goal: Absence of Fall  Outcome: Ongoing (interventions implemented as appropriate)      Problem: Nutrition, Imbalanced: Inadequate Oral Intake (Adult)  Goal: Prevent Further Weight Loss  Outcome: Ongoing (interventions implemented as appropriate)      Problem: Palliative Care (Adult)  Goal: Maximized Comfort  Outcome: Ongoing (interventions implemented as appropriate)    Goal: Enhanced Quality of Life  Outcome: Ongoing (interventions implemented as appropriate)      Problem: Skin Injury Risk (Adult)  Goal: Skin Health and Integrity  Outcome: Ongoing (interventions implemented as appropriate)

## 2019-02-14 NOTE — NURSING NOTE
[facility] Visit Report    King Parson  [id number]  2/14/2019    Admission R/T [facility] Dx: Yes    Reason for [facility] Admission:Hypopharynx    Symptom  Management: Pain    Nursing/Medication Recommendations:Monitor for condition changes and medicate as directed.  Assess for swallowing    Psychosocial Issues and Recommendations:    Spiritual Concerns and Recommendations:    [facility]  Pt is a pps of 40% and remains GIP appropriate and RN met with Alyce Gerard today about possible discharge to home for tomorrow.     Review of Visit Daily inpatient visit:  Pt remains a pps of 40% and continues to remain alert and oriented x 3.  Pt continues to receive IV Dilaudid 0.5 mg and Dilaudid 4 mg po.  RN spoke with pt and Alyce Gerard and Dr. Rucker about pt POC and plan is decided that pt will most likely go home with [facility] on Monday.        Slick Kuo RN

## 2019-02-15 ENCOUNTER — DOCUMENTATION (OUTPATIENT)
Dept: RADIATION ONCOLOGY | Facility: HOSPITAL | Age: 69
End: 2019-02-15

## 2019-02-15 PROCEDURE — 94799 UNLISTED PULMONARY SVC/PX: CPT

## 2019-02-15 PROCEDURE — 63710000001 PREDNISONE PER 1 MG: Performed by: INTERNAL MEDICINE

## 2019-02-15 PROCEDURE — 99231 SBSQ HOSP IP/OBS SF/LOW 25: CPT | Performed by: INTERNAL MEDICINE

## 2019-02-15 PROCEDURE — 97110 THERAPEUTIC EXERCISES: CPT

## 2019-02-15 RX ADMIN — APIXABAN 5 MG: 5 TABLET, FILM COATED ORAL at 08:37

## 2019-02-15 RX ADMIN — FAMOTIDINE 20 MG: 20 TABLET, FILM COATED ORAL at 20:26

## 2019-02-15 RX ADMIN — HYDROMORPHONE HYDROCHLORIDE 4 MG: 2 TABLET ORAL at 02:58

## 2019-02-15 RX ADMIN — GABAPENTIN 800 MG: 400 CAPSULE ORAL at 05:27

## 2019-02-15 RX ADMIN — Medication 500 MCG: at 08:37

## 2019-02-15 RX ADMIN — HYDROMORPHONE HYDROCHLORIDE 4 MG: 2 TABLET ORAL at 20:26

## 2019-02-15 RX ADMIN — HYDROMORPHONE HYDROCHLORIDE 4 MG: 2 TABLET ORAL at 15:42

## 2019-02-15 RX ADMIN — FAMOTIDINE 20 MG: 20 TABLET, FILM COATED ORAL at 08:37

## 2019-02-15 RX ADMIN — GABAPENTIN 800 MG: 400 CAPSULE ORAL at 15:42

## 2019-02-15 RX ADMIN — APIXABAN 5 MG: 5 TABLET, FILM COATED ORAL at 20:26

## 2019-02-15 RX ADMIN — PREDNISONE 40 MG: 20 TABLET ORAL at 08:37

## 2019-02-15 RX ADMIN — BUDESONIDE AND FORMOTEROL FUMARATE DIHYDRATE 2 PUFF: 160; 4.5 AEROSOL RESPIRATORY (INHALATION) at 07:00

## 2019-02-15 RX ADMIN — BUDESONIDE AND FORMOTEROL FUMARATE DIHYDRATE 2 PUFF: 160; 4.5 AEROSOL RESPIRATORY (INHALATION) at 22:02

## 2019-02-15 RX ADMIN — LEVOTHYROXINE SODIUM 75 MCG: 75 TABLET ORAL at 08:37

## 2019-02-15 RX ADMIN — HYDROMORPHONE HYDROCHLORIDE 4 MG: 2 TABLET ORAL at 08:36

## 2019-02-15 RX ADMIN — NICOTINE 1 PATCH: 14 PATCH, EXTENDED RELEASE TRANSDERMAL at 08:38

## 2019-02-15 RX ADMIN — GABAPENTIN 800 MG: 400 CAPSULE ORAL at 23:23

## 2019-02-15 NOTE — PROGRESS NOTES
Physician Weekly Management Note    Diagnosis:     Diagnosis Plan   1. Malignant neoplasm metastatic to right lung (CMS/HCC)         RT Details:  Treatment #10/10     right lower lobe lung metastasis with rib invasion      XRT alone    Notes on Treatment course, Films, Medical progress:  Patient seen on stretcher, tells me his pain is significantly better controlled at this time.  We will not see him back in routine follow-up.    Weekly Management:  Medication reviewed?   Yes  New medications given?   No  Problemlist reviewed?   Yes       Technical aspects reviewed:  Weekly OBI approved?   Yes  Weekly port films approved?   Yes  Change requests noted on port film?   No  Patient setup and plan reviewed?   Yes    Chart Reviewed:  Continue current treatment plan?   Yes  Treatment plan change requested?   No  CBC reviewed?   No  Concurrent Chemo?   No    Objective     Toxicities:   As above     Review of Systems   As above    There were no vitals filed for this visit.    Current Status 12/28/2018   ECOG score 0       Physical Exam  As above      Problem Summary List    Diagnosis:     Diagnosis Plan   1. Malignant neoplasm metastatic to right lung (CMS/HCC)       Pathology:       Past Medical History:   Diagnosis Date   • Asthma    • Bruises easily    • COPD (chronic obstructive pulmonary disease) (CMS/HCC)    • GERD (gastroesophageal reflux disease)    • History of anemia    • History of chemotherapy     NOV 2017   • History of chemotherapy     NOV 2017   • History of chest pain 02/01/2017    HAD NEG. STRESS TEST IN EPIC   • Hyperlipidemia    • Hypertension    • Low back pain     chronic, also bilat leg pain   • Lung cancer (CMS/HCC) 2018   • Lung cancer (CMS/HCC)    • Neck mass     right side   • Osteoarthritis    • Poor vision    • Shingles    • Squamous cell cancer of hypopharynx (CMS/HCC) 2017   • Vision loss of left eye         Past Surgical History:   Procedure Laterality Date   • ABDOMINAL SURGERY     • ELBOW  ARTHROTOMY Bilateral     bursa removed   • ENDOSCOPY W/ PEG TUBE PLACEMENT N/A 7/31/2017    Procedure: ESOPHAGOGASTRODUODENOSCOPY WITH PERCUTANEOUS ENDOSCOPIC GASTROSTOMY TUBE INSERTION;  Surgeon: Raul Valenzuela MD;  Location: [facility] MAIN OR;  Service:    • EPIDURAL BLOCK     • EYE SURGERY Left 1993    HEMORRHAGE LEFT EYE   • HAND TENDON REPAIR      LEFT THUMB   • INCISION AND DRAINAGE ABSCESS Left     LEFT HAND, STREP INFECTION   • NECK DISSECTION Right 5/17/2018    Procedure: RIGHT MODIFIED RADICAL  NECK DISSECTION;  Surgeon: Clinton Styles MD;  Location[facility];  Service: ENT   • PEG TUBE INSERTION N/A 1/30/2019    Procedure: PERCUTANEOUS ENDOSCOPIC GASTROSTOMY TUBE INSERTION;  Surgeon: Mandy Patel MD;  Location: [facility] ENDOSCOPY;  Service: Gastroenterology   • VENOUS ACCESS DEVICE (PORT) INSERTION N/A 7/31/2017    Procedure: INSERTION VENOUS ACCESS DEVICE;  Surgeon: Raul Valenzuela MD;  Location: [facility] MAIN OR;  Service:          Current Facility-Administered Medications on File Prior to Visit   Medication Dose Route Frequency Provider Last Rate Last Dose   • acetaminophen (TYLENOL) 160 MG/5ML solution 650 mg  650 mg Oral Q6H PRN Eligio Mcallister MD        Or   • acetaminophen (TYLENOL) tablet 650 mg  650 mg Oral Q6H PRN Eligio Mcallister MD       • albuterol (PROVENTIL) nebulizer solution 0.083% 2.5 mg/3mL  2.5 mg Nebulization Q6H PRN Eligio Mcallister MD       • albuterol sulfate HFA (PROVENTIL HFA;VENTOLIN HFA;PROAIR HFA) inhaler 2 puff  2 puff Inhalation Q4H PRN Eligio Mcallister MD       • apixaban (ELIQUIS) tablet 5 mg  5 mg Oral Q12H Eligio Mcallister MD   5 mg at 02/15/19 0837   • benzocaine (HURRICAINE) 20 % oral spray 2 spray  2 spray Mouth/Throat 4x Daily PRN Eligio Mcallister MD       • budesonide-formoterol (SYMBICORT) 160-4.5 MCG/ACT inhaler 2 puff  2 puff Inhalation BID - RT Eligio Mcallister MD   2 puff at 02/15/19 0700   • famotidine (PEPCID) tablet 20 mg  20 mg Oral BID  Eligio Mcallister MD   20 mg at 02/15/19 0837   • gabapentin (NEURONTIN) capsule 800 mg  800 mg Oral Q8H Eligio Mcallister MD   800 mg at 02/15/19 0527   • Glycerin-Hypromellose- (ARTIFICIAL TEARS) 0.2-0.2-1 % ophthalmic solution solution 2 drop  2 drop Both Eyes Q1H PRN Eligio Mcallister MD       • heparin flush (porcine) 100 UNIT/ML injection 500 Units  500 Units Intravenous PRN John Presley MD   500 Units at 09/28/17 1013   • heparin flush (porcine) 100 UNIT/ML injection 500 Units  500 Units Intravenous PRN John Presley MD   500 Units at 10/11/17 1311   • HYDROmorphone (DILAUDID) injection 0.5 mg  0.5 mg Intravenous Q1H PRN Drew Rucker MD   0.5 mg at 02/14/19 0418   • HYDROmorphone (DILAUDID) injection 1 mg  1 mg Intravenous Q1H PRN Drew Rucker MD       • HYDROmorphone (DILAUDID) tablet 4 mg  4 mg Oral Q6H Drew Rucker MD   4 mg at 02/15/19 0836   • levothyroxine (SYNTHROID, LEVOTHROID) tablet 75 mcg  75 mcg Oral Daily Eligio Mcallister MD   75 mcg at 02/15/19 0837   • LORazepam (ATIVAN) injection 0.5 mg  0.5 mg Intravenous Q1H PRN Drew Rucker MD       • LORazepam (ATIVAN) injection 1 mg  1 mg Intravenous Q1H PRN Drew Rucker MD       • LORazepam (ATIVAN) injection 2 mg  2 mg Intravenous Q1H PRN Drew Rucker MD       • naproxen (NAPROSYN) tablet 500 mg  500 mg Oral BID PRN Eligio Mcallister MD       • nicotine (NICODERM CQ) 14 MG/24HR patch 1 patch  1 patch Transdermal Q24H Eligio Mcallister MD   1 patch at 02/15/19 0838   • polyethylene glycol 3350 powder (packet)  17 g Oral Daily PRN Eligio Mcallister MD       • predniSONE (DELTASONE) tablet 40 mg  40 mg Oral Daily With Breakfast Drew Rucker MD   40 mg at 02/15/19 0837   • senna (SENOKOT) tablet 8.6 mg  1 tablet Oral Nightly PRN Eligio Mcallister MD       • sodium chloride 0.9 % flush 10 mL  10 mL Intravenous PRN John Presley MD   10 mL at 09/28/17 1013   • sodium chloride 0.9 % flush 10 mL   10 mL Intravenous John Mancia MD   10 mL at 10/11/17 1310   • vitamin B-12 (CYANOCOBALAMIN) tablet 500 mcg  500 mcg Oral Daily Eligio Mcallister MD   500 mcg at 02/15/19 0837     Current Outpatient Medications on File Prior to Visit   Medication Sig Dispense Refill   • acetaminophen (TYLENOL) 325 MG tablet Take 650 mg by mouth Every 6 (Six) Hours As Needed for Mild Pain .     • albuterol (PROVENTIL HFA;VENTOLIN HFA) 108 (90 BASE) MCG/ACT inhaler Inhale 2 puffs 2 (two) times a day.     • apixaban (ELIQUIS) 5 MG tablet tablet Take 1 tablet by mouth Every 12 (Twelve) Hours. 60 tablet 3   • celecoxib (CeleBREX) 200 MG capsule Take 1 capsule by mouth Daily. 30 capsule 1   • Cyanocobalamin (B-12) 1000 MCG capsule Take 1 tablet/day by mouth Daily.     • gabapentin (NEURONTIN) 800 MG tablet TAKE ONE TABLET BY MOUTH FOUR TIMES A  tablet 4   • HYDROcodone-acetaminophen (NORCO) 5-325 MG per tablet Take 1 tablet by mouth Every 8 (Eight) Hours As Needed for Moderate Pain . 90 tablet 0   • ipratropium (ATROVENT) 0.06 % nasal spray 1 spray into each nostril Daily As Needed.     • levothyroxine (SYNTHROID) 75 MCG tablet Take 1 tablet by mouth Daily. 30 tablet 5   • lidocaine (LIDODERM) 5 % Place 1 patch on the skin as directed by provider Daily. Remove & Discard patch within 12 hours or as directed by MD 30 patch 11   • omeprazole (priLOSEC) 40 MG capsule Take 40 mg by mouth Every Morning.     • ondansetron (ZOFRAN) 8 MG tablet Take 1 tablet by mouth 3 (Three) Times a Day As Needed for Nausea or Vomiting. 30 tablet 5   • oxyCODONE-acetaminophen (PERCOCET)  MG per tablet Take 1 tablet by mouth Every 6 (Six) Hours As Needed for Moderate Pain . 10 tablet 0   • predniSONE (DELTASONE) 10 MG tablet Take 1 tablet each morning for 3 days. 3 tablet 0   • SYMBICORT 160-4.5 MCG/ACT inhaler Inhale 2 puffs 2 (Two) Times a Day.     • valACYclovir (VALTREX) 1000 MG tablet Take 1 po tid 21 tablet 0       Allergies   Allergen  Reactions   • Codeine Unknown (See Comments)     He dont like to take it because it makes him feel strange.          Primary care MD:    Elise Ortiz APRN    Oncologist:     JOZEF Braxton M.D.    Seen and approved by:  North Marroquin MD  02/13/2019

## 2019-02-15 NOTE — THERAPY TREATMENT NOTE
Acute Care - Physical Therapy Treatment Note  [facility]     Patient Name: King Parson  : 1950  MRN: [id number]  Today's Date: 2/15/2019             Admit Date: 2019    Visit Dx:    ICD-10-CM ICD-9-CM   1. 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, M0)   • Throat cancer (CMS/HCC)   • Encounter for fitting and adjustment of vascular catheter   • Other chronic pain   • Squamous cell carcinoma   • Cervicalgia   • Cervical spinal stenosis   • Encounter for monitoring opioid maintenance therapy   • Lung metastases (CMS/HCC)   • Encounter for long-term (current) use of high-risk medication   • DDD (degenerative disc disease), cervical   • Cervical radiculitis   • Varicella zoster- over V1,  slit lamp neg 19   • Dysphagia   • Esophageal dysmotility   • Aspiration syndrome   • History of immunotherapy   • Abnormal weight loss   • GERD (gastroesophageal reflux disease)   • Tobacco abuse   • Failure to thrive (0-17)   • Acute deep vein thrombosis (DVT) of right lower extremity (CMS/HCC)   • Eye pain, right 2nd to herpes zoster   • Admission for hospice care   • COPD mixed type (CMS/HCC)   • Metastasis to bone (CMS/HCC)       Therapy Treatment    Rehabilitation Treatment Summary     Row Name 02/15/19 1500             Treatment Time/Intention    Discipline  physical therapy assistant  -CW      Document Type  therapy note (daily note)  -CW      Subjective Information  no complaints  -CW      Mode of Treatment  individual therapy;physical therapy  -CW      Therapy Frequency (PT Clinical Impression)  3 times/wk  -CW      Patient Effort  good   -CW      Existing Precautions/Restrictions  fall  -CW      Recorded by [CW] Afshin Contreras, PTA 02/15/19 1522      Row Name 02/15/19 1500             Vital Signs    O2 Delivery Pre Treatment  room air  -CW      Recorded by [CW] Afshin Contreras, PTA 02/15/19 1522      Row Name 02/15/19 1500             Cognitive Assessment/Intervention- PT/OT    Orientation Status (Cognition)  oriented x 4  -CW      Follows Commands (Cognition)  WFL  -CW      Personal Safety Interventions  fall prevention program maintained;gait belt;muscle strengthening facilitated;nonskid shoes/slippers when out of bed  -CW      Recorded by [CW] Afshin Contreras, PTA 02/15/19 1522      Row Name 02/15/19 1500             Bed Mobility Assessment/Treatment    Bed Mobility Assessment/Treatment  supine-sit;sit-supine  -CW      Supine-Sit [place] (Bed Mobility)  not tested  -CW      Recorded by [CW] Afshin Contreras, PTA 02/15/19 1522      Row Name 02/15/19 1500             Transfer Assessment/Treatment    Transfer Assessment/Treatment  sit-stand transfer;stand-sit transfer  -CW      Recorded by [CW] Afshin Contreras, PTA 02/15/19 1522      Row Name 02/15/19 1500             Sit-Stand Transfer    Sit-Stand [place] (Transfers)  supervision  -CW      Assistive Device (Sit-Stand Transfers)  walker, front-wheeled  -CW      Recorded by [CW] Afshin Contreras, PTA 02/15/19 1522      Row Name 02/15/19 1500             Stand-Sit Transfer    Stand-Sit [place] (Transfers)  supervision  -CW      Assistive Device (Stand-Sit Transfers)  walker, front-wheeled  -CW      Recorded by [CW] Afshin Contreras, PTA 02/15/19 1522      Row Name 02/15/19 1500             Gait/Stairs Assessment/Training    [place] Level (Gait)  supervision  -CW      Assistive Device (Gait)  walker, front-wheeled  -CW      Distance in Feet (Gait)  350  -CW      Pattern (Gait)  swing-through  -CW      Recorded by [CW] Afshin Contreras, PTA 02/15/19 1522       Row Name 02/15/19 1500             Positioning and Restraints    Pre-Treatment Position  in bed  -CW      Post Treatment Position  bed  -CW      In Bed  notified nsg;sitting EOB;call light within reach;encouraged to call for assist;with family/caregiver  -CW      Recorded by [CW] Afshin Contreras, PTA 02/15/19 1522      Row Name 02/15/19 1500             Outcome Summary/Treatment Plan (PT)    Anticipated Discharge Disposition (PT)  skilled nursing facility  -CW      Recorded by [CW] Afshin Contreras, PTA 02/15/19 1522        User Key  (r) = Recorded By, (t) = Taken By, (c) = Cosigned By    Initials Name Effective Dates Discipline    CW Afshin Contreras, PTA 03/07/18 -  PT                   Physical Therapy Education     Title: PT OT SLP Therapies (Done)     Topic: Physical Therapy (Done)     Point: Mobility training (Done)     Learning Progress Summary           Patient Acceptance, E,TB, VU,DU by  at 2/15/2019  3:23 PM    Acceptance, E,TB, VU,DU by  at 2/13/2019  2:41 PM    Acceptance, E, NR,VU by  at 2/11/2019  2:00 PM                   Point: Home exercise program (Done)     Learning Progress Summary           Patient Acceptance, E,TB, VU,DU by  at 2/15/2019  3:23 PM    Acceptance, E,TB, VU,DU by  at 2/13/2019  2:41 PM    Acceptance, E, NR,VU by  at 2/11/2019  2:00 PM                   Point: Body mechanics (Done)     Learning Progress Summary           Patient Acceptance, E,TB, VU,DU by  at 2/15/2019  3:23 PM    Acceptance, E,TB, VU,DU by  at 2/13/2019  2:41 PM    Acceptance, E, NR,VU by  at 2/11/2019  2:00 PM                   Point: Precautions (Done)     Learning Progress Summary           Patient Acceptance, E,TB, VU,DU by ROBI at 2/15/2019  3:23 PM    Acceptance, E,TB, VU,DU by ROBI at 2/13/2019  2:41 PM    Acceptance, E,TB, VU by  at 2/11/2019  6:01 PM    Acceptance, E, NR,VU by  at 2/11/2019  2:00 PM   Family Acceptance, E,TB, VU by JS at 2/11/2019  6:01 PM                                User Key     Initials Effective Dates Name Provider Type Discipline     04/03/18 -  Mago Patino, PT Physical Therapist PT    JS 10/10/18 -  Gabrielle Ray, RN Registered Nurse Nurse    CW 03/07/18 -  Afshin Contreras PTA Physical Therapy Assistant PT                PT Recommendation and Plan  Anticipated Discharge Disposition (PT): skilled nursing facility  Therapy Frequency (PT Clinical Impression): 3 times/wk  Outcome Summary/Treatment Plan (PT)  Anticipated Discharge Disposition (PT): skilled nursing facility  Plan of Care Reviewed With: patient  Progress: improving  Outcome Summary: Pt increaasing with transfer safety and I and actiivty tolerance with amb with RWX  Outcome Measures     Row Name 02/15/19 1500 02/13/19 1400          How much help from another person do you currently need...    Turning from your back to your side while in flat bed without using bedrails?  4  -CW  4  -CW     Moving from lying on back to sitting on the side of a flat bed without bedrails?  4  -CW  4  -CW     Moving to and from a bed to a chair (including a wheelchair)?  4  -CW  3  -CW     Standing up from a chair using your arms (e.g., wheelchair, bedside chair)?  4  -CW  3  -CW     Climbing 3-5 steps with a railing?  3  -CW  3  -CW     To walk in hospital room?  3  -CW  3  -CW     AM-PAC 6 Clicks Score  22  -CW  20  -CW        Functional Assessment    Outcome Measure Options  AM-PAC 6 Clicks Basic Mobility (PT)  -CW  AM-PAC 6 Clicks Basic Mobility (PT)  -CW       User Key  (r) = Recorded By, (t) = Taken By, (c) = Cosigned By    Initials Name Provider Type    CW Afshin Contreras PTA Physical Therapy Assistant         Time Calculation:   PT Charges     Row Name 02/15/19 1524             Time Calculation    Start Time  1508  -CW      Stop Time  1524  -CW      Time Calculation (min)  16 min  -CW      PT Received On  02/15/19  -CW      PT - Next Appointment  02/18/19  -CW        User Key  (r) = Recorded By, (t) =  Taken By, (c) = Cosigned By    Initials Name Provider Type    CW Afshin Contreras, SVEN Physical Therapy Assistant        Therapy Suggested Charges     Code   Minutes Charges    None           Therapy Charges for Today     Code Description Service Date Service Provider Modifiers Qty    58203571986 HC PT THER PROC EA 15 MIN 2/15/2019 Afshin Contreras PTA GP 1          PT G-Codes  Outcome Measure Options: AM-PAC 6 Clicks Basic Mobility (PT)  AM-PAC 6 Clicks Score: 22    Afshin Contreras PTA  2/15/2019

## 2019-02-15 NOTE — PLAN OF CARE
Problem: Patient Care Overview  Goal: Plan of Care Review  Outcome: Ongoing (interventions implemented as appropriate)   02/15/19 6583   Coping/Psychosocial   Plan of Care Reviewed With patient   Plan of Care Review   Progress improving   OTHER   Outcome Summary Pt increasing with transfer safety and I and activity tolerance with amb with RWX

## 2019-02-15 NOTE — PLAN OF CARE
Problem: Patient Care Overview  Goal: Plan of Care Review  Outcome: Ongoing (interventions implemented as appropriate)   02/15/19 1200   Coping/Psychosocial   Plan of Care Reviewed With patient   Plan of Care Review   Progress improving   OTHER   Outcome Summary Pt medicated with scheduled Dilaudid, no PRN meds required. RN educated pt on how to administer tube feeding once discharged home, pt verbalized understanding and RN answered questions pt had about tube feeding process. Pt ambulated to bathroom with stand by assist. Tape reapplied to pt right eye. Pt appears comfortable at this time. Will continue to monitor per comfort care.      Goal: Individualization and Mutuality  Outcome: Ongoing (interventions implemented as appropriate)    Goal: Discharge Needs Assessment  Outcome: Ongoing (interventions implemented as appropriate)    Goal: Interprofessional Rounds/Family Conf  Outcome: Ongoing (interventions implemented as appropriate)      Problem: Fall Risk (Adult)  Goal: Absence of Fall  Outcome: Ongoing (interventions implemented as appropriate)      Problem: Nutrition, Imbalanced: Inadequate Oral Intake (Adult)  Goal: Prevent Further Weight Loss  Outcome: Ongoing (interventions implemented as appropriate)      Problem: Palliative Care (Adult)  Goal: Maximized Comfort  Outcome: Ongoing (interventions implemented as appropriate)    Goal: Enhanced Quality of Life  Outcome: Ongoing (interventions implemented as appropriate)      Problem: Skin Injury Risk (Adult)  Goal: Skin Health and Integrity  Outcome: Ongoing (interventions implemented as appropriate)

## 2019-02-15 NOTE — NURSING NOTE
[facility] Visit Report    King Parson  [id number]  2/15/2019    Admission R/T [facility] Dx: Yes    Reason for [facility] Admission:Hypopharynx Cancer    Symptom  Management: Pain    Nursing/Medication Recommendations:Teach pt how to utilize feeding pump before DC home on Monday.    Psychosocial Issues and Recommendations:    Spiritual Concerns and Recommendations:    [facility] Discharge Plans: Plan is for pt to go home on Monday with [facility].  Pt continues to remain appropriate for GIP stay and requires scheduled doses of Dilaudid 4 mg.  Pt will be followed by [facility] over the weekend.     Review of Visit Daily inpatient visit:  Pt is a pps of 40% and remains a and o x3 and continues to recieve scheduled po Dilaudid for pain.  Pt continues to eat and is able to take in liquids and has a plan for DC to home on Monday.  RN called [facility] to discuss having Kangaroo pump sent to floor for pt to familiarize his self with it before DC home and Celena states that she will make that happen.  RN spoke with Jana about disease progression and poc and she vu.  [facility] will continue to follow pt daily.        Slick Kuo RN

## 2019-02-15 NOTE — PROGRESS NOTES
COMPLETION / CLOSURE NOTE    Diagnosis: Malignant neoplasm metastatic to right lung (CMS/HCC)     Dates of treatment:  1/31/2019 - 2/13/2019.    Treatment Site - RT RIB  Treatment Intent - Curative  Total Dose in cGy - 3750  Number of Treatments - 10  Dose per fraction - See below  Fractions per day - 1 fx/day  Fractions per week - 5 fx/week  Treatment Type - 3D, See below  Energy - 6 MVP, 18 MVP  Normalization - Isocenter, Calc point, See below  Imaging/Field Verification - Simulation before first treatment to verify field, blocking, placement and positioning, Simulation for all ports of change, Weekly port films of all ports  Additional comments: - RT RIB 375cGy x 10 = 3750  PRESCRIBED 93% TO CALC PT      Tolerance: Patient had some improvement in his pain near the end of his course and completed the total treatments in 13 elapsed days.    Disposition:  No routine follow-up scheduled in our department.     [place] Marroquin Jr., MD

## 2019-02-15 NOTE — PROGRESS NOTES
Palliative Care/Hospice Follow Up Note       LOS: 6 days   Patient Care Team:  Elise Ortiz APRN as PCP - General (Nurse Practitioner)  Aakash Garcia MD as PCP - Claims Attributed  Clinton Styles MD as Referring Physician (Otolaryngology)  Cosmo Conway MD as Consulting Physician (Cardiology)  Edgar Prince MD as Consulting Physician (Pain Medicine)  Aakash Garcia MD as Consulting Physician (Hematology and Oncology)  North Marroquin MD as Consulting Physician (Radiation Oncology)  Drew Rucker MD as Consulting Physician (Hospice and Palliative Medicine)    Chief Complaint:  Stage [place] ( T3,N2a,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    Interval History:     Patient Complaints: Same, right side face pain  Patient Denies:  SOA  History taken from:  Patient and RN    Review of Systems: As above.     Palliative Performance Scale  Palliative Performance Scale Score: 50%  [place] Symptom Assessment System Revised  Pain Score: 7   ESAS Tiredness Score: 5  ESAS Nausea Score: No nausea  ESAS Depression Score: 2  ESAS Anxiety Score: No anxiety  ESAS Drowsiness Score: 5  ESAS Lack of Appetite Score: 4  ESAS Wellbeing Score: unable to assess  ESAS Dyspnea Score: No shortness of breath  ESAS Other Problem Score: unable to assess  ESAS Source of Information: patient, healthcare professional caregiver  ESAS Intervention: medicated/see MAR  ESAS Intervention Response: tolerated    Vital Signs  Temp:  [97.1 °F (36.2 °C)-97.9 °F (36.6 °C)] 97.9 °F (36.6 °C)  Heart Rate:  [] 96  Resp:  [16] 16  BP: (100-102)/(57-67) 102/67  Device (Oxygen Therapy): room air SpO2:  [93 %-97 %] 93 %    Physical Exam:  General Appearance:    Awake and appears in no acute distress, same   Throat:   No oral lesions, oral mucosa moist   Neck:   No adenopathy, supple, trachea midline   Lungs:     Clear to auscultation, respirations regular,  even     Heart:    Regular rhythm and normal rate   Abdomen:     Occasional bowel sounds, soft and non-tender, non-distended   Extremities:   No edema, no cyanosis, 4th finger on right hand with painful lesion and has a splint   Pulses:   Radial pulses palpable and equal bilaterally          Results Review:     I reviewed the patient's new clinical results.    Medication Reviewed.    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 I reviewed with CCP and [facility] RN. The patient is receiving Dilaudid 4 mg Q 6 hours. He has completed his full coarse of XRT to right chest/rib. Steroids changed prednisone 40 mg PO daily. He has required 1 dose 0.5 mg Dilaudid IV thus far today. Continue gabapentin. Continue 1000 ml TF 12 hours at HS. Continue to adjust meds as needed for comfort. Disposition being worked on.     Plan for disposition:[facility].    Drew Rucker MD  Hospice and Palliative Medicine  02/14/19  7:42 PM

## 2019-02-16 PROCEDURE — 99231 SBSQ HOSP IP/OBS SF/LOW 25: CPT | Performed by: INTERNAL MEDICINE

## 2019-02-16 PROCEDURE — 63710000001 PREDNISONE PER 1 MG: Performed by: INTERNAL MEDICINE

## 2019-02-16 PROCEDURE — 94799 UNLISTED PULMONARY SVC/PX: CPT

## 2019-02-16 PROCEDURE — 25010000002 HYDROMORPHONE 1 MG/ML SOLUTION: Performed by: INTERNAL MEDICINE

## 2019-02-16 RX ORDER — HYDROMORPHONE HYDROCHLORIDE 2 MG/1
2 TABLET ORAL EVERY 4 HOURS PRN
Status: DISCONTINUED | OUTPATIENT
Start: 2019-02-16 | End: 2019-02-18 | Stop reason: HOSPADM

## 2019-02-16 RX ADMIN — HYDROMORPHONE HYDROCHLORIDE 4 MG: 2 TABLET ORAL at 14:05

## 2019-02-16 RX ADMIN — GABAPENTIN 800 MG: 400 CAPSULE ORAL at 20:31

## 2019-02-16 RX ADMIN — HYDROMORPHONE HYDROCHLORIDE 4 MG: 2 TABLET ORAL at 20:29

## 2019-02-16 RX ADMIN — BUDESONIDE AND FORMOTEROL FUMARATE DIHYDRATE 2 PUFF: 160; 4.5 AEROSOL RESPIRATORY (INHALATION) at 23:59

## 2019-02-16 RX ADMIN — FAMOTIDINE 20 MG: 20 TABLET, FILM COATED ORAL at 20:29

## 2019-02-16 RX ADMIN — Medication 500 MCG: at 08:26

## 2019-02-16 RX ADMIN — LEVOTHYROXINE SODIUM 75 MCG: 75 TABLET ORAL at 08:26

## 2019-02-16 RX ADMIN — HYDROMORPHONE HYDROCHLORIDE 4 MG: 2 TABLET ORAL at 02:18

## 2019-02-16 RX ADMIN — FAMOTIDINE 20 MG: 20 TABLET, FILM COATED ORAL at 08:30

## 2019-02-16 RX ADMIN — HYDROMORPHONE HYDROCHLORIDE 0.5 MG: 1 INJECTION, SOLUTION INTRAMUSCULAR; INTRAVENOUS; SUBCUTANEOUS at 05:47

## 2019-02-16 RX ADMIN — APIXABAN 5 MG: 5 TABLET, FILM COATED ORAL at 20:29

## 2019-02-16 RX ADMIN — GABAPENTIN 800 MG: 400 CAPSULE ORAL at 05:46

## 2019-02-16 RX ADMIN — HYDROMORPHONE HYDROCHLORIDE 4 MG: 2 TABLET ORAL at 08:26

## 2019-02-16 RX ADMIN — APIXABAN 5 MG: 5 TABLET, FILM COATED ORAL at 08:26

## 2019-02-16 RX ADMIN — BUDESONIDE AND FORMOTEROL FUMARATE DIHYDRATE 2 PUFF: 160; 4.5 AEROSOL RESPIRATORY (INHALATION) at 08:56

## 2019-02-16 RX ADMIN — GABAPENTIN 800 MG: 400 CAPSULE ORAL at 14:05

## 2019-02-16 RX ADMIN — NICOTINE 1 PATCH: 14 PATCH, EXTENDED RELEASE TRANSDERMAL at 08:27

## 2019-02-16 RX ADMIN — PREDNISONE 40 MG: 20 TABLET ORAL at 08:26

## 2019-02-16 NOTE — PROGRESS NOTES
Palliative Care/Hospice Follow Up Note       LOS: 7 days   Patient Care Team:  Elise Ortiz APRN as PCP - General (Nurse Practitioner)  Aakash Garcia MD as PCP - Claims Attributed  Clinton Styles MD as Referring Physician (Otolaryngology)  Cosmo Conway MD as Consulting Physician (Cardiology)  Edgar Prince MD as Consulting Physician (Pain Medicine)  Aakash Garcia MD as Consulting Physician (Hematology and Oncology)  North Marroquin MD as Consulting Physician (Radiation Oncology)  Drew Rucker MD as Consulting Physician (Hospice and Palliative Medicine)    Chief Complaint:  Stage [place] ( T3,N2a,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    Interval History:     Patient Complaints: Same, right side face pain not as bad?  Patient Denies:  SOA  History taken from:  Patient and RN    Review of Systems: As above.     Palliative Performance Scale  Palliative Performance Scale Score: 50%  [place] Symptom Assessment System Revised  Pain Score: no pain   ESAS Tiredness Score: 3  ESAS Nausea Score: No nausea  ESAS Depression Score: No depression  ESAS Anxiety Score: No anxiety  ESAS Drowsiness Score: 3  ESAS Lack of Appetite Score: 5  ESAS Wellbeing Score: unable to assess  ESAS Dyspnea Score: No shortness of breath  ESAS Other Problem Score: unable to assess  ESAS Source of Information: patient, healthcare professional caregiver  ESAS Intervention: medicated/see MAR  ESAS Intervention Response: tolerated    Vital Signs  Temp:  [96.9 °F (36.1 °C)-97.3 °F (36.3 °C)] 96.9 °F (36.1 °C)  Heart Rate:  [] 84  Resp:  [16-20] 18  BP: (91-92)/(52-53) 92/53  Device (Oxygen Therapy): room air SpO2:  [94 %-96 %] 94 %    Physical Exam:  General Appearance:    Awake and appears in no acute distress, same   Throat:   No oral lesions, oral mucosa moist   Neck:   No adenopathy, supple, trachea midline   Lungs:     Clear to  auscultation, respirations regular, even     Heart:    Regular rhythm and normal rate   Abdomen:     Occasional bowel sounds, soft and non-tender, non-distended   Extremities:   No edema, no cyanosis, 4th finger on right hand with painful lesion, at times, and has a splint   Pulses:   Radial pulses palpable and equal bilaterally          Results Review:     I reviewed the patient's new clinical results.    Medication Reviewed.    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 I reviewed again with CCP and Bimal RN. The patient is receiving same Dilaudid 4 mg PO Q 6 hours. He has completed his full coarse of XRT to right chest/rib. He continues to take prednisone 40 mg PO daily. He has required no Dilaudid IV thus far today. Continue gabapentin. Continue 1000 ml TF 12 hours at HS. The nursing staff is trying to educate the patient on administration of partial continuous TFs. He could not do bolus feeds in the past. Continue to adjust meds as needed for comfort. Disposition: plan home on Monday.     Plan for disposition:[facility].    Drew Rucker MD  Hospice and Palliative Medicine  02/15/19  7:13 PM

## 2019-02-16 NOTE — PLAN OF CARE
Problem: Fall Risk (Adult)  Goal: Absence of Fall  Outcome: Ongoing (interventions implemented as appropriate)      Problem: Nutrition, Imbalanced: Inadequate Oral Intake (Adult)  Goal: Prevent Further Weight Loss  Outcome: Ongoing (interventions implemented as appropriate)      Problem: Palliative Care (Adult)  Goal: Maximized Comfort  Outcome: Ongoing (interventions implemented as appropriate)    Goal: Enhanced Quality of Life  Outcome: Ongoing (interventions implemented as appropriate)      Problem: Patient Care Overview  Goal: Plan of Care Review  Outcome: Ongoing (interventions implemented as appropriate)   02/16/19 1818   Coping/Psychosocial   Plan of Care Reviewed With patient   Plan of Care Review   Progress no change   OTHER   Outcome Summary Pt calm and comfortable, scheduled dilaudid given, no breakthrough doses needed today. Pt encouraged to flush and clamp g-tube, did so with supervision. Cold compresses for eye as needed. Continue palliative care.      Goal: Individualization and Mutuality  Outcome: Ongoing (interventions implemented as appropriate)   02/16/19 1818   Individualization   Patient Specific Interventions scheduled and PRN dilaudid for pain, tube feedings at night per pt       Problem: Skin Injury Risk (Adult)  Goal: Skin Health and Integrity  Outcome: Ongoing (interventions implemented as appropriate)

## 2019-02-16 NOTE — PROGRESS NOTES
Palliative Care/Hospice Follow Up Note       LOS: 8 days   Patient Care Team:  Elise Ortiz APRN as PCP - General (Nurse Practitioner)  Aakash Garcia MD as PCP - Claims Attributed  Clinton Styles MD as Referring Physician (Otolaryngology)  Cosmo Conway MD as Consulting Physician (Cardiology)  Edgar Prince MD as Consulting Physician (Pain Medicine)  Aakash Garcia MD as Consulting Physician (Hematology and Oncology)  North Marroquin MD as Consulting Physician (Radiation Oncology)  Drew Rucker MD as Consulting Physician (Hospice and Palliative Medicine)    Chief Complaint:  Stage Adelaide ( T3,N2a,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    Interval History:     Patient Complaints: Same, right side face pain  Patient Denies:  SOA  History taken from:  Patient     Review of Systems: As above.     Palliative Performance Scale  Palliative Performance Scale Score: 50%  [place] Symptom Assessment System Revised  Pain Score: 7   ESAS Tiredness Score: 2  ESAS Nausea Score: No nausea  ESAS Depression Score: No depression  ESAS Anxiety Score: No anxiety  ESAS Drowsiness Score: 2  ESAS Lack of Appetite Score: 6  ESAS Wellbeing Score: 4  ESAS Dyspnea Score: No shortness of breath  ESAS Other Problem Score: unable to assess  ESAS Source of Information: patient, healthcare professional caregiver  ESAS Intervention: medicated/see MAR  ESAS Intervention Response: tolerated    Vital Signs  Temp:  [96.9 °F (36.1 °C)-97 °F (36.1 °C)] 97 °F (36.1 °C)  Heart Rate:  [84-90] 88  Resp:  [16-18] 16  BP: (92-96)/(53-56) 96/56  Device (Oxygen Therapy): room air SpO2:  [94 %-98 %] 98 %    Physical Exam:  General Appearance:    Awake and appears in no acute distress, same   Throat:   No oral lesions, oral mucosa moist   Neck:   No adenopathy, supple, trachea midline   Lungs:     Clear to auscultation, respirations regular, even     Heart:     Regular rhythm and normal rate   Abdomen:     Occasional bowel sounds, soft and non-tender, non-distended   Extremities:   No edema, no cyanosis, 4th finger on right hand with painful lesion, at times, and has a splint   Pulses:   Radial pulses palpable and equal bilaterally          Results Review:     I reviewed the patient's new clinical results.    Medication Reviewed.    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 The patient is receiving same Dilaudid 4 mg PO Q 6 hours. He has required 1 dose of 0.5 mg Dilaudid IV thus far today.  Will DC IV Dilaudid.  P.o. Dilaudid 2 mg will be available as needed.  Continue gabapentin. Continue 1000 ml TF 12 hours at HS. The nursing staff is educating the patient on administration of partial continuous TFs. He could not do bolus feeds in the past. Continue to adjust meds as needed for comfort. Disposition: plan home on Monday.     Plan for disposition:HSB.    Drew Rucker MD  Hospice and Palliative Medicine  02/16/19  9:28 AM

## 2019-02-16 NOTE — PLAN OF CARE
Problem: Fall Risk (Adult)  Goal: Absence of Fall  Outcome: Ongoing (interventions implemented as appropriate)      Problem: Nutrition, Imbalanced: Inadequate Oral Intake (Adult)  Goal: Prevent Further Weight Loss  Outcome: Ongoing (interventions implemented as appropriate)      Problem: Palliative Care (Adult)  Goal: Maximized Comfort  Outcome: Ongoing (interventions implemented as appropriate)    Goal: Enhanced Quality of Life  Outcome: Ongoing (interventions implemented as appropriate)      Problem: Patient Care Overview  Goal: Plan of Care Review  Outcome: Ongoing (interventions implemented as appropriate)   02/15/19 1630   Plan of Care Review   Progress no change   OTHER   Outcome Summary Pt was medicated with scheduled dilaudid x 2 this shift. Pt needd no prn pain medications. Pt concerned about home TF education and pumps differing. [facility] worked on getting kangaroo pump delivered to room and pt educated via [facility] on pump operation to expect at home. Pt vu. Pt will be taking kangaroo pump with bag home with him upon discharge. Pt has been up ad hieu without issue. Will continue comfort measures and monitoring.       Problem: Skin Injury Risk (Adult)  Goal: Skin Health and Integrity  Outcome: Ongoing (interventions implemented as appropriate)

## 2019-02-16 NOTE — PLAN OF CARE
Problem: Fall Risk (Adult)  Goal: Absence of Fall  Outcome: Ongoing (interventions implemented as appropriate)      Problem: Nutrition, Imbalanced: Inadequate Oral Intake (Adult)  Goal: Prevent Further Weight Loss  Outcome: Ongoing (interventions implemented as appropriate)      Problem: Palliative Care (Adult)  Goal: Maximized Comfort  Outcome: Ongoing (interventions implemented as appropriate)    Goal: Enhanced Quality of Life  Outcome: Ongoing (interventions implemented as appropriate)      Problem: Patient Care Overview  Goal: Plan of Care Review  Outcome: Ongoing (interventions implemented as appropriate)   02/16/19 0431   Coping/Psychosocial   Plan of Care Reviewed With patient   Plan of Care Review   Progress improving   OTHER   Outcome Summary Pt. VS WNL. Pt. hospice to go home on Monday. Pt. is A&O x4. Pt. given scheduled PO dilaudid, has declined IV dilaudid this shift. Pt. educated on kangaroo TF pump. Pt. demonstrated to this RN how to set tubing and pump up. Pt. up ad hieu, educated on calling for assistance. Pt. eye draining and irritated ice applied. Pt. resting comfortably at present, will continue to monitor closely.     Goal: Individualization and Mutuality  Outcome: Ongoing (interventions implemented as appropriate)    Goal: Discharge Needs Assessment  Outcome: Ongoing (interventions implemented as appropriate)      Problem: Skin Injury Risk (Adult)  Goal: Skin Health and Integrity  Outcome: Ongoing (interventions implemented as appropriate)

## 2019-02-16 NOTE — PROGRESS NOTES
[facility] Visit Report    King Parson  [id number]  2/16/2019    Admission R/T [facility] Dx: YES      Reason for [facility] Admission: CA of hypopharynx, head, face, neck and lung, dysphagia.      Symptom  Management: Pain control      Nursing/Medication Recommendations: No recommendations at this time.      Psychosocial Issues and Recommendations: Provide support to patient and family      Spiritual Concerns and Recommendations: None at present      [facility]  Continue to monitor for pain management, titrating completely to oral medication even for prn's. Plan is home on Monday with [facility] following.      Review of Visit: Close monitoring for safety, daily RN assessment for symptom management of pain titrating to oral meds, comfort care.  Patient awake and alert, walking around in room. VS 97.0-88-16-96/56 with 02 sat 98% on room air. Spoke to patient regarding pain management and he states he is comfortable on the scheduled Dilaudid at 4mg PO q6hrs. Required 1 dose of IV Dilaudid last night but now will use PO Dilaudid 2mg prn instead of IV for breakthrough pain. Staff report patient has been educated and returned a demonstration to the staff on how to set up the Kangaroo pump delivered by [employer]. They also educated the patient. Emotional support provided to patient. Will continue to follow up daily to assess needs, monitor status, offer support and facilitate transfer to home on Monday.        Jessica Escamilla RN  [facility] Scattered Bed Nurse

## 2019-02-17 PROCEDURE — 63710000001 PREDNISONE PER 1 MG: Performed by: INTERNAL MEDICINE

## 2019-02-17 PROCEDURE — 99232 SBSQ HOSP IP/OBS MODERATE 35: CPT | Performed by: INTERNAL MEDICINE

## 2019-02-17 PROCEDURE — 94799 UNLISTED PULMONARY SVC/PX: CPT

## 2019-02-17 RX ADMIN — APIXABAN 5 MG: 5 TABLET, FILM COATED ORAL at 09:07

## 2019-02-17 RX ADMIN — NICOTINE 1 PATCH: 14 PATCH, EXTENDED RELEASE TRANSDERMAL at 09:07

## 2019-02-17 RX ADMIN — HYDROMORPHONE HYDROCHLORIDE 4 MG: 2 TABLET ORAL at 21:10

## 2019-02-17 RX ADMIN — APIXABAN 5 MG: 5 TABLET, FILM COATED ORAL at 21:10

## 2019-02-17 RX ADMIN — GABAPENTIN 800 MG: 400 CAPSULE ORAL at 21:10

## 2019-02-17 RX ADMIN — GABAPENTIN 800 MG: 400 CAPSULE ORAL at 13:50

## 2019-02-17 RX ADMIN — PREDNISONE 40 MG: 20 TABLET ORAL at 09:06

## 2019-02-17 RX ADMIN — Medication 500 MCG: at 09:07

## 2019-02-17 RX ADMIN — HYDROMORPHONE HYDROCHLORIDE 4 MG: 2 TABLET ORAL at 02:02

## 2019-02-17 RX ADMIN — FAMOTIDINE 20 MG: 20 TABLET, FILM COATED ORAL at 09:07

## 2019-02-17 RX ADMIN — BUDESONIDE AND FORMOTEROL FUMARATE DIHYDRATE 2 PUFF: 160; 4.5 AEROSOL RESPIRATORY (INHALATION) at 19:54

## 2019-02-17 RX ADMIN — FAMOTIDINE 20 MG: 20 TABLET, FILM COATED ORAL at 21:10

## 2019-02-17 RX ADMIN — HYDROMORPHONE HYDROCHLORIDE 4 MG: 2 TABLET ORAL at 09:06

## 2019-02-17 RX ADMIN — BUDESONIDE AND FORMOTEROL FUMARATE DIHYDRATE 2 PUFF: 160; 4.5 AEROSOL RESPIRATORY (INHALATION) at 08:38

## 2019-02-17 RX ADMIN — GABAPENTIN 800 MG: 400 CAPSULE ORAL at 06:25

## 2019-02-17 RX ADMIN — LEVOTHYROXINE SODIUM 75 MCG: 75 TABLET ORAL at 09:06

## 2019-02-17 RX ADMIN — HYDROMORPHONE HYDROCHLORIDE 4 MG: 2 TABLET ORAL at 13:50

## 2019-02-17 RX ADMIN — ACETAMINOPHEN 650 MG: 325 TABLET, FILM COATED ORAL at 09:08

## 2019-02-17 NOTE — PROGRESS NOTES
[facility] Visit Report    King Parson  [id number]  2/17/2019    Admission R/T [facility] Dx: YES      Reason for [facility] Admission: CA of hypopharynx, head, face, neck and lung, dysphagia.      Symptom  Management: Pain control      Nursing/Medication Recommendations: No recommendations at this time      Psychosocial Issues and Recommendations: Provide support to patient and family      Spiritual Concerns and Recommendations: None at present      [facility] Discharge Plans:  Continue to monitor, daily RN assessment for symptom management of pain, patient has transitioned to oral Dilaudid scheduled and prn. Meeting criteria for GIP and plan to discharge home with [facility] following tomorrow if remains stable. Has developed a temperature of 102.4. Dr. Rucker will continue to follow.      Review of Visit: Close monitoring for safety, daily RN assessment for symptom management of pain, comfort care. Patient awake and alert, sitting up in bed. Denies pain or discomfort at this time. Pain controlled on scheduled Dilaudid and did not have any prn doses overnight. Emotional support provided. Spoke to staff LOLA David, patient developed a temperature of 102.4 this am and was given Tylenol. Per Dr. Rucker will continue to monitor this and if temperature remains elevated he may cancel the discharge for tomorrow. Patient is understanding of this. Discussed with staff LOLA David and she states that patient a little nervous when setting up tube feeding and may need an oncall visit the first night or two when he is at home in order to make sure he is able to perform correctly. We will pass on to the home team when he is discharged. Will continue to follow to assess needs, monitor status, offer support and facilitate transfer to home when he is discharged.        Jessica Escamilla, RN  [facility] Scattered Bed Nurse

## 2019-02-17 NOTE — PLAN OF CARE
Problem: Patient Care Overview  Goal: Plan of Care Review  Continue symptom management and comfort care.   02/16/19 2146 02/17/19 0408   Coping/Psychosocial   Plan of Care Reviewed With patient --    OTHER   Outcome Summary --  Patient rested well overnight. Pain medications given as per schedule with no complaints of breakthrough pain. Tube feeds continue with no problems. Will continue to monitor.

## 2019-02-17 NOTE — PLAN OF CARE
Problem: Fall Risk (Adult)  Goal: Absence of Fall  Outcome: Ongoing (interventions implemented as appropriate)      Problem: Nutrition, Imbalanced: Inadequate Oral Intake (Adult)  Goal: Prevent Further Weight Loss  Outcome: Ongoing (interventions implemented as appropriate)      Problem: Palliative Care (Adult)  Goal: Maximized Comfort  Outcome: Ongoing (interventions implemented as appropriate)    Goal: Enhanced Quality of Life  Outcome: Ongoing (interventions implemented as appropriate)      Problem: Patient Care Overview  Goal: Plan of Care Review  Outcome: Ongoing (interventions implemented as appropriate)   02/17/19 1609   Coping/Psychosocial   Plan of Care Reviewed With patient   Plan of Care Review   Progress no change   OTHER   Outcome Summary Pt had temp this morning, came down with tylenol. Pt stated he felt fine, no changes reported. Scheduled dilaudid controlled pain. Flushed and disconnected g-tube with nurse supervision. Needed prompting but did ok. Plan is to DC home tomorrow with hospice. Continue to monitor and provide palliative care.      Goal: Individualization and Mutuality  Outcome: Ongoing (interventions implemented as appropriate)   02/16/19 5883   Individualization   Patient Specific Interventions scheduled and PRN dilaudid for pain, tube feedings at night per pt       Problem: Skin Injury Risk (Adult)  Goal: Skin Health and Integrity  Outcome: Ongoing (interventions implemented as appropriate)

## 2019-02-17 NOTE — PROGRESS NOTES
Palliative Care/Hospice Follow Up Note       LOS: 9 days   Patient Care Team:  Elise Ortiz APRN as PCP - General (Nurse Practitioner)  Aakash Garcia MD as PCP - Claims Attributed  Clinton Styles MD as Referring Physician (Otolaryngology)  Cosmo Conway MD as Consulting Physician (Cardiology)  Edgar Prince MD as Consulting Physician (Pain Medicine)  Aakash Garcia MD as Consulting Physician (Hematology and Oncology)  [place] MD as Consulting Physician (Radiation Oncology)  Drew Rucker MD as Consulting Physician (Hospice and Palliative Medicine)    Chief Complaint:  Stage Adelaide ( T3,N2a,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    Interval History:     Patient Complaints: Same, right side face pain still present some  Patient Denies:  SOA, cough or mucus, any new pains, no change in his finger lesion, no  complaints, positive bowel movement.  History taken from:  Patient and RN    Review of Systems: As above.     Palliative Performance Scale  Palliative Performance Scale Score: 50%  [place] Symptom Assessment System Revised  Pain Score: 7   ESAS Tiredness Score: 2  ESAS Nausea Score: No nausea  ESAS Depression Score: No depression  ESAS Anxiety Score: 2  ESAS Drowsiness Score: 2  ESAS Lack of Appetite Score: 6  ESAS Wellbeing Score: 4  ESAS Dyspnea Score: No shortness of breath  ESAS Other Problem Score: unable to assess  ESAS Source of Information: healthcare professional caregiver  ESAS Intervention: medicated/see MAR  ESAS Intervention Response: tolerated    Vital Signs  Temp:  [97.9 °F (36.6 °C)-102.4 °F (39.1 °C)] 102.4 °F (39.1 °C)  Heart Rate:  [] 106  Resp:  [16] 16  BP: (89-97)/(52-59) 97/52  Device (Oxygen Therapy): room air SpO2:  [94 %-96 %] 94 %    Physical Exam:  General Appearance:    Awake and appears in no acute distress, same   Throat:   No oral lesions, oral mucosa moist    Neck:   No adenopathy, supple, trachea midline   Lungs:     Clear to auscultation, respirations regular, even     Heart:    Regular rhythm and tachycardia    Abdomen:     Occasional bowel sounds, soft and non-tender, non-distended, tolerating tube feeds   Extremities:   No edema, no cyanosis, 4th finger on right hand with painful lesion, at times, and has a splint in the lesion looks the same presently, no palpable calf tenderness   Pulses:   Radial pulses palpable and equal bilaterally          Results Review:     I reviewed the patient's new clinical results.    Medication Reviewed.    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  The patient has a temperature up to 102.4?  This is a recheck from the 100.7 obtained earlier.  ROS and physical examination as outlined above.  Unchanged and nothing specific to point to why a temperature is present.  The patient does not feel bad in any different ways.  The patient is receiving same Dilaudid 4 mg PO Q 6 hours. He has not required any extra p.o. Dilaudid thus far today.  Continue all of the same p.o. medicines.  Continue 1000 ml TF 12 hours at HS. The nursing staff is educating the patient on administration of partial continuous TFs. He could not do bolus feeds in the past. Continue to adjust meds as needed for comfort. Disposition: plan home on Monday.     At this time, will give Tylenol for temperature.  No other changes to his medicines and no testing at this time.  If temperature persists, I probably would feel uncomfortable sending him home.  If  temperature resolves and his ROS and physical examination remains the same, plan discharge 2/18/2019.    Plan for disposition:HSB.    Drew Rucker MD  Hospice and Palliative Medicine  02/17/19  9:26 AM

## 2019-02-18 VITALS
TEMPERATURE: 97.6 F | OXYGEN SATURATION: 92 % | DIASTOLIC BLOOD PRESSURE: 60 MMHG | HEART RATE: 102 BPM | SYSTOLIC BLOOD PRESSURE: 98 MMHG | RESPIRATION RATE: 20 BRPM

## 2019-02-18 PROCEDURE — 94799 UNLISTED PULMONARY SVC/PX: CPT

## 2019-02-18 PROCEDURE — 63710000001 PREDNISONE PER 1 MG: Performed by: INTERNAL MEDICINE

## 2019-02-18 PROCEDURE — 99239 HOSP IP/OBS DSCHRG MGMT >30: CPT | Performed by: INTERNAL MEDICINE

## 2019-02-18 RX ORDER — NICOTINE 21 MG/24HR
1 PATCH, TRANSDERMAL 24 HOURS TRANSDERMAL
Qty: 7 PATCH | Refills: 0 | Status: SHIPPED | OUTPATIENT
Start: 2019-02-18

## 2019-02-18 RX ORDER — HYDROMORPHONE HYDROCHLORIDE 2 MG/1
4 TABLET ORAL EVERY 6 HOURS SCHEDULED
Qty: 32 TABLET | Refills: 0 | Status: SHIPPED | OUTPATIENT
Start: 2019-02-18 | End: 2019-02-22

## 2019-02-18 RX ORDER — HYDROMORPHONE HYDROCHLORIDE 2 MG/1
2 TABLET ORAL EVERY 4 HOURS PRN
Qty: 6 TABLET | Refills: 0 | Status: SHIPPED | OUTPATIENT
Start: 2019-02-18 | End: 2019-02-26

## 2019-02-18 RX ADMIN — HYDROMORPHONE HYDROCHLORIDE 4 MG: 2 TABLET ORAL at 08:11

## 2019-02-18 RX ADMIN — APIXABAN 5 MG: 5 TABLET, FILM COATED ORAL at 08:11

## 2019-02-18 RX ADMIN — GABAPENTIN 800 MG: 400 CAPSULE ORAL at 06:25

## 2019-02-18 RX ADMIN — HYDROMORPHONE HYDROCHLORIDE 4 MG: 2 TABLET ORAL at 13:59

## 2019-02-18 RX ADMIN — Medication 500 MCG: at 08:11

## 2019-02-18 RX ADMIN — BUDESONIDE AND FORMOTEROL FUMARATE DIHYDRATE 2 PUFF: 160; 4.5 AEROSOL RESPIRATORY (INHALATION) at 08:06

## 2019-02-18 RX ADMIN — FAMOTIDINE 20 MG: 20 TABLET, FILM COATED ORAL at 08:11

## 2019-02-18 RX ADMIN — PREDNISONE 40 MG: 20 TABLET ORAL at 08:11

## 2019-02-18 RX ADMIN — NICOTINE 1 PATCH: 14 PATCH, EXTENDED RELEASE TRANSDERMAL at 08:12

## 2019-02-18 RX ADMIN — GABAPENTIN 800 MG: 400 CAPSULE ORAL at 13:59

## 2019-02-18 RX ADMIN — LEVOTHYROXINE SODIUM 75 MCG: 75 TABLET ORAL at 08:10

## 2019-02-18 RX ADMIN — HYDROMORPHONE HYDROCHLORIDE 4 MG: 2 TABLET ORAL at 01:58

## 2019-02-18 NOTE — PROGRESS NOTES
Discharge Planning Assessment  [facility]     Patient Name: King Parson  MRN: [id number]  Today's Date: 2/18/2019    Admit Date: 2/8/2019    Discharge Needs Assessment    No documentation.       Discharge Plan     Row Name 02/18/19 1742       Plan    Plan Comments  Per Sam/Brother the DME was delivered this AM to the patient's home. Blaire/Sister is here to provide the transportation to home. Slick/Bimal/RN is here to arrange the discharge to home. MAHSA Gerard RN, CCP.     Final Discharge Disposition Code  50 - home with hospice    Final Note  Discharged to home with [facility] on 2/18/19. Sister Blaire provided the transportation to home. MAHSA Gerard RN, CCP.         Destination      No service coordination in this encounter.      Durable Medical Equipment      No service coordination in this encounter.      Dialysis/Infusion      No service coordination in this encounter.      Home Medical Care - Selection Complete      Service Provider Request Status Selected Services Address Phone Number Fax Number    [facility] [id number] [facility] [address] [phone]      [facility]      No service coordination in this encounter.        Expected Discharge Date and Time     Expected Discharge Date Expected Discharge Time    Feb 18, 2019  4:09 PM        Demographic Summary    No documentation.       Functional Status    No documentation.       Psychosocial    No documentation.       Abuse/Neglect    No documentation.       Legal    No documentation.       Substance Abuse    No documentation.       Patient Forms    No documentation.           Alyce Gerard RN

## 2019-02-18 NOTE — PLAN OF CARE
Problem: Patient Care Overview  Goal: Plan of Care Review  Outcome: Ongoing (interventions implemented as appropriate)   02/18/19 0318   Coping/Psychosocial   Plan of Care Reviewed With patient   Plan of Care Review   Progress improving   OTHER   Outcome Summary Pt admitted to palliative care. Pt has a diagnosis of failure to thrive and many others. Pt has been very pleasant this shift. Continues with tube feedings from 7p to 7a. Pt was able to flush the PEG tube and set up the new bag of feeding by himself with nurse supervision. Pt ambulates ad hieu. Continues with scheduled Dilaudid that is effective for pain. Pt is probably being discharged in AM. Pt is resting at this time, will continue to monitor.     Goal: Discharge Needs Assessment  Outcome: Ongoing (interventions implemented as appropriate)      Problem: Fall Risk (Adult)  Goal: Absence of Fall  Outcome: Ongoing (interventions implemented as appropriate)      Problem: Nutrition, Imbalanced: Inadequate Oral Intake (Adult)  Goal: Prevent Further Weight Loss  Outcome: Ongoing (interventions implemented as appropriate)      Problem: Palliative Care (Adult)  Goal: Maximized Comfort  Outcome: Ongoing (interventions implemented as appropriate)    Goal: Enhanced Quality of Life  Outcome: Ongoing (interventions implemented as appropriate)      Problem: Patient Care Overview  Goal: Individualization and Mutuality  Outcome: Ongoing (interventions implemented as appropriate)    Goal: Interprofessional Rounds/Family Conf  Outcome: Outcome(s) achieved Date Met: 02/18/19      Problem: Skin Injury Risk (Adult)  Goal: Skin Health and Integrity  Outcome: Ongoing (interventions implemented as appropriate)

## 2019-02-18 NOTE — DISCHARGE SUMMARY
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 male presented 1/23/2019 with zoster involving his right face especially his forehead.  The patient has a history of multiple medical problems including squamous cell carcinoma of the hypopharynx and metastatic lung cancer. Patient was felt to be septic due to facial zoster with secondary bacterial component. He was started on IV vancomycin as well as acyclovir. Infectious disease was consulted and vancomycin was discontinued with clinical improvement and acyclovir was completed. Patient still had some right eye pain that would wax and wane. Ultimately trial of steroid helped relieve his discomfort. Ophthalmology recommended surgical intervention to externalize his lower lid.  He was also found to have bilateral lower extremity DVT/CVT and was started on Eliquis for anticoagulation. It was not felt safe for patient to come off anticoagulation for an eye surgery when he is already terminal due to progression of metastatic cancer. As result of his cancer he has dysphagia and is using tube feeds at night and he is tolerating. He had persistent fever most likely secondary to progression of cancer given the necrotic lesions noted on his lungs possibly compounded by the DVT. Initial plans were to go home with hospice unfortunately he does not have the additional support to maintain himself independently at home. He has two  sisters at bedside who are involved but they are unable to watch him 24/7. Patient has come a long way during this hospitalization in regards to confronting his mortality. Patient is being admitted to [facility] Sc[facility] for palliative/comfort care. Today he is feeling OK and his pain is controlled.   Additionally, the patient is to complete 10 courses of radiation therapy to his right ribs where there is a metastatic lesion.  Additionally, the patient appears to have a metastatic lesion on the pad of his distal fourth phalanx which is being protected with a metal splint as needed.  Local wound care to be provided.    CODE STATUS: No CPR and comfort measures.  Medicines and tube feeds as described.    Procedures Performed  The patient completed 10 courses of radiation therapy to his right chest for metastatic rib lesion.       Consults: (Obtained during his acute care hospital stay)  Consults     Date and Time Order Name Status Description    2/3/2019 1453 Inpatient Ophthalmology Consult Completed     1/27/2019 1152 Inpatient Radiation Oncology Consult Completed     1/23/2019 1900 Inpatient Infectious Diseases Consult Completed     1/23/2019 1817 Inpatient Gastroenterology Consult Completed     1/23/2019 1640 [facility] (on-call MD unless specified) Completed           Pertinent Test Results: Reviewed    Condition on Discharge: Fair    Palliative performance scale: 50%    Vital Signs  Temp:  [96.9 °F (36.1 °C)-102.4 °F (39.1 °C)] 97.6 °F (36.4 °C)  Heart Rate:  [] 95  Resp:  [16] 16  BP: (90-98)/(51-60) 98/60    Physical Exam:     General Appearance:    Awake and appears in no acute distress, same, right face slightly red with inversion of the lower lid that is taped at times to help prevent inversion   Throat:   No oral lesions, oral mucosa moist   Neck:   Supple, trachea midline   Lungs:     Clear to auscultation, respirations regular, even     Heart:    Regular rhythm and normal rate    Abdomen:     Occasional  bowel sounds, soft and non-tender, non-distended, tolerating tube feeds via PEG   Extremities:   No edema, no cyanosis, 4th finger on right hand with painful lesion, at times, and has a splint and the lesion looks the same presently, no palpable calf tenderness   Pulses:   Radial pulses palpable and equal bilaterally             Discharge Disposition  Home or Self Care   Hospice will be following at home    Discharge Medications     Discharge Medications      New Medications      Instructions Start Date   HYDROmorphone 2 MG tablet  Commonly known as:  DILAUDID   2 mg, Oral, Every 4 Hours PRN      HYDROmorphone 4 MG tablet  Commonly known as:  DILAUDID   4 mg, Oral, Every 6 Hours Scheduled      nicotine 14 MG/24HR patch  Commonly known as:  NICODERM CQ   1 patch, Transdermal, Every 24 Hours Scheduled         Continue These Medications      Instructions Start Date   acetaminophen 325 MG tablet  Commonly known as:  TYLENOL   650 mg, Oral, Every 6 Hours PRN      albuterol sulfate  (90 Base) MCG/ACT inhaler  Commonly known as:  PROVENTIL HFA;VENTOLIN HFA;PROAIR HFA   2 puffs, Inhalation, 2 Times Daily      apixaban 5 MG tablet tablet  Commonly known as:  ELIQUIS   5 mg, Oral, Every 12 Hours Scheduled      B-12 1000 MCG capsule   1 tablet/day, Oral, Daily      gabapentin 800 MG tablet  Commonly known as:  NEURONTIN   TAKE ONE TABLET BY MOUTH FOUR TIMES A DAY      levothyroxine 75 MCG tablet  Commonly known as:  SYNTHROID   75 mcg, Oral, Daily      omeprazole 40 MG capsule  Commonly known as:  priLOSEC   40 mg, Oral, Every Morning      predniSONE 10 MG tablet  Commonly known as:  DELTASONE   Take 1 tablet each morning for 3 days.      SYMBICORT 160-4.5 MCG/ACT inhaler  Generic drug:  budesonide-formoterol   2 puffs, Inhalation, 2 Times Daily - RT         Stop These Medications    celecoxib 200 MG capsule  Commonly known as:  CeleBREX     HYDROcodone-acetaminophen 5-325 MG per tablet  Commonly known as:  NORCO      ipratropium 0.06 % nasal spray  Commonly known as:  ATROVENT     lidocaine 5 %  Commonly known as:  LIDODERM     ondansetron 8 MG tablet  Commonly known as:  ZOFRAN     oxyCODONE-acetaminophen  MG per tablet  Commonly known as:  PERCOCET     valACYclovir 1000 MG tablet  Commonly known as:  VALTREX          Prednisone may be decreased to 20 mg every morning on 2/25/2019.  Recommend decrease by 10 mg every 2 weeks until stopped.    Discharge Diet:    Texture/consistency: Dysphagia level 2-.  Read.  Fluid consistency: Nectar/syrup thick, no straws    Tube feeding formula:  Nutren 1.5, or equivalent.  2 feedings start time 8 PM and end time 8 AM.  Rate 70 mL/h.  300 cc water flush and 12 hours.    Activity at Discharge: As tolerated    Follow-up Appointments  Future Appointments   Date Time Provider Department Center   2/28/2019  8:10 AM LAB CHAIR [facility] None   2/28/2019  8:40 AM Aakash Garcia MD [facility]         Test Results Pending at Discharge: None       Drew Rucker MD  02/18/19  7:02 AM    Time: Discharge 50 min

## 2019-02-18 NOTE — PROGRESS NOTES
Case Management Discharge Note    Final Note: Discharged to home with [facility] on 2/18/19. Sister Blaire provided the transportation to home. MAHSA Gerard RN, CCP.     Destination      No service has been selected for the patient.      Durable Medical Equipment      No service has been selected for the patient.      Dialysis/Infusion      No service has been selected for the patient.      Home Medical Care - Selection Complete      Service Provider Request Status Selected Services Address Phone Number Fax Number    [facility] [address] [place] [address] [phone]      Community Resources      No service has been selected for the patient.        Other: Other(private auto sister/[place])    Final Discharge Disposition Code: 50 - home with hospice

## 2019-02-28 ENCOUNTER — OFFICE VISIT (OUTPATIENT)
Dept: ONCOLOGY | Facility: CLINIC | Age: 69
End: 2019-02-28

## 2019-02-28 ENCOUNTER — LAB (OUTPATIENT)
Dept: ONCOLOGY | Facility: HOSPITAL | Age: 69
End: 2019-02-28

## 2019-02-28 ENCOUNTER — APPOINTMENT (OUTPATIENT)
Dept: ONCOLOGY | Facility: CLINIC | Age: 69
End: 2019-02-28

## 2019-02-28 ENCOUNTER — APPOINTMENT (OUTPATIENT)
Dept: OTHER | Facility: HOSPITAL | Age: 69
End: 2019-02-28

## 2019-02-28 VITALS
HEIGHT: 69 IN | TEMPERATURE: 97.9 F | RESPIRATION RATE: 20 BRPM | SYSTOLIC BLOOD PRESSURE: 96 MMHG | HEART RATE: 112 BPM | BODY MASS INDEX: 16.66 KG/M2 | WEIGHT: 112.5 LBS | DIASTOLIC BLOOD PRESSURE: 58 MMHG | OXYGEN SATURATION: 99 %

## 2019-02-28 DIAGNOSIS — Z45.2 ENCOUNTER FOR FITTING AND ADJUSTMENT OF VASCULAR CATHETER: ICD-10-CM

## 2019-02-28 DIAGNOSIS — C79.51 METASTASIS TO BONE (HCC): ICD-10-CM

## 2019-02-28 DIAGNOSIS — C13.9 SQUAMOUS CELL CARCINOMA OF HYPOPHARYNX (HCC): Primary | Chronic | ICD-10-CM

## 2019-02-28 DIAGNOSIS — I82.4Z1 ACUTE DEEP VEIN THROMBOSIS (DVT) OF DISTAL VEIN OF RIGHT LOWER EXTREMITY (HCC): ICD-10-CM

## 2019-02-28 DIAGNOSIS — Z79.899 ENCOUNTER FOR LONG-TERM (CURRENT) USE OF HIGH-RISK MEDICATION: Primary | ICD-10-CM

## 2019-02-28 DIAGNOSIS — C78.01 MALIGNANT NEOPLASM METASTATIC TO RIGHT LUNG (HCC): ICD-10-CM

## 2019-02-28 DIAGNOSIS — B02.9 VARICELLA ZOSTER: ICD-10-CM

## 2019-02-28 DIAGNOSIS — IMO0002 SQUAMOUS CELL CARCINOMA: ICD-10-CM

## 2019-02-28 DIAGNOSIS — G89.29 OTHER CHRONIC PAIN: ICD-10-CM

## 2019-02-28 DIAGNOSIS — C13.9 SQUAMOUS CELL CARCINOMA OF HYPOPHARYNX (HCC): ICD-10-CM

## 2019-02-28 DIAGNOSIS — C78.00 MALIGNANT NEOPLASM METASTATIC TO LUNG, UNSPECIFIED LATERALITY (HCC): ICD-10-CM

## 2019-02-28 DIAGNOSIS — C14.0 THROAT CANCER (HCC): ICD-10-CM

## 2019-02-28 PROBLEM — G89.3 PAIN OF METASTATIC MALIGNANCY: Status: ACTIVE | Noted: 2019-02-28

## 2019-02-28 LAB
ANION GAP SERPL CALCULATED.3IONS-SCNC: 10.6 MMOL/L
BASOPHILS # BLD AUTO: 0.01 10*3/MM3 (ref 0–0.2)
BASOPHILS NFR BLD AUTO: 0.2 % (ref 0–1.5)
BUN BLD-MCNC: 15 MG/DL (ref 8–23)
BUN/CREAT SERPL: 18.5 (ref 7–25)
CALCIUM SPEC-SCNC: 8.9 MG/DL (ref 8.6–10.5)
CHLORIDE SERPL-SCNC: 96 MMOL/L (ref 98–107)
CO2 SERPL-SCNC: 28.4 MMOL/L (ref 22–29)
CREAT BLD-MCNC: 0.81 MG/DL (ref 0.76–1.27)
DEPRECATED RDW RBC AUTO: 52.2 FL (ref 37–54)
EOSINOPHIL # BLD AUTO: 0.15 10*3/MM3 (ref 0–0.4)
EOSINOPHIL NFR BLD AUTO: 2.4 % (ref 0.3–6.2)
ERYTHROCYTE [DISTWIDTH] IN BLOOD BY AUTOMATED COUNT: 13.2 % (ref 12.3–15.4)
GFR SERPL CREATININE-BSD FRML MDRD: 95 ML/MIN/1.73
GLUCOSE BLD-MCNC: 115 MG/DL (ref 65–99)
HCT VFR BLD AUTO: 25.4 % (ref 37.5–51)
HGB BLD-MCNC: 8.2 G/DL (ref 13–17.7)
IMM GRANULOCYTES # BLD AUTO: 0.04 10*3/MM3 (ref 0–0.05)
IMM GRANULOCYTES NFR BLD AUTO: 0.6 % (ref 0–0.5)
LYMPHOCYTES # BLD AUTO: 0.43 10*3/MM3 (ref 0.7–3.1)
LYMPHOCYTES NFR BLD AUTO: 6.8 % (ref 19.6–45.3)
MAGNESIUM SERPL-MCNC: 2.1 MG/DL (ref 1.6–2.4)
MCH RBC QN AUTO: 34.6 PG (ref 26.6–33)
MCHC RBC AUTO-ENTMCNC: 32.3 G/DL (ref 31.5–35.7)
MCV RBC AUTO: 107.2 FL (ref 79–97)
MONOCYTES # BLD AUTO: 0.42 10*3/MM3 (ref 0.1–0.9)
MONOCYTES NFR BLD AUTO: 6.7 % (ref 5–12)
NEUTROPHILS # BLD AUTO: 5.23 10*3/MM3 (ref 1.4–7)
NEUTROPHILS NFR BLD AUTO: 83.3 % (ref 42.7–76)
NRBC BLD AUTO-RTO: 0 /100 WBC (ref 0–0)
PLATELET # BLD AUTO: 221 10*3/MM3 (ref 140–450)
PMV BLD AUTO: 9.3 FL (ref 6–12)
POTASSIUM BLD-SCNC: 4.1 MMOL/L (ref 3.5–5.2)
RBC # BLD AUTO: 2.37 10*6/MM3 (ref 4.14–5.8)
SODIUM BLD-SCNC: 135 MMOL/L (ref 136–145)
WBC NRBC COR # BLD: 6.28 10*3/MM3 (ref 3.4–10.8)

## 2019-02-28 PROCEDURE — 85025 COMPLETE CBC W/AUTO DIFF WBC: CPT | Performed by: INTERNAL MEDICINE

## 2019-02-28 PROCEDURE — 36415 COLL VENOUS BLD VENIPUNCTURE: CPT

## 2019-02-28 PROCEDURE — 83735 ASSAY OF MAGNESIUM: CPT | Performed by: INTERNAL MEDICINE

## 2019-02-28 PROCEDURE — 99213 OFFICE O/P EST LOW 20 MIN: CPT | Performed by: INTERNAL MEDICINE

## 2019-02-28 PROCEDURE — 80048 BASIC METABOLIC PNL TOTAL CA: CPT | Performed by: INTERNAL MEDICINE

## 2019-02-28 PROCEDURE — 96523 IRRIG DRUG DELIVERY DEVICE: CPT | Performed by: INTERNAL MEDICINE

## 2019-02-28 RX ORDER — HEPARIN SODIUM (PORCINE) LOCK FLUSH IV SOLN 100 UNIT/ML 100 UNIT/ML
500 SOLUTION INTRAVENOUS AS NEEDED
OUTPATIENT
Start: 2019-02-28

## 2019-02-28 RX ORDER — SODIUM CHLORIDE 0.9 % (FLUSH) 0.9 %
10 SYRINGE (ML) INJECTION AS NEEDED
OUTPATIENT
Start: 2019-02-28

## 2019-02-28 RX ORDER — SODIUM CHLORIDE 0.9 % (FLUSH) 0.9 %
10 SYRINGE (ML) INJECTION AS NEEDED
Status: DISCONTINUED | OUTPATIENT
Start: 2019-02-28 | End: 2019-02-28 | Stop reason: HOSPADM

## 2019-02-28 RX ADMIN — Medication 500 UNITS: at 09:37

## 2019-02-28 RX ADMIN — SODIUM CHLORIDE, PRESERVATIVE FREE 10 ML: 5 INJECTION INTRAVENOUS at 09:36

## 2019-02-28 NOTE — PROGRESS NOTES
REASON FOR FOLLOW-UP:    1. 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  2.  Combined radiation and chemotherapy with cisplatin and Taxol initiated on 8/10/2017 concurrent with radiation.    3.  Long smoking history with COPD.  4.  Mediport and PEG tube placed by Dr. Valenzuela on 7/31/2017.  Subsequent PEG tube infection requiring antibiotics.  5.  Chemotherapy completed 9/15/2017 and radiation therapy completed 10/9/2017.  6.  Right neck dissection by Dr. Clinton Styles 5/17/2018 for persistent disease in the right neck.  7.  PET scan from 6/26/2018 showing multiple hypermetabolic lung lesions consistent with metastatic disease.  8.  Opdivo therapy as palliative treatment of metastatic squamous cell carcinoma of the head and neck.  9.  Progression of disease in spite of immunotherapy with Opdivo  10.  Varicella-zoster infection involving the right facial nerve with severe postherpetic neuralgia  11.  Patient now receiving hospice support without plans for further chemotherapy treatment.    HISTORY OF PRESENT ILLNESS:  King Parson is a 68 y.o. male with the above-mentioned history here today for follow-up of his squamous cell carcinoma of the head and neck treated with combined chemotherapy and radiation.  The patient completed chemotherapy on 9/15/2017.  He received 6 weekly doses of Taxol/ cisplatin.  The seventh dose was held due to significant moist desquamation of the skin as well as worsening blood counts. He was able to complete his radiation therapy on 10/9/2017.     PET scan from November 2017 at the end of therapy showed an excellent response.      He had a persistent palpable node on the right which was biopsied by Dr. Styles and found to be positive for residual squamous cell carcinoma.  He subsequently underwent a right neck dissection performed by Dr. Styles on 5/17/2018.  There was metastatic carcinoma in 3 out of 14 lymph nodes  and squamous cell carcinoma noted in fibroadipose tissue measuring 2.7 cm.  Carcinoma was present at the soft tissue margin.    He had a follow-up PET scan which was performed on 6/26/2018.  Unfortunately the PET scan showed multiple hypermetabolic lung nodules consistent with metastatic disease.  We recommended proceeding with palliative immunotherapy in the form of Opdivo.  He received his first dose of Opdivo on 7/13/2018.      CT scans of the neck and chest were performed 9/4/2018 after cycle #4 showing some increase in the size of the pulmonary nodules but no obvious new metastases identified therefore we felt this may be pseudo-progression and continued Opdivo with plans to repeat scans again after 8 cycles.    Ultimately, the patient was found to have further progression of disease in spite of Opdivo.  He also had a very rapid decline in his performance status which required hospitalization.  In part this was related to development of varicella-zoster in the right facial nerve.  He also required placement of a PEG tube for nutrition.  During the hospitalization extensive conversations were held with the patient regarding the advanced nature of his malignancy and his declining performance status which would prevent further palliative chemotherapy or radiation treatments.  Hospice was recommended and the patient initially was on a hospice scattered bed at [facility] but later was felt to be appropriate for discharge home with home hospice follow-up.    He returns today for his first visit back to our office since discharge to assess his symptom control and again to address questions from the patient and family.    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  This diagnosis was made by FNA of the neck node on 6/23/2017.  He is undergone staging CT scans on  6/6/2017 and a PET scan on 7/6/2017.  His disease appears to be clinical stage TIII N2 a stage IV a squamous carcinoma of the hypopharynx.     He was originally referred to the [facility] and was seen by Dr. Moreno Camp of radiation oncology on 7/14/2017.  The patient lives in [place] and has some transportation difficulties.  He has a sister who works in the lab at [employer] and for this reason they have decided to receive their treatment in the [facility].     He was seen initially by Dr. Garcia in the company of 2 of his sisters.  We recommended combined chemotherapy and radiation.  He will be seeing Dr. Grayson of the [facility] [place] to discuss the radiation portion of his treatment.  We plan to deliver weekly chemotherapy during radiation with combination of cisplatin and Taxol.  Taxol will be delivered day 1 and cisplatin be delivered along with hydration on day 2 each week.    He saw Dr. Grayson who was in agreement with concurrent chemotherapy and radiation.    Mediport and PEG tube placed by Dr. Valenzuela on 7/31/2017.  Subsequent PEG tube infection requiring antibiotics.    He saw nutrition.  He had a chemotherapy education session.    Cycle #1 of therapy was initiated on 8/10/2017.    Patient completed his chemotherapy (6 cycles) on 9/14/2017    Radiation therapy completed 10/9/2017.    PET scan at completion of treatment in November 2017 showed excellent response.    Surveillance CT scan of the neck 2/6/2018 showed no evidence of disease progression.    Needle biopsy for persistent right neck node in March 2018 was positive for squamous cell carcinoma.  Dr. Styles performed right neck dissection surgery  5/17/2018.  3 out of 14 lymph nodes were positive for squamous cell carcinoma and squamous cell carcinoma was found in fibroadipose tissue measuring 2.7 cm with positive soft tissue margin.    PET scan 6/26/2018 showed multiple hypermetabolic lung nodules consistent with  metastatic disease.  We discussed starting palliative immunotherapy in the form of Opdivo.  CT scans of the neck and chest were performed 9/4/2018 after cycle #4. There was some increase in the size of the pulmonary nodules but no obvious new metastases identified therefore we felt this may represent pseudo-progression and continued Opdivo with repeat scans again after 8 cycles.    Essentially stable disease after 8 cycles of Opdivo.  Patient will continue on Opdivo and undergo scans after cycle #12    He was seen at an urgent care for right rib pain and x-rays showed acracked rib.    He required hospitalization in late January 2019 for declining performance status and pain and swelling in the right face due to acute varicella-zoster.  He was no longer felt to be a candidate for systemic chemotherapy and palliative care with hospice was arranged..      PAST MEDICAL, SURGICAL, FAMILY, AND SOCIAL HISTORIES were reviewed with the patient and in the electronic medical record, and were updated if indicated.    ALLERGIES:  Allergies   Allergen Reactions   • Codeine Unknown (See Comments)     He dont like to take it because it makes him feel strange.        MEDICATIONS:  The medication list has been reviewed with the patient by the medical assistant, and the list has been updated in the electronic medical record, which I reviewed.  Medication dosages and frequencies were confirmed to be accurate.    I have reviewed the patient's medical history in detail and updated the computerized patient record.    Review of Systems   Constitutional: Positive for fatigue. Negative for appetite change, chills, fever and unexpected weight change.   HENT:   Negative for nosebleeds.         See HPI     Respiratory: Negative for cough and shortness of breath.    Cardiovascular: Negative for chest pain and leg swelling.   Gastrointestinal: Negative for abdominal pain, constipation, diarrhea, nausea and vomiting.   Endocrine: Negative for hot  "flashes.   Genitourinary: Negative for difficulty urinating.    Musculoskeletal: Negative for arthralgias and myalgias.   Skin: Negative for rash and wound.   Neurological: Negative for dizziness and extremity weakness.   Hematological: Negative for adenopathy. Does not bruise/bleed easily.   Psychiatric/Behavioral: Negative for sleep disturbance.       Vitals:    02/28/19 0847   BP: 96/58   Pulse: 112   Resp: 20   Temp: 97.9 °F (36.6 °C)   SpO2: 99%   Weight: 51 kg (112 lb 8 oz)   Height: 175.3 cm (69.02\")   PainSc:   7   PainLoc: Comment: all over body       Physical Exam   Constitutional: He is oriented to person, place, and time. He appears well-developed and well-nourished.   HENT:   Head: Normocephalic and atraumatic.   Nose: Nose normal.   Mouth/Throat: Mucous membranes are normal.   Edentulous   Eyes: Pupils are equal, round, and reactive to light.   Neck: Normal range of motion. Neck supple.   Palpable, firm 2 cm node in the jugulodigastric area of the right neck.     Cardiovascular: Normal rate, regular rhythm and normal heart sounds.   Pulmonary/Chest: Effort normal and breath sounds normal. No respiratory distress. He has no wheezes. He has no rhonchi. He has no rales.   Mediport anterior chest wall. No evidence of infection or thrombosis.   Abdominal: Soft. Normal appearance and bowel sounds are normal. He exhibits no distension. There is no hepatosplenomegaly. There is no tenderness.   Musculoskeletal: Normal range of motion. He exhibits no edema.   Neurological: He is alert and oriented to person, place, and time.   Skin: Skin is warm and dry.   Psychiatric: He has a normal mood and affect. His behavior is normal.   Nursing note and vitals reviewed.    DIAGNOSTIC DATA:  Lab Results   Component Value Date    WBC 6.28 02/28/2019    HGB 8.2 (L) 02/28/2019    HCT 25.4 (L) 02/28/2019    .2 (H) 02/28/2019     02/28/2019     Lab Results   Component Value Date    NEUTROABS 5.23 02/28/2019 "       Lab Results   Component Value Date    GLUCOSE 115 (H) 02/28/2019    BUN 15 02/28/2019    CREATININE 0.81 02/28/2019    EGFRIFNONA 95 02/28/2019    BCR 18.5 02/28/2019    K 4.1 02/28/2019    CO2 28.4 02/28/2019    CALCIUM 8.9 02/28/2019    ALBUMIN 2.90 (L) 02/01/2019    AST 16 02/01/2019    ALT 24 02/01/2019     CT CAP 2/1/2019  FINDINGS:      Chest:         The heart is normal in size.     Mildly prominent right paratracheal lymph nodes measuring up to 0.6 cm  in short axis dimension, as before. Mild enlarged aorticopulmonary  window lymph node conglomerate measures 1 x 1.4 cm, unchanged since  10/30/2018.      Multiple bilateral pulmonary masses are present with representative  lesions as below:  *  Dominant mass within the right lower lobe with associated invasion of  the underlying pleura and right seventh and eighth eighth ribs measures  4.4 x 5.3 cm (previously 3.3 x 4.2 cm on 1/2/2019). This lesion contains  internal foci of air.  *  Right middle lobe mass abutting the right heart border measuring 4 x  3.1 cm processes previously 2.8 x 2.4 cm). This mass contains a focus of  gas.  *  Multiple smaller lesions also demonstrate interval enlargement.     There is no new pulmonary consolidation, pleural effusion or  pneumothorax.     Abdomen:         Percutaneous gastrostomy tube terminates in the stomach.     Subcentimeter hypodense lesions are present within the liver, as before.     The gallbladder, pancreas, spleen, adrenal glands and kidneys have an  unremarkable postcontrast CT appearance. No hydronephrosis is present.     There is mild intra and extrahepatic biliary ductal dilation with common  bile duct measuring up to 1 cm in diameter, as before.     There is no abdominopelvic adenopathy by size criteria.     The large and small bowel are normal in caliber. The appendix is normal.     There is no free intraperitoneal air or fluid.     Bone windows: Old left clavicular, right rib and right humeral  fracture  deformities, as before. There is no acute, likely pathologic fracture of  the right seventh rib.     IMPRESSION:  Impression:  1.  Interval enlargement of multiple large pulmonary masses since  1/2/2019 as above. Multiple of the metastatic lesions containing foci of  gas. While findings likely represent necrotic metastases, superinfected  metastatic disease would also appear similar in the appropriate clinical  setting and correlation with patient history with potential percutaneous  sampling is recommended if clinically indicated.  2.  New, likely pathologic fracture of the right seventh rib.  3.  Mildly prominent mediastinal lymph nodes, as before.  4.  Percutaneous gastrostomy tube appropriately positioned in the  stomach with a few foci of gas within the adjacent subcutaneous tissues.  5.  Other chronic findings as above.    ASSESSMENT:  This is a 68 y.o. male with:  1.  Stage IV a (T3, N2 a, M0) squamous cell carcinoma of the hypopharynx with metastatic lymphadenopathy to the right neck.Treated with combined radiation and cisplatin and Taxol chemotherapy.  Chemotherapy was completed 9/15/2017 and radiation completed  on 10/9/2017.  Persistent squamous cell carcinoma in the right neck node. Patientunderwent right neck dissection 5/17/2018.      2.  Comorbidities including COPD.  3.  PEG tube removed by Dr. Valenzuela 11/30/2017.  4.  Development of metastatic disease to the lungs noted on PET scan 6/26/2018.  5.  Patient is now receiving palliative terminal care with hospice in the home setting.  6.  Pain related to postherpetic neuralgia in the right face.  7.  DVT currently on anticoagulation with Eliquis.    PLAN:  1.  Continue terminal palliative care with the assistance of hospice.  Patient actually seems to be very comfortable on today's visit and is still ambulatory with a walker and functioning pretty well at home.  2.  In regards to his pain he is taking a high dose of gabapentin for his  postherpetic neuralgia and also will be started on MS Contin with breakthrough narcotic as needed by his hospice nurse.  3.  We addressed all of the patient's questions and questions from the family.  We certainly feel that this is the best approach for this gentleman at this time.  We will not schedule routine follow-up in our office but will certainly remain available to help out in any way we can with symptom control during the terminal phase of his illness.                Aakash Garcia MD

## 2019-05-30 ENCOUNTER — HOSPITAL ENCOUNTER (EMERGENCY)
Facility: HOSPITAL | Age: 69
Discharge: HOME | End: 2019-05-30
Attending: EMERGENCY MEDICINE | Admitting: EMERGENCY MEDICINE

## 2019-05-30 ENCOUNTER — APPOINTMENT (OUTPATIENT)
Dept: GENERAL RADIOLOGY | Facility: HOSPITAL | Age: 69
End: 2019-05-30

## 2019-05-30 VITALS
TEMPERATURE: 101.5 F | HEART RATE: 106 BPM | SYSTOLIC BLOOD PRESSURE: 71 MMHG | WEIGHT: 112.7 LBS | DIASTOLIC BLOOD PRESSURE: 54 MMHG | HEIGHT: 69 IN | BODY MASS INDEX: 16.69 KG/M2 | RESPIRATION RATE: 24 BRPM | OXYGEN SATURATION: 88 %

## 2019-05-30 DIAGNOSIS — J18.9 PNEUMONIA OF RIGHT LOWER LOBE DUE TO INFECTIOUS ORGANISM: ICD-10-CM

## 2019-05-30 DIAGNOSIS — C34.90 MALIGNANT NEOPLASM OF LUNG, UNSPECIFIED LATERALITY, UNSPECIFIED PART OF LUNG (HCC): Primary | ICD-10-CM

## 2019-05-30 LAB
ALBUMIN SERPL-MCNC: 3 G/DL (ref 3.5–5.2)
ALBUMIN/GLOB SERPL: 0.9 G/DL
ALP SERPL-CCNC: 97 U/L (ref 39–117)
ALT SERPL W P-5'-P-CCNC: 24 U/L (ref 1–41)
ANION GAP SERPL CALCULATED.3IONS-SCNC: 16.5 MMOL/L
AST SERPL-CCNC: 24 U/L (ref 1–40)
BASOPHILS # BLD AUTO: 0.05 10*3/MM3 (ref 0–0.2)
BASOPHILS NFR BLD AUTO: 0.2 % (ref 0–1.5)
BILIRUB SERPL-MCNC: 0.4 MG/DL (ref 0.2–1.2)
BUN BLD-MCNC: 21 MG/DL (ref 8–23)
BUN/CREAT SERPL: 28.4 (ref 7–25)
CALCIUM SPEC-SCNC: 11.4 MG/DL (ref 8.6–10.5)
CHLORIDE SERPL-SCNC: 94 MMOL/L (ref 98–107)
CO2 SERPL-SCNC: 22.5 MMOL/L (ref 22–29)
CREAT BLD-MCNC: 0.74 MG/DL (ref 0.76–1.27)
D-LACTATE SERPL-SCNC: 5.4 MMOL/L (ref 0.5–2)
DEPRECATED RDW RBC AUTO: 58.4 FL (ref 37–54)
EOSINOPHIL # BLD AUTO: 0.09 10*3/MM3 (ref 0–0.4)
EOSINOPHIL NFR BLD AUTO: 0.4 % (ref 0.3–6.2)
ERYTHROCYTE [DISTWIDTH] IN BLOOD BY AUTOMATED COUNT: 16.4 % (ref 12.3–15.4)
GFR SERPL CREATININE-BSD FRML MDRD: 105 ML/MIN/1.73
GLOBULIN UR ELPH-MCNC: 3.5 GM/DL
GLUCOSE BLD-MCNC: 209 MG/DL (ref 65–99)
HCT VFR BLD AUTO: 28.1 % (ref 37.5–51)
HGB BLD-MCNC: 8.6 G/DL (ref 13–17.7)
HOLD SPECIMEN: NORMAL
IMM GRANULOCYTES # BLD AUTO: 0.31 10*3/MM3 (ref 0–0.05)
IMM GRANULOCYTES NFR BLD AUTO: 1.3 % (ref 0–0.5)
LYMPHOCYTES # BLD AUTO: 1.2 10*3/MM3 (ref 0.7–3.1)
LYMPHOCYTES NFR BLD AUTO: 4.9 % (ref 19.6–45.3)
MCH RBC QN AUTO: 29.6 PG (ref 26.6–33)
MCHC RBC AUTO-ENTMCNC: 30.6 G/DL (ref 31.5–35.7)
MCV RBC AUTO: 96.6 FL (ref 79–97)
MONOCYTES # BLD AUTO: 0.37 10*3/MM3 (ref 0.1–0.9)
MONOCYTES NFR BLD AUTO: 1.5 % (ref 5–12)
NEUTROPHILS # BLD AUTO: 22.62 10*3/MM3 (ref 1.7–7)
NEUTROPHILS NFR BLD AUTO: 91.7 % (ref 42.7–76)
NRBC BLD AUTO-RTO: 0 /100 WBC (ref 0–0.2)
NT-PROBNP SERPL-MCNC: 749.8 PG/ML (ref 5–900)
PLATELET # BLD AUTO: 477 10*3/MM3 (ref 140–450)
PMV BLD AUTO: 9.3 FL (ref 6–12)
POTASSIUM BLD-SCNC: 4.2 MMOL/L (ref 3.5–5.2)
PROCALCITONIN SERPL-MCNC: 1.46 NG/ML (ref 0.1–0.25)
PROT SERPL-MCNC: 6.5 G/DL (ref 6–8.5)
RBC # BLD AUTO: 2.91 10*6/MM3 (ref 4.14–5.8)
SODIUM BLD-SCNC: 133 MMOL/L (ref 136–145)
TROPONIN T SERPL-MCNC: <0.01 NG/ML (ref 0–0.03)
WBC NRBC COR # BLD: 24.64 10*3/MM3 (ref 3.4–10.8)

## 2019-05-30 PROCEDURE — 71045 X-RAY EXAM CHEST 1 VIEW: CPT

## 2019-05-30 PROCEDURE — 99285 EMERGENCY DEPT VISIT HI MDM: CPT

## 2019-05-30 PROCEDURE — 25010000002 MORPHINE PER 10 MG: Performed by: EMERGENCY MEDICINE

## 2019-05-30 PROCEDURE — 80053 COMPREHEN METABOLIC PANEL: CPT | Performed by: EMERGENCY MEDICINE

## 2019-05-30 PROCEDURE — 83605 ASSAY OF LACTIC ACID: CPT | Performed by: EMERGENCY MEDICINE

## 2019-05-30 PROCEDURE — 85025 COMPLETE CBC W/AUTO DIFF WBC: CPT | Performed by: EMERGENCY MEDICINE

## 2019-05-30 PROCEDURE — 83880 ASSAY OF NATRIURETIC PEPTIDE: CPT | Performed by: EMERGENCY MEDICINE

## 2019-05-30 PROCEDURE — 87040 BLOOD CULTURE FOR BACTERIA: CPT | Performed by: EMERGENCY MEDICINE

## 2019-05-30 PROCEDURE — 25010000002 HYDROMORPHONE PER 4 MG: Performed by: EMERGENCY MEDICINE

## 2019-05-30 PROCEDURE — 84484 ASSAY OF TROPONIN QUANT: CPT | Performed by: EMERGENCY MEDICINE

## 2019-05-30 PROCEDURE — 96375 TX/PRO/DX INJ NEW DRUG ADDON: CPT

## 2019-05-30 PROCEDURE — 99285 EMERGENCY DEPT VISIT HI MDM: CPT | Performed by: EMERGENCY MEDICINE

## 2019-05-30 PROCEDURE — 84145 PROCALCITONIN (PCT): CPT | Performed by: EMERGENCY MEDICINE

## 2019-05-30 PROCEDURE — 96374 THER/PROPH/DIAG INJ IV PUSH: CPT

## 2019-05-30 RX ORDER — MORPHINE SULFATE 30 MG/1
30 TABLET, FILM COATED, EXTENDED RELEASE ORAL 2 TIMES DAILY
COMMUNITY

## 2019-05-30 RX ORDER — SENNOSIDES 8.6 MG
TABLET ORAL NIGHTLY
COMMUNITY

## 2019-05-30 RX ORDER — AMMONIUM LACTATE 120 MG/G
CREAM TOPICAL AS NEEDED
COMMUNITY

## 2019-05-30 RX ORDER — DEXAMETHASONE 4 MG/1
4 TABLET ORAL 2 TIMES DAILY WITH MEALS
COMMUNITY

## 2019-05-30 RX ORDER — SODIUM CHLORIDE 0.9 % (FLUSH) 0.9 %
10 SYRINGE (ML) INJECTION AS NEEDED
Status: DISCONTINUED | OUTPATIENT
Start: 2019-05-30 | End: 2019-05-30 | Stop reason: HOSPADM

## 2019-05-30 RX ORDER — ACETAMINOPHEN 160 MG/5ML
1000 SOLUTION ORAL ONCE
Status: COMPLETED | OUTPATIENT
Start: 2019-05-30 | End: 2019-05-30

## 2019-05-30 RX ORDER — HYDROMORPHONE HYDROCHLORIDE 4 MG/1
4 TABLET ORAL EVERY 4 HOURS PRN
COMMUNITY

## 2019-05-30 RX ORDER — LACTULOSE 10 G/15ML
20 SOLUTION ORAL 2 TIMES DAILY PRN
COMMUNITY

## 2019-05-30 RX ORDER — HYDROMORPHONE HCL 110MG/55ML
0.5 PATIENT CONTROLLED ANALGESIA SYRINGE INTRAVENOUS ONCE
Status: COMPLETED | OUTPATIENT
Start: 2019-05-30 | End: 2019-05-30

## 2019-05-30 RX ORDER — ZINC OXIDE 20 %
OINTMENT (GRAM) TOPICAL AS NEEDED
COMMUNITY

## 2019-05-30 RX ORDER — ACETAMINOPHEN 500 MG
1000 TABLET ORAL ONCE
Status: DISCONTINUED | OUTPATIENT
Start: 2019-05-30 | End: 2019-05-30

## 2019-05-30 RX ADMIN — ACETAMINOPHEN 1000 MG: 325 SOLUTION ORAL at 08:53

## 2019-05-30 RX ADMIN — HEPARIN SODIUM (PORCINE) LOCK FLUSH IV SOLN 100 UNIT/ML 500 UNITS: 100 SOLUTION at 11:43

## 2019-05-30 RX ADMIN — SODIUM CHLORIDE 500 ML: 9 INJECTION, SOLUTION INTRAVENOUS at 08:16

## 2019-05-30 RX ADMIN — MORPHINE SULFATE 6 MG: 4 INJECTION, SOLUTION INTRAMUSCULAR; INTRAVENOUS at 08:21

## 2019-05-30 RX ADMIN — HYDROMORPHONE HYDROCHLORIDE 0.5 MG: 2 INJECTION, SOLUTION INTRAMUSCULAR; INTRAVENOUS; SUBCUTANEOUS at 11:26

## 2019-06-04 LAB — BACTERIA SPEC AEROBE CULT: NORMAL
